# Patient Record
Sex: FEMALE | Race: WHITE | NOT HISPANIC OR LATINO | Employment: OTHER | URBAN - METROPOLITAN AREA
[De-identification: names, ages, dates, MRNs, and addresses within clinical notes are randomized per-mention and may not be internally consistent; named-entity substitution may affect disease eponyms.]

---

## 2017-01-03 ENCOUNTER — ALLSCRIPTS OFFICE VISIT (OUTPATIENT)
Dept: OTHER | Facility: OTHER | Age: 77
End: 2017-01-03

## 2017-01-03 ENCOUNTER — HOSPITAL ENCOUNTER (OUTPATIENT)
Dept: CT IMAGING | Facility: HOSPITAL | Age: 77
Discharge: HOME | End: 2017-01-03
Attending: INTERNAL MEDICINE
Payer: MEDICARE

## 2017-01-03 DIAGNOSIS — C34.91 MALIGNANT NEOPLASM OF UNSPECIFIED PART OF RIGHT BRONCHUS OR LUNG (HCC): ICD-10-CM

## 2017-01-03 PROCEDURE — 74177 CT ABD & PELVIS W/CONTRAST: CPT

## 2017-01-03 PROCEDURE — 71260 CT THORAX DX C+: CPT

## 2017-01-03 RX ADMIN — IOHEXOL 100 ML: 350 INJECTION, SOLUTION INTRAVENOUS at 10:59

## 2017-01-05 ENCOUNTER — ALLSCRIPTS OFFICE VISIT (OUTPATIENT)
Dept: OTHER | Facility: OTHER | Age: 77
End: 2017-01-05

## 2017-01-05 ENCOUNTER — TRANSCRIBE ORDERS (OUTPATIENT)
Dept: ADMINISTRATIVE | Facility: HOSPITAL | Age: 77
End: 2017-01-05

## 2017-01-05 DIAGNOSIS — C34.91 PRIMARY LUNG CANCER WITH METASTASIS FROM LUNG TO OTHER SITE, RIGHT (HCC): Primary | ICD-10-CM

## 2017-01-17 ENCOUNTER — GENERIC CONVERSION - ENCOUNTER (OUTPATIENT)
Dept: OTHER | Facility: OTHER | Age: 77
End: 2017-01-17

## 2017-01-25 ENCOUNTER — GENERIC CONVERSION - ENCOUNTER (OUTPATIENT)
Dept: OTHER | Facility: OTHER | Age: 77
End: 2017-01-25

## 2017-03-02 ENCOUNTER — ALLSCRIPTS OFFICE VISIT (OUTPATIENT)
Dept: OTHER | Facility: OTHER | Age: 77
End: 2017-03-02

## 2017-03-02 DIAGNOSIS — I65.29 OCCLUSION AND STENOSIS OF UNSPECIFIED CAROTID ARTERY: ICD-10-CM

## 2017-03-02 DIAGNOSIS — I73.9 PERIPHERAL VASCULAR DISEASE (HCC): ICD-10-CM

## 2017-03-06 ENCOUNTER — HOSPITAL ENCOUNTER (OUTPATIENT)
Dept: NON INVASIVE DIAGNOSTICS | Facility: HOSPITAL | Age: 77
Discharge: HOME | End: 2017-03-06
Attending: INTERNAL MEDICINE
Payer: MEDICARE

## 2017-03-06 ENCOUNTER — TRANSCRIBE ORDERS (OUTPATIENT)
Dept: ADMINISTRATIVE | Facility: HOSPITAL | Age: 77
End: 2017-03-06

## 2017-03-06 ENCOUNTER — APPOINTMENT (OUTPATIENT)
Dept: LAB | Facility: HOSPITAL | Age: 77
End: 2017-03-06
Attending: INTERNAL MEDICINE
Payer: MEDICARE

## 2017-03-06 DIAGNOSIS — I65.29 OCCLUSION AND STENOSIS OF UNSPECIFIED CAROTID ARTERY: ICD-10-CM

## 2017-03-06 DIAGNOSIS — I73.9 PERIPHERAL VASCULAR DISEASE (HCC): ICD-10-CM

## 2017-03-06 LAB
ALBUMIN SERPL BCP-MCNC: 3.4 G/DL (ref 3.5–5)
ALP SERPL-CCNC: 94 U/L (ref 46–116)
ALT SERPL W P-5'-P-CCNC: 15 U/L (ref 12–78)
AST SERPL W P-5'-P-CCNC: 16 U/L (ref 5–45)
BILIRUB DIRECT SERPL-MCNC: 0.12 MG/DL (ref 0–0.2)
BILIRUB SERPL-MCNC: 0.3 MG/DL (ref 0.2–1)
CHOLEST SERPL-MCNC: 189 MG/DL (ref 50–200)
HDLC SERPL-MCNC: 65 MG/DL (ref 40–60)
LDLC SERPL CALC-MCNC: 95 MG/DL (ref 0–100)
PROT SERPL-MCNC: 7.3 G/DL (ref 6.4–8.2)
TRIGL SERPL-MCNC: 146 MG/DL

## 2017-03-06 PROCEDURE — 36415 COLL VENOUS BLD VENIPUNCTURE: CPT

## 2017-03-06 PROCEDURE — 93306 TTE W/DOPPLER COMPLETE: CPT

## 2017-03-06 PROCEDURE — 80076 HEPATIC FUNCTION PANEL: CPT

## 2017-03-06 PROCEDURE — 80061 LIPID PANEL: CPT

## 2017-04-04 ENCOUNTER — ALLSCRIPTS OFFICE VISIT (OUTPATIENT)
Dept: OTHER | Facility: OTHER | Age: 77
End: 2017-04-04

## 2017-04-17 ENCOUNTER — APPOINTMENT (OUTPATIENT)
Dept: LAB | Facility: HOSPITAL | Age: 77
End: 2017-04-17
Attending: RADIOLOGY
Payer: MEDICARE

## 2017-04-17 ENCOUNTER — HOSPITAL ENCOUNTER (OUTPATIENT)
Dept: CT IMAGING | Facility: HOSPITAL | Age: 77
Discharge: HOME | End: 2017-04-17
Attending: INTERNAL MEDICINE
Payer: MEDICARE

## 2017-04-17 ENCOUNTER — TRANSCRIBE ORDERS (OUTPATIENT)
Dept: ADMINISTRATIVE | Facility: HOSPITAL | Age: 77
End: 2017-04-17

## 2017-04-17 DIAGNOSIS — Z01.818 PREOP TESTING: ICD-10-CM

## 2017-04-17 DIAGNOSIS — C34.91 MALIGNANT NEOPLASM OF UNSPECIFIED PART OF RIGHT BRONCHUS OR LUNG (HCC): ICD-10-CM

## 2017-04-17 DIAGNOSIS — Z01.818 PREOP TESTING: Primary | ICD-10-CM

## 2017-04-17 LAB
CREAT SERPL-MCNC: 0.76 MG/DL (ref 0.6–1.3)
GFR SERPL CREATININE-BSD FRML MDRD: >60 ML/MIN/1.73SQ M

## 2017-04-17 PROCEDURE — 70498 CT ANGIOGRAPHY NECK: CPT

## 2017-04-17 PROCEDURE — 82565 ASSAY OF CREATININE: CPT

## 2017-04-17 PROCEDURE — 36415 COLL VENOUS BLD VENIPUNCTURE: CPT

## 2017-04-17 PROCEDURE — 71260 CT THORAX DX C+: CPT

## 2017-04-17 PROCEDURE — 74177 CT ABD & PELVIS W/CONTRAST: CPT

## 2017-04-17 RX ADMIN — IOHEXOL 100 ML: 350 INJECTION, SOLUTION INTRAVENOUS at 11:03

## 2017-05-01 ENCOUNTER — TRANSCRIBE ORDERS (OUTPATIENT)
Dept: ADMINISTRATIVE | Facility: HOSPITAL | Age: 77
End: 2017-05-01

## 2017-05-01 ENCOUNTER — ALLSCRIPTS OFFICE VISIT (OUTPATIENT)
Dept: OTHER | Facility: OTHER | Age: 77
End: 2017-05-01

## 2017-05-01 DIAGNOSIS — C34.90 PRIMARY LUNG CANCER WITH METASTASIS FROM LUNG TO OTHER SITE, UNSPECIFIED LATERALITY (HCC): Primary | ICD-10-CM

## 2017-05-01 DIAGNOSIS — C34.91 MALIGNANT NEOPLASM OF UNSPECIFIED PART OF RIGHT BRONCHUS OR LUNG (HCC): ICD-10-CM

## 2017-05-04 ENCOUNTER — GENERIC CONVERSION - ENCOUNTER (OUTPATIENT)
Dept: OTHER | Facility: OTHER | Age: 77
End: 2017-05-04

## 2017-05-15 RX ORDER — SODIUM CHLORIDE 9 MG/ML
40 INJECTION, SOLUTION INTRAVENOUS ONCE
Status: DISCONTINUED | OUTPATIENT
Start: 2017-05-16 | End: 2017-05-19 | Stop reason: HOSPADM

## 2017-05-16 ENCOUNTER — HOSPITAL ENCOUNTER (OUTPATIENT)
Dept: INFUSION CENTER | Facility: HOSPITAL | Age: 77
Discharge: HOME | End: 2017-05-16
Payer: MEDICARE

## 2017-05-16 VITALS
OXYGEN SATURATION: 97 % | RESPIRATION RATE: 20 BRPM | SYSTOLIC BLOOD PRESSURE: 147 MMHG | TEMPERATURE: 96.2 F | DIASTOLIC BLOOD PRESSURE: 64 MMHG | HEART RATE: 63 BPM

## 2017-05-16 LAB
ALBUMIN SERPL BCP-MCNC: 3 G/DL (ref 3.5–5)
ALP SERPL-CCNC: 78 U/L (ref 46–116)
ALT SERPL W P-5'-P-CCNC: 13 U/L (ref 12–78)
ANION GAP SERPL CALCULATED.3IONS-SCNC: 4 MMOL/L (ref 4–13)
AST SERPL W P-5'-P-CCNC: 14 U/L (ref 5–45)
BASOPHILS # BLD AUTO: 0.02 THOUSANDS/ΜL (ref 0–0.1)
BASOPHILS NFR BLD AUTO: 0 % (ref 0–1)
BILIRUB SERPL-MCNC: 0.4 MG/DL (ref 0.2–1)
BUN SERPL-MCNC: 14 MG/DL (ref 5–25)
CALCIUM SERPL-MCNC: 8.6 MG/DL (ref 8.3–10.1)
CHLORIDE SERPL-SCNC: 105 MMOL/L (ref 100–108)
CO2 SERPL-SCNC: 34 MMOL/L (ref 21–32)
CREAT SERPL-MCNC: 0.7 MG/DL (ref 0.6–1.3)
EOSINOPHIL # BLD AUTO: 0.11 THOUSAND/ΜL (ref 0–0.61)
EOSINOPHIL NFR BLD AUTO: 2 % (ref 0–6)
ERYTHROCYTE [DISTWIDTH] IN BLOOD BY AUTOMATED COUNT: 12.3 % (ref 11.6–15.1)
GFR SERPL CREATININE-BSD FRML MDRD: >60 ML/MIN/1.73SQ M
GLUCOSE SERPL-MCNC: 161 MG/DL (ref 65–140)
HCT VFR BLD AUTO: 39 % (ref 34.8–46.1)
HGB BLD-MCNC: 12.5 G/DL (ref 11.5–15.4)
LYMPHOCYTES # BLD AUTO: 1.47 THOUSANDS/ΜL (ref 0.6–4.47)
LYMPHOCYTES NFR BLD AUTO: 23 % (ref 14–44)
MCH RBC QN AUTO: 30.9 PG (ref 26.8–34.3)
MCHC RBC AUTO-ENTMCNC: 32.1 G/DL (ref 31.4–37.4)
MCV RBC AUTO: 97 FL (ref 82–98)
MONOCYTES # BLD AUTO: 0.51 THOUSAND/ΜL (ref 0.17–1.22)
MONOCYTES NFR BLD AUTO: 8 % (ref 4–12)
NEUTROPHILS # BLD AUTO: 4.24 THOUSANDS/ΜL (ref 1.85–7.62)
NEUTS SEG NFR BLD AUTO: 67 % (ref 43–75)
PLATELET # BLD AUTO: 128 THOUSANDS/UL (ref 149–390)
PMV BLD AUTO: 10.1 FL (ref 8.9–12.7)
POTASSIUM SERPL-SCNC: 3.5 MMOL/L (ref 3.5–5.3)
PROT SERPL-MCNC: 6.2 G/DL (ref 6.4–8.2)
RBC # BLD AUTO: 4.04 MILLION/UL (ref 3.81–5.12)
SODIUM SERPL-SCNC: 143 MMOL/L (ref 136–145)
TSH SERPL DL<=0.05 MIU/L-ACNC: 2.99 UIU/ML (ref 0.36–3.74)
WBC # BLD AUTO: 6.35 THOUSAND/UL (ref 4.31–10.16)

## 2017-05-16 PROCEDURE — 96413 CHEMO IV INFUSION 1 HR: CPT

## 2017-05-16 PROCEDURE — 84443 ASSAY THYROID STIM HORMONE: CPT | Performed by: INTERNAL MEDICINE

## 2017-05-16 PROCEDURE — 80053 COMPREHEN METABOLIC PANEL: CPT | Performed by: INTERNAL MEDICINE

## 2017-05-16 PROCEDURE — 85025 COMPLETE CBC W/AUTO DIFF WBC: CPT | Performed by: INTERNAL MEDICINE

## 2017-05-16 RX ADMIN — ATEZOLIZUMAB 1200 MG: 1200 INJECTION, SOLUTION INTRAVENOUS at 08:45

## 2017-05-16 RX ADMIN — Medication 300 UNITS: at 10:03

## 2017-05-16 NOTE — PROGRESS NOTES
Per Mabel Grady, Dr Jose Clemente nurse, pt had decided to start Tecentriq now instead of waiting 2 months to restart chemo or an immunotherapy drug per last office visit note

## 2017-05-16 NOTE — PROGRESS NOTES
Pt matthew infusion well  Offers no c/o  Pt used O2 via n/c @ 2L during visit today  Port deaccessed after flushing with Heparin per protocol  Dsd applied  Disch amb to home, steady gait

## 2017-05-16 NOTE — PROGRESS NOTES
Pt arrives on unit for Next Jump  Labs sent as ordered  Pt offers no c/o at this time  Will continue to monitor

## 2017-06-05 ENCOUNTER — ALLSCRIPTS OFFICE VISIT (OUTPATIENT)
Dept: OTHER | Facility: OTHER | Age: 77
End: 2017-06-05

## 2017-06-05 DIAGNOSIS — R53.83 OTHER FATIGUE: ICD-10-CM

## 2017-06-05 DIAGNOSIS — C34.91 MALIGNANT NEOPLASM OF UNSPECIFIED PART OF RIGHT BRONCHUS OR LUNG (HCC): ICD-10-CM

## 2017-06-06 ENCOUNTER — HOSPITAL ENCOUNTER (OUTPATIENT)
Dept: INFUSION CENTER | Facility: HOSPITAL | Age: 77
Discharge: HOME | End: 2017-06-06
Payer: MEDICARE

## 2017-06-06 LAB
ALBUMIN SERPL BCP-MCNC: 3.2 G/DL (ref 3.5–5)
ALP SERPL-CCNC: 84 U/L (ref 46–116)
ALT SERPL W P-5'-P-CCNC: 15 U/L (ref 12–78)
ANION GAP SERPL CALCULATED.3IONS-SCNC: 4 MMOL/L (ref 4–13)
AST SERPL W P-5'-P-CCNC: 13 U/L (ref 5–45)
BASOPHILS # BLD AUTO: 0.02 THOUSANDS/ΜL (ref 0–0.1)
BASOPHILS NFR BLD AUTO: 0 % (ref 0–1)
BILIRUB SERPL-MCNC: 0.4 MG/DL (ref 0.2–1)
BUN SERPL-MCNC: 12 MG/DL (ref 5–25)
CALCIUM SERPL-MCNC: 8.8 MG/DL (ref 8.3–10.1)
CHLORIDE SERPL-SCNC: 102 MMOL/L (ref 100–108)
CO2 SERPL-SCNC: 35 MMOL/L (ref 21–32)
CREAT SERPL-MCNC: 0.66 MG/DL (ref 0.6–1.3)
EOSINOPHIL # BLD AUTO: 0.1 THOUSAND/ΜL (ref 0–0.61)
EOSINOPHIL NFR BLD AUTO: 1 % (ref 0–6)
ERYTHROCYTE [DISTWIDTH] IN BLOOD BY AUTOMATED COUNT: 12.6 % (ref 11.6–15.1)
GFR SERPL CREATININE-BSD FRML MDRD: >60 ML/MIN/1.73SQ M
GLUCOSE SERPL-MCNC: 121 MG/DL (ref 65–140)
HCT VFR BLD AUTO: 39.8 % (ref 34.8–46.1)
HGB BLD-MCNC: 12.7 G/DL (ref 11.5–15.4)
LYMPHOCYTES # BLD AUTO: 2.17 THOUSANDS/ΜL (ref 0.6–4.47)
LYMPHOCYTES NFR BLD AUTO: 31 % (ref 14–44)
MCH RBC QN AUTO: 31.1 PG (ref 26.8–34.3)
MCHC RBC AUTO-ENTMCNC: 31.9 G/DL (ref 31.4–37.4)
MCV RBC AUTO: 98 FL (ref 82–98)
MONOCYTES # BLD AUTO: 0.69 THOUSAND/ΜL (ref 0.17–1.22)
MONOCYTES NFR BLD AUTO: 10 % (ref 4–12)
NEUTROPHILS # BLD AUTO: 4.05 THOUSANDS/ΜL (ref 1.85–7.62)
NEUTS SEG NFR BLD AUTO: 58 % (ref 43–75)
PLATELET # BLD AUTO: 146 THOUSANDS/UL (ref 149–390)
PMV BLD AUTO: 10.2 FL (ref 8.9–12.7)
POTASSIUM SERPL-SCNC: 4.1 MMOL/L (ref 3.5–5.3)
PROT SERPL-MCNC: 6.6 G/DL (ref 6.4–8.2)
RBC # BLD AUTO: 4.08 MILLION/UL (ref 3.81–5.12)
SODIUM SERPL-SCNC: 141 MMOL/L (ref 136–145)
WBC # BLD AUTO: 7.03 THOUSAND/UL (ref 4.31–10.16)

## 2017-06-06 PROCEDURE — 85025 COMPLETE CBC W/AUTO DIFF WBC: CPT | Performed by: INTERNAL MEDICINE

## 2017-06-06 PROCEDURE — 80053 COMPREHEN METABOLIC PANEL: CPT | Performed by: INTERNAL MEDICINE

## 2017-06-06 RX ORDER — SODIUM CHLORIDE 9 MG/ML
40 INJECTION, SOLUTION INTRAVENOUS ONCE
Status: DISCONTINUED | OUTPATIENT
Start: 2017-06-07 | End: 2017-06-10 | Stop reason: HOSPADM

## 2017-06-06 RX ADMIN — Medication 300 UNITS: at 15:30

## 2017-06-07 ENCOUNTER — HOSPITAL ENCOUNTER (OUTPATIENT)
Dept: INFUSION CENTER | Facility: HOSPITAL | Age: 77
Discharge: HOME | End: 2017-06-07
Payer: MEDICARE

## 2017-06-07 VITALS
TEMPERATURE: 96.3 F | RESPIRATION RATE: 18 BRPM | DIASTOLIC BLOOD PRESSURE: 82 MMHG | SYSTOLIC BLOOD PRESSURE: 147 MMHG | WEIGHT: 110.23 LBS | HEIGHT: 60 IN | OXYGEN SATURATION: 96 % | HEART RATE: 78 BPM | BODY MASS INDEX: 21.64 KG/M2

## 2017-06-07 PROCEDURE — 96413 CHEMO IV INFUSION 1 HR: CPT

## 2017-06-07 RX ADMIN — ATEZOLIZUMAB 1200 MG: 1200 INJECTION, SOLUTION INTRAVENOUS at 09:07

## 2017-06-07 RX ADMIN — Medication 300 UNITS: at 09:51

## 2017-06-13 ENCOUNTER — GENERIC CONVERSION - ENCOUNTER (OUTPATIENT)
Dept: OTHER | Facility: OTHER | Age: 77
End: 2017-06-13

## 2017-06-19 DIAGNOSIS — C34.91 MALIGNANT NEOPLASM OF UNSPECIFIED PART OF RIGHT BRONCHUS OR LUNG (HCC): ICD-10-CM

## 2017-06-26 ENCOUNTER — TRANSCRIBE ORDERS (OUTPATIENT)
Dept: ADMINISTRATIVE | Facility: HOSPITAL | Age: 77
End: 2017-06-26

## 2017-06-26 ENCOUNTER — HOSPITAL ENCOUNTER (OUTPATIENT)
Dept: INFUSION CENTER | Facility: HOSPITAL | Age: 77
Discharge: HOME | End: 2017-06-26
Payer: MEDICARE

## 2017-06-26 ENCOUNTER — ALLSCRIPTS OFFICE VISIT (OUTPATIENT)
Dept: OTHER | Facility: OTHER | Age: 77
End: 2017-06-26

## 2017-06-26 DIAGNOSIS — R53.83 OTHER FATIGUE: ICD-10-CM

## 2017-06-26 DIAGNOSIS — R91.1 COIN LESION: Primary | ICD-10-CM

## 2017-06-26 DIAGNOSIS — C34.91 MALIGNANT NEOPLASM OF UNSPECIFIED PART OF RIGHT BRONCHUS OR LUNG (HCC): ICD-10-CM

## 2017-06-26 DIAGNOSIS — R91.1 SOLITARY PULMONARY NODULE: ICD-10-CM

## 2017-06-26 LAB
ALBUMIN SERPL BCP-MCNC: 3.3 G/DL (ref 3.5–5)
ALP SERPL-CCNC: 86 U/L (ref 46–116)
ALT SERPL W P-5'-P-CCNC: 17 U/L (ref 12–78)
ANION GAP SERPL CALCULATED.3IONS-SCNC: 6 MMOL/L (ref 4–13)
AST SERPL W P-5'-P-CCNC: 13 U/L (ref 5–45)
BASOPHILS # BLD AUTO: 0.01 THOUSANDS/ΜL (ref 0–0.1)
BASOPHILS NFR BLD AUTO: 0 % (ref 0–1)
BILIRUB SERPL-MCNC: 0.4 MG/DL (ref 0.2–1)
BUN SERPL-MCNC: 11 MG/DL (ref 5–25)
CALCIUM SERPL-MCNC: 9.1 MG/DL (ref 8.3–10.1)
CHLORIDE SERPL-SCNC: 102 MMOL/L (ref 100–108)
CO2 SERPL-SCNC: 34 MMOL/L (ref 21–32)
CREAT SERPL-MCNC: 0.82 MG/DL (ref 0.6–1.3)
EOSINOPHIL # BLD AUTO: 0.09 THOUSAND/ΜL (ref 0–0.61)
EOSINOPHIL NFR BLD AUTO: 1 % (ref 0–6)
ERYTHROCYTE [DISTWIDTH] IN BLOOD BY AUTOMATED COUNT: 12.5 % (ref 11.6–15.1)
GFR SERPL CREATININE-BSD FRML MDRD: >60 ML/MIN/1.73SQ M
GLUCOSE SERPL-MCNC: 195 MG/DL (ref 65–140)
HCT VFR BLD AUTO: 40.5 % (ref 34.8–46.1)
HGB BLD-MCNC: 12.9 G/DL (ref 11.5–15.4)
LYMPHOCYTES # BLD AUTO: 2.18 THOUSANDS/ΜL (ref 0.6–4.47)
LYMPHOCYTES NFR BLD AUTO: 29 % (ref 14–44)
MCH RBC QN AUTO: 31.5 PG (ref 26.8–34.3)
MCHC RBC AUTO-ENTMCNC: 31.9 G/DL (ref 31.4–37.4)
MCV RBC AUTO: 99 FL (ref 82–98)
MONOCYTES # BLD AUTO: 0.49 THOUSAND/ΜL (ref 0.17–1.22)
MONOCYTES NFR BLD AUTO: 7 % (ref 4–12)
NEUTROPHILS # BLD AUTO: 4.76 THOUSANDS/ΜL (ref 1.85–7.62)
NEUTS SEG NFR BLD AUTO: 63 % (ref 43–75)
PLATELET # BLD AUTO: 153 THOUSANDS/UL (ref 149–390)
PMV BLD AUTO: 9.9 FL (ref 8.9–12.7)
POTASSIUM SERPL-SCNC: 4.2 MMOL/L (ref 3.5–5.3)
PROT SERPL-MCNC: 6.6 G/DL (ref 6.4–8.2)
RBC # BLD AUTO: 4.1 MILLION/UL (ref 3.81–5.12)
SODIUM SERPL-SCNC: 142 MMOL/L (ref 136–145)
TSH SERPL DL<=0.05 MIU/L-ACNC: 3 UIU/ML (ref 0.36–3.74)
WBC # BLD AUTO: 7.53 THOUSAND/UL (ref 4.31–10.16)

## 2017-06-26 PROCEDURE — 80053 COMPREHEN METABOLIC PANEL: CPT | Performed by: INTERNAL MEDICINE

## 2017-06-26 PROCEDURE — 84443 ASSAY THYROID STIM HORMONE: CPT | Performed by: INTERNAL MEDICINE

## 2017-06-26 PROCEDURE — 85025 COMPLETE CBC W/AUTO DIFF WBC: CPT | Performed by: INTERNAL MEDICINE

## 2017-06-26 RX ORDER — SODIUM CHLORIDE 9 MG/ML
40 INJECTION, SOLUTION INTRAVENOUS ONCE
Status: DISCONTINUED | OUTPATIENT
Start: 2017-06-27 | End: 2017-06-30 | Stop reason: HOSPADM

## 2017-06-26 RX ADMIN — Medication 300 UNITS: at 11:51

## 2017-06-26 NOTE — PROGRESS NOTES
Rec'd pt in good spirits, arrived with , using O2 concentrator at 2 liters nc  Port accessed for labs, deaccessed per protocol, sm bandage applied  Pt then d/c'd to home with steady gait

## 2017-06-27 ENCOUNTER — HOSPITAL ENCOUNTER (OUTPATIENT)
Dept: INFUSION CENTER | Facility: HOSPITAL | Age: 77
Discharge: HOME | End: 2017-06-27
Payer: MEDICARE

## 2017-06-27 VITALS
OXYGEN SATURATION: 98 % | DIASTOLIC BLOOD PRESSURE: 65 MMHG | SYSTOLIC BLOOD PRESSURE: 162 MMHG | RESPIRATION RATE: 18 BRPM | HEART RATE: 59 BPM | WEIGHT: 110.23 LBS | TEMPERATURE: 96 F | BODY MASS INDEX: 21.7 KG/M2

## 2017-06-27 PROCEDURE — 96413 CHEMO IV INFUSION 1 HR: CPT

## 2017-06-27 RX ADMIN — ATEZOLIZUMAB 1200 MG: 1200 INJECTION, SOLUTION INTRAVENOUS at 09:06

## 2017-06-27 RX ADMIN — Medication 300 UNITS: at 09:47

## 2017-06-27 NOTE — PROGRESS NOTES
Pt tolerated chemotherapy infusion with no adverse reactions   Pt then d/c'd home ambulatory with steady gait

## 2017-06-27 NOTE — PROGRESS NOTES
Pt arrives on unit for chemotherapy  Labs wnl to proceed with treatment  Pt offers no c/o at this time  Will continue to monitor

## 2017-07-11 ENCOUNTER — HOSPITAL ENCOUNTER (OUTPATIENT)
Dept: CT IMAGING | Facility: HOSPITAL | Age: 77
Discharge: HOME | End: 2017-07-11
Attending: INTERNAL MEDICINE
Payer: MEDICARE

## 2017-07-11 DIAGNOSIS — R91.1 SOLITARY PULMONARY NODULE: ICD-10-CM

## 2017-07-11 PROCEDURE — 71260 CT THORAX DX C+: CPT

## 2017-07-11 PROCEDURE — 74177 CT ABD & PELVIS W/CONTRAST: CPT

## 2017-07-11 RX ADMIN — IOHEXOL 100 ML: 350 INJECTION, SOLUTION INTRAVENOUS at 07:47

## 2017-07-17 ENCOUNTER — ALLSCRIPTS OFFICE VISIT (OUTPATIENT)
Dept: OTHER | Facility: OTHER | Age: 77
End: 2017-07-17

## 2017-07-17 ENCOUNTER — HOSPITAL ENCOUNTER (OUTPATIENT)
Dept: INFUSION CENTER | Facility: HOSPITAL | Age: 77
Discharge: HOME | End: 2017-07-17
Payer: MEDICARE

## 2017-07-17 LAB
ALBUMIN SERPL BCP-MCNC: 3.3 G/DL (ref 3.5–5)
ALP SERPL-CCNC: 77 U/L (ref 46–116)
ALT SERPL W P-5'-P-CCNC: 14 U/L (ref 12–78)
ANION GAP SERPL CALCULATED.3IONS-SCNC: 3 MMOL/L (ref 4–13)
AST SERPL W P-5'-P-CCNC: 14 U/L (ref 5–45)
BASOPHILS # BLD AUTO: 0.02 THOUSANDS/ΜL (ref 0–0.1)
BASOPHILS NFR BLD AUTO: 0 % (ref 0–1)
BILIRUB SERPL-MCNC: 0.5 MG/DL (ref 0.2–1)
BUN SERPL-MCNC: 13 MG/DL (ref 5–25)
CALCIUM SERPL-MCNC: 8.9 MG/DL (ref 8.3–10.1)
CHLORIDE SERPL-SCNC: 103 MMOL/L (ref 100–108)
CO2 SERPL-SCNC: 35 MMOL/L (ref 21–32)
CREAT SERPL-MCNC: 0.78 MG/DL (ref 0.6–1.3)
EOSINOPHIL # BLD AUTO: 0.09 THOUSAND/ΜL (ref 0–0.61)
EOSINOPHIL NFR BLD AUTO: 1 % (ref 0–6)
ERYTHROCYTE [DISTWIDTH] IN BLOOD BY AUTOMATED COUNT: 12.1 % (ref 11.6–15.1)
GFR SERPL CREATININE-BSD FRML MDRD: >60 ML/MIN/1.73SQ M
GLUCOSE SERPL-MCNC: 196 MG/DL (ref 65–140)
HCT VFR BLD AUTO: 41.1 % (ref 34.8–46.1)
HGB BLD-MCNC: 13.3 G/DL (ref 11.5–15.4)
LYMPHOCYTES # BLD AUTO: 2.14 THOUSANDS/ΜL (ref 0.6–4.47)
LYMPHOCYTES NFR BLD AUTO: 32 % (ref 14–44)
MCH RBC QN AUTO: 32 PG (ref 26.8–34.3)
MCHC RBC AUTO-ENTMCNC: 32.4 G/DL (ref 31.4–37.4)
MCV RBC AUTO: 99 FL (ref 82–98)
MONOCYTES # BLD AUTO: 0.4 THOUSAND/ΜL (ref 0.17–1.22)
MONOCYTES NFR BLD AUTO: 6 % (ref 4–12)
NEUTROPHILS # BLD AUTO: 3.98 THOUSANDS/ΜL (ref 1.85–7.62)
NEUTS SEG NFR BLD AUTO: 61 % (ref 43–75)
PLATELET # BLD AUTO: 135 THOUSANDS/UL (ref 149–390)
PMV BLD AUTO: 10 FL (ref 8.9–12.7)
POTASSIUM SERPL-SCNC: 3.9 MMOL/L (ref 3.5–5.3)
PROT SERPL-MCNC: 6.6 G/DL (ref 6.4–8.2)
RBC # BLD AUTO: 4.15 MILLION/UL (ref 3.81–5.12)
SODIUM SERPL-SCNC: 141 MMOL/L (ref 136–145)
TSH SERPL DL<=0.05 MIU/L-ACNC: 4.46 UIU/ML (ref 0.36–3.74)
WBC # BLD AUTO: 6.63 THOUSAND/UL (ref 4.31–10.16)

## 2017-07-17 PROCEDURE — 80053 COMPREHEN METABOLIC PANEL: CPT | Performed by: INTERNAL MEDICINE

## 2017-07-17 PROCEDURE — 85025 COMPLETE CBC W/AUTO DIFF WBC: CPT | Performed by: INTERNAL MEDICINE

## 2017-07-17 PROCEDURE — 84443 ASSAY THYROID STIM HORMONE: CPT | Performed by: INTERNAL MEDICINE

## 2017-07-17 RX ORDER — SODIUM CHLORIDE 9 MG/ML
20 INJECTION, SOLUTION INTRAVENOUS CONTINUOUS
Status: DISPENSED | OUTPATIENT
Start: 2017-07-18 | End: 2017-07-19

## 2017-07-17 RX ADMIN — Medication 300 UNITS: at 14:28

## 2017-07-17 NOTE — PROGRESS NOTES
Patient in infusion center today for central line labs  Pt tolerated port access and deaccess with no adverse reactions  Pt then d/cd home ambulatory with steady gait

## 2017-07-18 ENCOUNTER — HOSPITAL ENCOUNTER (OUTPATIENT)
Dept: INFUSION CENTER | Facility: HOSPITAL | Age: 77
Discharge: HOME | End: 2017-07-18
Payer: MEDICARE

## 2017-07-18 VITALS
BODY MASS INDEX: 21.77 KG/M2 | DIASTOLIC BLOOD PRESSURE: 63 MMHG | TEMPERATURE: 95.9 F | HEART RATE: 57 BPM | OXYGEN SATURATION: 96 % | WEIGHT: 110.89 LBS | SYSTOLIC BLOOD PRESSURE: 158 MMHG | RESPIRATION RATE: 18 BRPM | HEIGHT: 60 IN

## 2017-07-18 PROCEDURE — 96413 CHEMO IV INFUSION 1 HR: CPT

## 2017-07-18 PROCEDURE — C9483 INJECTION, ATEZOLIZUMAB: HCPCS | Performed by: INTERNAL MEDICINE

## 2017-07-18 RX ADMIN — Medication 300 UNITS: at 09:46

## 2017-07-18 RX ADMIN — ATEZOLIZUMAB 1200 MG: 1200 INJECTION, SOLUTION INTRAVENOUS at 09:10

## 2017-07-18 RX ADMIN — SODIUM CHLORIDE 20 ML/HR: 9 INJECTION, SOLUTION INTRAVENOUS at 08:30

## 2017-07-18 NOTE — PROGRESS NOTES
Pt arrives on unit for Tecentriq  Offers no c/o at this time  Notified Hay Wu RN of elevated TSH  As per [employer] no new orders at this time, okay to proceed with treatment  Will continue to monitor

## 2017-07-25 ENCOUNTER — GENERIC CONVERSION - ENCOUNTER (OUTPATIENT)
Dept: OTHER | Facility: OTHER | Age: 77
End: 2017-07-25

## 2017-07-25 LAB
LEFT EYE DIABETIC RETINOPATHY: NORMAL
RIGHT EYE DIABETIC RETINOPATHY: NORMAL

## 2017-08-07 ENCOUNTER — ALLSCRIPTS OFFICE VISIT (OUTPATIENT)
Dept: OTHER | Facility: OTHER | Age: 77
End: 2017-08-07

## 2017-08-07 ENCOUNTER — HOSPITAL ENCOUNTER (OUTPATIENT)
Dept: INFUSION CENTER | Facility: HOSPITAL | Age: 77
Discharge: HOME | End: 2017-08-07
Payer: MEDICARE

## 2017-08-07 VITALS — BODY MASS INDEX: 21.77 KG/M2 | HEIGHT: 60 IN | WEIGHT: 110.89 LBS

## 2017-08-07 DIAGNOSIS — C34.91 MALIGNANT NEOPLASM OF UNSPECIFIED PART OF RIGHT BRONCHUS OR LUNG (HCC): ICD-10-CM

## 2017-08-07 DIAGNOSIS — R53.83 OTHER FATIGUE: ICD-10-CM

## 2017-08-07 DIAGNOSIS — R91.1 SOLITARY PULMONARY NODULE: ICD-10-CM

## 2017-08-07 LAB
ALBUMIN SERPL BCP-MCNC: 3.4 G/DL (ref 3.5–5)
ALP SERPL-CCNC: 84 U/L (ref 46–116)
ALT SERPL W P-5'-P-CCNC: 9 U/L (ref 12–78)
ANION GAP SERPL CALCULATED.3IONS-SCNC: 4 MMOL/L (ref 4–13)
AST SERPL W P-5'-P-CCNC: 14 U/L (ref 5–45)
BASOPHILS # BLD AUTO: 0.02 THOUSANDS/ΜL (ref 0–0.1)
BASOPHILS NFR BLD AUTO: 0 % (ref 0–1)
BILIRUB SERPL-MCNC: 0.55 MG/DL (ref 0.2–1)
BUN SERPL-MCNC: 13 MG/DL (ref 5–25)
CALCIUM SERPL-MCNC: 9.1 MG/DL (ref 8.3–10.1)
CHLORIDE SERPL-SCNC: 102 MMOL/L (ref 100–108)
CO2 SERPL-SCNC: 33 MMOL/L (ref 21–32)
CREAT SERPL-MCNC: 0.62 MG/DL (ref 0.6–1.3)
EOSINOPHIL # BLD AUTO: 0.11 THOUSAND/ΜL (ref 0–0.61)
EOSINOPHIL NFR BLD AUTO: 1 % (ref 0–6)
ERYTHROCYTE [DISTWIDTH] IN BLOOD BY AUTOMATED COUNT: 12 % (ref 11.6–15.1)
GFR SERPL CREATININE-BSD FRML MDRD: 87 ML/MIN/1.73SQ M
GLUCOSE SERPL-MCNC: 93 MG/DL (ref 65–140)
HCT VFR BLD AUTO: 41.9 % (ref 34.8–46.1)
HGB BLD-MCNC: 14.1 G/DL (ref 11.5–15.4)
LYMPHOCYTES # BLD AUTO: 2.09 THOUSANDS/ΜL (ref 0.6–4.47)
LYMPHOCYTES NFR BLD AUTO: 25 % (ref 14–44)
MCH RBC QN AUTO: 32.2 PG (ref 26.8–34.3)
MCHC RBC AUTO-ENTMCNC: 33.7 G/DL (ref 31.4–37.4)
MCV RBC AUTO: 96 FL (ref 82–98)
MONOCYTES # BLD AUTO: 0.59 THOUSAND/ΜL (ref 0.17–1.22)
MONOCYTES NFR BLD AUTO: 7 % (ref 4–12)
NEUTROPHILS # BLD AUTO: 5.49 THOUSANDS/ΜL (ref 1.85–7.62)
NEUTS SEG NFR BLD AUTO: 67 % (ref 43–75)
NRBC BLD AUTO-RTO: 0 /100 WBCS
PLATELET # BLD AUTO: 145 THOUSANDS/UL (ref 149–390)
PMV BLD AUTO: 10.1 FL (ref 8.9–12.7)
POTASSIUM SERPL-SCNC: 4.1 MMOL/L (ref 3.5–5.3)
PROT SERPL-MCNC: 6.9 G/DL (ref 6.4–8.2)
RBC # BLD AUTO: 4.38 MILLION/UL (ref 3.81–5.12)
SODIUM SERPL-SCNC: 139 MMOL/L (ref 136–145)
WBC # BLD AUTO: 8.32 THOUSAND/UL (ref 4.31–10.16)

## 2017-08-07 PROCEDURE — 85025 COMPLETE CBC W/AUTO DIFF WBC: CPT | Performed by: INTERNAL MEDICINE

## 2017-08-07 PROCEDURE — 80053 COMPREHEN METABOLIC PANEL: CPT | Performed by: INTERNAL MEDICINE

## 2017-08-07 RX ORDER — SODIUM CHLORIDE 9 MG/ML
20 INJECTION, SOLUTION INTRAVENOUS CONTINUOUS
Status: DISPENSED | OUTPATIENT
Start: 2017-08-08 | End: 2017-08-09

## 2017-08-07 RX ADMIN — Medication 300 UNITS: at 10:37

## 2017-08-08 ENCOUNTER — HOSPITAL ENCOUNTER (OUTPATIENT)
Dept: INFUSION CENTER | Facility: HOSPITAL | Age: 77
Discharge: HOME | End: 2017-08-08
Payer: MEDICARE

## 2017-08-08 VITALS
TEMPERATURE: 96.5 F | OXYGEN SATURATION: 96 % | RESPIRATION RATE: 20 BRPM | WEIGHT: 110.89 LBS | DIASTOLIC BLOOD PRESSURE: 67 MMHG | BODY MASS INDEX: 21.77 KG/M2 | SYSTOLIC BLOOD PRESSURE: 142 MMHG | HEIGHT: 60 IN | HEART RATE: 65 BPM

## 2017-08-08 PROCEDURE — C9483 INJECTION, ATEZOLIZUMAB: HCPCS | Performed by: INTERNAL MEDICINE

## 2017-08-08 PROCEDURE — 96413 CHEMO IV INFUSION 1 HR: CPT

## 2017-08-08 RX ADMIN — Medication 300 UNITS: at 09:30

## 2017-08-08 RX ADMIN — ATEZOLIZUMAB 1200 MG: 1200 INJECTION, SOLUTION INTRAVENOUS at 08:53

## 2017-08-08 NOTE — PROGRESS NOTES
Pt arrives on unit for Tecentriq  Offers no c/o at this time  Labs wnl to proceed with treatment  Will continue to monitor

## 2017-08-28 ENCOUNTER — HOSPITAL ENCOUNTER (OUTPATIENT)
Dept: INFUSION CENTER | Facility: HOSPITAL | Age: 77
Discharge: HOME | End: 2017-08-28
Payer: MEDICARE

## 2017-08-28 ENCOUNTER — TRANSCRIBE ORDERS (OUTPATIENT)
Dept: ADMINISTRATIVE | Facility: HOSPITAL | Age: 77
End: 2017-08-28

## 2017-08-28 ENCOUNTER — ALLSCRIPTS OFFICE VISIT (OUTPATIENT)
Dept: OTHER | Facility: OTHER | Age: 77
End: 2017-08-28

## 2017-08-28 DIAGNOSIS — C34.10 SUPERIOR PULMONARY SULCUS SYNDROME, UNSPECIFIED LATERALITY (HCC): Primary | ICD-10-CM

## 2017-08-28 LAB
ALBUMIN SERPL BCP-MCNC: 3.4 G/DL (ref 3.5–5)
ALP SERPL-CCNC: 79 U/L (ref 46–116)
ALT SERPL W P-5'-P-CCNC: 14 U/L (ref 12–78)
ANION GAP SERPL CALCULATED.3IONS-SCNC: 4 MMOL/L (ref 4–13)
AST SERPL W P-5'-P-CCNC: 15 U/L (ref 5–45)
BASOPHILS # BLD AUTO: 0.01 THOUSANDS/ΜL (ref 0–0.1)
BASOPHILS NFR BLD AUTO: 0 % (ref 0–1)
BILIRUB SERPL-MCNC: 0.4 MG/DL (ref 0.2–1)
BUN SERPL-MCNC: 12 MG/DL (ref 5–25)
CALCIUM SERPL-MCNC: 9 MG/DL (ref 8.3–10.1)
CHLORIDE SERPL-SCNC: 103 MMOL/L (ref 100–108)
CO2 SERPL-SCNC: 36 MMOL/L (ref 21–32)
CREAT SERPL-MCNC: 0.69 MG/DL (ref 0.6–1.3)
EOSINOPHIL # BLD AUTO: 0.1 THOUSAND/ΜL (ref 0–0.61)
EOSINOPHIL NFR BLD AUTO: 1 % (ref 0–6)
ERYTHROCYTE [DISTWIDTH] IN BLOOD BY AUTOMATED COUNT: 11.9 % (ref 11.6–15.1)
GFR SERPL CREATININE-BSD FRML MDRD: 84 ML/MIN/1.73SQ M
GLUCOSE P FAST SERPL-MCNC: 126 MG/DL (ref 65–99)
GLUCOSE SERPL-MCNC: 126 MG/DL (ref 65–140)
HCT VFR BLD AUTO: 42.6 % (ref 34.8–46.1)
HGB BLD-MCNC: 13.7 G/DL (ref 11.5–15.4)
LYMPHOCYTES # BLD AUTO: 1.93 THOUSANDS/ΜL (ref 0.6–4.47)
LYMPHOCYTES NFR BLD AUTO: 28 % (ref 14–44)
MCH RBC QN AUTO: 31.4 PG (ref 26.8–34.3)
MCHC RBC AUTO-ENTMCNC: 32.2 G/DL (ref 31.4–37.4)
MCV RBC AUTO: 98 FL (ref 82–98)
MONOCYTES # BLD AUTO: 0.59 THOUSAND/ΜL (ref 0.17–1.22)
MONOCYTES NFR BLD AUTO: 9 % (ref 4–12)
NEUTROPHILS # BLD AUTO: 4.28 THOUSANDS/ΜL (ref 1.85–7.62)
NEUTS SEG NFR BLD AUTO: 62 % (ref 43–75)
PLATELET # BLD AUTO: 139 THOUSANDS/UL (ref 149–390)
PMV BLD AUTO: 10.7 FL (ref 8.9–12.7)
POTASSIUM SERPL-SCNC: 3.9 MMOL/L (ref 3.5–5.3)
PROT SERPL-MCNC: 6.6 G/DL (ref 6.4–8.2)
RBC # BLD AUTO: 4.36 MILLION/UL (ref 3.81–5.12)
SODIUM SERPL-SCNC: 143 MMOL/L (ref 136–145)
T4 FREE SERPL-MCNC: 1.69 NG/DL (ref 0.76–1.46)
TSH SERPL DL<=0.05 MIU/L-ACNC: 5.15 UIU/ML (ref 0.36–3.74)
WBC # BLD AUTO: 6.91 THOUSAND/UL (ref 4.31–10.16)

## 2017-08-28 PROCEDURE — 85025 COMPLETE CBC W/AUTO DIFF WBC: CPT | Performed by: INTERNAL MEDICINE

## 2017-08-28 PROCEDURE — 84439 ASSAY OF FREE THYROXINE: CPT | Performed by: INTERNAL MEDICINE

## 2017-08-28 PROCEDURE — 80053 COMPREHEN METABOLIC PANEL: CPT | Performed by: INTERNAL MEDICINE

## 2017-08-28 PROCEDURE — 84443 ASSAY THYROID STIM HORMONE: CPT | Performed by: INTERNAL MEDICINE

## 2017-08-28 RX ORDER — SODIUM CHLORIDE 9 MG/ML
20 INJECTION, SOLUTION INTRAVENOUS CONTINUOUS
Status: DISCONTINUED | OUTPATIENT
Start: 2017-08-29 | End: 2017-09-01 | Stop reason: HOSPADM

## 2017-08-28 RX ADMIN — Medication 300 UNITS: at 08:25

## 2017-08-28 NOTE — PROGRESS NOTES
Blood drawn for labs from patients port  Port flushed with heparin  Pt discharged from unit ambulatory and with a steady gait

## 2017-08-29 ENCOUNTER — HOSPITAL ENCOUNTER (OUTPATIENT)
Dept: INFUSION CENTER | Facility: HOSPITAL | Age: 77
Discharge: HOME | End: 2017-08-29
Payer: MEDICARE

## 2017-08-29 VITALS
TEMPERATURE: 96 F | OXYGEN SATURATION: 98 % | DIASTOLIC BLOOD PRESSURE: 55 MMHG | BODY MASS INDEX: 22.03 KG/M2 | HEART RATE: 65 BPM | RESPIRATION RATE: 18 BRPM | SYSTOLIC BLOOD PRESSURE: 142 MMHG | WEIGHT: 112.21 LBS | HEIGHT: 60 IN

## 2017-08-29 PROCEDURE — 96413 CHEMO IV INFUSION 1 HR: CPT

## 2017-08-29 PROCEDURE — C9483 INJECTION, ATEZOLIZUMAB: HCPCS | Performed by: INTERNAL MEDICINE

## 2017-08-29 RX ADMIN — Medication 300 UNITS: at 09:26

## 2017-08-29 RX ADMIN — SODIUM CHLORIDE 20 ML/HR: 9 INJECTION, SOLUTION INTRAVENOUS at 08:05

## 2017-08-29 RX ADMIN — ATEZOLIZUMAB 1200 MG: 1200 INJECTION, SOLUTION INTRAVENOUS at 08:47

## 2017-09-18 ENCOUNTER — HOSPITAL ENCOUNTER (OUTPATIENT)
Dept: INFUSION CENTER | Facility: HOSPITAL | Age: 77
Discharge: HOME | End: 2017-09-18
Payer: MEDICARE

## 2017-09-18 DIAGNOSIS — R91.1 SOLITARY PULMONARY NODULE: ICD-10-CM

## 2017-09-18 DIAGNOSIS — R53.83 OTHER FATIGUE: ICD-10-CM

## 2017-09-18 DIAGNOSIS — C34.91 MALIGNANT NEOPLASM OF UNSPECIFIED PART OF RIGHT BRONCHUS OR LUNG (HCC): ICD-10-CM

## 2017-09-18 DIAGNOSIS — C34.10 MALIGNANT NEOPLASM OF UPPER LOBE, UNSPECIFIED BRONCHUS OR LUNG (HCC): ICD-10-CM

## 2017-09-18 LAB
ALBUMIN SERPL BCP-MCNC: 3.3 G/DL (ref 3.5–5)
ALP SERPL-CCNC: 78 U/L (ref 46–116)
ALT SERPL W P-5'-P-CCNC: 14 U/L (ref 12–78)
ANION GAP SERPL CALCULATED.3IONS-SCNC: 5 MMOL/L (ref 4–13)
AST SERPL W P-5'-P-CCNC: 14 U/L (ref 5–45)
BASOPHILS # BLD AUTO: 0.01 THOUSANDS/ΜL (ref 0–0.1)
BASOPHILS NFR BLD AUTO: 0 % (ref 0–1)
BILIRUB SERPL-MCNC: 0.5 MG/DL (ref 0.2–1)
BUN SERPL-MCNC: 14 MG/DL (ref 5–25)
CALCIUM SERPL-MCNC: 8.8 MG/DL (ref 8.3–10.1)
CHLORIDE SERPL-SCNC: 104 MMOL/L (ref 100–108)
CO2 SERPL-SCNC: 35 MMOL/L (ref 21–32)
CREAT SERPL-MCNC: 0.7 MG/DL (ref 0.6–1.3)
EOSINOPHIL # BLD AUTO: 0.11 THOUSAND/ΜL (ref 0–0.61)
EOSINOPHIL NFR BLD AUTO: 2 % (ref 0–6)
ERYTHROCYTE [DISTWIDTH] IN BLOOD BY AUTOMATED COUNT: 11.9 % (ref 11.6–15.1)
GFR SERPL CREATININE-BSD FRML MDRD: 83 ML/MIN/1.73SQ M
GLUCOSE SERPL-MCNC: 129 MG/DL (ref 65–140)
HCT VFR BLD AUTO: 42.6 % (ref 34.8–46.1)
HGB BLD-MCNC: 13.8 G/DL (ref 11.5–15.4)
LYMPHOCYTES # BLD AUTO: 1.82 THOUSANDS/ΜL (ref 0.6–4.47)
LYMPHOCYTES NFR BLD AUTO: 28 % (ref 14–44)
MCH RBC QN AUTO: 31.7 PG (ref 26.8–34.3)
MCHC RBC AUTO-ENTMCNC: 32.4 G/DL (ref 31.4–37.4)
MCV RBC AUTO: 98 FL (ref 82–98)
MONOCYTES # BLD AUTO: 0.56 THOUSAND/ΜL (ref 0.17–1.22)
MONOCYTES NFR BLD AUTO: 9 % (ref 4–12)
NEUTROPHILS # BLD AUTO: 4.02 THOUSANDS/ΜL (ref 1.85–7.62)
NEUTS SEG NFR BLD AUTO: 61 % (ref 43–75)
PLATELET # BLD AUTO: 133 THOUSANDS/UL (ref 149–390)
PMV BLD AUTO: 9.9 FL (ref 8.9–12.7)
POTASSIUM SERPL-SCNC: 4 MMOL/L (ref 3.5–5.3)
PROT SERPL-MCNC: 6.6 G/DL (ref 6.4–8.2)
RBC # BLD AUTO: 4.35 MILLION/UL (ref 3.81–5.12)
SODIUM SERPL-SCNC: 144 MMOL/L (ref 136–145)
WBC # BLD AUTO: 6.52 THOUSAND/UL (ref 4.31–10.16)

## 2017-09-18 PROCEDURE — 85025 COMPLETE CBC W/AUTO DIFF WBC: CPT | Performed by: INTERNAL MEDICINE

## 2017-09-18 PROCEDURE — 80053 COMPREHEN METABOLIC PANEL: CPT | Performed by: INTERNAL MEDICINE

## 2017-09-18 RX ORDER — SODIUM CHLORIDE 9 MG/ML
20 INJECTION, SOLUTION INTRAVENOUS CONTINUOUS
Status: DISPENSED | OUTPATIENT
Start: 2017-09-19 | End: 2017-09-20

## 2017-09-18 RX ADMIN — Medication 300 UNITS: at 08:23

## 2017-09-19 ENCOUNTER — HOSPITAL ENCOUNTER (OUTPATIENT)
Dept: INFUSION CENTER | Facility: HOSPITAL | Age: 77
Discharge: HOME | End: 2017-09-19
Payer: MEDICARE

## 2017-09-19 VITALS
HEART RATE: 64 BPM | DIASTOLIC BLOOD PRESSURE: 58 MMHG | SYSTOLIC BLOOD PRESSURE: 142 MMHG | OXYGEN SATURATION: 100 % | RESPIRATION RATE: 20 BRPM

## 2017-09-19 PROCEDURE — C9483 INJECTION, ATEZOLIZUMAB: HCPCS | Performed by: INTERNAL MEDICINE

## 2017-09-19 PROCEDURE — 96413 CHEMO IV INFUSION 1 HR: CPT

## 2017-09-19 RX ADMIN — ATEZOLIZUMAB 1200 MG: 1200 INJECTION, SOLUTION INTRAVENOUS at 08:39

## 2017-09-19 RX ADMIN — Medication 300 UNITS: at 09:26

## 2017-09-19 RX ADMIN — SODIUM CHLORIDE 20 ML/HR: 9 INJECTION, SOLUTION INTRAVENOUS at 08:41

## 2017-09-19 NOTE — PLAN OF CARE
Problem: Potential for Falls  Goal: Patient will remain free of falls  INTERVENTIONS:  - Assess patient frequently for physical needs  -  Identify cognitive and physical deficits and behaviors that affect risk of falls    -  Baker fall precautions as indicated by assessment   - Educate patient/family on patient safety including physical limitations  - Instruct patient to call for assistance with activity based on assessment  - Modify environment to reduce risk of injury  - Consider OT/PT consult to assist with strengthening/mobility   Outcome: Progressing

## 2017-09-19 NOTE — PROGRESS NOTES
Rec'd pt for Infusion  VSS, no c/o pain/SOB  Port accessed per protocol  Tecentriq now infusing  Will cont to monitor  Carrie Lawson

## 2017-09-19 NOTE — PROGRESS NOTES
Tecentriq infused w/o adverse reaction, no complaints offered  Port deaccessed per protocol  Pt then d/c'd to home with steady gait on 2 l nasal canula O2 on portable concentrator

## 2017-09-21 ENCOUNTER — ALLSCRIPTS OFFICE VISIT (OUTPATIENT)
Dept: OTHER | Facility: OTHER | Age: 77
End: 2017-09-21

## 2017-09-27 ENCOUNTER — ALLSCRIPTS OFFICE VISIT (OUTPATIENT)
Dept: OTHER | Facility: OTHER | Age: 77
End: 2017-09-27

## 2017-10-02 ENCOUNTER — HOSPITAL ENCOUNTER (OUTPATIENT)
Dept: CT IMAGING | Facility: HOSPITAL | Age: 77
Discharge: HOME | End: 2017-10-02
Attending: INTERNAL MEDICINE
Payer: MEDICARE

## 2017-10-02 DIAGNOSIS — C34.10 SUPERIOR PULMONARY SULCUS SYNDROME, UNSPECIFIED LATERALITY (HCC): ICD-10-CM

## 2017-10-02 PROCEDURE — 74177 CT ABD & PELVIS W/CONTRAST: CPT

## 2017-10-02 PROCEDURE — 71260 CT THORAX DX C+: CPT

## 2017-10-02 RX ADMIN — IOHEXOL 100 ML: 350 INJECTION, SOLUTION INTRAVENOUS at 08:22

## 2017-10-05 ENCOUNTER — GENERIC CONVERSION - ENCOUNTER (OUTPATIENT)
Dept: OTHER | Facility: OTHER | Age: 77
End: 2017-10-05

## 2017-10-06 RX ORDER — SODIUM CHLORIDE 9 MG/ML
40 INJECTION, SOLUTION INTRAVENOUS ONCE
Status: COMPLETED | OUTPATIENT
Start: 2017-10-10 | End: 2017-10-10

## 2017-10-09 ENCOUNTER — ALLSCRIPTS OFFICE VISIT (OUTPATIENT)
Dept: OTHER | Facility: OTHER | Age: 77
End: 2017-10-09

## 2017-10-09 ENCOUNTER — HOSPITAL ENCOUNTER (OUTPATIENT)
Dept: INFUSION CENTER | Facility: HOSPITAL | Age: 77
Discharge: HOME | End: 2017-10-09
Payer: MEDICARE

## 2017-10-09 LAB
ALBUMIN SERPL BCP-MCNC: 3.2 G/DL (ref 3.5–5)
ALP SERPL-CCNC: 80 U/L (ref 46–116)
ALT SERPL W P-5'-P-CCNC: 19 U/L (ref 12–78)
ANION GAP SERPL CALCULATED.3IONS-SCNC: 3 MMOL/L (ref 4–13)
AST SERPL W P-5'-P-CCNC: 14 U/L (ref 5–45)
BASOPHILS # BLD AUTO: 0.02 THOUSANDS/ΜL (ref 0–0.1)
BASOPHILS NFR BLD AUTO: 0 % (ref 0–1)
BILIRUB SERPL-MCNC: 0.4 MG/DL (ref 0.2–1)
BUN SERPL-MCNC: 13 MG/DL (ref 5–25)
CALCIUM SERPL-MCNC: 8.8 MG/DL (ref 8.3–10.1)
CHLORIDE SERPL-SCNC: 102 MMOL/L (ref 100–108)
CO2 SERPL-SCNC: 35 MMOL/L (ref 21–32)
CREAT SERPL-MCNC: 0.69 MG/DL (ref 0.6–1.3)
EOSINOPHIL # BLD AUTO: 0.12 THOUSAND/ΜL (ref 0–0.61)
EOSINOPHIL NFR BLD AUTO: 2 % (ref 0–6)
ERYTHROCYTE [DISTWIDTH] IN BLOOD BY AUTOMATED COUNT: 12.1 % (ref 11.6–15.1)
GFR SERPL CREATININE-BSD FRML MDRD: 84 ML/MIN/1.73SQ M
GLUCOSE SERPL-MCNC: 156 MG/DL (ref 65–140)
HCT VFR BLD AUTO: 40.1 % (ref 34.8–46.1)
HGB BLD-MCNC: 13.6 G/DL (ref 11.5–15.4)
LYMPHOCYTES # BLD AUTO: 1.92 THOUSANDS/ΜL (ref 0.6–4.47)
LYMPHOCYTES NFR BLD AUTO: 32 % (ref 14–44)
MCH RBC QN AUTO: 32.9 PG (ref 26.8–34.3)
MCHC RBC AUTO-ENTMCNC: 33.9 G/DL (ref 31.4–37.4)
MCV RBC AUTO: 97 FL (ref 82–98)
MONOCYTES # BLD AUTO: 0.53 THOUSAND/ΜL (ref 0.17–1.22)
MONOCYTES NFR BLD AUTO: 9 % (ref 4–12)
NEUTROPHILS # BLD AUTO: 3.43 THOUSANDS/ΜL (ref 1.85–7.62)
NEUTS SEG NFR BLD AUTO: 57 % (ref 43–75)
PLATELET # BLD AUTO: 141 THOUSANDS/UL (ref 149–390)
PMV BLD AUTO: 10.5 FL (ref 8.9–12.7)
POTASSIUM SERPL-SCNC: 3.6 MMOL/L (ref 3.5–5.3)
PROT SERPL-MCNC: 6.5 G/DL (ref 6.4–8.2)
RBC # BLD AUTO: 4.14 MILLION/UL (ref 3.81–5.12)
SODIUM SERPL-SCNC: 140 MMOL/L (ref 136–145)
WBC # BLD AUTO: 6.02 THOUSAND/UL (ref 4.31–10.16)

## 2017-10-09 PROCEDURE — 80053 COMPREHEN METABOLIC PANEL: CPT | Performed by: INTERNAL MEDICINE

## 2017-10-09 PROCEDURE — 85025 COMPLETE CBC W/AUTO DIFF WBC: CPT | Performed by: INTERNAL MEDICINE

## 2017-10-09 RX ADMIN — Medication 300 UNITS: at 08:15

## 2017-10-09 NOTE — PROGRESS NOTES
Pt here for port labs; port accessed; labs obtained; port flushed per protocol; deaccessed; pt d/c'd home ambulatory with steady gait

## 2017-10-10 ENCOUNTER — HOSPITAL ENCOUNTER (OUTPATIENT)
Dept: INFUSION CENTER | Facility: HOSPITAL | Age: 77
Discharge: HOME | End: 2017-10-10
Payer: MEDICARE

## 2017-10-10 VITALS
HEIGHT: 60 IN | RESPIRATION RATE: 18 BRPM | BODY MASS INDEX: 21.73 KG/M2 | HEART RATE: 63 BPM | TEMPERATURE: 97 F | WEIGHT: 110.67 LBS | OXYGEN SATURATION: 99 %

## 2017-10-10 PROCEDURE — C9483 INJECTION, ATEZOLIZUMAB: HCPCS | Performed by: INTERNAL MEDICINE

## 2017-10-10 PROCEDURE — 96413 CHEMO IV INFUSION 1 HR: CPT

## 2017-10-10 RX ADMIN — ATEZOLIZUMAB 1200 MG: 1200 INJECTION, SOLUTION INTRAVENOUS at 08:51

## 2017-10-10 RX ADMIN — SODIUM CHLORIDE 40 ML/HR: 9 INJECTION, SOLUTION INTRAVENOUS at 08:36

## 2017-10-10 RX ADMIN — Medication 300 UNITS: at 09:32

## 2017-10-10 NOTE — PROGRESS NOTES
Pt arrived amb with spouse for for chemo  Wearing O2 at 2L via n/c  Connected to oxygen concentrator here  Made comfortable  Denies symptoms of infection, denies fever  Port accessed LCW, NSS to mileso, Tecentriq infused  Pt matthew well

## 2017-10-10 NOTE — PROGRESS NOTES
NSS flush done  Port deaccessed after flushing with Heparin per protocol  Dsd applied  Disch amb to home with spouse, steady gait  Instructions reviewed

## 2017-10-10 NOTE — PROGRESS NOTES
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  is currently on immunotherapy  She is tolerating this without difficulty  CT scan shows essentially stable findings compared to the prior study of 3 months earlier  There is a slight increase in the size of her left adrenal metastases, however  reviewed that this is probably not clinically relevant and that continued treatment would be advocated  could include a change to a different chemotherapy with the likelihood of response in the 20-30% range and higher potential for toxicity, or consideration for repeat biopsy to evaluate for any molecular markers that may have a vault  Sometimes this can translate into a treatment protocol or research protocol options  patient and her  voiced understanding above discussion wish to continue her current therapy for the moment  I certainly a m  in agreement with that plan  Counseling Documentation With Imm: The patient, patient's family was counseled regarding diagnostic results,-instructions for management,-patient and family education,-impressions,-risks and benefits of treatment options  total time of encounter was 40 minutes  Chief Complaint  Chief Complaint Free Text Note Form: Follow-up regarding lung cancer  History of Present Illness  HPI: June 2016 patient was found to have a thyroid mass on physical examination   Ultrasound showed bilateral thyroid nodules  In July 2016 she underwent CAT scan of the neck for evaluation of the carotid arteries  Incidentally noted, infiltrating mass in the right upper lobe extending to the hilum was noted  4, 2016PET/CT showed a right upper lobe mass measuring 4 8 cm, SUV 21 5  This extends to the right hilum  Right paratracheal and subcarinal nodes measure up to 12 mm  The left adrenal showed some hypermetabolism with SUV of 3 7, without discrete mass  Uptake in the right parotid gland is noted with SUV of 22 3 and a soft tissue nodule, measuring 11 mm endoscopy and bronchoscopy showed 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  Lymph node biopsies did not show malignancy  and radiation therapy were not felt to be applicable  Due to background pulmonary dysfunction as well as the potential for left adrenal metastatic disease  Chemotherapy was chosen as primary therapy  Alternatively  6, 2016started Abraxane 80 mg/m² day 1, 8, 15, carbo AUC5, day 1 only, every 21 days  2017progressive disease noted  Tecentriq 1200 mg IV every 3 weeks initiated  Current Therapy: May 2017progressive disease noted  Tecentriq 1200 mg IV every 3 weeks initiated  Interval History: Psoriasis stable CW pre treatment  topicals with good control  Review of Systems  Complete-Female:   Constitutional: No fever, no chills, feels well, no tiredness, no recent weight gain or weight loss-and-no recent weight loss  Eyes: No complaints of eye pain, no red eyes, no eyesight problems, no discharge, no dry eyes, no itching of eyes  ENT: no complaints of earache, no loss of hearing, no nose bleeds, no nasal discharge, no sore throat, no hoarseness  Cardiovascular: No complaints of slow heart rate, no fast heart rate, no chest pain, no palpitations, no leg claudication, no lower extremity edema  Respiratory: shortness of breath,-shortness of breath during exertion-and-Less SOB, able to do more     Gastrointestinal: No complaints of abdominal pain, no constipation, no nausea or vomiting, no diarrhea, no bloody stools  Genitourinary: No complaints of dysuria, no incontinence, no pelvic pain, no dysmenorrhea, no vaginal discharge or bleeding  Musculoskeletal: No complaints of arthralgias, no myalgias, no joint swelling or stiffness, no limb pain or swelling  The patient presents with complaints of a rash (psoriasis  )  Neurological: No complaints of headache, no confusion, no convulsions, no numbness, no dizziness or fainting, no tingling, no limb weakness, no difficulty walking  Psychiatric: Not suicidal, no sleep disturbance, no anxiety or depression, no change in personality, no emotional problems  Endocrine: No complaints of proptosis, no hot flashes, no muscle weakness, no deepening of the voice, no feelings of weakness  Hematologic/Lymphatic: No complaints of swollen glands, no swollen glands in the neck, does not bleed easily, does not bruise easily  Active Problems  1  Abnormal CT of the chest (793 2) (R93 8)   2  Acute bronchitis (466 0) (J20 9)   3  Asymptomatic carotid artery stenosis (433 10) (I65 29)   4  Benign essential hypertension (401 1) (I10)   5  Bursitis, ischial (726 5) (M70 70)   6  Cardiomyopathy (425 4) (I42 9)   7  Cataract of right eye (366 9) (H26 9)   8  Chemotherapy-induced nausea (787 02,E933 1) (R11 0,T45  1X5A)   9  Chronic hypoxemic respiratory failure (518 83,799 02) (J96 11)   10  Chronic obstructive pulmonary disease (496) (J44 9)   11  Congestive heart failure (428 0) (I50 9)   12  Depression (311) (F32 9)   13  DMII (diabetes mellitus, type 2) (250 00) (E11 9)   14  Fracture of multiple pubic rami (808 2) (S32 599A)   15  Fracture of pubic ramus (808 2) (S32 599A)   16  Hyperlipidemia (272 4) (E78 5)   17  Hypertension (401 9) (I10)   18  Lung cancer, upper lobe (162 3) (C34 10)   19  Metastasis to adrenal gland (198 7) (C79 70)   20   Multiple thyroid nodules (241 1) (E04 2) 21  Nodule of left lobe of thyroid gland (241 0) (E04 1)   22  Orthopedic aftercare (V54 9) (Z47 89)   23  Osteoporosis (733 00) (M81 0)   24  Other chronic pain (338 29) (G89 29)   25  Parotid adenoma (210 2) (D11 0)   26  Pelvic pain (R10 2)   27  Pelvic pain in female (625 9) (R10 2)   28  Primary localized osteoarthrosis of the hip, unspecified laterality (715 15) (M16 10)   29  Psoriasis (696 1) (L40 9)   30  PVD (peripheral vascular disease) (443 9) (I73 9)   31  Restless legs syndrome (333 94) (G25 81)   32  Sciatica (724 3) (M54 30)   33  Screening for genitourinary condition (V81 6) (Z13 89)   34  Screening for neurological condition (V80 09) (Z13 89)   35  Screening for osteoporosis (V82 81) (Z13 820)   36  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)   37  Strain of right hip adductor muscle (843 8) (S76 011A)   38  Trochanteric bursitis, unspecified laterality (726 5) (M70 60)   39  Type 2 diabetes mellitus (250 00) (E11 9)    Past Medical History  1  History of acute bronchitis (V12 69) (Z87 09)    Surgical History  1  History of Bypass Graft Using Vein: Aortic-Bifemoral   2  History of  Section   3  History of Cholecystectomy   4  History of Hysterectomy   5  History of Tonsillectomy    Family History  Mother    1  Family history of thyroid disease (V18 19) (Z83 49)  Sister    2  Family history of malignant neoplasm (V16 9) (Z80 9)    Social History   · Denied: Alcohol Use (History)   · Caffeine Use   · Current every day smoker (305 1) (F17 200)   · Denied: Drug use (305 90) (F19 90)    Current Meds   1  Advair Diskus 250-50 MCG/DOSE Inhalation Aerosol Powder Breath Activated; USE 1   INHALATION TWICE A DAY RINSE MOUTH AFTER USE; Therapy: 77HVN7794 to (Evaluate:11Rgq0440)  Requested for: 12Uou6613; Last   Rx:62Ofx9585 Ordered   2  Albuterol Sulfate (2 5 MG/3ML) 0 083% Inhalation Nebulization Solution; inhale contents   of 1 vial in nebulizer four times a day if needed;    Therapy: 21OIU9382 to (Evaluate:18Jun2018)  Requested for: 71HFU6215; Last   Rx:21Sep2017 Ordered   3  Azithromycin 250 MG Oral Tablet; Take 1 tab every Monday Wednesday and Friday    Requested for: 53Khl5580; Last Rx:21Sep2017 Ordered   4  Fluocinonide 0 05 % External Solution; APPLY SPARINGLY TO SCALP TWICE DAILY; Therapy: 41Cdq3436 to (Evaluate:01Nov2017)  Requested for: 02Oct2017; Last   Rx:02Oct2017 Ordered   5  FreeStyle Lite Test In [employer]; Test 3 times daily; Therapy: 53SIS0639 to (Evaluate:01Jan2018)  Requested for: 65XLG9060; Last   Rx:06Jan2017 Ordered   6  Lyrica 100 MG Oral Capsule; TAKE 1 CAPSULE AT BEDTIME; Therapy: (Recorded:70Mja4704) to Recorded   7  Metoprolol Tartrate 100 MG Oral Tablet; take one tablet daily  Requested for: 16LRW6687;   Last Rx:78Rkt4027 Ordered   8  Oxycodone-Acetaminophen 5-325 MG Oral Tablet; 1 q4h prn; Therapy: 58DJF6048 to [phone])  Requested for: 52KPT5481; Last   Rx:73Yep4088 Ordered   9  Simvastatin 80 MG Oral Tablet; Take 1 tablet daily; Therapy: 06NFS2974 to (Evaluate:67Ckb1293)  Requested for: 75DGJ0370; Last   Rx:51Cfg8658 Ordered   10  Spiriva Respimat 2 5 MCG/ACT Inhalation Aerosol Solution; INHALE 2 PUFFS ONCE    DAILY; Therapy: 27BFX9243 to (Evaluate:16Sep2018)  Requested for: 41Vag6577; Last    Rx:62Tvn9557 Ordered    Allergies  1  Penicillins    Vitals  Vital Signs    Recorded: 25JCG3310 02:50PM   Temperature 97 6 F   Heart Rate 72   Respiration 18   Systolic 137   Diastolic 64   Height 5 ft 1 in   Weight 110 lb 6 oz   BMI Calculated 20 86   BSA Calculated 1 47   O2 Saturation 93   Pain Scale 0     Physical Exam    Constitutional   General appearance: No acute distress, well appearing and well nourished  Eyes   Conjunctiva and lids: No swelling, erythema or discharge  Ears, Nose, Mouth, and Throat   External inspection of ears and nose: Normal     Oropharynx: Normal with no erythema, edema, exudate or lesions      Pulmonary Auscultation of lungs: Abnormal   Auscultation of the lungs revealed decreased breath sounds diffusely  Cardiovascular   Auscultation of heart: Normal rate and rhythm, normal S1 and S2, without murmurs  Examination of extremities for edema and/or varicosities: Normal     Abdomen   Abdomen: Non-tender, no masses  Liver and spleen: No hepatomegaly or splenomegaly  Lymphatic   Palpation of lymph nodes in neck: No lymphadenopathy  Musculoskeletal   Gait and station: Normal     Skin   Skin and subcutaneous tissue: Normal without rashes or lesions  Neurologic   Cranial nerves: Cranial nerves 2-12 intact  Psychiatric   Orientation to person, place, and time: Normal          Health Management  Chronic obstructive pulmonary disease   Complete PFT; every 1 year; Next Due: 34KRR0585; Overdue    Future Appointments    Date/Time Provider Specialty Site   10/19/2017 09:00 AM SANTIAGO Salazar  [facility] [address] [place] [address]     Signatures   Electronically signed by : SANTIAGO Bettencourt  Oct  9 2017  3:25PM EST                       (Author)

## 2017-10-26 RX ORDER — SODIUM CHLORIDE 9 MG/ML
20 INJECTION, SOLUTION INTRAVENOUS CONTINUOUS
Status: DISPENSED | OUTPATIENT
Start: 2017-10-31 | End: 2017-11-01

## 2017-10-26 NOTE — PROGRESS NOTES
Assessment  1  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)   2  Lung cancer, upper lobe (162 3) (C34 10)   3  Metastasis to adrenal gland (198 7) (C79 70)    Plan  Squamous cell carcinoma of right lung    · Follow-up visit in 3 weeks Evaluation and Treatment  Follow-up  Status: Hold For -  Scheduling  Requested for: [id number]   Ordered; For: Squamous cell carcinoma of right lung; Ordered By: Homer Camarena Performed:  Due: [id number]    Discussion/Summary  Discussion Summary:   In summary, this is a 74-year-old female with history of squamous cell carcinoma of the lung, stage IV, as outlined  is currently on Tecentriq every 3 weeks  She will have repeat labs today  Pending they are within range, she will proceed with cycle #5 of Tecentriq  She tolerates this well psoriasis is not worse since starting immunotherapy  She uses OTC cream intermittently with benefit  scan on 7/11/17 showed mild increase in the size of her primary tumor mass from previous 2 1 cm to her current 2 4 cm  Additionally, slight progression of adrenal nodularity is noted consistent with minimally progressive disease  A right upper lobe 5 mm nodule is new compared to the prior exam  Due to this showing slow progression, current therapy continued  imaging to be arranged at her next follow-up in 3 weeks  will be seeing her PCP for check up in a few weeks as well  Counseling Documentation With Imm: The patient, patient's family was counseled regarding diagnostic results,-- instructions for management,-- patient and family education,-- impressions  total time of encounter was 30 minutes-- and-- 20 minutes was spent counseling  Goals and Barriers: The patient has the current Goals: Prolongation of survival  The patent has the current Barriers: None  Patient's Capacity to Self-Care: Patient is able to Self-Care     Medication SE Review and Pt Understands Tx: Possible side effects of new medications were reviewed with the patient/guardian today  The treatment plan was reviewed with the patient/guardian  The patient/guardian understands and agrees with the treatment plan   Self Referrals:   Self Referrals: No      History of Present Illness  HPI: June 2016? patient was found to have a thyroid mass on physical examination  Ultrasound showed bilateral thyroid nodules  In July 2016 she underwent CAT scan of the neck for evaluation of the carotid arteries  Incidentally noted, infiltrating mass in the right upper lobe extending to the hilum was noted  4, 2016? PET/CT showed a right upper lobe mass measuring 4 8 cm, SUV 21 5  This extends to the right hilum  Right paratracheal and subcarinal nodes measure up to 12 mm  The left adrenal showed some hypermetabolism with SUV of 3 7, without discrete mass  Uptake in the right parotid gland is noted with SUV of 22 3 and a soft tissue nodule, measuring 11 mm endoscopy and bronchoscopy showed 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  Lymph node biopsies did not show malignancy  and radiation therapy were not felt to be applicable  Due to background pulmonary dysfunction as well as the potential for left adrenal metastatic disease  Chemotherapy was chosen as primary therapy  Alternatively  6, 2016?started Abraxane 80 mg/m? day 1, 8, 15, carbo AUC?5, day 1 only, every 21 days  2017? progressive disease noted  Tecentriq 1200 mg IV every 3 weeks initiated  Current Therapy: May 2017? progressive disease noted  Tecentriq 1200 mg IV every 3 weeks initiated  Interval History: Psoriasis stable  continues to using topicals with good control      Review of Systems  Complete-Female:   Constitutional: No fever, no chills, feels well, no tiredness, no recent weight gain or weight loss  Eyes: No complaints of eye pain, no red eyes, no eyesight problems, no discharge, no dry eyes, no itching of eyes     ENT: no complaints of earache, no loss of hearing, no nose bleeds, no nasal discharge, no sore throat, no hoarseness  Cardiovascular: No complaints of slow heart rate, no fast heart rate, no chest pain, no palpitations, no leg claudication, no lower extremity edema  Respiratory: wheezing,-- shortness of breath during exertion-- and-- continuous O2, 2 L, but-- no cough  Gastrointestinal: No complaints of abdominal pain, no constipation, no nausea or vomiting, no diarrhea, no bloody stools  Genitourinary: No complaints of dysuria, no incontinence, no pelvic pain, no dysmenorrhea, no vaginal discharge or bleeding  Musculoskeletal: No complaints of arthralgias, no myalgias, no joint swelling or stiffness, no limb pain or swelling  Integumentary: psoriasis  Neurological: No complaints of headache, no confusion, no convulsions, no numbness, no dizziness or fainting, no tingling, no limb weakness, no difficulty walking  Psychiatric: no anxiety-- and-- no depression  Endocrine: no muscle weakness  no feelings of weakness   Hematologic/Lymphatic: No complaints of swollen glands, no swollen glands in the neck, does not bleed easily, does not bruise easily  ROS Reviewed:   ROS reviewed  Active Problems  1  Abnormal CT of the chest (793 2) (R93 8)   2  Acute bronchitis (466 0) (J20 9)   3  Asymptomatic carotid artery stenosis (433 10) (I65 29)   4  Benign essential hypertension (401 1) (I10)   5  Bursitis, ischial (726 5) (M70 70)   6  Cardiomyopathy (425 4) (I42 9)   7  Cataract of right eye (366 9) (H26 9)   8  Chemotherapy-induced nausea (787 02,E933 1) (R11 0,T45  1X5A)   9  Chronic obstructive pulmonary disease (496) (J44 9)   10  Congestive heart failure (428 0) (I50 9)   11  COPD with acute exacerbation (491 21) (J44 1)   12  Depression (311) (F32 9)   13  DMII (diabetes mellitus, type 2) (250 00) (E11 9)   14  Dyspnea (786 09) (R06 00)   15  Fatigue (780 79) (R53 83)   16  Fracture of multiple pubic rami (808 2) (S32 599A)   17  Fracture of pubic ramus (808 2) (S32 599A)   18   Hyperlipidemia (272 4) (E78 5)   19  Hypertension (401 9) (I10)   20  Lung cancer, upper lobe (162 3) (C34 10)   21  Metastasis to adrenal gland (198 7) (C79 70)   22  Multiple thyroid nodules (241 1) (E04 2)   23  Need for immunization against influenza (V04 81) (Z23)   24  Nodule of left lobe of thyroid gland (241 0) (E04 1)   25  Orthopedic aftercare (V54 9) (Z47 89)   26  Osteoporosis (733 00) (M81 0)   27  Other chronic pain (338 29) (G89 29)   28  Parotid adenoma (210 2) (D11 0)   29  Pelvic pain (R10 2)   30  Pelvic pain in female (625 9) (R10 2)   31  Pre-operative cardiovascular examination (V72 81) (Z01 810)   32  Primary localized osteoarthrosis of the hip, unspecified laterality (715 15) (M16 10)   33  Psoriasis (696 1) (L40 9)   34  Pulmonary mass (786 6) (R91 8)   35  Pulmonary nodule (793 11) (R91 1)   36  PVD (peripheral vascular disease) (443 9) (I73 9)   37  Restless legs syndrome (333 94) (G25 81)   38  Sciatica (724 3) (M54 30)   39  Screening for genitourinary condition (V81 6) (Z13 89)   40  Screening for neurological condition (V80 09) (Z13 89)   41  Screening for osteoporosis (V82 81) (Z13 820)   42  Shortness of breath (786 05) (R06 02)   43  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)   44  Strain of right hip adductor muscle (843 8) (S76 011A)   45  Trochanteric bursitis, unspecified laterality (726 5) (M70 60)   46  Type 2 diabetes mellitus (250 00) (E11 9)   47  Wheezing (786 07) (R06 2)    Past Medical History  1  History of acute bronchitis (V12 69) (Z87 09)    Surgical History  1  History of Bypass Graft Using Vein: Aortic-Bifemoral   2  History of  Section   3  History of Cholecystectomy   4  History of Hysterectomy   5  History of Tonsillectomy  Surgical History Reviewed: The surgical history was reviewed and updated today  Family History  Mother    1  Family history of thyroid disease (V18 19) (Z83 49)  Sister    2   Family history of malignant neoplasm (V16 9) (Z80 9)  Family History Reviewed: The family history was reviewed and updated today  Social History   · Denied: Alcohol Use (History)   · Caffeine Use   · Current every day smoker (305 1) (F17 200)   · Denied: Drug use (305 90) (F19 90)  Social History Reviewed: The social history was reviewed and updated today  The social history was reviewed and is unchanged  Current Meds   1  Advair Diskus 250-50 MCG/DOSE Inhalation Aerosol Powder Breath Activated; USE 1   INHALATION TWICE A DAY RINSE MOUTH AFTER USE; Therapy: 38IQO9178 to (Evaluate:16Sep2016)  Requested for: 12HXM6391; Last   Rx:56Ujk7298 Ordered   2  Albuterol Sulfate (2 5 MG/3ML) 0 083% Inhalation Nebulization Solution; inhale contents   of 1 vial in nebulizer four times a day if needed; Therapy: 56SRP3232 to (Germán Rodriguez)  Requested for: 36DDU3582; Last   Rx:18Sux4981 Ordered   3  FreeStyle Lite Test In [employer]; Test 3 times daily; Therapy: 67LIG1472 to (Evaluate:01Jan2018)  Requested for: 46OJB6015; Last   Rx:06Jan2017 Ordered   4  Ibandronate Sodium 150 MG Oral Tablet; TAKE ONE TABLET ONCE MONTHLY; Therapy: 69XDB3465 to (Last Rx:29Wgd8734)  Requested for: 50Lwr6793 Ordered   5  Lyrica 100 MG Oral Capsule; TAKE 1 CAPSULE AT BEDTIME; Therapy: (Recorded:13Dss2503) to Recorded   6  Lyrica 100 MG Oral Capsule; TAKE 1 CAPSULE AT BEDTIME; Therapy: (Recorded:38Eze8359) to Recorded   7  Metoprolol Tartrate 100 MG Oral Tablet; take one tablet daily  Requested for: 15INP5957;   Last Rx:27Cqt1959 Ordered   8  Oxycodone-Acetaminophen 5-325 MG Oral Tablet; 1 q4h prn; Therapy: 45CXU3876 to (Evaluate:79Lxr4973)  Requested for: 73IRE4387; Last   Rx:04Liy1197 Ordered   9  Simvastatin 80 MG Oral Tablet; Take 1 tablet daily; Therapy: 73YYE3956 to (Evaluate:90Wnn4585)  Requested for: 29PAK6246; Last   QW:71TNS1024 Ordered   10  Spiriva Respimat 2 5 MCG/ACT Inhalation Aerosol Solution; INHALE 2 PUFFS ONCE    DAILY;     Therapy: 27QVA5725 to (Oleta Angel)  Requested for: 93YWH8389; Last    Rx:09Iyv7872 Ordered   11  TRENtal 400 MG TBCR; TAKE 1 TABLET 3 TIMES DAILY WITH FOOD; Therapy: (Selina Contreras) to Recorded  Medication List Reviewed: The medication list was reviewed and updated today  Allergies  1  Penicillins    Vitals  Vital Signs    Recorded: 07Aug2017 08:54AM Recorded: 07Aug2017 08:40AM   Temperature  97 F   Heart Rate 61 38   Respiration  18   Systolic 843 040   Diastolic 80 78   Height  5 ft 0 5 in   Weight  111 lb    BMI Calculated  21 32   BSA Calculated  1 46   O2 Saturation  97     Physical Exam    Constitutional   General appearance: No acute distress, well appearing and well nourished  Eyes   Conjunctiva and lids: No swelling, erythema or discharge  Pupils and irises: Equal, round and reactive to light  Ears, Nose, Mouth, and Throat   External inspection of ears and nose: Normal     Oropharynx: Normal with no erythema, edema, exudate or lesions  Pulmonary   Respiratory effort: No increased work of breathing or signs of respiratory distress  -- on continuous nasal cannula O2, 2L  Auscultation of lungs: Abnormal   Auscultation of the lungs revealed diffuse wheezing  Cardiovascular   Auscultation of heart: Normal rate and rhythm, normal S1 and S2, without murmurs  Examination of extremities for edema and/or varicosities: Normal     Abdomen   Abdomen: Non-tender, no masses  Liver and spleen: No hepatomegaly or splenomegaly  Lymphatic   Palpation of lymph nodes in neck: No lymphadenopathy  Musculoskeletal   Gait and station: Normal     Skin   Skin and subcutaneous tissue: Abnormal  -- mild psorisasis of elbows and posterior neck  Neurologic   Cranial nerves: Cranial nerves 2-12 intact      Psychiatric   Orientation to person, place, and time: Normal     Mood and affect: Normal         ECOG 2       Results/Data  (1) CBC/PLT/DIFF 20NGE0999 02:29PM Elizabeth Cehn     Test Name Result Flag Reference   WBC COUNT 6 63 Thousand/uL  4 31-10 16   RBC COUNT 4 15 Million/uL  3 81-5 12   HEMOGLOBIN 13 3 g/dL  11 5-15 4   HEMATOCRIT 41 1 %  34 8-46  1   MCV 99 fL H 82-98   MCH 32 0 pg  26 8-34 3   MCHC 32 4 g/dL  31 4-37 4   RDW 12 1 %  11 6-15 1   MPV 10 0 fL  8 9-12 7   PLATELET COUNT 946 Thousands/uL L 149-390   NEUTROPHILS RELATIVE PERCENT 61 %  43-75   LYMPHOCYTES RELATIVE PERCENT 32 %  14-44   MONOCYTES RELATIVE PERCENT 6 %  4-12   EOSINOPHILS RELATIVE PERCENT 1 %  0-6   BASOPHILS RELATIVE PERCENT 0 %  0-1   NEUTROPHILS ABSOLUTE COUNT 3 98 Thousands/? ??L  1 85-7 62   LYMPHOCYTES ABSOLUTE COUNT 2 14 Thousands/? ??L  0 60-4 47   MONOCYTES ABSOLUTE COUNT 0 40 Thousand/? ??L  0 17-1 22   EOSINOPHILS ABSOLUTE COUNT 0 09 Thousand/? ??L  0 00-0 61   BASOPHILS ABSOLUTE COUNT 0 02 Thousands/? ??L  0 00-0 10     (1) COMPREHENSIVE METABOLIC PANEL 33FUQ0540 45:25PL Baltafrance Binaks     Test Name Result Flag Reference   GLUCOSE,RANDM 196 mg/dL H    If the patient is fasting, the ADA then defines impaired fasting glucose as > 100 mg/dL and diabetes as > or equal to 123 mg/dL  SODIUM 141 mmol/L  136-145   POTASSIUM 3 9 mmol/L  3 5-5 3   CHLORIDE 103 mmol/L  100-108   CARBON DIOXIDE 35 mmol/L H 21-32   ANION GAP (CALC) 3 mmol/L L 4-13   BLOOD UREA NITROGEN 13 mg/dL  5-25   CREATININE 0 78 mg/dL  0 60-1 30   Standardized to IDMS reference method   CALCIUM 8 9 mg/dL  8 3-10 1   BILI, TOTAL 0 50 mg/dL  0 20-1 00   ALK PHOSPHATAS 77 U/L     ALT (SGPT) 14 U/L  12-78   AST(SGOT) 14 U/L  5-45   ALBUMIN 3 3 g/dL L 3 5-5 0   TOTAL PROTEIN 6 6 g/dL  6 4-8 2   eGFR Non-African American      >60 0 ml/min/1 73sq m   [facility] Disease Education Program recommendations are as follows:  GFR calculation is accurate only with a steady state creatinine  Chronic Kidney disease less than 60 ml/min/1 73 sq  meters  Kidney failure less than 15 ml/min/1 73 sq  meters       (1) TSH 61XQW6338 02:29PM Keri Samaniego     Test Name Result Flag Reference   TSH 4 465 uIU/mL H 0 358-3 740   Patients undergoing fluorescein dye angiography may retain small amounts of fluorescein in the body for 48-72 hours post procedure  Samples containing fluorescein can produce falsely depressed TSH values  If the patient had this procedure,a specimen should be resubmitted post fluorescein clearance  The recommended reference ranges for TSH during pregnancy are as follows:  First trimester 0 1 to 2 5 uIU/mL  Second trimester  0 2 to 3 0 uIU/mL  Third trimester 0 3 to 3 0 uIU/m     Health Management  Chronic obstructive pulmonary disease   Complete PFT; every 1 year; Next Due: 75BIU3224; Overdue    Future Appointments    Date/Time Provider Specialty Site   09/21/2017 10:00 AM SANTIAGO Rg  Pulmonary Medicine [facility] Alan Contiwster   08/28/2017 01:30 PM SANTIAGO Alves , [facility] Hematology Oncology [address] [place] [address] ONCOLOGY   08/21/2017 09:50 AM SANTIAGO Han  Family Medicine [facility]     Signatures   Electronically signed by : VIRAL Pierson; Aug  7 2017  4:42PM EST                       (Author)    Electronically signed by : SANTIAGO Apodaca  Sep 13 2017 10:12AM EST

## 2017-10-27 NOTE — PROGRESS NOTES
Assessment  1  Chronic obstructive pulmonary disease (496) (J44 9)   2  Chronic hypoxemic respiratory failure (518 83,799 02) (J96 11)   3  Lung cancer, upper lobe (162 3) (C34 10)   4  Metastasis to adrenal gland (198 7) (C79 70)    Plan  Chronic obstructive pulmonary disease    · Advair Diskus 250-50 MCG/DOSE Inhalation Aerosol Powder Breath Activated;  USE 1 INHALATION TWICE A DAY RINSE MOUTH AFTER USE   Rx By: Linda Huber; Dispense: 90 Days ; #:3 Aerosol Powder Breath Activated; Refill: 3;For: Chronic obstructive pulmonary disease; ALLIE = N; Verified Transmission to [facility]/PHARMACY# [id number]; Last Updated By: System, Sure[employer]; 9/21/2017 10:37:22 AM   · Albuterol Sulfate (2 5 MG/3ML) 0 083% Inhalation Nebulization Solution; inhale  contents of 1 vial in nebulizer four times a day if needed   Rx By: Linda Huber; Dispense: 90 Days ; #:360 ML; Refill: 2;For: Chronic obstructive pulmonary disease; ALLIE = N; Verified Transmission to [facility]/PHARMACY# [id number]; Msg to Pharmacy: J44 9; Last Updated By: System, SureScripts; 9/21/2017 10:37:22 AM   · Azithromycin 250 MG Oral Tablet; Take 1 tab every Monday Wednesday and  Friday   Rx By: Linda Huber; Dispense: 90 Days ; #:36 Tablet; Refill: 3;For: Chronic obstructive pulmonary disease; ALLIE = N; Verified Transmission to [facility]/PHARMACY# [id number]; Last Updated By: System, SureScripts; 9/21/2017 10:37:22 AM   · Spiriva Respimat 2 5 MCG/ACT Inhalation Aerosol Solution; INHALE 2 PUFFS  ONCE DAILY   Rx By: Linda Huber; Dispense: 90 Days ; #:3 X 4 GM Inhaler; Refill: 3;For: Chronic obstructive pulmonary disease; ALLIE = N; Verified Transmission to "[facility]/PHARMACY# [id number]; Last Updated By: System, SureScripts; 9/21/2017 10:37:23 AM    Results/Data  Results Free Text Form [facility]:   Results   Other CBC shows normal white blood cell count and hemoglobin and slight reduction in platelet count   Comprehensive metabolic profile shows slightly elevated carbon dioxide level which may reflect some chronic hypercapnic respiratory failure  CT Scan Chest abdomen pelvis CT from July was viewed on the [facility] system  There is still a suprahilar mass which is spiculated and has enlarged very minimally from the prior  Adrenal abnormality also persists  Discussion/Summary  Discussion Summary:   Colleen Linares has similar respiratory symptoms  She is on maximal pulmonary medication  I have spoke with her about participating in pulmonary rehabilitation and given her some information to take home to consider it  I do think she would benefit from participating and would likely participate at the [facility]  continues to smoke  We discussed tobacco cessation  Her  inquired about potential assistive medications  We discussed these in detail but ultimately it comes down to Colleen Linares being ready and willing to attempt a complete cessation  lung cancer is being followed by Dr Iqra Cui and she is undergoing chemotherapy for adrenal metastasis  She has not responded to this point to therapy although has not had significant progression either  will see her back again and six months  If she has new or worsening symptoms, I would be happy to see her sooner  Medications were refilled as appropriate  Thank you for asking me to participate in her care    Goals and Barriers: The patient has the current Goals: Improved breathing  The patent has the current Barriers: Lung cancer, continued tobacco use  Patient's Capacity to Self-Care: Patient is able to Self-Care  Patient Education: Educational resources provided: Pulmonary rehabilitation pamphlet  Medication SE Review and Pt Understands Tx: The treatment plan was reviewed with the patient/guardian  The patient/guardian understands and agrees with the treatment plan   Self Referrals:   Self Referrals: No      Active Problems  1  Abnormal CT of the chest (793 2) (R93 8)   2  Acute bronchitis (466 0) (J20 9)   3  Asymptomatic carotid artery stenosis (433 10) (I65 29)   4  Benign essential hypertension (401 1) (I10)   5  Bursitis, ischial (726 5) (M70 70)   6  Cardiomyopathy (425 4) (I42 9)   7  Cataract of right eye (366 9) (H26 9)   8  Chemotherapy-induced nausea (787 02,E933 1) (R11 0,T45  1X5A)   9  Chronic obstructive pulmonary disease (496) (J44 9)   10  Congestive heart failure (428 0) (I50 9)   11  Depression (311) (F32 9)   12  DMII (diabetes mellitus, type 2) (250 00) (E11 9)   13  Fracture of multiple pubic rami (808 2) (S32 599A)   14  Fracture of pubic ramus (808 2) (S32 599A)   15  Hyperlipidemia (272 4) (E78 5)   16  Hypertension (401 9) (I10)   17  Lung cancer, upper lobe (162 3) (C34 10)   18  Metastasis to adrenal gland (198 7) (C79 70)   19  Multiple thyroid nodules (241 1) (E04 2)   20  Nodule of left lobe of thyroid gland (241 0) (E04 1)   21  Orthopedic aftercare (V54 9) (Z47 89)   22  Osteoporosis (733 00) (M81 0)   23  Other chronic pain (338 29) (G89 29)   24  Parotid adenoma (210 2) (D11 0)   25  Pelvic pain (R10 2)   26  Pelvic pain in female (625 9) (R10 2)   27  Primary localized osteoarthrosis of the hip, unspecified laterality (715 15) (M16 10)   28  Psoriasis (696 1) (L40 9)   29  PVD (peripheral vascular disease) (443 9) (I73 9)   30  Restless legs syndrome (333 94) (G25 81)   31  Sciatica (724 3) (M54 30)   32  Screening for genitourinary condition (V81 6) (Z13 89)   33  Screening for neurological condition (V80 09) (Z13 89)   34  Screening for osteoporosis (V82 81) (Z13 820)   35  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)   36  Strain of right hip adductor muscle (843 8) (S76 011A)   37  Trochanteric bursitis, unspecified laterality (726 5) (M70 60)   38  Type 2 diabetes mellitus (250 00) (E11 9)    Chief Complaint  Chief Complaint Chronic Condition [facility]: Patient is here today for follow up of chronic conditions described in HPI  History of Present Illness  HPI: Colleen Linares is here for follow-up of her COPD and lung cancer   She has chronic hypoxemic respiratory failure and continues to smoke close to half a pack of cigarettes daily  She was previously followed by Dr Wade Mend  She is currently following with Dr Sofia Benoit for squamous cell carcinoma of the right lung for which she is undergoing chemotherapy  This is felt to be metastatic to her adrenal gland and she has not been particularly chemotherapy responsive  She is currently on Advair 250/51 puff twice daily, Spiriva once a day, prn albuterol nebulizers and a prn Ventolin inhaler  She does have oxygen at home that she uses at night and p r n  during the day  denies cough or sputum production  She has no wheezing  Her shortness of breath is unchanged in nature  Her oxygen demands have not changed since last visit  She does report feeling tired but no recent weight changes  Review of Systems  Complete-Female - Pulm:   Constitutional: No fever, no chills, feels well, no tiredness, no recent weight gain or weight loss  Eyes: no complaints of vision problems  ENT: no rhinitis, no PND, no epistaxis  Cardiovascular: no palpitations, no chest pain  Respiratory: as noted in HPI  Gastrointestinal: no complaints of esophageal reflux, no abdominal pain  Genitourinary: no dysuria  Musculoskeletal: no arthralgias, no joint swelling, no myalgias  Integumentary: no rash, no lesions  Neurological: no headache, no fainting, no weakness  Psychiatric: no anxiety, no depression  Hematologic/Lymphatic: ? no complaints of swollen glands  Past Medical History  1  History of acute bronchitis (V12 69) (Z87 09)    Surgical History  1  History of Bypass Graft Using Vein: Aortic-Bifemoral   2  History of  Section   3  History of Cholecystectomy   4  History of Hysterectomy   5  History of Tonsillectomy  Surgical History Reviewed: The surgical history was reviewed and updated today  Family History  Mother    1   Family history of thyroid disease (V18 19) (Z80 46)  Sister 2  Family history of malignant neoplasm (V16 9) (Z80 9)  Family History Reviewed: The family history was reviewed and updated today  Social History   · Denied: Alcohol Use (History)   · Caffeine Use   · Coffee once in a great while   and everyday tea drinker   · Current every day smoker (305 1) (F17 200)   · Smoke for less than 1/2 pa k per day for 50 yrs   · Denied: Drug use (305 90) (F19 90)  Social History Reviewed: The social history was reviewed and updated today  Current Meds   1  Advair Diskus 250-50 MCG/DOSE Inhalation Aerosol Powder Breath Activated; USE 1   INHALATION TWICE A DAY RINSE MOUTH AFTER USE; Therapy: 29LTQ1251 to (Evaluate:16Sep2016)  Requested for: 14BSF7014; Last   Rx:44Qie5983 Ordered   2  Albuterol Sulfate (2 5 MG/3ML) 0 083% Inhalation Nebulization Solution; inhale contents   of 1 vial in nebulizer four times a day if needed; Therapy: 44WPI1860 to (Darren Diehl)  Requested for: 59NOP7282; Last   Rx:58Zvq4179 Ordered   3  Azithromycin 250 MG Oral Tablet; 1 tab three times weekly; Therapy: (Krishna Roberts) to Recorded   4  FreeStyle Lite Test In [employer]; Test 3 times daily; Therapy: 03RFM5192 to (Evaluate:01Jan2018)  Requested for: 99GRU7636; Last   Rx:06Jan2017 Ordered   5  Lyrica 100 MG Oral Capsule; TAKE 1 CAPSULE AT BEDTIME; Therapy: (Recorded:72Vis7372) to Recorded   6  Metoprolol Tartrate 100 MG Oral Tablet; take one tablet daily  Requested for: 17HSJ9290;   Last Rx:53Gah1522 Ordered   7  Oxycodone-Acetaminophen 5-325 MG Oral Tablet; 1 q4h prn; Therapy: 23MYQ4396 to (Evaluate:76Pgb4605)  Requested for: 31Lbv4929; Last   Rx:59Drd9442 Ordered   8  Simvastatin 80 MG Oral Tablet; Take 1 tablet daily; Therapy: 26VYY2901 to (Evaluate:02Pae1356)  Requested for: 15YSW6892; Last   Rx:22Uuk5649 Ordered   9  Spiriva Respimat 2 5 MCG/ACT Inhalation Aerosol Solution; INHALE 2 PUFFS ONCE   DAILY;    Therapy: 39YOY1866 to (Melissa Walsh)  Requested for: 70UKS6819; Last   Rx:11Nxz9348 Ordered   10  TRENtal 400 MG TBCR; TAKE 1 TABLET 3 TIMES DAILY WITH FOOD; Therapy: (Tamra Kimball) to Recorded  Medication List Reviewed: The medication list was reviewed and updated today  Allergies  1  Penicillins    Vitals  Vital Signs    Recorded: 06LMO3089 10:11AM   Temperature 96 9 F   Heart Rate 80   Respiration 18   Systolic 032   Diastolic 60   Height 5 ft 1 in   Weight 111 lb    BMI Calculated 20 97   BSA Calculated 1 47   O2 Saturation 95, Nasal Cannula   FiO2 2L/min, Nasal Cannula     Physical Exam    Constitutional   General appearance: No acute distress, well appearing and well nourished  Ears, Nose, Mouth, and Throat   Nasal mucosa, septum, and turbinates: Normal without edema or erythema  Lips, teeth, and gums: Normal, good dentition  Oropharynx: Normal with no erythema, edema, exudate or lesions  Neck   Neck: Supple, symmetric, trachea midline, no masses  Jugular veins: Normal     Pulmonary   Chest: Normal     Percussion of chest: Normal     Auscultation of lungs: Clear to auscultation, no rales, no crackles, no wheezing  Cardiovascular   Auscultation of heart: Normal rate and rhythm, normal S1 and S2, no murmurs  Examination of extremities for edema and/or varicosities: Normal     Abdomen   Abdomen: Soft, non-tender  Lymphatic   Palpation of lymph nodes in neck: No lymphadenopathy  Musculoskeletal   Gait and station: Normal     Digits and nails: Normal without clubbing or cyanosis  Neurologic   Mental Status: Normal  Not confused, no evidence of dementia, good comprehension, good concentration  Skin   Skin and subcutaneous tissue: Limited exam shows no rash  Psychiatric   Orientation to person, place and time: Normal     Mood and affect: Normal        Health Management  Chronic obstructive pulmonary disease   Complete PFT; every 1 year; Next Due: 09NHG2123;  Overdue    Future Appointments    Date/Time Provider Specialty Site   10/09/2017 02:15 PM SANTIAGO Dinero , , [facility] Hematology Oncology [facility] ONCOLOGY   09/28/2017 09:30 AM SANTIAGO Salazar   Family Medicine [address] [place] [address]     Signatures   Electronically signed by : SANTIAGO Francisco ; Sep 21 2017 12:54PM EST                       (Author)

## 2017-10-27 NOTE — PROGRESS NOTES
Assessment  1  Chronic hypoxemic respiratory failure (518 83,799 02) (J96 11)   2  Chronic obstructive pulmonary disease (496) (J44 9)   3  Lung cancer, upper lobe (162 3) (C34 10)   4  Metastasis to adrenal gland (198 7) (C79 70)    Plan  Abnormal CT of the chest, Chronic hypoxemic respiratory failure, Chronic obstructive  pulmonary disease, Lung cancer, upper lobe, Metastasis to adrenal gland    · Home Oxygen with Portability; Status:Complete;   Done: 11XMK5910   Perform:Not Applicable; [id number]; Last Updated By:Aspen Garcia; 2017 9:18:55 AM;Ordered; For:Abnormal CT of the chest, Chronic hypoxemic respiratory failure, Chronic obstructive pulmonary disease, Lung cancer, upper lobe, Metastasis to adrenal gland; Ordered By:Caro Ha;  : Good  of Need (# of months)(99=Lifetime): : 99  Assesment? : Yes  of Delivery? Specify Mask Type : Nasal Canula  is the patient's Weight? : 111 lbs  is the patient's Height? : 5 ft 1 inch  1-5L O2: : To include pulse oximetry at rest and with activities of daily living  Maintain oxygen saturation levels at or above 90% while on an oxygen conserving setting      range of 1-5   for Oxygen Conserving Device? : Yes  : 2  : Continuous  Saturation Results Ambulating with O2 : 93  Saturation Results Ambulating Room Air : 87  Saturation Results-Room Air : 91  Chronic hypoxemic respiratory failure    · Six Minute Walk Test - POC; Status:Complete;   Done: 00NOX3455   Perform: In Office; ; Last Updated By:Anum Tatum; 2017 4:02:33 PM;Ordered; For:Chronic hypoxemic respiratory failure; Ordered By:Caro Ha; Results/Data  Results Free Text Form [facility]:   Results   Other 6 Minute Walk Test: was started on RA She was 91% with HR of 72 BPM at rest  She started walking and after 3 Minutes her SPO2 fell to 87% with a HR of 84 BPM  She was placed on 2 LPM NC   Her SPo2 Improved to 96% with HR of 74 BPM  She walked another 5 minutes and SPO2 was maintained < 88% on 2 LPM with HR of 95 BPM  She ambulated a total of 195 Meters  Discussion/Summary  Discussion Summary:   Patient presents for Oxygen assessment  She performed 6 Minute walk test  She required NC at 2 LPM NC to maintain her SPo2 > 88%  I have written her a prescription  I have made no other changes  She will follow up with Dr Josey Solis as previously scheduled  Counseling Documentation With Imm: The patient, patient's family was counseled regarding diagnostic results,-- instructions for management  Patient's Capacity to Self-Care: Patient is able to Self-Care  Active Problems  1  Abnormal CT of the chest (793 2) (R93 8)   2  Acute bronchitis (466 0) (J20 9)   3  Asymptomatic carotid artery stenosis (433 10) (I65 29)   4  Benign essential hypertension (401 1) (I10)   5  Bursitis, ischial (726 5) (M70 70)   6  Cardiomyopathy (425 4) (I42 9)   7  Cataract of right eye (366 9) (H26 9)   8  Chemotherapy-induced nausea (787 02,E933 1) (R11 0,T45  1X5A)   9  Chronic hypoxemic respiratory failure (518 83,799 02) (J96 11)   10  Chronic obstructive pulmonary disease (496) (J44 9)   11  Congestive heart failure (428 0) (I50 9)   12  Depression (311) (F32 9)   13  DMII (diabetes mellitus, type 2) (250 00) (E11 9)   14  Fracture of multiple pubic rami (808 2) (S32 599A)   15  Fracture of pubic ramus (808 2) (S32 599A)   16  Hyperlipidemia (272 4) (E78 5)   17  Hypertension (401 9) (I10)   18  Lung cancer, upper lobe (162 3) (C34 10)   19  Metastasis to adrenal gland (198 7) (C79 70)   20  Multiple thyroid nodules (241 1) (E04 2)   21  Nodule of left lobe of thyroid gland (241 0) (E04 1)   22  Orthopedic aftercare (V54 9) (Z47 89)   23  Osteoporosis (733 00) (M81 0)   24  Other chronic pain (338 29) (G89 29)   25  Parotid adenoma (210 2) (D11 0)   26  Pelvic pain (R10 2)   27  Pelvic pain in female (625 9) (R10 2)   28  Primary localized osteoarthrosis of the hip, unspecified laterality (949 15) (M16 10)   29  Psoriasis (696 1) (L40 9)   30  PVD (peripheral vascular disease) (443 9) (I73 9)   31  Restless legs syndrome (333 94) (G25 81)   32  Sciatica (724 3) (M54 30)   33  Screening for genitourinary condition (V81 6) (Z13 89)   34  Screening for neurological condition (V80 09) (Z13 89)   35  Screening for osteoporosis (V82 81) (Z13 820)   36  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)   37  Strain of right hip adductor muscle (843 8) (S76 011A)   38  Trochanteric bursitis, unspecified laterality (726 5) (M70 60)   39  Type 2 diabetes mellitus (250 00) (E11 9)    Chief Complaint  Chief Complaint Free Text Note Form: Oxygen, COPD, 6 Min Walk Face to Face      History of Present Illness  HPI: Ms Lalita Reese presents to the office today for evaluation for Oxygen  She was recently in the office to follow up with Dr Navya Gutiérrez is regards to her COPD and Chronic Hypoxic Respiratory Failure  She offers no complaints and feels at baseline  Review of Systems  Complete-Female - Pulm:   Constitutional: No fever, no chills, feels well, no tiredness, no recent weight gain or weight loss  Eyes: no complaints of vision problems  ENT: no rhinitis, no PND, no epistaxis  Cardiovascular: no palpitations, no chest pain  Respiratory: as noted in HPI  Gastrointestinal: no complaints of esophageal reflux, no abdominal pain  Musculoskeletal: no arthralgias, no joint swelling, no myalgias  Integumentary: no rash, no lesions  Past Medical History  1  History of acute bronchitis (V12 69) (Z87 09)    Surgical History  1  History of Bypass Graft Using Vein: Aortic-Bifemoral   2  History of  Section   3  History of Cholecystectomy   4  History of Hysterectomy   5  History of Tonsillectomy    Family History  Mother    1  Family history of thyroid disease (V18 19) (Z83 49)  Sister    2   Family history of malignant neoplasm (V16 9) (Z80 9)    Social History   · Denied: Alcohol Use (History)   · Caffeine Use   · Coffee once in a great while   and everyday tea drinker   · Current every day smoker (305 1) (F17 200)   · Smoke for less than 1/2 pa k per day for 50 yrs   · Denied: Drug use (305 90) (F19 90)    Current Meds   1  Advair Diskus 250-50 MCG/DOSE Inhalation Aerosol Powder Breath Activated; USE 1   INHALATION TWICE A DAY RINSE MOUTH AFTER USE; Therapy: 96MCA5762 to (Evaluate:16Sep2018)  Requested for: 03Xei8937; Last   Rx:21Sep2017 Ordered   2  Albuterol Sulfate (2 5 MG/3ML) 0 083% Inhalation Nebulization Solution; inhale contents   of 1 vial in nebulizer four times a day if needed; Therapy: 93VNB7551 to (Evaluate:18Jun2018)  Requested for: 93HXK9994; Last   Rx:21Sep2017 Ordered   3  Azithromycin 250 MG Oral Tablet; Take 1 tab every Monday Wednesday and Friday    Requested for: 60Dpj6868; Last Rx:21Sep2017 Ordered   4  FreeStyle Lite Test In [employer]; Test 3 times daily; Therapy: 58CQA2935 to (Evaluate:01Jan2018)  Requested for: 18SFE8219; Last   Rx:06Jan2017 Ordered   5  Lyrica 100 MG Oral Capsule; TAKE 1 CAPSULE AT BEDTIME; Therapy: (Recorded:18Ndq0950) to Recorded   6  Metoprolol Tartrate 100 MG Oral Tablet; TAKE 1 TABLET DAILY morning; Therapy: 92QPX0209 to Recorded   7  Metoprolol Tartrate 100 MG Oral Tablet; take one tablet daily  Requested for: 84DIM1889;   Last Rx:37Oqd6990 Ordered   8  Oxycodone-Acetaminophen 5-325 MG Oral Tablet; 1 q4h prn; Therapy: 07FAD1969 to (Evaluate:89Mvk0061)  Requested for: 15Lbl8918; Last   Rx:20Mxd0668 Ordered   9  Simvastatin 80 MG Oral Tablet; Take 1 tablet daily; Therapy: 50AUX8236 to (Evaluate:76Jsl8480)  Requested for: 89WQP8787; Last   Rx:10Oer7336 Ordered   10  Spiriva Respimat 2 5 MCG/ACT Inhalation Aerosol Solution; INHALE 2 PUFFS ONCE    DAILY; Therapy: 09FFL5039 to (Evaluate:16Sep2018)  Requested for: 63Fai8420; Last    Rx:61Yka8828 Ordered    Allergies  1   Penicillins    Vitals  Vital Signs    Recorded: 17VAF3763 03:19PM   Temperature 97 1 F   Heart Rate 76   Respiration 20   Systolic 527   Diastolic 60   Height 5 ft 1 in   Weight 111 lb    BMI Calculated 20 97   BSA Calculated 1 47   O2 Saturation 91, RA     Physical Exam    Constitutional   General appearance: No acute distress, well appearing and well nourished  Eyes   Examination of pupil and irises: Anicteric, pupils reactive  Ears, Nose, Mouth, and Throat   External inspection of ears and nose: Normal     Nasal mucosa, septum, and turbinates: Normal without edema or erythema  Oropharynx: Normal with no erythema, edema, exudate or lesions  Neck   Jugular veins: Normal     Pulmonary   Chest: Normal     Auscultation of lungs: Clear to auscultation, no rales, no crackles, no wheezing  Cardiovascular   Auscultation of heart: Normal rate and rhythm, normal S1 and S2, no murmurs  Examination of extremities for edema and/or varicosities: Normal     Abdomen   Abdomen: Soft, non-tender  Lymphatic   Palpation of lymph nodes in neck: No lymphadenopathy  Musculoskeletal   Gait and station: Normal     Neurologic   Mental Status: Normal  Not confused, no evidence of dementia, good comprehension, good concentration  Skin   Skin and subcutaneous tissue: Limited exam shows no rash  Psychiatric   Orientation to person, place and time: Normal        Health Management  Chronic obstructive pulmonary disease   Complete PFT; every 1 year; Next Due: 15IUU1587; Overdue    Future Appointments    Date/Time Provider Specialty Site   10/09/2017 02:15 PM SANTIAGO Johns , [facility] Hematology Oncology [address] [place] [address] MEDICAL ONCOLOGY   10/19/2017 09:00 AM SANTIAGO Le   Family Medicine [facility]     Signatures   Electronically signed by : Jerel Lawson, [facility]; Sep 27 2017  5:46PM EST                       (Author)    Electronically signed by : SANTIAGO Campos ; Oct  2 2017  4:48PM EST                       (Author)

## 2017-10-30 ENCOUNTER — HOSPITAL ENCOUNTER (OUTPATIENT)
Dept: INFUSION CENTER | Facility: HOSPITAL | Age: 77
Discharge: HOME | End: 2017-10-30
Payer: MEDICARE

## 2017-10-30 DIAGNOSIS — R91.1 SOLITARY PULMONARY NODULE: ICD-10-CM

## 2017-10-30 LAB
ALBUMIN SERPL BCP-MCNC: 3.1 G/DL (ref 3.5–5)
ALP SERPL-CCNC: 76 U/L (ref 46–116)
ALT SERPL W P-5'-P-CCNC: 15 U/L (ref 12–78)
ANION GAP SERPL CALCULATED.3IONS-SCNC: 4 MMOL/L (ref 4–13)
AST SERPL W P-5'-P-CCNC: 14 U/L (ref 5–45)
BASOPHILS # BLD AUTO: 0.01 THOUSANDS/ΜL (ref 0–0.1)
BASOPHILS NFR BLD AUTO: 0 % (ref 0–1)
BILIRUB SERPL-MCNC: 0.5 MG/DL (ref 0.2–1)
BUN SERPL-MCNC: 10 MG/DL (ref 5–25)
CALCIUM SERPL-MCNC: 8.4 MG/DL (ref 8.3–10.1)
CHLORIDE SERPL-SCNC: 102 MMOL/L (ref 100–108)
CO2 SERPL-SCNC: 36 MMOL/L (ref 21–32)
CREAT SERPL-MCNC: 0.68 MG/DL (ref 0.6–1.3)
EOSINOPHIL # BLD AUTO: 0.1 THOUSAND/ΜL (ref 0–0.61)
EOSINOPHIL NFR BLD AUTO: 1 % (ref 0–6)
ERYTHROCYTE [DISTWIDTH] IN BLOOD BY AUTOMATED COUNT: 12.1 % (ref 11.6–15.1)
GFR SERPL CREATININE-BSD FRML MDRD: 84 ML/MIN/1.73SQ M
GLUCOSE SERPL-MCNC: 161 MG/DL (ref 65–140)
HCT VFR BLD AUTO: 41 % (ref 34.8–46.1)
HGB BLD-MCNC: 12.9 G/DL (ref 11.5–15.4)
LYMPHOCYTES # BLD AUTO: 1.52 THOUSANDS/ΜL (ref 0.6–4.47)
LYMPHOCYTES NFR BLD AUTO: 20 % (ref 14–44)
MCH RBC QN AUTO: 31.2 PG (ref 26.8–34.3)
MCHC RBC AUTO-ENTMCNC: 31.5 G/DL (ref 31.4–37.4)
MCV RBC AUTO: 99 FL (ref 82–98)
MONOCYTES # BLD AUTO: 0.63 THOUSAND/ΜL (ref 0.17–1.22)
MONOCYTES NFR BLD AUTO: 8 % (ref 4–12)
NEUTROPHILS # BLD AUTO: 5.35 THOUSANDS/ΜL (ref 1.85–7.62)
NEUTS SEG NFR BLD AUTO: 71 % (ref 43–75)
PLATELET # BLD AUTO: 142 THOUSANDS/UL (ref 149–390)
PMV BLD AUTO: 10.3 FL (ref 8.9–12.7)
POTASSIUM SERPL-SCNC: 3.9 MMOL/L (ref 3.5–5.3)
PROT SERPL-MCNC: 6.4 G/DL (ref 6.4–8.2)
RBC # BLD AUTO: 4.14 MILLION/UL (ref 3.81–5.12)
SODIUM SERPL-SCNC: 142 MMOL/L (ref 136–145)
WBC # BLD AUTO: 7.61 THOUSAND/UL (ref 4.31–10.16)

## 2017-10-30 PROCEDURE — 85025 COMPLETE CBC W/AUTO DIFF WBC: CPT | Performed by: INTERNAL MEDICINE

## 2017-10-30 PROCEDURE — 36593 DECLOT VASCULAR DEVICE: CPT

## 2017-10-30 PROCEDURE — 80053 COMPREHEN METABOLIC PANEL: CPT | Performed by: INTERNAL MEDICINE

## 2017-10-30 RX ADMIN — ALTEPLASE 2 MG: 2.2 INJECTION, POWDER, LYOPHILIZED, FOR SOLUTION INTRAVENOUS at 08:30

## 2017-10-30 RX ADMIN — Medication 300 UNITS: at 09:33

## 2017-10-30 NOTE — PROGRESS NOTES
Brisk blood return noted from left CW port after 60 minute dwell time on cath flow  Labs drawn and patient then d/c'd home ambulatory with steady gait

## 2017-10-30 NOTE — PROGRESS NOTES
Pt in infusion center today for central line labs  Unable to obtain blood return  Cath flow instilling at this time

## 2017-10-31 ENCOUNTER — HOSPITAL ENCOUNTER (OUTPATIENT)
Dept: INFUSION CENTER | Facility: HOSPITAL | Age: 77
Discharge: HOME | End: 2017-10-31
Payer: MEDICARE

## 2017-10-31 VITALS
HEART RATE: 62 BPM | TEMPERATURE: 96.2 F | OXYGEN SATURATION: 99 % | DIASTOLIC BLOOD PRESSURE: 70 MMHG | SYSTOLIC BLOOD PRESSURE: 144 MMHG | RESPIRATION RATE: 18 BRPM

## 2017-10-31 PROCEDURE — C9483 INJECTION, ATEZOLIZUMAB: HCPCS | Performed by: INTERNAL MEDICINE

## 2017-10-31 PROCEDURE — 96413 CHEMO IV INFUSION 1 HR: CPT

## 2017-10-31 RX ADMIN — SODIUM CHLORIDE 20 ML/HR: 9 INJECTION, SOLUTION INTRAVENOUS at 08:53

## 2017-10-31 RX ADMIN — ATEZOLIZUMAB 1200 MG: 1200 INJECTION, SOLUTION INTRAVENOUS at 08:53

## 2017-10-31 RX ADMIN — Medication 300 UNITS: at 09:31

## 2017-10-31 NOTE — PROGRESS NOTES
Pt arrived for infusion, VSS, offers no complaints  Port accessed without difficulty, pharmacy notified  WCTM

## 2017-11-20 ENCOUNTER — HOSPITAL ENCOUNTER (OUTPATIENT)
Dept: INFUSION CENTER | Facility: HOSPITAL | Age: 77
Discharge: HOME | End: 2017-11-20
Payer: MEDICARE

## 2017-11-20 LAB
ALBUMIN SERPL BCP-MCNC: 3.3 G/DL (ref 3.5–5)
ALP SERPL-CCNC: 83 U/L (ref 46–116)
ALT SERPL W P-5'-P-CCNC: 15 U/L (ref 12–78)
ANION GAP SERPL CALCULATED.3IONS-SCNC: 4 MMOL/L (ref 4–13)
AST SERPL W P-5'-P-CCNC: 12 U/L (ref 5–45)
BASOPHILS # BLD AUTO: 0.01 THOUSANDS/ΜL (ref 0–0.1)
BASOPHILS NFR BLD AUTO: 0 % (ref 0–1)
BILIRUB SERPL-MCNC: 0.5 MG/DL (ref 0.2–1)
BUN SERPL-MCNC: 11 MG/DL (ref 5–25)
CALCIUM SERPL-MCNC: 8.8 MG/DL (ref 8.3–10.1)
CHLORIDE SERPL-SCNC: 103 MMOL/L (ref 100–108)
CO2 SERPL-SCNC: 36 MMOL/L (ref 21–32)
CREAT SERPL-MCNC: 0.73 MG/DL (ref 0.6–1.3)
EOSINOPHIL # BLD AUTO: 0.1 THOUSAND/ΜL (ref 0–0.61)
EOSINOPHIL NFR BLD AUTO: 2 % (ref 0–6)
ERYTHROCYTE [DISTWIDTH] IN BLOOD BY AUTOMATED COUNT: 11.8 % (ref 11.6–15.1)
GFR SERPL CREATININE-BSD FRML MDRD: 79 ML/MIN/1.73SQ M
GLUCOSE SERPL-MCNC: 164 MG/DL (ref 65–140)
HCT VFR BLD AUTO: 42.8 % (ref 34.8–46.1)
HGB BLD-MCNC: 13.4 G/DL (ref 11.5–15.4)
LYMPHOCYTES # BLD AUTO: 1.81 THOUSANDS/ΜL (ref 0.6–4.47)
LYMPHOCYTES NFR BLD AUTO: 28 % (ref 14–44)
MCH RBC QN AUTO: 30.9 PG (ref 26.8–34.3)
MCHC RBC AUTO-ENTMCNC: 31.3 G/DL (ref 31.4–37.4)
MCV RBC AUTO: 99 FL (ref 82–98)
MONOCYTES # BLD AUTO: 0.46 THOUSAND/ΜL (ref 0.17–1.22)
MONOCYTES NFR BLD AUTO: 7 % (ref 4–12)
NEUTROPHILS # BLD AUTO: 4.16 THOUSANDS/ΜL (ref 1.85–7.62)
NEUTS SEG NFR BLD AUTO: 63 % (ref 43–75)
PLATELET # BLD AUTO: 146 THOUSANDS/UL (ref 149–390)
PMV BLD AUTO: 10.3 FL (ref 8.9–12.7)
POTASSIUM SERPL-SCNC: 4 MMOL/L (ref 3.5–5.3)
PROT SERPL-MCNC: 6.7 G/DL (ref 6.4–8.2)
RBC # BLD AUTO: 4.33 MILLION/UL (ref 3.81–5.12)
SODIUM SERPL-SCNC: 143 MMOL/L (ref 136–145)
WBC # BLD AUTO: 6.54 THOUSAND/UL (ref 4.31–10.16)

## 2017-11-20 PROCEDURE — 80053 COMPREHEN METABOLIC PANEL: CPT | Performed by: INTERNAL MEDICINE

## 2017-11-20 PROCEDURE — 85025 COMPLETE CBC W/AUTO DIFF WBC: CPT | Performed by: INTERNAL MEDICINE

## 2017-11-20 RX ORDER — SODIUM CHLORIDE 9 MG/ML
40 INJECTION, SOLUTION INTRAVENOUS ONCE
Status: COMPLETED | OUTPATIENT
Start: 2017-11-21 | End: 2017-11-21

## 2017-11-20 RX ADMIN — Medication 300 UNITS: at 08:15

## 2017-11-21 ENCOUNTER — HOSPITAL ENCOUNTER (OUTPATIENT)
Dept: INFUSION CENTER | Facility: HOSPITAL | Age: 77
Discharge: HOME | End: 2017-11-21
Payer: MEDICARE

## 2017-11-21 VITALS
TEMPERATURE: 96.1 F | RESPIRATION RATE: 18 BRPM | SYSTOLIC BLOOD PRESSURE: 149 MMHG | OXYGEN SATURATION: 98 % | HEART RATE: 75 BPM | DIASTOLIC BLOOD PRESSURE: 67 MMHG

## 2017-11-21 PROCEDURE — C9483 INJECTION, ATEZOLIZUMAB: HCPCS | Performed by: INTERNAL MEDICINE

## 2017-11-21 PROCEDURE — 96413 CHEMO IV INFUSION 1 HR: CPT

## 2017-11-21 RX ADMIN — SODIUM CHLORIDE 40 ML/HR: 9 INJECTION, SOLUTION INTRAVENOUS at 08:24

## 2017-11-21 RX ADMIN — ATEZOLIZUMAB 1200 MG: 1200 INJECTION, SOLUTION INTRAVENOUS at 08:55

## 2017-11-21 RX ADMIN — Medication 300 UNITS: at 09:35

## 2017-11-21 NOTE — PROGRESS NOTES
Pt arrived on unit for chemotherapy  Offers no c/o  VSS and labs wnl  Pt tolerated treatment today with no adverse reactions  Left unit ambulatory with a steady gait

## 2017-11-30 ENCOUNTER — TRANSCRIBE ORDERS (OUTPATIENT)
Dept: ADMINISTRATIVE | Facility: HOSPITAL | Age: 77
End: 2017-11-30

## 2017-11-30 ENCOUNTER — ALLSCRIPTS OFFICE VISIT (OUTPATIENT)
Dept: OTHER | Facility: OTHER | Age: 77
End: 2017-11-30

## 2017-11-30 DIAGNOSIS — C34.91 PRIMARY LUNG CANCER WITH METASTASIS FROM LUNG TO OTHER SITE, RIGHT (HCC): Primary | ICD-10-CM

## 2017-12-01 ENCOUNTER — ALLSCRIPTS OFFICE VISIT (OUTPATIENT)
Dept: OTHER | Facility: OTHER | Age: 77
End: 2017-12-01

## 2017-12-04 ENCOUNTER — GENERIC CONVERSION - ENCOUNTER (OUTPATIENT)
Dept: OTHER | Facility: OTHER | Age: 77
End: 2017-12-04

## 2017-12-04 ENCOUNTER — LAB CONVERSION - ENCOUNTER (OUTPATIENT)
Dept: OTHER | Facility: OTHER | Age: 77
End: 2017-12-04

## 2017-12-04 LAB
HBA1C MFR BLD HPLC: 5.8 % OF TOTAL HGB
TSH SERPL DL<=0.05 MIU/L-ACNC: 3.16 MIU/L (ref 0.4–4.5)

## 2017-12-05 NOTE — PROGRESS NOTES
Assessment    1  DMII (diabetes mellitus, type 2) (250 00) (E11 9)   2  Hyperlipidemia (272 4) (E78 5)   3  Hypertension (401 9) (I10)    Plan  DMII (diabetes mellitus, type 2)    · (1) HEMOGLOBIN A1C; Status:Active; Requested for:15Svk3077;    · (1) TSH WITH FT4 REFLEX; Status:Active; Requested for:52Rkf3224;     Discussion/Summary    In summary then this is a 65-year-old female with COPD, lung carcinoma with metastasis being treated by Oncology and Pulmonary who presents today for follow-up of chronic pain related to peripheral vascular disease with failed revascularization in distant past, type 2 diabetes managed with diet, essential hypertension, hyperlipidemia as well as subclinical hypothyroidism in addition to multiple other conditions  We are going to get hemoglobin A1c as well as a TSH today  She is given an influenza vaccine today  She declines a Pneumovax today  She will continue her follow-up with her specialty care  Will see her back in 4 months or sooner as needed  Follow up when results are available  She has been agree  Chief Complaint  My back bothers me for 4-5 days  Seems to worsen through the day  Takes Percocet chronically without change  Seems fine overnight  I sit around all day cause my  does it all  No change in chronic LE pain  BP seemed to begin while making the bed  No incontinence or LE weakness  Heat seems to help  Patient is here today for follow up of chronic conditions described in HPI        History of Present Illness  The patient is a 65-year-old female with metastatic lung carcinoma currently being treated by Hematology Oncology as well as Pulmonary who presents today for routine follow-up of multiple medical conditions including chronic pain managed with narcotics related to peripheral vascular disease with failed revascularization in the distant past, essential hypertension, COPD, history of CHF, depression, type 2 diabetes as well as hyperlipidemia in addition to multiple other problems  She is accompanied by her  today  She states that her back has been bothering her and indicates the lumbosacral region for the past 4-5 days   relates that he believes it began when they were trying to make the bed and she began complaining about it at that time  She states that she sleeps fine at night and seems improved in the morning but as she sits around during the day it seems to worsen  She has no change in her chronic lower extremity pain  She has no complaint of urinary incontinence or fecal incontinence  No increased lower extremity weakness  She states that heat seems to help it  The patient states her hyperlipidemia has been stable since the last visit  Comorbid Illnesses: diabetes mellitus,-- peripheral vascular disease,-- tobacco use-- and-- hypertension  Symptoms: denies chest pain,-- stable intermittent leg claudication-- and-- stable muscle pain  Medications: Medication(s): a statin  The patient presents for follow-up of essential hypertension  The patient states she has been stable with her blood pressure control since the last visit  Comorbid Illnesses: cardiac failure-- and-- left ventricular hypertrophy  Interval Events: Wearing chronic O2 for her COPD and history of CHF  Symptoms: stable dyspnea,-- denies chest pain-- and-- denies lower extremity edema  Associated symptoms include She is presently on no medication she is overdue for hemoglobin A1c  Medications: The patient is not currently on any medications for her hypertension--lifestyle modification only  Review of Systems   Constitutional: no fever,-- no recent weight gain,-- not feeling tired-- and-- no recent weight loss  Cardiovascular: no chest pain-- and-- no lower extremity edema  Respiratory: shortness of breath-- and-- cough, but-- no orthopnea-- and-- no PND  Gastrointestinal: no constipation-- and-- no diarrhea  Genitourinary: no incontinence    Musculoskeletal: limb pain, but-- as noted in HPI  Neurological: no headache-- and-- no dizziness  Active Problems  1  Abnormal CT of the chest (793 2) (R93 8)   2  Asymptomatic carotid artery stenosis (433 10) (I65 29)   3  Benign essential hypertension (401 1) (I10)   4  Bursitis, ischial (726 5) (M70 70)   5  Cardiomyopathy (425 4) (I42 9)   6  Cataract of right eye (366 9) (H26 9)   7  Chemotherapy-induced nausea (787 02,E933 1) (R11 0,T45  1X5A)   8  Chronic hypoxemic respiratory failure (518 83,799 02) (J96 11)   9  Chronic obstructive pulmonary disease (496) (J44 9)   10  Congestive heart failure (428 0) (I50 9)   11  Depression (311) (F32 9)   12  DMII (diabetes mellitus, type 2) (250 00) (E11 9)   13  Fracture of multiple pubic rami (808 2) (S32 599A)   14  Fracture of pubic ramus (808 2) (S32 599A)   15  Hyperlipidemia (272 4) (E78 5)   16  Hypertension (401 9) (I10)   17  Lung cancer, upper lobe (162 3) (C34 10)   18  Metastasis to adrenal gland (198 7) (C79 70)   19  Multiple thyroid nodules (241 1) (E04 2)   20  Nodule of left lobe of thyroid gland (241 0) (E04 1)   21  Orthopedic aftercare (V54 9) (Z47 89)   22  Osteoporosis (733 00) (M81 0)   23  Other chronic pain (338 29) (G89 29)   24  Parotid adenoma (210 2) (D11 0)   25  Pelvic pain (R10 2)   26  Pelvic pain in female (625 9) (R10 2)   27  Primary localized osteoarthrosis of the hip, unspecified laterality (715 15) (M16 10)   28  Psoriasis (696 1) (L40 9)   29  PVD (peripheral vascular disease) (443 9) (I73 9)   30  Restless legs syndrome (333 94) (G25 81)   31  Sciatica (724 3) (M54 30)   32  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)   33  Trochanteric bursitis, unspecified laterality (726 5) (M70 60)    Past Medical History  1  History of acute bronchitis (V12 69) (Z87 09)    Surgical History  1  History of Bypass Graft Using Vein: Aortic-Bifemoral   2  History of  Section   3  History of Cholecystectomy   4  History of Hysterectomy   5   History of Tonsillectomy    Family History  Mother    1  Family history of thyroid disease (V18 19) (Z83 49)  Sister    2  Family history of malignant neoplasm (V16 9) (Z80 9)    Social History     · Denied: Alcohol Use (History)   · Caffeine Use   · Current every day smoker (305 1) (F17 200)   · Denied: Drug use (305 90) (F19 90)    Current Meds   1  Advair Diskus 250-50 MCG/DOSE Inhalation Aerosol Powder Breath Activated; USE 1 INHALATION TWICE A DAY RINSE MOUTH AFTER USE; Therapy: 45VTP8154 to (Evaluate:88Ltt8703)  Requested for: 55Xoy0314; Last Rx:11Mhk3109 Ordered   2  Albuterol Sulfate (2 5 MG/3ML) 0 083% Inhalation Nebulization Solution; inhale contents of 1 vial in nebulizer four times a day if needed; Therapy: 54SDK2140 to (Evaluate:18Jun2018)  Requested for: 66PXA2345; Last Rx:81Juk0127 Ordered   3  Fluocinonide 0 05 % External Solution; APPLY SPARINGLY TO SCALP TWICE DAILY; Therapy: 41Jnl3863 to (Evaluate:03Cav4090)  Requested for: 95NAC5318; Last Rx:16Oct2017 Ordered   4  FreeStyle Lite Test In [employer]; Test 3 times daily; Therapy: 37PEB5680 to (Evaluate:01Jan2018)  Requested for: 60ZGT9629; Last Rx:06Jan2017 Ordered   5  Lyrica 100 MG Oral Capsule; TAKE 1 CAPSULE AT BEDTIME; Therapy: (Recorded:30Oct2017) to Recorded   6  Metoprolol Tartrate 100 MG Oral Tablet; take one tablet daily  Requested for: 00GYZ1361; Last Rx:16Oct2017 Ordered   7  Oxycodone-Acetaminophen 5-325 MG Oral Tablet; 1 q4h prn; Therapy: 36TBN0448 to (Evaluate:90Eqc2387)  Requested for: 27PTL4402; Last Rx:28Nov2017 Ordered   8  Simvastatin 80 MG Oral Tablet; Take 1 tablet daily; Therapy: 41VOM7543 to (Evaluate:16Fnp0542)  Requested for: 80HOS8163; Last Rx:29Dqc8432 Ordered   9  Spiriva Respimat 2 5 MCG/ACT Inhalation Aerosol Solution; INHALE 2 PUFFS ONCE DAILY; Therapy: 20DCN7107 to (Evaluate:15Qkj7776)  Requested for: 43Ynt2418; Last Rx:71Fzd6391 Ordered    Allergies  1   Penicillins    Vitals  Vital Signs    Recorded: 50SLA7569 09:36AM Systolic 772   Diastolic 60   Height 5 ft 1 in   Weight 110 lb    BMI Calculated 20 78   BSA Calculated 1 47       Physical Exam   Constitutional  General appearance: Abnormal   well developed,-- chronically ill,-- within normal limits of ideal weight-- and-- appearance reflects stated age  -- Wearing nasal oxygen  Eyes  Conjunctiva and lids: No swelling, erythema or discharge  Pulmonary  Respiratory effort: No increased work of breathing or signs of respiratory distress  Auscultation of lungs: Abnormal  -- Moderately distant breath sounds with scattered rhonchi and occasional crackle  Cardiovascular  Auscultation of heart: Normal rate and rhythm, normal S1 and S2, without murmurs  -- Distant heart sounds with regular rhythm with a rate of approximately 80  Examination of extremities for edema and/or varicosities: Normal  -- No edema  Lymphatic  Palpation of lymph nodes in neck: No lymphadenopathy  -- No JVD  Musculoskeletal  Digits and nails: Normal without clubbing or cyanosis  -- No cyanosis noted  Psychiatric  Orientation to person, place, and time: Normal    Mood and affect: Abnormal  -- Dysphoric appearance  Health Management  Chronic obstructive pulmonary disease   Complete PFT; every 1 year; Next Due: 07ZJO4633;  Overdue    Future Appointments    Date/Time Provider Specialty Site   01/02/2018 10:00 AM SANTIAGO Lemus , DO [facility]   02/06/2018 08:40 AM SANTIAGO Lemus , DO [facility] [address] [place] [address]       Signatures   Electronically signed by : SANTIAGO Singh ; Dec  1 2017  6:40PM EST                       (Author)

## 2017-12-05 NOTE — PROGRESS NOTES
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)   2  Metastasis to adrenal gland (198 7) (C79 70)    Plan  Squamous cell carcinoma of right lung    · Drink plenty of fluids ; Status:Complete;   Done: 51AUW7130   Ordered; For:Squamous cell carcinoma of right lung; Ordered By:Yonathan Langford;   · Follow-up visit in 6 weeks Evaluation and Treatment  Follow-up  Status: Hold For -  Scheduling  Requested for: 91XFH8146   Ordered; For: Squamous cell carcinoma of right lung; Ordered By: Isidro Hanson Performed:  Due: 39GMF0476   · * CT CHEST ABDOMEN PELVIS W CONTRAST; Status:Hold For - Scheduling; Requested  GOL:85TOD6261; Perform:[facility]; WVM:85GKQ1513;LTWAEZF; For:Squamous cell carcinoma of right lung; Ordered By:Yonathan Langford;    Discussion/Summary  Discussion Summary:   In summary, this is a 44-year-old female history of squamous cell carcinoma of the lung with metastatic disease, stage IV  She is currently on Tecentriq  She is tolerating this fairly well  CBC and chemistry are normal  TSH to be Re checked  Performance status remains good, ECOG -1  Follow-up in 6 weeks is requested with repeat imaging just prior to that visit  I reviewed the above with the patient and her   They voiced understanding and agreement  Counseling Documentation With Imm: The patient was counseled regarding diagnostic results, instructions for management, patient and family education, impressions  total time of encounter was 25 minutes  Chief Complaint  Chief Complaint Free Text Note Form: Follow-up regarding lung cancer  History of Present Illness  HPI: June 2016- patient was found to have a thyroid mass on physical examination  Ultrasound showed bilateral thyroid nodules  In July 2016 she underwent CAT scan of the neck for evaluation of the carotid arteries  Incidentally noted, infiltrating mass in the right upper lobe extending to the hilum was noted    August 4, 2016-PET/CT showed a right upper lobe mass measuring 4 8 cm, SUV 21 5  This extends to the right hilum  Right paratracheal and subcarinal nodes measure up to 12 mm  The left adrenal showed some hypermetabolism with SUV of 3 7, without discrete mass  Uptake in the right parotid gland is noted with SUV of 22 3 and a soft tissue nodule, measuring 11 mm  Mediastinal endoscopy and bronchoscopy showed 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  Lymph node biopsies did not show malignancy  Surgery and radiation therapy were not felt to be applicable  Due to background pulmonary dysfunction as well as the potential for left adrenal metastatic disease  Chemotherapy was chosen as primary therapy  Alternatively  September 6, 2016-started Abraxane 80 mg/m^2 day 1, 8, 15, carbo AUC-5, day 1 only, every 21 days  May 2017-progressive disease noted  Tecentriq 1200 mg IV every 3 weeks initiated  Current Therapy: May 2017-progressive disease noted  Tecentriq 1200 mg IV every 3 weeks initiated  Interval History: Has had back pain started about 4 days ago  Started after some housework, has been getting better with heating pad  Review of Systems  Complete-Female:   Constitutional: No fever, no chills, feels well, no tiredness, no recent weight gain or weight loss and no recent weight loss  Eyes: No complaints of eye pain, no red eyes, no eyesight problems, no discharge, no dry eyes, no itching of eyes  ENT: no complaints of earache, no loss of hearing, no nose bleeds, no nasal discharge, no sore throat, no hoarseness  Cardiovascular: No complaints of slow heart rate, no fast heart rate, no chest pain, no palpitations, no leg claudication, no lower extremity edema  Respiratory: shortness of breath, shortness of breath during exertion and Less SOB, able to do more  Gastrointestinal: No complaints of abdominal pain, no constipation, no nausea or vomiting, no diarrhea, no bloody stools     Genitourinary: No complaints of dysuria, no incontinence, no pelvic pain, no dysmenorrhea, no vaginal discharge or bleeding  Musculoskeletal: No complaints of arthralgias, no myalgias, no joint swelling or stiffness, no limb pain or swelling  The patient presents with complaints of a rash (psoriasis  )  Neurological: No complaints of headache, no confusion, no convulsions, no numbness, no dizziness or fainting, no tingling, no limb weakness, no difficulty walking  Psychiatric: Not suicidal, no sleep disturbance, no anxiety or depression, no change in personality, no emotional problems  Endocrine: No complaints of proptosis, no hot flashes, no muscle weakness, no deepening of the voice, no feelings of weakness  Hematologic/Lymphatic: No complaints of swollen glands, no swollen glands in the neck, does not bleed easily, does not bruise easily  Active Problems    1  Abnormal CT of the chest (793 2) (R93 8)   2  Acute bronchitis (466 0) (J20 9)   3  Asymptomatic carotid artery stenosis (433 10) (I65 29)   4  Benign essential hypertension (401 1) (I10)   5  Bursitis, ischial (726 5) (M70 70)   6  Cardiomyopathy (425 4) (I42 9)   7  Cataract of right eye (366 9) (H26 9)   8  Chemotherapy-induced nausea (787 02,E933 1) (R11 0,T45  1X5A)   9  Chronic hypoxemic respiratory failure (518 83,799 02) (J96 11)   10  Chronic obstructive pulmonary disease (496) (J44 9)   11  Congestive heart failure (428 0) (I50 9)   12  Depression (311) (F32 9)   13  DMII (diabetes mellitus, type 2) (250 00) (E11 9)   14  Fracture of multiple pubic rami (808 2) (S32 599A)   15  Fracture of pubic ramus (808 2) (S32 599A)   16  Hyperlipidemia (272 4) (E78 5)   17  Hypertension (401 9) (I10)   18  Lung cancer, upper lobe (162 3) (C34 10)   19  Metastasis to adrenal gland (198 7) (C79 70)   20  Multiple thyroid nodules (241 1) (E04 2)   21  Nodule of left lobe of thyroid gland (241 0) (E04 1)   22  Orthopedic aftercare (V54 9) (Z47 89)   23   Osteoporosis (733 00) (M81 0) 24  Other chronic pain (338 29) (G89 29)   25  Parotid adenoma (210 2) (D11 0)   26  Pelvic pain (R10 2)   27  Pelvic pain in female (625 9) (R10 2)   28  Primary localized osteoarthrosis of the hip, unspecified laterality (715 15) (M16 10)   29  Psoriasis (696 1) (L40 9)   30  PVD (peripheral vascular disease) (443 9) (I73 9)   31  Restless legs syndrome (333 94) (G25 81)   32  Sciatica (724 3) (M54 30)   33  Screening for genitourinary condition (V81 6) (Z13 89)   34  Screening for neurological condition (V80 09) (Z13 89)   35  Screening for osteoporosis (V82 81) (Z13 820)   36  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)   37  Strain of right hip adductor muscle (843 8) (S76 011A)   38  Trochanteric bursitis, unspecified laterality (726 5) (M70 60)   39  Type 2 diabetes mellitus (250 00) (E11 9)    Past Medical History    1  History of acute bronchitis (V12 69) (Z87 09)    Surgical History    1  History of Bypass Graft Using Vein: Aortic-Bifemoral   2  History of  Section   3  History of Cholecystectomy   4  History of Hysterectomy   5  History of Tonsillectomy    Family History  Mother    1  Family history of thyroid disease (V18 19) (Z83 49)  Sister    2  Family history of malignant neoplasm (V16 9) (Z80 9)    Social History    · Denied: Alcohol Use (History)   · Caffeine Use   · Current every day smoker (305 1) (F17 200)   · Denied: Drug use (305 90) (F19 90)    Current Meds   1  Advair Diskus 250-50 MCG/DOSE Inhalation Aerosol Powder Breath Activated; USE 1   INHALATION TWICE A DAY RINSE MOUTH AFTER USE; Therapy: 13NJZ2608 to (Evaluate:42Jfr0215)  Requested for: 60Zgq5444; Last   Rx:84Doq3981 Ordered   2  Albuterol Sulfate (2 5 MG/3ML) 0 083% Inhalation Nebulization Solution; inhale contents   of 1 vial in nebulizer four times a day if needed; Therapy: 44DJF8509 to (Evaluate:07Acj8321)  Requested for: 58TNX2035; Last   Rx:41Aqj5753 Ordered   3  Azithromycin 250 MG Oral Tablet;  Take 1 tab every Monday Wednesday and Friday    Requested for: 47Zhd7474; Last Rx:21Sep2017 Ordered   4  Fluocinonide 0 05 % External Solution; APPLY SPARINGLY TO SCALP TWICE DAILY; Therapy: 49Ggb7440 to (Evaluate:15Qho4741)  Requested for: 67NVS0389; Last   Rx:16Oct2017 Ordered   5  FreeStyle Lite Test In [employer]; Test 3 times daily; Therapy: 64NOG3250 to (Evaluate:01Jan2018)  Requested for: 78MCK1670; Last   Rx:06Jan2017 Ordered   6  Lyrica 100 MG Oral Capsule; TAKE 1 CAPSULE AT BEDTIME; Therapy: (Recorded:30Oct2017) to Recorded   7  Metoprolol Tartrate 100 MG Oral Tablet; take one tablet daily  Requested for: 61CXL5720;   Last Rx:16Oct2017 Ordered   8  Oxycodone-Acetaminophen 5-325 MG Oral Tablet; 1 q4h prn; Therapy: 87OHM9716 to (Evaluate:37Siz5789)  Requested for: 92XCC0484; Last   Rx:28Nov2017 Ordered   9  Simvastatin 80 MG Oral Tablet; Take 1 tablet daily; Therapy: 34ISY1171 to (Evaluate:36Clo8362)  Requested for: 87SZZ0311; Last   Rx:17Ebo7904 Ordered   10  Spiriva Respimat 2 5 MCG/ACT Inhalation Aerosol Solution; INHALE 2 PUFFS ONCE    DAILY; Therapy: 74YIE5311 to (Evaluate:11Dhi1365)  Requested for: 90Gxj5650; Last    Rx:21Sep2017 Ordered    Allergies    1  Penicillins    Vitals  Vital Signs    Recorded: 07TUJ5982 08:25AM   Temperature 97 4 F   Heart Rate 73   Respiration 17   Systolic 305   Diastolic 68   Height 5 ft 1 in   Weight 110 lb 4 oz   BMI Calculated 20 83   BSA Calculated 1 47   O2 Saturation 97   Pain Scale 3     Physical Exam    Constitutional   General appearance: No acute distress, well appearing and well nourished  Eyes   Conjunctiva and lids: No swelling, erythema or discharge  Ears, Nose, Mouth, and Throat   External inspection of ears and nose: Normal     Oropharynx: Normal with no erythema, edema, exudate or lesions  Pulmonary   Auscultation of lungs: Abnormal   Auscultation of the lungs revealed decreased breath sounds diffusely     Cardiovascular   Auscultation of heart: Normal rate and rhythm, normal S1 and S2, without murmurs  Examination of extremities for edema and/or varicosities: Normal     Abdomen   Abdomen: Non-tender, no masses  Liver and spleen: No hepatomegaly or splenomegaly  Lymphatic   Palpation of lymph nodes in neck: No lymphadenopathy  Musculoskeletal   Gait and station: Normal     Skin   Skin and subcutaneous tissue: Normal without rashes or lesions  Neurologic   Cranial nerves: Cranial nerves 2-12 intact  Psychiatric   Orientation to person, place, and time: Normal          Health Management  Chronic obstructive pulmonary disease   Complete PFT; every 1 year; Next Due: 74LEQ5942; Overdue    Future Appointments    Date/Time Provider Specialty Site   01/02/2018 10:00 AM SANTIAGO Monet , DO, [facility]   12/01/2017 09:30 AM SANTIAGO Clemens   [facility] [place] [address]   Electronically signed by : SANTIAGO Peoples ,DO; Nov 30 2017  8:49AM EST                       (Author)

## 2017-12-06 ENCOUNTER — GENERIC CONVERSION - ENCOUNTER (OUTPATIENT)
Dept: FAMILY MEDICINE CLINIC | Facility: CLINIC | Age: 77
End: 2017-12-06

## 2017-12-06 ENCOUNTER — HOSPITAL ENCOUNTER (OUTPATIENT)
Dept: RADIOLOGY | Facility: HOSPITAL | Age: 77
Discharge: HOME | End: 2017-12-06
Attending: FAMILY MEDICINE
Payer: MEDICARE

## 2017-12-06 ENCOUNTER — TRANSCRIBE ORDERS (OUTPATIENT)
Dept: ADMINISTRATIVE | Facility: HOSPITAL | Age: 77
End: 2017-12-06

## 2017-12-06 DIAGNOSIS — M54.50 LOW BACK PAIN: ICD-10-CM

## 2017-12-06 PROCEDURE — 72110 X-RAY EXAM L-2 SPINE 4/>VWS: CPT

## 2017-12-06 RX ORDER — SODIUM CHLORIDE 9 MG/ML
20 INJECTION, SOLUTION INTRAVENOUS CONTINUOUS
Status: DISCONTINUED | OUTPATIENT
Start: 2017-12-12 | End: 2017-12-15 | Stop reason: HOSPADM

## 2017-12-08 ENCOUNTER — GENERIC CONVERSION - ENCOUNTER (OUTPATIENT)
Dept: OTHER | Facility: OTHER | Age: 77
End: 2017-12-08

## 2017-12-11 DIAGNOSIS — M54.50 LOW BACK PAIN: ICD-10-CM

## 2017-12-11 DIAGNOSIS — R91.1 SOLITARY PULMONARY NODULE: ICD-10-CM

## 2017-12-12 ENCOUNTER — HOSPITAL ENCOUNTER (OUTPATIENT)
Dept: INFUSION CENTER | Facility: HOSPITAL | Age: 77
Discharge: HOME | End: 2017-12-12
Payer: MEDICARE

## 2017-12-12 VITALS
HEART RATE: 71 BPM | OXYGEN SATURATION: 99 % | DIASTOLIC BLOOD PRESSURE: 58 MMHG | TEMPERATURE: 96 F | RESPIRATION RATE: 18 BRPM | SYSTOLIC BLOOD PRESSURE: 142 MMHG

## 2017-12-12 LAB
ALBUMIN SERPL BCP-MCNC: 3.2 G/DL (ref 3.5–5)
ALP SERPL-CCNC: 114 U/L (ref 46–116)
ALT SERPL W P-5'-P-CCNC: 13 U/L (ref 12–78)
ANION GAP SERPL CALCULATED.3IONS-SCNC: 3 MMOL/L (ref 4–13)
AST SERPL W P-5'-P-CCNC: 14 U/L (ref 5–45)
BASOPHILS # BLD AUTO: 0.01 THOUSANDS/ΜL (ref 0–0.1)
BASOPHILS NFR BLD AUTO: 0 % (ref 0–1)
BILIRUB SERPL-MCNC: 0.5 MG/DL (ref 0.2–1)
BUN SERPL-MCNC: 13 MG/DL (ref 5–25)
CALCIUM SERPL-MCNC: 8.8 MG/DL (ref 8.3–10.1)
CHLORIDE SERPL-SCNC: 103 MMOL/L (ref 100–108)
CO2 SERPL-SCNC: 38 MMOL/L (ref 21–32)
CREAT SERPL-MCNC: 0.75 MG/DL (ref 0.6–1.3)
EOSINOPHIL # BLD AUTO: 0.09 THOUSAND/ΜL (ref 0–0.61)
EOSINOPHIL NFR BLD AUTO: 1 % (ref 0–6)
ERYTHROCYTE [DISTWIDTH] IN BLOOD BY AUTOMATED COUNT: 11.7 % (ref 11.6–15.1)
GFR SERPL CREATININE-BSD FRML MDRD: 77 ML/MIN/1.73SQ M
GLUCOSE SERPL-MCNC: 159 MG/DL (ref 65–140)
HCT VFR BLD AUTO: 41 % (ref 34.8–46.1)
HGB BLD-MCNC: 13.2 G/DL (ref 11.5–15.4)
LYMPHOCYTES # BLD AUTO: 1.56 THOUSANDS/ΜL (ref 0.6–4.47)
LYMPHOCYTES NFR BLD AUTO: 23 % (ref 14–44)
MCH RBC QN AUTO: 32 PG (ref 26.8–34.3)
MCHC RBC AUTO-ENTMCNC: 32.2 G/DL (ref 31.4–37.4)
MCV RBC AUTO: 100 FL (ref 82–98)
MONOCYTES # BLD AUTO: 0.56 THOUSAND/ΜL (ref 0.17–1.22)
MONOCYTES NFR BLD AUTO: 8 % (ref 4–12)
NEUTROPHILS # BLD AUTO: 4.63 THOUSANDS/ΜL (ref 1.85–7.62)
NEUTS SEG NFR BLD AUTO: 68 % (ref 43–75)
PLATELET # BLD AUTO: 165 THOUSANDS/UL (ref 149–390)
PMV BLD AUTO: 10 FL (ref 8.9–12.7)
POTASSIUM SERPL-SCNC: 4 MMOL/L (ref 3.5–5.3)
PROT SERPL-MCNC: 6.6 G/DL (ref 6.4–8.2)
RBC # BLD AUTO: 4.12 MILLION/UL (ref 3.81–5.12)
SODIUM SERPL-SCNC: 144 MMOL/L (ref 136–145)
T4 FREE SERPL-MCNC: 1.64 NG/DL (ref 0.76–1.46)
TSH SERPL DL<=0.05 MIU/L-ACNC: 6.8 UIU/ML (ref 0.36–3.74)
WBC # BLD AUTO: 6.85 THOUSAND/UL (ref 4.31–10.16)

## 2017-12-12 PROCEDURE — C9483 INJECTION, ATEZOLIZUMAB: HCPCS | Performed by: INTERNAL MEDICINE

## 2017-12-12 PROCEDURE — 85025 COMPLETE CBC W/AUTO DIFF WBC: CPT | Performed by: INTERNAL MEDICINE

## 2017-12-12 PROCEDURE — 84443 ASSAY THYROID STIM HORMONE: CPT | Performed by: INTERNAL MEDICINE

## 2017-12-12 PROCEDURE — 80053 COMPREHEN METABOLIC PANEL: CPT | Performed by: INTERNAL MEDICINE

## 2017-12-12 PROCEDURE — 84439 ASSAY OF FREE THYROXINE: CPT | Performed by: INTERNAL MEDICINE

## 2017-12-12 PROCEDURE — 96413 CHEMO IV INFUSION 1 HR: CPT

## 2017-12-12 RX ADMIN — SODIUM CHLORIDE 20 ML/HR: 0.9 INJECTION, SOLUTION INTRAVENOUS at 08:54

## 2017-12-12 RX ADMIN — Medication 300 UNITS: at 10:07

## 2017-12-12 RX ADMIN — ATEZOLIZUMAB 1200 MG: 1200 INJECTION, SOLUTION INTRAVENOUS at 09:25

## 2017-12-12 NOTE — PROGRESS NOTES
Pt matthew Tecentriq infusion without c/o  NSS flush done  Port deaccessed after flushing with Heparin, dsd applied  Disch amb to home with spouse, steady gait

## 2017-12-12 NOTE — PROGRESS NOTES
Pt here for labs and chemo  VSS  Labs drawn, pharmacy notified  [facility]

## 2017-12-13 ENCOUNTER — ALLSCRIPTS OFFICE VISIT (OUTPATIENT)
Dept: OTHER | Facility: OTHER | Age: 77
End: 2017-12-13

## 2017-12-15 NOTE — CONSULTS
Assessment  Assessed    1  PVD (peripheral vascular disease) (443 9) (I73 9)   2  Lower back pain (724 2) (M54 5)    Plan  Back pain, lumbosacral    · PredniSONE 10 MG Oral Tablet; Take as directed according to info sheet given atoffice   Rx By: [address] [facility]; Dispense: 7 Days ; #:21 Tablet; Refill: 0;For: Back pain, lumbosacral; ALLIE = N; Verified Transmission to [facility]/PHARMACY# [id number]; Last Updated By: System, [employer]; 12/13/2017 1:21:46 PM   · *1 - [facility]  *  Status: Active  Requested for: 85BXD9229   Ordered; For: Back pain, lumbosacral; Ordered By: [address] Performed:  Due: 98WDH4115  Care Summary provided  : Yes   · Follow-up visit in 1 week Evaluation and Treatment  Follow-up  Status: Hold For -Scheduling  Requested for: 28LCX2165   Ordered; For: Back pain, lumbosacral; Ordered By: [address] Performed:  Due: 95DQT5439    Discussion/Summary    At this point the patient's pain persists despite time, relative rest, activity modification, and nonsteroidal anti-inflammatories  Her pain is significantly interfering with her daily living activities  At this point, I believe is reasonable to have the patient undergo a course of physical therapy  I will start her on a titrating dose of oral prednisone to address any inflammatory component of the patient's pain  She understands she should not take nonsteroidal anti-inflammatories until she is finished with this steroid  If she has any problems or questions she will give us a call  Will follow-up phone call in 1 weekThis document utilizing voice recognition software and errors may have occurred  The patient has the current Goals: Decreased pain increased function  The patent has the current Barriers: Chronic respiratory issues, chronic pain syndrome  Patient is able to Self-Care  Possible side effects of new medications were reviewed with the patient/guardian today  The treatment plan was reviewed with the patient/guardian   The patient/guardian understands and agrees with the treatment plan   The patient and patient's family was counseled regarding diagnostic results,-- instructions for management,-- risk factor reductions,-- prognosis,-- patient and family education,-- risks and benefits of treatment options-- and-- importance of compliance with treatment  Self Referrals: Yes Physical therapy      Chief Complaint  Chief Complaints    1  Pain    History of Present Illness  Extremely pleasant 27-year-old female with longstanding history of lower extremity pain secondary to venous insufficiency and COPD and lung cancer  She has a 4 week history of pain on the right side of her low back without radiation  It possibly secondary to moving a mattress it has been improving since it started  She is on chronic Percocet and Lyrica for her previously mentioned lower extremity pain  I personally reviewed the patient's past medical history, surgical history, allergies, current medications social history, family history and review of systems  Full intake form reviewed with the patient  Referring physician is  Dr Parrish Montelongo  Primary Care physician is  Dr Angeline Pedro presents with complaints of constant episodes of moderate right lower back pain, described as dull and aching  On a scale of 1 to 10, the patient rates the pain as 5  Episodes started about 1 month ago  Her symptoms are caused by no known event  Symptoms are improved by rest  Symptoms are made worse by walking  Symptoms are improving  Review of Systems   Constitutional: recent weight loss, but-- no fever-- and-- no recent weight gain  Eyes: no double vision-- and-- no blurry vision  Cardiovascular: no chest pain,-- no palpitations-- and-- no lower extremity edema  Respiratory: shortness of breath-- and-- wheezing    Musculoskeletal: difficulty walking, but-- no muscle weakness,-- no joint stiffness,-- no joint swelling,-- no limb swelling,-- no pain in extremity-- and-- no decreased range of motion  Neurological: memory loss, but-- no dizziness,-- no difficulty swallowing,-- no loss of consciousness-- and-- no seizures  Gastrointestinal: no nausea,-- no vomiting,-- no constipation-- and-- no diarrhea  Genitourinary: no difficulty initiating urine stream,-- no genital pain-- and-- no frequent urination  Integumentary: no complaints of skin rash  Psychiatric: no depression  Endocrine: no excessive thirst,-- no adrenal disease,-- no hypothyroidism-- and-- no hyperthyroidism  Hematologic/Lymphatic: no tendency for easy bruising-- and-- no tendency for easy bleeding  ROS reviewed  Active Problems  Problems    1  Back pain, lumbosacral (724 2,724 6) (M54 5)   2  Bursitis, ischial (726 5) (M70 70)   3  Cardiomyopathy (425 4) (I42 9)   4  Hyperlipidemia (272 4) (E78 5)   5  Hypertension (401 9) (I10)   6  Lung cancer, upper lobe (162 3) (C34 10)   7  Metastasis to adrenal gland (198 7) (C79 70)   8  Multiple thyroid nodules (241 1) (E04 2)   9  Nodule of left lobe of thyroid gland (241 0) (E04 1)   10  Orthopedic aftercare (V54 9) (Z47 89)   11  Osteoporosis (733 00) (M81 0)   12  Other chronic pain (338 29) (G89 29)   13  Parotid adenoma (210 2) (D11 0)   14  Primary localized osteoarthrosis of the hip, unspecified laterality (715 15) (M16 10)   15  Psoriasis (696 1) (L40 9)   16  PVD (peripheral vascular disease) (443 9) (I73 9)   17  Restless legs syndrome (333 94) (G25 81)   18  Sciatica (724 3) (M54 30)   19  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)    Past Medical History  Problems    1  History of Abnormal CT of the chest (793 2) (R93 8)   2  History of Asymptomatic carotid artery stenosis (433 10) (I65 29)   3  History of Benign essential hypertension (401 1) (I10)   4  History of Cataract of right eye (366 9) (H26 9)   5  History of acute bronchitis (V12 69) (Z87 09)   6  History of asthma (V12 69) (Z42 09)   7   History of diabetes mellitus (V12 29) (Z86 39)   8  History of hypertension (V12 59) (Z86 79)   9  History of malignant neoplasm of lung (V10 11) (Z85 118)   10  History of myocardial infarction (412) (I25 2)   11  History of pulmonary emphysema (V12 69) (Z87 09)   12  History of Pelvic pain (R10 2)   13  History of Pelvic pain in female (625 9) (R10 2)   14  History of Trochanteric bursitis, unspecified laterality (726 5) (M70 60)    The active problems and past medical history were reviewed and updated today  Surgical History  Problems    1  History of Bypass Graft Using Vein: Aortic-Bifemoral   2  History of  Section   3  History of Cholecystectomy   4  History of Hysterectomy   5  History of Tonsillectomy    The surgical history was reviewed and updated today  Family History  Mother    1  Family history of thyroid disease (V18 19) (Z83 49)  Sister    2  Family history of malignant neoplasm (V16 9) (Z80 9)    The family history was reviewed and updated today  Social History  Problems    · Denied: Alcohol Use (History)   · Caffeine Use   · Current every day smoker (305 1) (F17 200)   · Denied: Drug use (305 90) (F19 90)   ·   The social history was reviewed and updated today  Current Meds   1  Advair Diskus 250-50 MCG/DOSE Inhalation Aerosol Powder Breath Activated; USE 1 INHALATION TWICE A DAY RINSE MOUTH AFTER USE; Therapy: 42GTO6607 to (Evaluate:23Ota4053)  Requested for: 72Kzh2303; Last Rx:71Zmu4488 Ordered   2  Albuterol Sulfate (2 5 MG/3ML) 0 083% Inhalation Nebulization Solution; inhale contents of 1 vial in nebulizer four times a day if needed; Therapy: 34WAY1211 to (Evaluate:50Vku7318)  Requested for: 67XUV5344; Last Rx:29Fia0378 Ordered   3  Fluocinonide 0 05 % External Solution; APPLY SPARINGLY TO SCALP TWICE DAILY; Therapy: 87Fkq2514 to (Evaluate:62Fyp8436)  Requested for: 79YLP5891; Last Rx:62Cig5412 Ordered   4  FreeStyle Lite Test In [employer]; Test 3 times daily;  Therapy: 76Tns7032 to (Evaluate:01Jan2018)  Requested for: 36HPH0758; Last QS:78DXX0459 Ordered   5  Lyrica 100 MG Oral Capsule; TAKE 1 CAPSULE AT BEDTIME; Therapy: (Recorded:30Oct2017) to Recorded   6  Metoprolol Tartrate 100 MG Oral Tablet; take one tablet daily  Requested for: 38ULD7700; Last Rx:75Ctc6662 Ordered   7  Oxycodone-Acetaminophen 5-325 MG Oral Tablet; 1 q4h prn; Therapy: 71YVV8980 to (Evaluate:27Yzr7169)  Requested for: 43HRD0444; Last Rx:28Nov2017 Ordered   8  Simvastatin 80 MG Oral Tablet; Take 1 tablet daily; Therapy: 01KPZ2974 to (Evaluate:26Mra5395)  Requested for: 56TXL4868; Last Rx:86Mwg1382 Ordered   9  Spiriva Respimat 2 5 MCG/ACT Inhalation Aerosol Solution; INHALE 2 PUFFS ONCE DAILY; Therapy: 65CEW5374 to (Evaluate:16Qyy1141)  Requested for: 03FGF4558; Last Rx:91Qor1244 Ordered   10  TRENtal 400 MG TBCR; Therapy: (Recorded:87Xrj6159) to Recorded    The medication list was reviewed and updated today  Allergies  Medication    1  Penicillins    Physical Exam   Constitutional  General appearance: Abnormal   chronically ill  Eyes  Sclera: anicteric  HEENT  Hearing grossly intact  Neck  Neck: Supple, symmetric, trachea midline, no masses  Pulmonary  Respiratory effort: Abnormal  -- On oxygen nasal cannula  Cardiovascular  Examination of extremities: No edema or pitting edema present  Pedal pulses: Abnormal      Skin  Skin and subcutaneous tissue: Normal without rashes or lesions, well hydrated  Psychiatric  Mood and affect: Mood and affect appropriate  Neurologic  Cranial nerves: Cranial nerves II-XII grossly intact  Musculoskeletal  Gait and station: Abnormal   Gait evaluation demonstrated slow, deliberate sit to stand transfer  Lumbar/Sacral Spine examination demonstrates  Inspection: Normal station and gait  Normal lumbar lordotic curve with no significant scoliosis or spinal step-off  Skin intact without erythema  No gross mass or muscle atrophy  Palpation: There is no tenderness to palpation overlying the sacroiliac joint as well as the ischial bursa bilateral  No significant tenderness over the greater trochanteric bursa bilaterally  Motor/Strength: 5/5 strength in the bilateral lower limbs  The patient is able to heel and/toe walk  Tandem gait is intact  Reflexes: Deep tendon reflex are 2+ and symmetrical bilateral patella and AchillesSensation: Sensation intact to light touch and pinprick in the bilateral lower limbs  Proprioception is intact at bilateral hallux  Maneuvers: Negative bilateral straight leg raising  Negative Raphael's maneuver  Results/Data  Procedure Flowsheet 89CZC1601 01:13PM [address] [facility]     Test Name Result Flag Reference   Oswestry Score 44         Results    I personally reviewed the films/images in the office today  Other  Oxycodone- this am 12/13/2017  * XR SPINE LUMBAR MINIMUM 4 VIEWS NON INJURY [id number] 10:46AM Richardary Raid Order Number: [id number]     Test Name Result Flag Reference   XR SPINE LUMBAR MINIMUM 4 VIEWS (Report)     LUMBAR SPINE   INDICATION: Lower back pain  COMPARISON: CT abdomen chest, abdomen and pelvis 10/2/2017   VIEWS: AP, lateral, bilateral oblique and coned down projections   IMAGES: 5   FINDINGS:   Mild levoscoliosis  No spondylolisthesis  There is no radiographic evidence of acute fracture or destructive osseous lesion  Multilevel degenerative disc disease throughout the lumbar spine with small marginal endplate osteophytes at several levels  The pedicles appear intact  No pars defects  Atherosclerotic vascular calcifications are noted  Surgical clips in the retroperitoneum  IMPRESSION:   No acute findings  Mild levoscoliosis with multilevel degenerative disease      Workstation performed: [id number]   Signed by:  Lyn Ventura DO  12/7/17     Future Appointments    Date/Time Provider Specialty Site   01/02/2018 10:00 AM SANTIAGO Munoz , , [facility] [address] [place] [address] 02/06/2018 08:40 AM SANTIAGO Arriaga , DO, [facility] On[facility]     Signatures   Electronically signed by : Karen Franco DO; Dec 13 2017  1:27PM EST                       (Author)

## 2017-12-18 ENCOUNTER — APPOINTMENT (OUTPATIENT)
Dept: PHYSICAL THERAPY | Facility: CLINIC | Age: 77
End: 2017-12-18
Payer: MEDICARE

## 2017-12-18 DIAGNOSIS — M54.50 LOW BACK PAIN: ICD-10-CM

## 2017-12-18 PROCEDURE — G8981 BODY POS CURRENT STATUS: HCPCS

## 2017-12-18 PROCEDURE — 97162 PT EVAL MOD COMPLEX 30 MIN: CPT

## 2017-12-18 PROCEDURE — 97140 MANUAL THERAPY 1/> REGIONS: CPT

## 2017-12-18 PROCEDURE — G8982 BODY POS GOAL STATUS: HCPCS

## 2017-12-19 ENCOUNTER — GENERIC CONVERSION - ENCOUNTER (OUTPATIENT)
Dept: PAIN MEDICINE | Facility: CLINIC | Age: 77
End: 2017-12-19

## 2017-12-20 ENCOUNTER — APPOINTMENT (OUTPATIENT)
Dept: PHYSICAL THERAPY | Facility: CLINIC | Age: 77
End: 2017-12-20
Payer: MEDICARE

## 2017-12-20 ENCOUNTER — GENERIC CONVERSION - ENCOUNTER (OUTPATIENT)
Dept: OTHER | Facility: OTHER | Age: 77
End: 2017-12-20

## 2017-12-20 PROCEDURE — 97110 THERAPEUTIC EXERCISES: CPT

## 2017-12-20 PROCEDURE — 97140 MANUAL THERAPY 1/> REGIONS: CPT

## 2017-12-27 ENCOUNTER — HOSPITAL ENCOUNTER (OUTPATIENT)
Dept: CT IMAGING | Facility: HOSPITAL | Age: 77
Discharge: HOME | End: 2017-12-27
Attending: INTERNAL MEDICINE
Payer: MEDICARE

## 2017-12-27 DIAGNOSIS — C34.91 MALIGNANT NEOPLASM OF UNSPECIFIED PART OF RIGHT BRONCHUS OR LUNG (HCC): ICD-10-CM

## 2017-12-27 PROCEDURE — 74177 CT ABD & PELVIS W/CONTRAST: CPT

## 2017-12-27 PROCEDURE — 71260 CT THORAX DX C+: CPT

## 2017-12-27 RX ADMIN — IOHEXOL 100 ML: 350 INJECTION, SOLUTION INTRAVENOUS at 07:44

## 2017-12-28 ENCOUNTER — APPOINTMENT (OUTPATIENT)
Dept: PHYSICAL THERAPY | Facility: CLINIC | Age: 77
End: 2017-12-28
Payer: MEDICARE

## 2017-12-28 PROCEDURE — 97110 THERAPEUTIC EXERCISES: CPT

## 2017-12-28 PROCEDURE — 97140 MANUAL THERAPY 1/> REGIONS: CPT

## 2018-01-02 ENCOUNTER — ALLSCRIPTS OFFICE VISIT (OUTPATIENT)
Dept: OTHER | Facility: OTHER | Age: 78
End: 2018-01-02

## 2018-01-02 RX ORDER — SODIUM CHLORIDE 9 MG/ML
20 INJECTION, SOLUTION INTRAVENOUS CONTINUOUS
Status: DISPENSED | OUTPATIENT
Start: 2018-01-03 | End: 2018-01-04

## 2018-01-03 ENCOUNTER — HOSPITAL ENCOUNTER (OUTPATIENT)
Dept: INFUSION CENTER | Facility: HOSPITAL | Age: 78
Discharge: HOME | End: 2018-01-05
Payer: MEDICARE

## 2018-01-03 VITALS
TEMPERATURE: 96.5 F | RESPIRATION RATE: 18 BRPM | SYSTOLIC BLOOD PRESSURE: 151 MMHG | HEART RATE: 63 BPM | DIASTOLIC BLOOD PRESSURE: 53 MMHG | OXYGEN SATURATION: 100 %

## 2018-01-03 LAB
ALBUMIN SERPL BCP-MCNC: 3.2 G/DL (ref 3.5–5)
ALP SERPL-CCNC: 118 U/L (ref 46–116)
ALT SERPL W P-5'-P-CCNC: 14 U/L (ref 12–78)
ANION GAP SERPL CALCULATED.3IONS-SCNC: 5 MMOL/L (ref 4–13)
AST SERPL W P-5'-P-CCNC: 13 U/L (ref 5–45)
BASOPHILS # BLD AUTO: 0.01 THOUSANDS/ΜL (ref 0–0.1)
BASOPHILS NFR BLD AUTO: 0 % (ref 0–1)
BILIRUB SERPL-MCNC: 0.5 MG/DL (ref 0.2–1)
BUN SERPL-MCNC: 11 MG/DL (ref 5–25)
CALCIUM SERPL-MCNC: 8.5 MG/DL (ref 8.3–10.1)
CHLORIDE SERPL-SCNC: 103 MMOL/L (ref 100–108)
CO2 SERPL-SCNC: 35 MMOL/L (ref 21–32)
CREAT SERPL-MCNC: 0.63 MG/DL (ref 0.6–1.3)
EOSINOPHIL # BLD AUTO: 0.1 THOUSAND/ΜL (ref 0–0.61)
EOSINOPHIL NFR BLD AUTO: 2 % (ref 0–6)
ERYTHROCYTE [DISTWIDTH] IN BLOOD BY AUTOMATED COUNT: 11.8 % (ref 11.6–15.1)
GFR SERPL CREATININE-BSD FRML MDRD: 86 ML/MIN/1.73SQ M
GLUCOSE P FAST SERPL-MCNC: 134 MG/DL (ref 65–99)
GLUCOSE SERPL-MCNC: 134 MG/DL (ref 65–140)
HCT VFR BLD AUTO: 41.3 % (ref 34.8–46.1)
HGB BLD-MCNC: 13.1 G/DL (ref 11.5–15.4)
LYMPHOCYTES # BLD AUTO: 1.5 THOUSANDS/ΜL (ref 0.6–4.47)
LYMPHOCYTES NFR BLD AUTO: 24 % (ref 14–44)
MCH RBC QN AUTO: 32.1 PG (ref 26.8–34.3)
MCHC RBC AUTO-ENTMCNC: 31.7 G/DL (ref 31.4–37.4)
MCV RBC AUTO: 101 FL (ref 82–98)
MONOCYTES # BLD AUTO: 0.53 THOUSAND/ΜL (ref 0.17–1.22)
MONOCYTES NFR BLD AUTO: 9 % (ref 4–12)
NEUTROPHILS # BLD AUTO: 4.09 THOUSANDS/ΜL (ref 1.85–7.62)
NEUTS SEG NFR BLD AUTO: 65 % (ref 43–75)
PLATELET # BLD AUTO: 127 THOUSANDS/UL (ref 149–390)
PMV BLD AUTO: 11.2 FL (ref 8.9–12.7)
POTASSIUM SERPL-SCNC: 3.7 MMOL/L (ref 3.5–5.3)
PROT SERPL-MCNC: 6.4 G/DL (ref 6.4–8.2)
RBC # BLD AUTO: 4.08 MILLION/UL (ref 3.81–5.12)
SODIUM SERPL-SCNC: 143 MMOL/L (ref 136–145)
TSH SERPL DL<=0.05 MIU/L-ACNC: 4.93 UIU/ML (ref 0.36–3.74)
WBC # BLD AUTO: 6.23 THOUSAND/UL (ref 4.31–10.16)

## 2018-01-03 PROCEDURE — 84443 ASSAY THYROID STIM HORMONE: CPT | Performed by: INTERNAL MEDICINE

## 2018-01-03 PROCEDURE — 96413 CHEMO IV INFUSION 1 HR: CPT

## 2018-01-03 PROCEDURE — 85025 COMPLETE CBC W/AUTO DIFF WBC: CPT | Performed by: INTERNAL MEDICINE

## 2018-01-03 PROCEDURE — 80053 COMPREHEN METABOLIC PANEL: CPT | Performed by: INTERNAL MEDICINE

## 2018-01-03 RX ADMIN — Medication 300 UNITS: at 09:46

## 2018-01-03 RX ADMIN — SODIUM CHLORIDE 20 ML/HR: 9 INJECTION, SOLUTION INTRAVENOUS at 08:39

## 2018-01-03 RX ADMIN — ATEZOLIZUMAB 1200 MG: 1200 INJECTION, SOLUTION INTRAVENOUS at 09:03

## 2018-01-03 NOTE — PROGRESS NOTES
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)    Plan   Squamous cell carcinoma of right lung    · Drink plenty of fluids ; Status:Complete;   Done: 58GQV9706   Ordered; For:Squamous cell carcinoma of right lung; Ordered By:Belman, Truett Burkitt;   · Follow-up visit in 1 month Evaluation and Treatment  Follow-up  Status: Hold For -    Scheduling  Requested for: 14VKQ6075   Ordered; For: Squamous cell carcinoma of right lung; Ordered By: Rupinder Nugent Performed:  Due: 76ZYG6543   · 2 - Beverley Bear MD, Farooq  (Radiation Oncology) Co-Management  *  Status: Active     Requested for: 85RRP9820   Ordered; For: Squamous cell carcinoma of right lung; Ordered By: Rupinder Nugent Performed:  Due: 36FBD2375  Care Summary provided  : Yes    Discussion/Summary   Discussion Summary:    In summary, this is a 58-year-old female history of 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  she is doing fairly well  About a month ago she had some pain in the right hip  This resolved with physical therapy  She has no residual pain in that area, or elsewhere  generally is able to function without restriction  She does have some dyspnea on exertion which is stable  She uses nasal oxygen 24/7   and chemistry are normal  TSH is slightly increased over the past few months  T4 is near normal, however  Observation is recommended  This is likely a side effect of her immunotherapy  CT scan shows stable disease in the right hilum and satellite nodule  The left adrenal has increased by some 20% compared to her most recent imaging when it had increased slightly compared to the imaging prior  this progressive increase in the size of her left adrenal a change in approach is warranted    reviewed that 1 could change her systemic therapy program entirely or, alternatively, consider radiating the progressive adrenal lesion, and continuing on immunotherapy for the disease control it provides elsewhere  theoretically, 1 could consider that radiating the renegade adrenal lesion might provide immunologic priming which could improve efficacy of immunotherapy  Again, this is a theoretical consideration without any data, at the moment  reviewed the above considerations the patient and her   They were agreeable to radiation consultation in this regard  will see her back in 1 month for review  Counseling Documentation With Imm: The patient, patient's family was counseled regarding diagnostic results,-- instructions for management,-- patient and family education,-- impressions  total time of encounter was 40 minutes  Chief Complaint   Chief Complaint Free Text Note Form: Follow-up regarding lung cancer  History of Present Illness   HPI: June 2016â patient was found to have a thyroid mass on physical examination  Ultrasound showed bilateral thyroid nodules  In July 2016 she underwent CAT scan of the neck for evaluation of the carotid arteries  Incidentally noted, infiltrating mass in the right upper lobe extending to the hilum was noted  4, 2016âPET/CT showed a right upper lobe mass measuring 4 8 cm, SUV 21 5  This extends to the right hilum  Right paratracheal and subcarinal nodes measure up to 12 mm  The left adrenal showed some hypermetabolism with SUV of 3 7, without discrete mass  Uptake in the right parotid gland is noted with SUV of 22 3 and a soft tissue nodule, measuring 11 mm  endoscopy and bronchoscopy showed 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  Lymph node biopsies did not show malignancy  and radiation therapy were not felt to be applicable  Due to background pulmonary dysfunction as well as the potential for left adrenal metastatic disease  Chemotherapy was chosen as primary therapy  Alternatively  6, 2016âstarted Abraxane 80 mg/mÂ² day 1, 8, 15, carbo AUCâ5, day 1 only, every 21 days  2017âprogressive disease noted   Tecentriq 1200 mg IV every 3 weeks initiated  Current Therapy: May 2017âprogressive disease noted  Tecentriq 1200 mg IV every 3 weeks initiated  Interval History: Has had back pain started about 4 days ago  Started after some housework, has been getting better with heating pad  Review of Systems   Complete-Female:      Constitutional: No fever, no chills, feels well, no tiredness, no recent weight gain or weight loss-- and-- no recent weight loss  Eyes: No complaints of eye pain, no red eyes, no eyesight problems, no discharge, no dry eyes, no itching of eyes  ENT: no complaints of earache, no loss of hearing, no nose bleeds, no nasal discharge, no sore throat, no hoarseness  Cardiovascular: No complaints of slow heart rate, no fast heart rate, no chest pain, no palpitations, no leg claudication, no lower extremity edema  Respiratory: shortness of breath,-- shortness of breath during exertion-- and-- Less SOB, able to do more  Gastrointestinal: No complaints of abdominal pain, no constipation, no nausea or vomiting, no diarrhea, no bloody stools  Genitourinary: No complaints of dysuria, no incontinence, no pelvic pain, no dysmenorrhea, no vaginal discharge or bleeding  Musculoskeletal: No complaints of arthralgias, no myalgias, no joint swelling or stiffness, no limb pain or swelling  The patient presents with complaints of a rash (psoriasis  )  Neurological: No complaints of headache, no confusion, no convulsions, no numbness, no dizziness or fainting, no tingling, no limb weakness, no difficulty walking  Psychiatric: Not suicidal, no sleep disturbance, no anxiety or depression, no change in personality, no emotional problems  Endocrine: No complaints of proptosis, no hot flashes, no muscle weakness, no deepening of the voice, no feelings of weakness        Hematologic/Lymphatic: No complaints of swollen glands, no swollen glands in the neck, does not bleed easily, does not bruise easily  Active Problems   1  Back pain, lumbosacral (724 2,724 6) (M54 5)   2  Bursitis, ischial (726 5) (M70 70)   3  Cardiomyopathy (425 4) (I42 9)   4  Hyperlipidemia (272 4) (E78 5)   5  Hypertension (401 9) (I10)   6  Lower back pain (724 2) (M54 5)   7  Lung cancer, upper lobe (162 3) (C34 10)   8  Metastasis to adrenal gland (198 7) (C79 70)   9  Multiple thyroid nodules (241 1) (E04 2)   10  Nodule of left lobe of thyroid gland (241 0) (E04 1)   11  Orthopedic aftercare (V54 9) (Z47 89)   12  Osteoporosis (733 00) (M81 0)   13  Other chronic pain (338 29) (G89 29)   14  Parotid adenoma (210 2) (D11 0)   15  Primary localized osteoarthrosis of the hip, unspecified laterality (715 15) (M16 10)   16  Psoriasis (696 1) (L40 9)   17  PVD (peripheral vascular disease) (443 9) (I73 9)   18  Restless legs syndrome (333 94) (G25 81)   19  Sciatica (724 3) (M54 30)   20  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)    Past Medical History   1  History of Abnormal CT of the chest (793 2) (R93 8)   2  History of Asymptomatic carotid artery stenosis (433 10) (I65 29)   3  History of Benign essential hypertension (401 1) (I10)   4  History of Cataract of right eye (366 9) (H26 9)   5  History of acute bronchitis (V12 69) (Z87 09)   6  History of asthma (V12 69) (Z87 09)   7  History of diabetes mellitus (V12 29) (Z86 39)   8  History of hypertension (V12 59) (Z86 79)   9  History of malignant neoplasm of lung (V10 11) (Z85 118)   10  History of myocardial infarction (412) (I25 2)   11  History of pulmonary emphysema (V12 69) (Z87 09)   12  History of Pelvic pain (R10 2)   13  History of Pelvic pain in female (625 9) (R10 2)   14  History of Trochanteric bursitis, unspecified laterality (726 5) (M70 60)    Surgical History   1  History of Bypass Graft Using Vein: Aortic-Bifemoral   2  History of  Section   3  History of Cholecystectomy   4  History of Hysterectomy   5   History of Tonsillectomy    Family History   Mother    1  Family history of thyroid disease (V18 19) (Z83 49)  Sister    2  Family history of malignant neoplasm (V16 9) (Z80 9)    Social History    · Denied: Alcohol Use (History)   · Caffeine Use   · Current every day smoker (305 1) (F17 200)   · Denied: Drug use (305 90) (F19 90)   ·     Current Meds    1  Advair Diskus 250-50 MCG/DOSE Inhalation Aerosol Powder Breath Activated; USE 1     INHALATION TWICE A DAY RINSE MOUTH AFTER USE; Therapy: 10JVD9531 to (Evaluate:16Sep2018)  Requested for: 77Xpm2054; Last     Rx:62Ipv2276 Ordered   2  Albuterol Sulfate (2 5 MG/3ML) 0 083% Inhalation Nebulization Solution; inhale contents     of 1 vial in nebulizer four times a day if needed; Therapy: 67KQY8789 to (Evaluate:18Jun2018)  Requested for: 96XKQ0823; Last     Rx:67Bym5862 Ordered   3  Azithromycin 250 MG Oral Tablet; 1 tablet 3 times weekly; Therapy: (50-92-49-29) to Recorded   4  Fluocinonide 0 05 % External Solution; APPLY SPARINGLY TO SCALP TWICE DAILY; Therapy: 39Uhg7421 to (Evaluate:55Xom2495)  Requested for: 97SMO0586; Last     Rx:16Oct2017 Ordered   5  FreeStyle Lite Test In [employer]; Test 3 times daily; Therapy: 71FCW3625 to (Evaluate:01Jan2018)  Requested for: 99FTP5433; Last     Rx:06Jan2017 Ordered   6  Lyrica 100 MG Oral Capsule; TAKE 1 CAPSULE AT BEDTIME; Therapy: (Recorded:30Oct2017) to Recorded   7  Metoprolol Tartrate 100 MG Oral Tablet; take one tablet daily  Requested for: 11ROA5411;     Last Rx:16Oct2017 Ordered   8  Oxycodone-Acetaminophen 5-325 MG Oral Tablet; 1 q4h prn; Therapy: 27IRG3629 to (Evaluate:27Jan2018)  Requested for: 49Ojg1781; Last     Rx:96Jfh7358 Ordered   9  Simvastatin 80 MG Oral Tablet; Take 1 tablet daily; Therapy: 29BYE8643 to (Evaluate:19Mar2018)  Requested for: 13HII9401; Last     Rx:41Qpo4197 Ordered   10   Spiriva Respimat 2 5 MCG/ACT Inhalation Aerosol Solution; INHALE 2 PUFFS ONCE DAILY; Therapy: 64PGZ8546 to (Evaluate:34Vvv5864)  Requested for: 27BNI3305; Last      Rx:80Nen5182 Ordered   11  TRENtal 400 MG TBCR; Therapy: (Recorded:05Hut2863) to Recorded    Allergies   1  Penicillins    Vitals   Vital Signs    Recorded: 94PQK4890 10:23AM   Temperature 96 6 F   Heart Rate 77   Respiration 18   Systolic 544   Diastolic 72   Height 5 ft 1 in   Weight 108 lb 3 oz   BMI Calculated 20 44   BSA Calculated 1 45   O2 Saturation 95   Pain Scale 0     Physical Exam        Constitutional      General appearance: No acute distress, well appearing and well nourished  Eyes      Conjunctiva and lids: No swelling, erythema or discharge  Ears, Nose, Mouth, and Throat      External inspection of ears and nose: Normal        Oropharynx: Normal with no erythema, edema, exudate or lesions  Pulmonary      Auscultation of lungs: Abnormal   Auscultation of the lungs revealed decreased breath sounds diffusely  Cardiovascular      Auscultation of heart: Normal rate and rhythm, normal S1 and S2, without murmurs  Examination of extremities for edema and/or varicosities: Normal        Abdomen      Abdomen: Non-tender, no masses  Liver and spleen: No hepatomegaly or splenomegaly  Lymphatic      Palpation of lymph nodes in neck: No lymphadenopathy  Musculoskeletal      Gait and station: Normal        Skin      Skin and subcutaneous tissue: Normal without rashes or lesions  Neurologic      Cranial nerves: Cranial nerves 2-12 intact         Psychiatric      Orientation to person, place, and time: Normal           Results/Data   * CT CHEST ABDOMEN PELVIS W CONTRAST 23Qzg8888 07:16AM Pati Krystin Order Number: [id number]       - Patient Instructions: [facility]      ** [address] [place] [address]       ** [place] [address]      ** ON 12/26/2017 START FOLLOW INSTRUCTIONS ON BARIUM BOTTLE       ** WEDNESDAY 12/27/2017 @@ 7:30AM       To schedule this appointment, please contact Central Scheduling at [phone]  Test Name Result Flag Reference   CT CHEST ABDOMEN PELVIS W CONTRAST (Report)     CT CHEST, ABDOMEN AND PELVIS WITH IV CONTRAST           INDICATION: C34 91: Malignant neoplasm of unspecified part of right bronchus or lung (this clinical history is taken directly from the electronic ordering system)  Lung cancer  COMPARISON: Multiple prior examinations, most recently July 11, 2017  TECHNIQUE: CT examination of the chest, abdomen and pelvis was performed  Reformatted images were created in axial, sagittal, and coronal planes  Radiation dose length product (DLP) for this visit: 352 mGy-cm   This examination, like all CT scans performed in the [facility], was performed utilizing techniques to minimize radiation dose exposure, including the use of iterative       reconstruction and automated exposure control  IV Contrast: 100 mL of iohexol (OMNIPAQUE)         Enteric Contrast: Enteric contrast was administered  FINDINGS:            CHEST           LUNGS: Stable right suprahilar mass measures 2 5 x 1 0 cm  Decreased conspicuity of satellite nodule in the periphery of the lateral right upper lobe measuring 6 mm on image 3/21  Stable minimal postobstructive atelectasis in the right upper lobe  Stable left lower lobe linear scarring  No other new pulmonary nodule or mass  PLEURA: Unremarkable  HEART/GREAT VESSELS: Unremarkable for patient's age  MEDIASTINUM AND CARLO: Prominent right hilar node unchanged  No other adenopathy  CHEST WALL AND LOWER NECK: Heterogeneous thyroid with stable bilateral thyroid nodules  Lleft chest wall port again identified  ABDOMEN           LIVER/BILIARY TREE: Unremarkable  GALLBLADDER: Gallbladder is surgically absent  SPLEEN: Unremarkable  PANCREAS: Unremarkable  ADRENAL GLANDS: Enlarging left adrenal gland nodule measuring 2 8 x 2 4 cm (previous 2 3 x 2 1 cm)  Stable thickening of the right adrenal gland  KIDNEYS/URETERS: Unchanged bilateral renal cysts  Bilateral renal scarring again noted  STOMACH AND BOWEL: Sigmoid diverticulosis without acute diverticulitis  Stomach and bowel appear otherwise unremarkable  APPENDIX: No findings to suggest appendicitis  ABDOMINOPELVIC CAVITY: Surgical clips throughout the retroperitoneum in keeping with a history of prior retroperitoneal surgery  No lymphadenopathy  No ascites  No free intracranial air  VESSELS: Extensive atherosclerotic change  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  Stable chronically occluded aortobifemoral grafts  PELVIS           REPRODUCTIVE ORGANS: Unremarkable for patient's age  URINARY BLADDER: Unremarkable  ABDOMINAL WALL/INGUINAL REGIONS: Unremarkable  OSSEOUS STRUCTURES: No acute fracture or destructive osseous lesion  Chronic healed fractures of right pubic symphysis and ischium noted  IMPRESSION:           Stable right suprahilar mass  Small satellite nodule suspicious for local right upper lobe metastatic disease measuring 6 mm slightly less conspicuous (less solid-appearing)  Interval increase in the size of metastatic lesion in the anterior limb of the left adrenal gland  No other metastatic lesions are identified  Workstation performed: [id number]           Signed by: Mary Black MD      12/29/17        Health Management   History of chronic obstructive lung disease   Complete PFT; every 1 year; Next Due: 99SGQ9670;  Overdue    Future Appointments      Date/Time Provider Specialty Site   02/06/2018 08:40 AM SANTIAGO Melton , DO, [facility] [address] [place] [address]     Signatures    Electronically signed by : Vee Salvador SANTIAGO Da Silva  Jan 2 2018 11:08AM EST                       (Author)

## 2018-01-09 ENCOUNTER — GENERIC CONVERSION - ENCOUNTER (OUTPATIENT)
Dept: OTHER | Facility: OTHER | Age: 78
End: 2018-01-09

## 2018-01-09 NOTE — RESULT NOTES
Verified Results  * XR CHEST PA & LATERAL [id number] 07:38AM Missy Camp Order Number: [id number]     Test Name Result Flag Reference   XR CHEST PA & LATERAL (Report)     CHEST      INDICATION: Chronic cough     COMPARISON: 06/19/2013     VIEWS: Frontal and lateral projections; 2 images     FINDINGS:        Cardiomediastinal silhouette appears unremarkable  The lungs are hyperinflated  There are no infiltrates  There is no evidence of heart failure  Visualized osseous structures appear within normal limits for the patient's age  IMPRESSION:     No active pulmonary disease  Hyperinflation  No change         Workstation performed: [id number]     Signed by:   Juli Syed MD   3/17/16

## 2018-01-10 NOTE — CONSULTS
Chief Complaint  Evaluation regarding lung cancer  History of Present Illness  June 2016- patient was found to have a thyroid mass on physical examination  Ultrasound showed bilateral thyroid nodules  In July 2016 she underwent CAT scan of the neck for evaluation of the carotid arteries  Incidentally noted, infiltrating mass in the right upper lobe extending to the hilum was noted  August 4, 2016-PET/CT showed a right upper lobe mass measuring 4 8 cm, SUV 21 5  This extends to the right hilum  Right paratracheal and subcarinal nodes measure up to 12 mm  The left adrenal showed some hypermetabolism with SUV of 3 7, without discrete mass  Uptake in the right parotid gland is noted with SUV of 22 3 and a soft tissue nodule, measuring 11 mm  Mediastinal endoscopy and bronchoscopy showed 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  Lymph node biopsies did not show malignancy  Review of Systems    Constitutional: recent 20 lbs over past 6-12 months  lb weight loss, but No fever, no chills, feels well, no tiredness, no recent weight gain or weight loss  Eyes: No complaints of eye pain, no red eyes, no eyesight problems, no discharge, no dry eyes, no itching of eyes  ENT: no complaints of earache, no loss of hearing, no nose bleeds, no nasal discharge, no sore throat, no hoarseness  Cardiovascular: No complaints of slow heart rate, no fast heart rate, no chest pain, no palpitations, no leg claudication, no lower extremity edema  Respiratory: shortness of breath and shortness of breath during exertion  Gastrointestinal: No complaints of abdominal pain, no constipation, no nausea or vomiting, no diarrhea, no bloody stools  Genitourinary: No complaints of dysuria, no incontinence, no pelvic pain, no dysmenorrhea, no vaginal discharge or bleeding  Musculoskeletal: No complaints of arthralgias, no myalgias, no joint swelling or stiffness, no limb pain or swelling     The patient presents with complaints of a rash (psoriasis  )  Neurological: No complaints of headache, no confusion, no convulsions, no numbness, no dizziness or fainting, no tingling, no limb weakness, no difficulty walking  Psychiatric: Not suicidal, no sleep disturbance, no anxiety or depression, no change in personality, no emotional problems  Endocrine: No complaints of proptosis, no hot flashes, no muscle weakness, no deepening of the voice, no feelings of weakness  Hematologic/Lymphatic: No complaints of swollen glands, no swollen glands in the neck, does not bleed easily, does not bruise easily  Active Problems    1  Abnormal CT of the chest (793 2) (R93 8)   2  Acute bronchitis (466 0) (J20 9)   3  Asymptomatic carotid artery stenosis (433 10) (I65 29)   4  Benign essential hypertension (401 1) (I10)   5  Bursitis, ischial (726 5) (M70 70)   6  Cardiomyopathy (425 4) (I42 9)   7  Cataract of right eye (366 9) (H26 9)   8  Chronic obstructive pulmonary disease (496) (J44 9)   9  Congestive heart failure (428 0) (I50 9)   10  COPD with acute exacerbation (491 21) (J44 1)   11  Depression (311) (F32 9)   12  DMII (diabetes mellitus, type 2) (250 00) (E11 9)   13  Dyspnea (786 09) (R06 00)   14  Fracture of multiple pubic rami (808 2) (S32 599A)   15  Fracture of pubic ramus (808 2) (S32 599A)   16  Hyperlipidemia (272 4) (E78 5)   17  Hypertension (401 9) (I10)   18  Left thyroid nodule (241 0) (E04 1)   19  Lung cancer, upper lobe (162 3) (C34 10)   20  Multiple thyroid nodules (241 1) (E04 2)   21  Need for immunization against influenza (V04 81) (Z23)   22  Orthopedic aftercare (V54 9) (Z47 89)   23  Osteoporosis (733 00) (M81 0)   24  Other chronic pain (338 29) (G89 29)   25  Pelvic pain (R10 2)   26  Pelvic pain in female (625 9) (R10 2)   27  Pre-operative cardiovascular examination (V72 81) (Z01 810)   28  Primary localized osteoarthrosis of the hip, unspecified laterality (715 15) (M16 10)   29   Psoriasis (696 1) (L40 9)   30  Pulmonary mass (786 6) (R91 8)   31  Pulmonary nodule (793 11) (R91 1)   32  PVD (peripheral vascular disease) (443 9) (I73 9)   33  Restless legs syndrome (333 94) (G25 81)   34  Sciatica (724 3) (M54 30)   35  Screening for genitourinary condition (V81 6) (Z13 89)   36  Screening for neurological condition (V80 09) (Z13 89)   37  Screening for osteoporosis (V82 81) (Z13 820)   38  Shortness of breath (786 05) (R06 02)   39  Strain of right hip adductor muscle (843 8) (S76 011A)   40  Trochanteric bursitis, unspecified laterality (726 5) (M70 60)   41  Type 2 diabetes mellitus (250 00) (E11 9)    Past Medical History    · History of acute bronchitis (V12 69) (Z87 09)    Surgical History    · History of Bypass Graft Using Vein: Aortic-Bifemoral   · History of  Section   · History of Cholecystectomy   · History of Hysterectomy   · History of Tonsillectomy    The surgical history was reviewed and updated today  Family History    · Family history of malignant neoplasm (V16 9) (Z80 9)    The family history was reviewed and updated today  Social History    · Denied: Alcohol Use (History)   · Caffeine Use   · Coffee once in a great while   and everyday tea drinker   · Current every day smoker (305 1) (F17 200)   · Smoke for less than 1/2 pa k per day for 50 yrs   · Denied: Drug use (305 90) (F19 90)  The social history was reviewed and updated today  Current Meds   1  Advair Diskus 250-50 MCG/DOSE Inhalation Aerosol Powder Breath Activated; USE 1   INHALATION TWICE A DAY RINSE MOUTH AFTER USE; Therapy: 17WEV6810 to (Evaluate:86Qwb0526)  Requested for: 62BVM1349; Last   Rx:85Krj7991 Ordered   2  Albuterol Sulfate (2 5 MG/3ML) 0 083% Inhalation Nebulization Solution; inhale contents   of 1 vial in nebulizer four times a day if needed    Requested for: 30KIS3559; Last Rx:2015 Ordered   3  ALPRAZolam 0 25 MG Oral Tablet; TAKE 1 TABLET TWICE DAILY AS NEEDED;    Therapy: (Ela Mcginnis) to Recorded   4  Aspirin 81 MG Oral Tablet Delayed Release; TAKE 1 TABLET DAILY AS DIRECTED; Therapy: 36SXN9120 to (Evaluate:84Qag1808)  Requested for: 44Lns5253; Last   Rx:33Jrx9917 Ordered   5  Azithromycin 250 MG Oral Tablet; One tab 3 times weekly; Therapy: (Ileana Boland) to Recorded   6  Diflorasone Diacetate 0 05 % External Ointment; APPLY TO AFFECTED AREA TWICE A   DAY; Therapy: 12BQO8309 to (Evaluate:13Vxt3533)  Requested for: 25MEW6870; Last   Rx:14Jan2016 Ordered   7  Fluocinonide 0 05 % External Solution; APPLY SPARINGLY TO AFFECTED AREA(S)   TWICE DAILY; Last Rx:53Xdh3227 Ordered   8  FreeStyle Lite Test In [employer]; Test 3 times daily; Therapy: 51Odu8351 to (Evaluate:69Zzd2982)  Requested for: 63Ngh8473; Last   Rx:44Etx1353 Ordered   9  GlipiZIDE ER 2 5 MG Oral Tablet Extended Release 24 Hour; TAKE 1 TABLET DAILY    Requested for: 92MKL4187; Last Rx:23Mar2016 Ordered   10  Ibandronate Sodium 150 MG Oral Tablet; TAKE ONE TABLET ONCE MONTHLY; Therapy: 00WUS8336 to (Last Rx:73Dyj3324)  Requested for: 99Cji1784 Ordered   11  Lyrica 100 MG Oral Capsule; TAKE 1 CAPSULE AT BEDTIME; Last Rx:29Vbr4433    Ordered   12  Metoprolol Tartrate 100 MG Oral Tablet; take one tablet daily  Requested for: 21Mar2016;    Last Rx:21Mar2016 Ordered   13  Oxycodone-Acetaminophen 5-325 MG Oral Tablet; 1 q4h prn; Therapy: 28HYQ3425 to (Evaluate:46Ojy0752)  Requested for: 07Sbv5287; Last    Rx:09Qph7394 Ordered   14  Pazeo 0 7 % Ophthalmic Solution; Therapy: 68XPH8408 to Recorded   15  PredniSONE 20 MG Oral Tablet; Take one tablet twice a day for 4 days, then one tablet a    day for 4 days, then 1/2 tablet a day for 4 days, then stop; Therapy: 53VYP9754 to (Evaluate:29Jun2016)  Requested for: 62NOA7616; Last    Rx:69Rvc7837 Ordered   16  Simvastatin 80 MG Oral Tablet; Take 1 tablet daily;     Therapy: 69DPH8760 to (Evaluate:35Vrc4290)  Requested for: 21Mar2016; Last    Rx:21Mar2016 Ordered   17  Simvastatin 80 MG Oral Tablet; Therapy: (Arcenio Dee) to Recorded   18  Spiriva Respimat 2 5 MCG/ACT Inhalation Aerosol Solution; INHALE 2 PUFFS ONCE    DAILY; Therapy: 46BNM5516 to (Evaluate:67Szw9752)  Requested for: 74HIQ4990; Last    Rx:80Qyv1201 Ordered   19  TRENtal 400 MG TBCR; TAKE 1 TABLET 3 TIMES DAILY WITH FOOD; Therapy: (Shabbir Lopez) to Recorded    The medication list was reviewed and updated today  Allergies    1  Penicillins    Vitals   Recorded: 48KGD9080 65:57HI   Systolic 520   Diastolic 68   Heart Rate 47   Respiration 14   Temperature 97 3 F   Pain Scale 0   O2 Saturation 90   Height 5 ft 1 in   Weight 110 lb 2 08 oz   BMI Calculated 20 81   BSA Calculated 1 46     Physical Exam    Constitutional   General appearance: No acute distress, well appearing and well nourished  Eyes   Conjunctiva and lids: No swelling, erythema or discharge  Ears, Nose, Mouth, and Throat   External inspection of ears and nose: Normal     Oropharynx: Normal with no erythema, edema, exudate or lesions  Pulmonary   Auscultation of lungs: Abnormal   Auscultation of the lungs revealed decreased breath sounds diffusely  Cardiovascular   Auscultation of heart: Normal rate and rhythm, normal S1 and S2, without murmurs  Examination of extremities for edema and/or varicosities: Normal     Abdomen   Abdomen: Non-tender, no masses  Liver and spleen: No hepatomegaly or splenomegaly  Lymphatic   Palpation of lymph nodes in neck: No lymphadenopathy  Musculoskeletal   Gait and station: Normal     Skin   Skin and subcutaneous tissue: Normal without rashes or lesions  Neurologic   Cranial nerves: Cranial nerves 2-12 intact      Psychiatric   Orientation to person, place, and time: Normal          Results/Data  (1) TISSUE EXAM [id number] 12:56PM [place]     Test Name Result Flag Reference   LAB AP CASE REPORT (Report)     Surgical Pathology Report             Case: [id number]                   Authorizing Provider: Karin Junior MD      Collected:      08/10/2016 1256        Ordering Location:   [facility]   Received:      08/12/2016 Franky Mistry [address] Operating Room                            Pathologist:      Sheila Crowley MD                              Specimen:  Bronchus, Endobronchial biopsy left superior segment   LAB AP FINAL DIAGNOSIS      A  Bronchus, left superior segment, edobronchial biopsy   - High grade squamous dysplasia/squamous cell carcinoma in situ    - No definitive invasion identified  Electronically signed by Sheila Crowley MD on 8/17/2016 at 10:42 AM   LAB AP NOTE      The atypical cells are positive for p40 and CK5/6 but negative forf TTF-1   and Napsin-A on immunostaining helping support the above diagnosis  Appropriate positive controls were reviewed  Intradepartmental   consultation is in agreement  LAB AP SURGICAL ADDITIONAL INFORMATION (Report)     These tests were developed and their performance characteristics   determined by [facility] or [facility]  They may not be cleared or approved by the [place]  Food and   Drug Administration  The FDA has determined that such clearance or   approval is not necessary  These tests are used for clinical purposes  They should not be regarded as investigational or for research  This   laboratory has been approved by CLIA 88, designated as a high-complexity   laboratory and is qualified to perform these tests  - Interpretation performed at [facility], [facility]   LAB AP GROSS DESCRIPTION (Report)     A  The specimen is received in formalin, labeled with the patient's name   and hospital number, and is designated endobronchial biopsy left superior   segment  The specimen consists of 3 tan to pink soft tissue fragments   ranging in greatest dimension from less than 0 1 to 0 5 centimeters     Entirely submitted  One cassette  Note: The estimated total formalin fixation time based upon information   provided by the submitting clinician and the standard processing schedule   is 49 0 hours  BMV     (1) FINE NEEDLE ASPIRATION 33Lpe2231 12:06PM Nando Lundberg     Test Name Result Flag Reference   LAB AP CASE REPORT (Report)     Non-gynecologic Cytology             Case: [id number]                  Authorizing Provider: China Martino MD      Collected:      08/10/2016 1206        Ordering Location:   [address] [place] [address]   Received:      08/10/2016 [address] Operating Room                            Pathologist:      Lawrence Chowdhury MD                              Specimens:  A) - Mass, Right hilar                                         B) - Mass, Right hilar                                         C) - Mass, Right hilar                                         D) - Lymph Node, LEVEL 7                                        E) - Lymph Node, LEVEL 7                                        F) - Lymph Node, 4L                                          G) - Lymph Node, 4L                                          H) - Lymph Node, 4R                                          I) - Lymph Node, 4R   LAB AP CYTO FINAL DIAGNOSIS (Report)     A, B, C  Mass, Right hilar: (Thin prep, smear and cell block preparations)  Conclusive evidence of Malignancy  Non-small cell carcinoma consistent with squamous cell carcinoma  Satisfactory for evaluation  D-E  Lymph Node, LEVEL 7: (Thin prep and cell block preparations)  No malignant cells identified  Mixed lymphocytes and neutrophils  Rare bronchial cells  Satisfactory for evaluation  F - G  Lymph Node, 4L: (Thin prep and cell block preparations)  Atypical cellular changes seen  Rare groups of atypical epithelioid cells  Unremarkable bronchial cells  Mixed lymphocytes and neutrophils  Satisfactory for evaluation  H - I   Lymph Node, 4R: (Thin prep and cell block preparations)  No malignant cells identified  Mixed lymphocytes and neutrophils  Unremarkable bronchial cells  Electronically signed by Marge Means MD on 8/18/2016 at 10:05 AM   LAB AP NONGYN NOTE      On immunostaining, with appropriate positive controls, the tumor cells are   positive for p40 and CK 5/6  They are negative for TTF-1  These results   help support the above classification of this tumor  LAB AP INTRAOPERATIVE CONSULTATION      Mass, Right hilar - FNAB on-site evaluation: Positive for malignancy,   favor non-small cell carcinoma  Dr [place]  [place]   LAB AP GROSS DESCRIPTION (Report)     A  Mass, Right hilar: 35ml, red, received in CytoLyt*    B  Mass, Right hilar: 6 slides received: 3 Diff-quik and 3 alcohol fixed    C  Mass, Right hilar, cell block:    D  Lymph Node, LEVEL 7: 25ml, red, received in CytoLyt*    E  Lymph Node, LEVEL 7, cell block:     F  Lymph Node, 4L: 23ml, red, received in CytoLyt*    G  Lymph Node, 4L, cell block:    H  Lymph Node, 4R: 25ml, light pink, received in CytoLyt*    I  Lymph Node, 4R, cell block:   LAB AP NONGYN ADDITIONAL INFORMATION (Report)     [email or web address]'s FDA approved ,  and ThinPrep Imaging System are   utilized with strict adherence to the 's instruction manual to   prepare gynecologic and non-gynecologic cytology specimens for the   production of ThinPrep slides as well as for gynecologic ThinPrep imaging  These processes have been validated by our laboratory and/or by the     These tests were developed and their performance characteristics   determined by [facility] [address] [place] [address] or [facility]  They may not be cleared or approved by the [place]  Food and   Drug Administration  The FDA has determined that such clearance or   approval is not necessary  These tests are used for clinical purposes  They should not be regarded as investigational or for research   This laboratory has been approved by [facility], designated as a high-complexity   laboratory and is qualified to perform these tests  - Interpretation performed at [facility], [address] [address] [address] 12:28PM Kaity aDy Order Number: [id number]    - Patient Instructions: To schedule this appointment, please contact Central Scheduling at [phone]   Order Number: [id number]    - Patient Instructions: [address] 7/30/16 1:00PM   NOTHING TO EAT 3 HOURS PRIOR TO TEST, OK TO DRINK CLEAR LIQUIDS     Test Name Result Flag Reference   CT CHEST W CONTRAST (Report)     CT CHEST WITH IV CONTRAST     INDICATION: Right hilar mass identified on recent neck CT angiogram  Carotid stenosis  COMPARISON: CT angiogram performed July 21, 2016  TECHNIQUE: CT examination of the chest was performed  85 mL of Omnipaque 350 was injected intravenously  Axial, sagittal and coronal reformatted images were submitted for interpretation  Coronal thick section MIP (maximal intensity projection) images    were also created  This examination, like all CT scans performed in the [facility], was performed utilizing techniques to minimize radiation dose exposure, including the use of iterative reconstruction and automated exposure control  FINDINGS:     LUNGS: There is a spiculated right hilar and suprahilar mass measuring 4 2 x 2 8 cm in transverse dimensions  This mass encases and slightly narrows right upper lobe bronchus as demonstrated on images 601/75 and 603/57  Groundglass parenchymal density   in the right upper lobe peripheral to this mass system with obstructive atelectasis or developing postobstructive right upper lobe pneumonia, and findings are unchanged from July 21, 2016 examination  Lungs are otherwise clear  PLEURA: Unremarkable  HEART/GREAT VESSELS: Heart is unremarkable   There is marked narrowing of posterior right upper lobe pulmonary vein related to right hilar/suprahilar tumor  MEDIASTINUM AND CARLO: Unremarkable  CHEST WALL AND LOWER NECK: Bilateral thyroid nodules are noted, largest in the posterior mid to lower pole the left thyroid lobe measuring 2 2 x 1 2 cm  VISUALIZED STRUCTURES IN THE UPPER ABDOMEN: Extensive atherosclerotic changes associated with arterial vasculature in the upper abdomen  There is atherosclerotic occlusion of the aorta below the level of the renal arteries  OSSEOUS STRUCTURES: No acute fracture  No destructive osseous lesion  Intramuscular lipoma in the left latissimus dorsi musculature measures approximately 9 cm in greatest dimension  IMPRESSION:     Spiculated left hilar and suprahilar mass consistent with bronchogenic malignancy  Encasement and slight narrowing of right upper lobe bronchus resulting in postobstructive atelectasis and, in the appropriate clinical setting, possibly postobstructive    pneumonia  Extensive atherosclerosis and apparent occlusion of infrarenal abdominal aorta  Recently discovered thyroid nodules, largest measuring up to 2 2 cm  According to guidelines published in the February 2015 white paper on this topic in the Journal of the [employer] of Radiology Geryl Saver), further evaluation with ultrasound may be    considered  However, as the majority of incidental thyroid nodules are benign and because small incidental thyroid malignancies typically demonstrate indolent behavior, consideration of the patient's life expectancy and possible comorbidities should be    taken into account prior to a decision to pursue further imaging  ##sigslh##sigslh       Workstation performed: [id number]     Signed by:   Bobbi Baker MD   8/1/16     Assessment    1  History of Bypass Graft Using Vein: Aortic-Bifemoral   2   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)    Plan  Chemotherapy-induced nausea    · Ondansetron HCl - 8 MG Oral Tablet; 1 TAB PO Q 8 HR PRN N/V   Rx By: Marlene Falk; Dispense: 0 Days ; #:30 Tablet; Refill: 2; For: Chemotherapy-induced nausea; ALLIE = N; Verified Transmission to [facility]/PHARMACY# [id number]; Last Updated By: System, [employer]; 8/22/2016 4:56:41 PM  Squamous cell carcinoma of right lung    · Drink plenty of fluids ; Status:Complete;   Done: 40XKG3002   Ordered; For:Squamous cell carcinoma of right lung; Ordered By:Dawson Langford;   · Follow-up visit in 3 weeks Evaluation and Treatment  Follow-up  Status: Hold For -  Scheduling  Requested for: 21Bax2626   Ordered; For: Squamous cell carcinoma of right lung; Ordered By: Marlene Falk Performed:  Due: 54MAD6351   · (1) CBC/PLT/DIFF; Status:Active; Requested SGL:34PYO5135;    Perform:[facility]; HQF:73DCM9126; Last Updated By:Iggy Wisdom; 8/22/2016 4:26:24 PM;Ordered; For:Squamous cell carcinoma of right lung; Ordered By:Dawson Langford;   · (1) CBC/PLT/DIFF; Status:Active; Requested IEE:86FRE4495;    Perform:[facility]; FEB:01XSA7501; Last Updated By:Iggy Wisdom; 8/22/2016 4:25:16 PM;Ordered; For:Squamous cell carcinoma of right lung; Ordered By:Dawson Langford;   · (1) CBC/PLT/DIFF; Status:Active; Requested SNM:92MJB0353;    Perform:[facility]; AGY:52MMZ3019; Last Updated By:Iggy Wisdom; 8/22/2016 4:27:13 PM;Ordered; For:Squamous cell carcinoma of right lung; Ordered By:Dawson Langford;   · (1) CBC/PLT/DIFF; Status:Active; Requested for:10Oct2016;    Perform:[facility]; Due:10Oct2017; Last Updated By:Iggy Wisdom; 8/22/2016 4:26:33 PM;Ordered; For:Squamous cell carcinoma of right lung; Ordered By:Dawson Langford;   · (1) CBC/PLT/DIFF; Status:Active; Requested for:12Ksa0360;    Perform:[facility]; Due:49Gmz1248; Last Updated By:Iggy Wisdom; 8/22/2016 4:25:46 PM;Ordered; For:Squamous cell carcinoma of right lung; Ordered By:Dawson Langford;   · (1) CBC/PLT/DIFF; Status:Active;  Requested VAY:07BIV7479;    Perform:[facility]; EYE:94OBT6543; Last Updated By:Chiquita Wisdom; 2016 4:26:40 PM;Ordered; For:Squamous cell carcinoma of right lung; Ordered By:Kia Langford;   · (1) CBC/PLT/DIFF; Status:Active; Requested TZR:54BGO5439;    Perform:[facility]; Due:92Rec1971; Last Updated By:Chiquita Wisdom; 2016 4:26:05 PM;Ordered; For:Squamous cell carcinoma of right lung; Ordered By:Kia Langford;   · (1) CBC/PLT/DIFF; Status:Active; Requested for:03Dqw1926;    Perform:[facility]; PZU:63GBL0044;FRQXJFH; For:Squamous cell carcinoma of right lung; Ordered By:Kia Langford;   · (1) CBC/PLT/DIFF; Status:Active; Requested NFZ:13BDB2703;    Perform:[facility]; GZP:04SNB4100; Last Updated By:Chiquita Wisdom; 2016 4:26:49 PM;Ordered; For:Squamous cell carcinoma of right lung; Ordered By:Kia Langford;   · (1) CBC/PLT/DIFF; Status:Active; Requested ZVK:36NXL3659;    Perform:[facility]; Due:17Lyz4684; Last Updated By:Chiquita Wisdom; 2016 4:26:13 PM;Ordered; For:Squamous cell carcinoma of right lung; Ordered By:Kia Langford;   · (1) CBC/PLT/DIFF; Status:Active; Requested for:62Bos1232;    Perform:[facility]; Due:81Ohe4493; Last Updated By:Chiquita Wisdom; 2016 4:24:17 PM;Ordered; For:Squamous cell carcinoma of right lung; Ordered By:Kia Langford;   · (1) CBC/PLT/DIFF; Status:Active; Requested for:62Wzu1333;    Perform:[facility]; SZ93BSZ4833; Last Updated By:Chiquita Wisdom; 2016 4:27:05 PM;Ordered; For:Squamous cell carcinoma of right lung; Ordered By:Kia Langford;   · (1) CBC/PLT/DIFF; Status:Complete; Requested for:Recurring Schedule: 2016;  2016; 2016; 2016; 2016; 2016; 10/3/2016; 10/1   ;    Perform:[facility]; QWH:61SSK9726;VKTDWTQ; For:Squamous cell carcinoma of right lung; Ordered By:Kia Langford;   · (1) COMPREHENSIVE METABOLIC PANEL; Status:Active;  Requested EC44GYT4780;    Perform:[facility]; ZMF:77MVP2081; Last Updated By:Davy Wisdom; 8/22/2016 4:26:24 PM;Ordered; For:Squamous cell carcinoma of right lung; Ordered By:Reed Langford;   · (1) COMPREHENSIVE METABOLIC PANEL; Status:Active; Requested HHT:54YMN9416;    Perform:[facility]; HID:50QNW2067; Last Updated By:Davy Wisdom; 8/22/2016 4:25:16 PM;Ordered; For:Squamous cell carcinoma of right lung; Ordered By:Reed Langford;   · (1) COMPREHENSIVE METABOLIC PANEL; Status:Active; Requested BRIAN:83JCN4888;    Perform:[facility]; IRX:14PQL2586; Last Updated By:Davy Wisdom; 8/22/2016 4:27:13 PM;Ordered; For:Squamous cell carcinoma of right lung; Ordered By:Reed Langford;   · (1) COMPREHENSIVE METABOLIC PANEL; Status:Active; Requested for:10Oct2016;    Perform:[facility]; Due:10Oct2017; Last Updated By:Davy Wisdom; 8/22/2016 4:26:33 PM;Ordered; For:Squamous cell carcinoma of right lung; Ordered By:Reed Langford;   · (1) COMPREHENSIVE METABOLIC PANEL; Status:Active; Requested for:75Jux1220;    Perform:[facility]; Due:84Lcb2604; Last Updated By:aDvy Wisdom; 8/22/2016 4:25:46 PM;Ordered; For:Squamous cell carcinoma of right lung; Ordered By:Reed Langford;   · (1) COMPREHENSIVE METABOLIC PANEL; Status:Active; Requested FELIX:39FRL7377;    Perform:[facility]; Due:52Cqm6409; Last Updated By:Davy Wisdom; 8/22/2016 4:26:40 PM;Ordered; For:Squamous cell carcinoma of right lung; Ordered By:Reed Langford;   · (1) COMPREHENSIVE METABOLIC PANEL; Status:Active; Requested CYQ:53KBX1063;    Perform:[facility]; Due:69Pdy2191; Last Updated By:Davy Wisdom; 8/22/2016 4:26:05 PM;Ordered; For:Squamous cell carcinoma of right lung; Ordered By:Reed Langford;   · (1) COMPREHENSIVE METABOLIC PANEL; Status:Active; Requested for:88Tav1861;    Perform:[facility]; PIC:90IYH4177;VMYUKLP; For:Squamous cell carcinoma of right lung; Ordered By:Reed Langford;   · (1) COMPREHENSIVE METABOLIC PANEL; Status:Active;  Requested WPB:96KUY7138; Perform:[facility]; EVU:97TBQ7937; Last Updated By:Adonis Wisdom; 8/22/2016 4:26:49 PM;Ordered; For:Squamous cell carcinoma of right lung; Ordered By:Sally Langford;   · (1) COMPREHENSIVE METABOLIC PANEL; Status:Active; Requested XPB:59NFG8749;    Perform:[facility]; Due:88Zan4201; Last Updated By:Adonis Wisdom; 8/22/2016 4:26:14 PM;Ordered; For:Squamous cell carcinoma of right lung; Ordered By:Sally Langford;   · (1) COMPREHENSIVE METABOLIC PANEL; Status:Active; Requested for:04Ovw6852;    Perform:[facility]; Due:17Egg8080; Last Updated By:Adonis Wisdom; 8/22/2016 4:24:17 PM;Ordered; For:Squamous cell carcinoma of right lung; Ordered By:Sally Langford;   · (1) COMPREHENSIVE METABOLIC PANEL; Status:Active; Requested for:13Qdj7416;    Perform:[facility]; VZF:57DCX8781; Last Updated By:Adonis Wisdom; 8/22/2016 4:27:05 PM;Ordered; For:Squamous cell carcinoma of right lung; Ordered By:Sally Langford;   · (1) COMPREHENSIVE METABOLIC PANEL; Status:Complete; Requested for:Recurring  Schedule: 8/22/2016; 8/29/2016; 9/5/2016; 9/12/2016; 9/19/2016; 9/26/2016; 10/3/2016;  10/1   ;    Perform:[facility]; NRI:84LBG5207;HNMEIUH; For:Squamous cell carcinoma of right lung; Ordered By:Adam Langford;    Discussion/Summary    In summary, this is a 79-year-old female history of 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 as outlined  We reviewed her PET scan results  She clearly has disease in the right upper lung extending to the mediastinum  Mediastinal lymph nodes have not been demonstrated malignant, however  Question of metastatic disease in the left adrenal gland is raised  The lesion in the right parotid most likely reflects a separate primary in that location  Her case was reviewed at lung tumor working group  Radiation therapy was felt to be unfavorable due to background pulmonary dysfunction and increased risk for further pulmonary restrictions    Chemotherapy is considered as an alternative  We reviewed that Abraxane and carboplatin could be offered to stabilize or improve her disease status  If this occurred survival benefit would be associated  We reviewed potential toxicities including but not limited to, nausea, vomiting, cytopenias, fatigue, allergic reaction, peripheral neuropathy, alopecia  We reviewed prophylactic measures taken routinely as well as monitoring to allow for early intervention is appropriate  Our chemotherapy nurse review the above information as well as giving the patient and her   Information in this regard  The patient and her  voiced understanding and agreement and wished to proceed as outlined  We made arrangements accordingly  She will need a Port-A-Cath, as her venous access is quite poor  The treatment plan was reviewed with the patient/guardian  The patient/guardian understands and agrees with the treatment plan   The patient was counseled regarding diagnostic results, instructions for management, patient and family education, impressions  total time of encounter was 40 minutes        Signatures   Electronically signed by : SANTIAGO Russell D ,DO; Aug 22 2016  5:14PM EST                       (Author)

## 2018-01-10 NOTE — MISCELLANEOUS
Message   Recorded as Task   Date: 05/03/2017 02:46 PM, Created By: Shabbir Hogan   Task Name: Care Coordination   Assigned To: Meri Nugent   Regarding Patient: Carolann Hammans, Status: Active   Comment:    Kelly Arora - 03 May 2017 2:46 PM     TASK CREATED  Caller: DARIO, Spouse; Care Coordination; [phone] (Mobile Phone)  PT'S , [address] NOW---NOT WAIT 2 MONTHS AND REPEAT CT SCAN  PLEASE CONTACT THEM ON CELL TO DISCUSS/SET UP   Left msg for patients  - Per Dr June Hartley ok for patient to start on Tecentriq  Will make arrangements for patient to come in for teaching and consent      Active Problems    1  Abnormal CT of the chest (793 2) (R93 8)   2  Acute bronchitis (466 0) (J20 9)   3  Asymptomatic carotid artery stenosis (433 10) (I65 29)   4  Benign essential hypertension (401 1) (I10)   5  Bursitis, ischial (726 5) (M70 70)   6  Cardiomyopathy (425 4) (I42 9)   7  Cataract of right eye (366 9) (H26 9)   8  Chemotherapy-induced nausea (787 02,E933 1) (R11 0,T45  1X5A)   9  Chronic obstructive pulmonary disease (496) (J44 9)   10  Congestive heart failure (428 0) (I50 9)   11  COPD with acute exacerbation (491 21) (J44 1)   12  Depression (311) (F32 9)   13  DMII (diabetes mellitus, type 2) (250 00) (E11 9)   14  Dyspnea (786 09) (R06 00)   15  Fatigue (780 79) (R53 83)   16  Fracture of multiple pubic rami (808 2) (S32 599A)   17  Fracture of pubic ramus (808 2) (S32 599A)   18  Hyperlipidemia (272 4) (E78 5)   19  Hypertension (401 9) (I10)   20  Lung cancer, upper lobe (162 3) (C34 10)   21  Multiple thyroid nodules (241 1) (E04 2)   22  Need for immunization against influenza (V04 81) (Z23)   23  Nodule of left lobe of thyroid gland (241 0) (E04 1)   24  Orthopedic aftercare (V54 9) (Z47 89)   25  Osteoporosis (733 00) (M81 0)   26  Other chronic pain (338 29) (G89 29)   27  Parotid adenoma (210 2) (D11 0)   28  Pelvic pain (R10 2)   29   Pelvic pain in female (625 9) (R10 2)   30  Pre-operative cardiovascular examination (V72 81) (Z01 810)   31  Primary localized osteoarthrosis of the hip, unspecified laterality (715 15) (M16 10)   32  Psoriasis (696 1) (L40 9)   33  Pulmonary mass (786 6) (R91 8)   34  Pulmonary nodule (793 11) (R91 1)   35  PVD (peripheral vascular disease) (443 9) (I73 9)   36  Restless legs syndrome (333 94) (G25 81)   37  Sciatica (724 3) (M54 30)   38  Screening for genitourinary condition (V81 6) (Z13 89)   39  Screening for neurological condition (V80 09) (Z13 89)   40  Screening for osteoporosis (V82 81) (Z13 820)   41  Shortness of breath (786 05) (R06 02)   42  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)   43  Strain of right hip adductor muscle (843 8) (S76 011A)   44  Trochanteric bursitis, unspecified laterality (726 5) (M70 60)   45  Type 2 diabetes mellitus (250 00) (E11 9)   46  Wheezing (786 07) (R06 2)    Current Meds   1  Advair Diskus 250-50 MCG/DOSE Inhalation Aerosol Powder Breath Activated; USE 1   INHALATION TWICE A DAY RINSE MOUTH AFTER USE; Therapy: 97VCQ2286 to (Evaluate:16Sep2016)  Requested for: 11ATJ6291; Last   Rx:93Lel8327 Ordered   2  Albuterol Sulfate (2 5 MG/3ML) 0 083% Inhalation Nebulization Solution; inhale contents   of 1 vial in nebulizer four times a day if needed; Therapy: 69WWA8060 to (Caden Cornea)  Requested for: 58UGY4766; Last   Rx:67Cxc2053 Ordered   3  FreeStyle Lite Test In [employer]; Test 3 times daily; Therapy: 65FEJ9495 to (Evaluate:01Jan2018)  Requested for: 71NKQ9586; Last   Rx:06Jan2017 Ordered   4  Ibandronate Sodium 150 MG Oral Tablet; TAKE ONE TABLET ONCE MONTHLY; Therapy: 60VPF1083 to (Last Rx:40Caz0881)  Requested for: 52Emd8602 Ordered   5  Lyrica 100 MG Oral Capsule; TAKE 1 CAPSULE AT BEDTIME; Therapy: (Recorded:65Cel2899) to Recorded   6   Metoprolol Tartrate 100 MG Oral Tablet; take one tablet daily  Requested for: 19Apr2017;   Last Rx:19Apr2017 Ordered   7  Oxycodone-Acetaminophen 5-325 MG Oral Tablet; 1 q4h prn; Therapy: 97XRQ9661 to (Evaluate:94Hsc4251)  Requested for: 46UOP6070; Last   XH:31IKW2679 Ordered   8  Simvastatin 80 MG Oral Tablet; Take 1 tablet daily; Therapy: 42JMJ2419 to (Evaluate:25Jun2017)  Requested for: 65ULD2225; Last   Rx:27Mar2017 Ordered   9  Spiriva Respimat 2 5 MCG/ACT Inhalation Aerosol Solution; INHALE 2 PUFFS ONCE   DAILY; Therapy: 34JTM1945 to (Jerry Ohs)  Requested for: 63DKN2215; Last   Rx:15Qjz1605 Ordered   10  TRENtal 400 MG TBCR (Pentoxifylline ER); TAKE 1 TABLET 3 TIMES DAILY WITH    FOOD; Therapy: (Recorded:21Nov2012) to Recorded    Allergies    1   Penicillins    Signatures   Electronically signed by : Netta Beckett, ; May  4 2017 10:58AM EST                       (Author)

## 2018-01-10 NOTE — RESULT NOTES
Verified Results  * DXA BONE DENSITY SPINE HIP AND PELVIS 14Hkb1885 07:02AM Madeline Alpers Order Number: [id number]     Test Name Result Flag Reference   DXA BONE DENSITY SPINE HIP AND PELVIS (Report)     CENTRAL DXA SCAN     CLINICAL HISTORY:  67year old post-menopausal  female who does not exercise and does not take calcium or vitamin D supplements  TECHNIQUE: Bone densitometry was performed using a [employer] C bone densitometer  Regions of interest appear properly placed  The BMD of L1 and L2 is elevated, compared to the BMD of L3 and L4, most likely secondary to degenerative change,    although this should be correlated with the patient's clinical history  L1 and L2 were excluded from calculation of lumbar spine BMD         COMPARISON: None  RESULTS:    LUMBAR SPINE: L3-L4:   BMD 0 867 gm/cm2   T-score -2 1   Z-score 0 5     LEFT TOTAL HIP:   BMD 0 572 gm/cm2   T-score -3 0   Z-score -1 1     LEFT FEMORAL NECK:   BMD 0 422 gm/cm2   T-score -3 8   Z-score -1 7           ASSESSMENT:   1  Based on the WHO classification, the T-scores of -3 8 and -3 0 in the left femoral neck and total left hip are consistent with osteoporosis  2  According to the [address] [place] [address], prescription therapy is recommended with a T-score of -2 5 or less in the spine or hip after appropriate evaluation to exclude secondary causes  3  A daily intake of at least 1200 mg Calcium and 800 to 1000 IU of Vitamin D, as well as weight bearing and muscle strengthening exercise, fall prevention and avoidance of tobacco and excessive alcohol intake as basic preventive measures are    suggested  4  Repeat DXA scan in 18-24 months as clinically indicated         WHO CLASSIFICATION:   Normal (a T-score of -1 0 or higher)   Low bone mineral density (a T-score of less than -1 0 but higher than -2 5)   Osteoporosis (a T-score of -2 5 or less)   Severe osteoporosis (a T-score of -2 5 or less with a fragility fracture)             Workstation performed: [id number]     VAS CAROTID COMPLETE STUDY 37FLS9783 10:33AM Kelly Peacock Order Number: [id number]     Test Name Result Flag Reference   VAS CAROTID COMPLETE STUDY (Report)     [facility] REPORT   CLINICAL:   Indications: Carotid disease w/o CVA [I65 29]  Pt  states she is here because   her doctor told her to have this test done, but she does not know why  She has   no symptoms  Risk Factors   The patient has history of Diabetes (NIDDM (oral meds))  Clinical   Right Pressure: 121/52 mm Hg     Left Pressure: 115/56 mm Hg  FINDINGS:      Right    Impression PSV EDV (cm/s) Ratio    Dist  ICA         92     18  1 60    Mid  ICA         106     23  1 84    Prox  ICA  1 - 49%   102     18  1 77    Dist CCA         105     19        Mid CCA          57     16  0 70    Prox CCA         82     11        Ext Carotid        82      0  1 42    Prox Vert         63     15        Subclavian        125      0           Left     Impression PSV EDV (cm/s) Ratio    Dist  ICA         53      7  0 65    Mid  ICA         220     38  2 65    Prox  ICA  70%+    281     63  3 39    Dist CCA         72     12        Mid CCA          83      9  1 13    Prox CCA         73     11        Ext Carotid       120     16  1 45    Prox Vert         76     25        Subclavian        89      0                 CONCLUSION:   Impression   RIGHT:   There is <50% stenosis noted in the internal carotid artery  Plaque is   heterogenous and irregular  Plaque is seen within the common carotid artery  Vertebral artery flow is antegrade  There is no significant subclavian artery   disease  LEFT:   There is >70% stenosis noted in the internal carotid artery  Plaque is   heterogenous and irregular  Plaque is seen within the common carotid artery  Vertebral artery flow is antegrade  There is no significant subclavian artery   disease        Recommend repeat testing in 6month as per protocol unless otherwise indicated  Internal carotid artery stenosis determination by consensus criteria from:   Luis Campos et al  Carotid Artery Stenosis: Gray-Scale and Doppler US Diagnosis   - Society of Radiologists in [address] [place] [address], Radiology 2003;   [phone]  SIGNATURE:   Electronically Signed by: Neil Denney MD on 2016-07-06 05:14:52 PM     US THYROID [id number] 10:05AM Kelly Peacock Order Number: [id number]     Test Name Result Flag Reference   US THYROID (Report)     THYROID ULTRASOUND     INDICATION: Evaluate for thyroid nodules     COMPARISON: None  TECHNIQUE:  Ultrasound of the thyroid was performed with a high frequency linear transducer in transverse and sagittal planes including volumetric imaging sweeps as well as traditional still imaging technique  FINDINGS:   Normal homogeneous smooth echotexture  Right gland: 1 3 x 4 7 x 2 1 cm  There is a 1 8 x 1 4 x 1 3 cm hypoechoic nodule at the midpole right lobe of the thyroid  This has smooth margins with a few foci of increased echogenicity internally  This meets criteria for ultrasound-guided fine-needle aspiration  Left gland: 1 6 x 4 1 x 2 2 cm  There is a 2 x 2 2 x 1 6 cm slightly heterogeneous oval nodule at the mid to inferior pole left lobe of the thyroid  This meets criteria for ultrasound-guided fine-needle aspiration  There is a 1 4 x 0 8 x 0 7 cm iso to slightly hyperechoic nodule at the mid to upper pole left lobe of the thyroid  This does not meet criteria for fine needle aspiration  Isthmus: 0 4 cm in AP dimension  No dominant nodules  IMPRESSION:      Bilateral thyroid nodules  The dominant nodule at the midpole right lobe of the thyroid as well as the dominant nodule at the mid to lower pole left lobe of the thyroid criteria for fine needle aspiration         Workstation performed: [id number]     Signed by:   Jeanine Tobin MD   7/7/16       Plan  Multiple thyroid nodules    · US GUIDED THYROID BIOPSY; Status:Hold For - Scheduling; Requested  for:70Bbs8591;   Osteoporosis    · Ibandronate Sodium 150 MG Oral Tablet; TAKE ONE TABLET ONCE MONTHLY

## 2018-01-11 ENCOUNTER — APPOINTMENT (OUTPATIENT)
Dept: RADIATION ONCOLOGY | Facility: HOSPITAL | Age: 78
End: 2018-01-11
Payer: MEDICARE

## 2018-01-11 PROCEDURE — 99215 OFFICE O/P EST HI 40 MIN: CPT | Performed by: RADIOLOGY

## 2018-01-12 ENCOUNTER — APPOINTMENT (OUTPATIENT)
Dept: PHYSICAL THERAPY | Facility: CLINIC | Age: 78
End: 2018-01-12
Payer: MEDICARE

## 2018-01-12 VITALS
DIASTOLIC BLOOD PRESSURE: 64 MMHG | SYSTOLIC BLOOD PRESSURE: 108 MMHG | HEIGHT: 61 IN | WEIGHT: 106.5 LBS | BODY MASS INDEX: 20.11 KG/M2 | HEART RATE: 78 BPM

## 2018-01-12 VITALS
OXYGEN SATURATION: 95 % | HEIGHT: 61 IN | HEART RATE: 80 BPM | DIASTOLIC BLOOD PRESSURE: 60 MMHG | SYSTOLIC BLOOD PRESSURE: 132 MMHG | WEIGHT: 111 LBS | BODY MASS INDEX: 20.96 KG/M2 | RESPIRATION RATE: 18 BRPM | TEMPERATURE: 96.9 F

## 2018-01-12 PROCEDURE — 97140 MANUAL THERAPY 1/> REGIONS: CPT

## 2018-01-12 PROCEDURE — G8983 BODY POS D/C STATUS: HCPCS

## 2018-01-12 PROCEDURE — 97110 THERAPEUTIC EXERCISES: CPT

## 2018-01-12 PROCEDURE — G8982 BODY POS GOAL STATUS: HCPCS

## 2018-01-12 NOTE — MISCELLANEOUS
Message   Recorded as Task   Date: 06/13/2017 10:34 AM, Created By: Sofía Jang   Task Name: Call Back   Assigned To: Meri Nugent   Regarding Patient: Naif Erickson, Status: Active   Comment:    Sofía Jang - 13 Jun 2017 10:34 AM     TASK CREATED  Arturo, pt's  called () asking about the ct on the 19th and if this is supposed to happen still because of the change in treatment  He also has questions about his upcoming follow up appointment for his wife (pt)  Meri Nugent - 13 Jun 2017 11:10 AM     TASK REASSIGNED: Previously Assigned To Meri Nugent  When should her next CT scan be? Ingris Garcia - 13 Jun 2017 11:57 AM     TASK REPLIED TO: Previously Assigned To Luna Dueñas  she has completed 2 cycles of tecentriq  scan after 3  scan can be scheduled and discussed at next office visit with Dr Zulma Bacon  That was an old scan scheduled before she decided to do treatment  Returned call to patients  - explained that patient does not need CT scan on 6/19 - next scan will be arranged at her next office visit with Dr Zulma Bacon  Active Problems    1  Abnormal CT of the chest (793 2) (R93 8)   2  Acute bronchitis (466 0) (J20 9)   3  Asymptomatic carotid artery stenosis (433 10) (I65 29)   4  Benign essential hypertension (401 1) (I10)   5  Bursitis, ischial (726 5) (M70 70)   6  Cardiomyopathy (425 4) (I42 9)   7  Cataract of right eye (366 9) (H26 9)   8  Chemotherapy-induced nausea (787 02,E933 1) (R11 0,T45  1X5A)   9  Chronic obstructive pulmonary disease (496) (J44 9)   10  Congestive heart failure (428 0) (I50 9)   11  COPD with acute exacerbation (491 21) (J44 1)   12  Depression (311) (F32 9)   13  DMII (diabetes mellitus, type 2) (250 00) (E11 9)   14  Dyspnea (786 09) (R06 00)   15  Fatigue (780 79) (R53 83)   16  Fracture of multiple pubic rami (808 2) (S32 599A)   17  Fracture of pubic ramus (808 2) (S32 599A)   18  Hyperlipidemia (272 4) (E78 5)   19  Hypertension (401 9) (I10)   20  Lung cancer, upper lobe (162 3) (C34 10)   21  Multiple thyroid nodules (241 1) (E04 2)   22  Need for immunization against influenza (V04 81) (Z23)   23  Nodule of left lobe of thyroid gland (241 0) (E04 1)   24  Orthopedic aftercare (V54 9) (Z47 89)   25  Osteoporosis (733 00) (M81 0)   26  Other chronic pain (338 29) (G89 29)   27  Parotid adenoma (210 2) (D11 0)   28  Pelvic pain (R10 2)   29  Pelvic pain in female (625 9) (R10 2)   30  Pre-operative cardiovascular examination (V72 81) (Z01 810)   31  Primary localized osteoarthrosis of the hip, unspecified laterality (715 15) (M16 10)   32  Psoriasis (696 1) (L40 9)   33  Pulmonary mass (786 6) (R91 8)   34  Pulmonary nodule (793 11) (R91 1)   35  PVD (peripheral vascular disease) (443 9) (I73 9)   36  Restless legs syndrome (333 94) (G25 81)   37  Sciatica (724 3) (M54 30)   38  Screening for genitourinary condition (V81 6) (Z13 89)   39  Screening for neurological condition (V80 09) (Z13 89)   40  Screening for osteoporosis (V82 81) (Z13 820)   41  Shortness of breath (786 05) (R06 02)   42  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)   43  Strain of right hip adductor muscle (843 8) (S76 011A)   44  Trochanteric bursitis, unspecified laterality (726 5) (M70 60)   45  Type 2 diabetes mellitus (250 00) (E11 9)   46  Wheezing (786 07) (R06 2)    Current Meds   1  Advair Diskus 250-50 MCG/DOSE Inhalation Aerosol Powder Breath Activated; USE 1   INHALATION TWICE A DAY RINSE MOUTH AFTER USE; Therapy: 76BRH6178 to (Evaluate:20Zhb1713)  Requested for: 74MMQ3443; Last   Rx:56Ijm1570 Ordered   2  Albuterol Sulfate (2 5 MG/3ML) 0 083% Inhalation Nebulization Solution; inhale contents   of 1 vial in nebulizer four times a day if needed; Therapy: 18IJE2146 to (Isabel Ramirez)  Requested for: 50ZLI5873; Last   Rx:58Onz2614 Ordered   3  FreeStyle Lite Test In [employer]; Test 3 times daily;    Therapy: 86Jib5304 to (Evaluate:01Jan2018)  Requested for: 19QAJ9215; Last   TR:30DGJ9057 Ordered   4  Ibandronate Sodium 150 MG Oral Tablet; TAKE ONE TABLET ONCE MONTHLY; Therapy: 67UML1690 to (Last Rx:43Smp4816)  Requested for: 65Tae8131 Ordered   5  Lyrica 100 MG Oral Capsule; TAKE 1 CAPSULE AT BEDTIME; Therapy: (Recorded:30Ibr5729) to Recorded   6  Lyrica 100 MG Oral Capsule; TAKE 1 CAPSULE AT BEDTIME; Therapy: (Recorded:05May2017) to Recorded   7  Metoprolol Tartrate 100 MG Oral Tablet; take one tablet daily  Requested for: 19Apr2017;   Last Rx:19Apr2017 Ordered   8  Oxycodone-Acetaminophen 5-325 MG Oral Tablet; 1 q4h prn; Therapy: 24JPQ9315 to (Evaluate:30Jun2017)  Requested for: 47XAD5830; Last   Rx:16Mgy3820 Ordered   9  Simvastatin 80 MG Oral Tablet; Take 1 tablet daily; Therapy: 09UZU2153 to (Evaluate:25Jun2017)  Requested for: 62KOB7167; Last   Rx:27Mar2017 Ordered   10  Spiriva Respimat 2 5 MCG/ACT Inhalation Aerosol Solution; INHALE 2 PUFFS ONCE    DAILY; Therapy: 51GXW8809 to (Alondra Waters)  Requested for: 73HVQ9736; Last    Rx:79Twb7651 Ordered   11  TRENtal 400 MG TBCR (Pentoxifylline ER); TAKE 1 TABLET 3 TIMES DAILY WITH    FOOD; Therapy: (Recorded:21Nov2012) to Recorded    Allergies    1   Penicillins    Signatures   Electronically signed by : Swetha Aranda, ; Jun 13 2017  1:41PM EST                       (Author)

## 2018-01-12 NOTE — PROGRESS NOTES
Discussion/Summary  Social Work-Discussion Summary [facility]: Patient is being seen for a distress screenning assessment  LSW reviewed pt's distress thermometer completed by pt on 8/22/2016 in med onc  Pt rated their distress as a 5/10 and named worry as an emotional problem  LSW introduced her role as a cancer care counselor and cancer support services offered to pt and her  in person  LSW assessed pt's worry  Pt denied that worrying is a problem for her and discussed how she is able to cope with her cancer diagnosis with the support of her   Pt reports she is feeling "good " Pt and her  denied requiring social work assistance at this time  LSW provided pt emotional support during this assessment  LSW provided pt with contact information for cancer care counselors  LSW is available if pt or her family desire cancer care counseling        Signatures   Electronically signed by : GURJIT Dalton; Aug 23 2016  9:45AM EST                       (Author)

## 2018-01-12 NOTE — CONSULTS
Assessment    1  History of Bypass Graft Using Vein: Aortic-Bifemoral   2  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)    Plan  Chemotherapy-induced nausea    · Ondansetron HCl - 8 MG Oral Tablet; 1 TAB PO Q 8 HR PRN N/V   Rx By: Gavino Mckeon; Dispense: 0 Days ; #:30 Tablet; Refill: 2; For: Chemotherapy-induced nausea; ALLIE = N; Verified Transmission to [facility]/PHARMACY# 1960; Last Updated By: System, [employer]; 2016 4:56:41 PM  Squamous cell carcinoma of right lung    · Drink plenty of fluids ; Status:Complete;   Done: 23GKF5612   Ordered; For:Squamous cell carcinoma of right lung; Ordered By:Sia Langford;   · Follow-up visit in 3 weeks Evaluation and Treatment  Follow-up  Status: Hold For -  Scheduling  Requested for: 53Pcg0479   Ordered; For: Squamous cell carcinoma of right lung; Ordered By: Gavino Mckeon Performed:  Due: 64HOB1259   · (1) CBC/PLT/DIFF; Status:Active; Requested XHD:00VMX5352;    Perform:[facility]; GNI:89QLL9403; Last Updated By:Krystian Wisdom; 2016 4:26:24 PM;Ordered; For:Squamous cell carcinoma of right lung; Ordered By:Sia Langford;   · (1) CBC/PLT/DIFF; Status:Active; Requested PLY:29JNX7078;    Perform:[facility]; KORY:26RDQ8193; Last Updated By:Krystian Wisdom; 2016 4:25:16 PM;Ordered; For:Squamous cell carcinoma of right lung; Ordered By:Sia Langford;   · (1) CBC/PLT/DIFF; Status:Active; Requested YF81TQQ2258;    Perform:[facility]; FDW:49GPN5836; Last Updated By:Krystian Wisdom; 2016 4:27:13 PM;Ordered; For:Squamous cell carcinoma of right lung; Ordered By:Sia Langford;   · (1) CBC/PLT/DIFF; Status:Active; Requested for:2016;    Perform:[facility]; Due:2017; Last Updated By:Krystian Wisdom; 2016 4:26:33 PM;Ordered; For:Squamous cell carcinoma of right lung; Ordered By:Sia Langford;   · (1) CBC/PLT/DIFF; Status:Active;  Requested for:2016;    Perform:[facility]; Due:2017; Last Updated By:Divine Wisdom; 8/22/2016 4:25:46 PM;Ordered; For:Squamous cell carcinoma of right lung; Ordered By:Jesse Langford;   · (1) CBC/PLT/DIFF; Status:Active; Requested OSI:59WDX6083;    Perform:[facility]; Due:50Kqy9884; Last Updated By:Divine Wisdom; 8/22/2016 4:26:40 PM;Ordered; For:Squamous cell carcinoma of right lung; Ordered By:Jesse Langford;   · (1) CBC/PLT/DIFF; Status:Active; Requested WJX:54BGD4381;    Perform:[facility]; Due:81Cmo6040; Last Updated By:Divine Wisdom; 8/22/2016 4:26:05 PM;Ordered; For:Squamous cell carcinoma of right lung; Ordered By:Jesse Langford;   · (1) CBC/PLT/DIFF; Status:Active; Requested for:87Kzn3348;    Perform:[facility]; OSV:32BKT4958;TTQJIDE; For:Squamous cell carcinoma of right lung; Ordered By:Jesse Langford;   · (1) CBC/PLT/DIFF; Status:Active; Requested DXE:40UVW2121;    Perform:[facility]; EPB:78ZCW8517; Last Updated By:Divine Wisdom; 8/22/2016 4:26:49 PM;Ordered; For:Squamous cell carcinoma of right lung; Ordered By:Jesse Langford;   · (1) CBC/PLT/DIFF; Status:Active; Requested DZU:62SAT0995;    Perform:[facility]; Due:83Ooe8068; Last Updated By:Divine Wisdom; 8/22/2016 4:26:13 PM;Ordered; For:Squamous cell carcinoma of right lung; Ordered By:Jesse Langford;   · (1) CBC/PLT/DIFF; Status:Active; Requested for:82Vcp2741;    Perform:[facility]; Due:08Zyy4133; Last Updated By:Divine Wisdom; 8/22/2016 4:24:17 PM;Ordered; For:Squamous cell carcinoma of right lung; Ordered By:Jesse Langford;   · (1) CBC/PLT/DIFF; Status:Active; Requested for:31Oct2016;    Perform:[facility]; UEF:00UAS1563; Last Updated By:Divine Wisdom; 8/22/2016 4:27:05 PM;Ordered; For:Squamous cell carcinoma of right lung; Ordered By:Jesse Langford;   · (1) CBC/PLT/DIFF; Status:Complete; Requested for:Recurring Schedule: 8/22/2016;  8/29/2016; 9/5/2016; 9/12/2016; 9/19/2016; 9/26/2016; 10/3/2016; 10/1   ;    Perform:[facility]; A:82AMS3227;STOZWDN; For:Squamous cell carcinoma of right lung; Ordered By:Jane Langford;   · (1) COMPREHENSIVE METABOLIC PANEL; Status:Active; Requested GIR:53KEC2799;    Perform:[facility]; DYT:18VTZ7787; Last Updated By:Julia Wisdom; 8/22/2016 4:26:24 PM;Ordered; For:Squamous cell carcinoma of right lung; Ordered By:Jane Langford;   · (1) COMPREHENSIVE METABOLIC PANEL; Status:Active; Requested BBO:05TBX2955;    Perform:[facility]; BPI:35TLK1215; Last Updated By:Julia Wisdom; 8/22/2016 4:25:16 PM;Ordered; For:Squamous cell carcinoma of right lung; Ordered By:Jane Langford;   · (1) COMPREHENSIVE METABOLIC PANEL; Status:Active; Requested VBB:14GKX5619;    Perform:[facility]; ORM:12HZK2174; Last Updated By:Julia Wisdom; 8/22/2016 4:27:13 PM;Ordered; For:Squamous cell carcinoma of right lung; Ordered By:Jane Langford;   · (1) COMPREHENSIVE METABOLIC PANEL; Status:Active; Requested for:10Oct2016;    Perform:[facility]; Due:10Oct2017; Last Updated By:Julia Wisdom; 8/22/2016 4:26:33 PM;Ordered; For:Squamous cell carcinoma of right lung; Ordered By:Jane Langford;   · (1) COMPREHENSIVE METABOLIC PANEL; Status:Active; Requested for:99Nsk7266;    Perform:[facility]; Due:45Pvv8038; Last Updated By:Julia Wisdom; 8/22/2016 4:25:46 PM;Ordered; For:Squamous cell carcinoma of right lung; Ordered By:Jane Langford;   · (1) COMPREHENSIVE METABOLIC PANEL; Status:Active; Requested TRN:91OMT1128;    Perform:[facility]; Due:17Oct2017; Last Updated By:Julia Wisdom; 8/22/2016 4:26:40 PM;Ordered; For:Squamous cell carcinoma of right lung; Ordered By:Jane Langford;   · (1) COMPREHENSIVE METABOLIC PANEL; Status:Active; Requested DOC:20NVG9102;    Perform:[facility]; Due:60Wdk1557; Last Updated By:Julia Wisdom; 8/22/2016 4:26:05 PM;Ordered; For:Squamous cell carcinoma of right lung; Ordered By:Jane Langford;   · (1) COMPREHENSIVE METABOLIC PANEL; Status:Active;  Requested for:94Dfr5309;    Perform:[facility]; EXX:54MYX4788;KGCMRLD; For:Squamous cell carcinoma of right lung; Ordered By:Sally Langford;   · (1) COMPREHENSIVE METABOLIC PANEL; Status:Active; Requested CZK:48XVZ3183;    Perform:[facility]; WAK:07YOH6893; Last Updated By:Adonis Wisdom; 8/22/2016 4:26:49 PM;Ordered; For:Squamous cell carcinoma of right lung; Ordered By:Sally Langford;   · (1) COMPREHENSIVE METABOLIC PANEL; Status:Active; Requested TMK:18ZIP4330;    Perform:[facility]; Due:72Byk3954; Last Updated By:Adonis Wisdom; 8/22/2016 4:26:14 PM;Ordered; For:Squamous cell carcinoma of right lung; Ordered By:Sally Langford;   · (1) COMPREHENSIVE METABOLIC PANEL; Status:Active; Requested for:29Viy2022;    Perform:[facility]; Due:07Ica5046; Last Updated By:Adonis Wisdom; 8/22/2016 4:24:17 PM;Ordered; For:Squamous cell carcinoma of right lung; Ordered By:Sally Langford;   · (1) COMPREHENSIVE METABOLIC PANEL; Status:Active; Requested for:31Oct2016;    Perform:[facility]; DPH:34CSG0699; Last Updated By:Adonis Wisdom; 8/22/2016 4:27:05 PM;Ordered; For:Squamous cell carcinoma of right lung; Ordered By:Sally Langford;   · (1) COMPREHENSIVE METABOLIC PANEL; Status:Complete; Requested for:Recurring  Schedule: 8/22/2016; 8/29/2016; 9/5/2016; 9/12/2016; 9/19/2016; 9/26/2016; 10/3/2016;  10/1   ;    Perform:[facility]; JXQ:93KYS0825;SPKKXHN; For:Squamous cell carcinoma of right lung; Ordered By:Anastasiya, Adam;    Discussion/Summary    In summary, this is a 54-year-old female history of 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 as outlined  We reviewed her PET scan results  She clearly has disease in the right upper lung extending to the mediastinum  Mediastinal lymph nodes have not been demonstrated malignant, however  Question of metastatic disease in the left adrenal gland is raised   The lesion in the right parotid most likely reflects a separate primary in that location  Her case was reviewed at lung tumor working group  Radiation therapy was felt to be unfavorable due to background pulmonary dysfunction and increased risk for further pulmonary restrictions  Chemotherapy is considered as an alternative  We reviewed that Abraxane and carboplatin could be offered to stabilize or improve her disease status  If this occurred survival benefit would be associated  We reviewed potential toxicities including but not limited to, nausea, vomiting, cytopenias, fatigue, allergic reaction, peripheral neuropathy, alopecia  We reviewed prophylactic measures taken routinely as well as monitoring to allow for early intervention is appropriate  Our chemotherapy nurse review the above information as well as giving the patient and her   Information in this regard  The patient and her  voiced understanding and agreement and wished to proceed as outlined  We made arrangements accordingly  She will need a Port-A-Cath, as her venous access is quite poor  The treatment plan was reviewed with the patient/guardian  The patient/guardian understands and agrees with the treatment plan   The patient was counseled regarding diagnostic results, instructions for management, patient and family education, impressions  total time of encounter was 40 minutes  Chief Complaint  Evaluation regarding lung cancer  History of Present Illness  June 2016- patient was found to have a thyroid mass on physical examination  Ultrasound showed bilateral thyroid nodules  In July 2016 she underwent CAT scan of the neck for evaluation of the carotid arteries  Incidentally noted, infiltrating mass in the right upper lobe extending to the hilum was noted  August 4, 2016-PET/CT showed a right upper lobe mass measuring 4 8 cm, SUV 21 5  This extends to the right hilum  Right paratracheal and subcarinal nodes measure up to 12 mm   The left adrenal showed some hypermetabolism with SUV of 3 7, without discrete mass  Uptake in the right parotid gland is noted with SUV of 22 3 and a soft tissue nodule, measuring 11 mm  Mediastinal endoscopy and bronchoscopy showed 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  Lymph node biopsies did not show malignancy  Review of Systems    Constitutional: recent 20 lbs over past 6-12 months  lb weight loss, but No fever, no chills, feels well, no tiredness, no recent weight gain or weight loss  Eyes: No complaints of eye pain, no red eyes, no eyesight problems, no discharge, no dry eyes, no itching of eyes  ENT: no complaints of earache, no loss of hearing, no nose bleeds, no nasal discharge, no sore throat, no hoarseness  Cardiovascular: No complaints of slow heart rate, no fast heart rate, no chest pain, no palpitations, no leg claudication, no lower extremity edema  Respiratory: shortness of breath and shortness of breath during exertion  Gastrointestinal: No complaints of abdominal pain, no constipation, no nausea or vomiting, no diarrhea, no bloody stools  Genitourinary: No complaints of dysuria, no incontinence, no pelvic pain, no dysmenorrhea, no vaginal discharge or bleeding  Musculoskeletal: No complaints of arthralgias, no myalgias, no joint swelling or stiffness, no limb pain or swelling  The patient presents with complaints of a rash (psoriasis  )  Neurological: No complaints of headache, no confusion, no convulsions, no numbness, no dizziness or fainting, no tingling, no limb weakness, no difficulty walking  Psychiatric: Not suicidal, no sleep disturbance, no anxiety or depression, no change in personality, no emotional problems  Endocrine: No complaints of proptosis, no hot flashes, no muscle weakness, no deepening of the voice, no feelings of weakness  Hematologic/Lymphatic: No complaints of swollen glands, no swollen glands in the neck, does not bleed easily, does not bruise easily  Active Problems    1   Abnormal CT of the chest (793 2) (R93 8)   2  Acute bronchitis (466 0) (J20 9)   3  Asymptomatic carotid artery stenosis (433 10) (I65 29)   4  Benign essential hypertension (401 1) (I10)   5  Bursitis, ischial (726 5) (M70 70)   6  Cardiomyopathy (425 4) (I42 9)   7  Cataract of right eye (366 9) (H26 9)   8  Chronic obstructive pulmonary disease (496) (J44 9)   9  Congestive heart failure (428 0) (I50 9)   10  COPD with acute exacerbation (491 21) (J44 1)   11  Depression (311) (F32 9)   12  DMII (diabetes mellitus, type 2) (250 00) (E11 9)   13  Dyspnea (786 09) (R06 00)   14  Fracture of multiple pubic rami (808 2) (S32 599A)   15  Fracture of pubic ramus (808 2) (S32 599A)   16  Hyperlipidemia (272 4) (E78 5)   17  Hypertension (401 9) (I10)   18  Left thyroid nodule (241 0) (E04 1)   19  Lung cancer, upper lobe (162 3) (C34 10)   20  Multiple thyroid nodules (241 1) (E04 2)   21  Need for immunization against influenza (V04 81) (Z23)   22  Orthopedic aftercare (V54 9) (Z47 89)   23  Osteoporosis (733 00) (M81 0)   24  Other chronic pain (338 29) (G89 29)   25  Pelvic pain (R10 2)   26  Pelvic pain in female (625 9) (R10 2)   27  Pre-operative cardiovascular examination (V72 81) (Z01 810)   28  Primary localized osteoarthrosis of the hip, unspecified laterality (715 15) (M16 10)   29  Psoriasis (696 1) (L40 9)   30  Pulmonary mass (786 6) (R91 8)   31  Pulmonary nodule (793 11) (R91 1)   32  PVD (peripheral vascular disease) (443 9) (I73 9)   33  Restless legs syndrome (333 94) (G25 81)   34  Sciatica (724 3) (M54 30)   35  Screening for genitourinary condition (V81 6) (Z13 89)   36  Screening for neurological condition (V80 09) (Z13 89)   37  Screening for osteoporosis (V82 81) (Z13 820)   38  Shortness of breath (786 05) (R06 02)   39  Strain of right hip adductor muscle (843 8) (S76 011A)   40  Trochanteric bursitis, unspecified laterality (726 5) (M70 60)   41   Type 2 diabetes mellitus (250 00) (E11 9)    Past Medical History    1  History of acute bronchitis (V12 69) (Z87 09)    Surgical History    1  History of Bypass Graft Using Vein: Aortic-Bifemoral   2  History of  Section   3  History of Cholecystectomy   4  History of Hysterectomy   5  History of Tonsillectomy    The surgical history was reviewed and updated today  Family History  Sister    1  Family history of malignant neoplasm (V16 9) (Z80 9)    The family history was reviewed and updated today  Social History    · Denied: Alcohol Use (History)   · Caffeine Use   · Current every day smoker (305 1) (F17 200)   · Denied: Drug use (305 90) (F19 90)  The social history was reviewed and updated today  Current Meds   1  Advair Diskus 250-50 MCG/DOSE Inhalation Aerosol Powder Breath Activated; USE 1   INHALATION TWICE A DAY RINSE MOUTH AFTER USE; Therapy: 75OFX4782 to (Evaluate:71Fuy9642)  Requested for: 69OPT4156; Last   Rx:61Wmg4480 Ordered   2  Albuterol Sulfate (2 5 MG/3ML) 0 083% Inhalation Nebulization Solution; inhale contents   of 1 vial in nebulizer four times a day if needed    Requested for: 25MDT8969; Last Rx:07Llc1915 Ordered   3  ALPRAZolam 0 25 MG Oral Tablet; TAKE 1 TABLET TWICE DAILY AS NEEDED; Therapy: (Gloria Yousif) to Recorded   4  Aspirin 81 MG Oral Tablet Delayed Release; TAKE 1 TABLET DAILY AS DIRECTED; Therapy: 86TQR6080 to (Evaluate:95Zuu7173)  Requested for: 59Ecg5502; Last   Rx:91Jql6819 Ordered   5  Azithromycin 250 MG Oral Tablet; One tab 3 times weekly; Therapy: (Layla West) to Recorded   6  Diflorasone Diacetate 0 05 % External Ointment; APPLY TO AFFECTED AREA TWICE A   DAY; Therapy: 99CTX6161 to (Evaluate:61Mwm5099)  Requested for: 68CPY4549; Last   Rx:2016 Ordered   7  Fluocinonide 0 05 % External Solution; APPLY SPARINGLY TO AFFECTED AREA(S)   TWICE DAILY; Last Rx:91Btj2231 Ordered   8  FreeStyle Lite Test In [employer]; Test 3 times daily;    Therapy: 05Kwi9750 to (Evaluate:66Rep0445) Requested for: 16Uyc8874; Last   Rx:71Aan3279 Ordered   9  GlipiZIDE ER 2 5 MG Oral Tablet Extended Release 24 Hour; TAKE 1 TABLET DAILY    Requested for: 58FGY6739; Last Rx:23Mar2016 Ordered   10  Ibandronate Sodium 150 MG Oral Tablet; TAKE ONE TABLET ONCE MONTHLY; Therapy: 03ODB8844 to (Last Rx:93Uzl2449)  Requested for: 23Riz6476 Ordered   11  Lyrica 100 MG Oral Capsule; TAKE 1 CAPSULE AT BEDTIME; Last Rx:49Bor3626    Ordered   12  Metoprolol Tartrate 100 MG Oral Tablet; take one tablet daily  Requested for: 21Mar2016;    Last Rx:21Mar2016 Ordered   13  Oxycodone-Acetaminophen 5-325 MG Oral Tablet; 1 q4h prn; Therapy: 26DCG7333 to (Evaluate:23Vvr6741)  Requested for: 63Xwy9273; Last    Rx:24Ysm1590 Ordered   14  Pazeo 0 7 % Ophthalmic Solution; Therapy: 46HBL6036 to Recorded   15  PredniSONE 20 MG Oral Tablet; Take one tablet twice a day for 4 days, then one tablet a    day for 4 days, then 1/2 tablet a day for 4 days, then stop; Therapy: 32QQI7661 to (Evaluate:29Dck8887)  Requested for: 22SQJ8569; Last    Rx:76Taj8002 Ordered   16  Simvastatin 80 MG Oral Tablet; Take 1 tablet daily; Therapy: 18YQR2236 to (Evaluate:49Rym9653)  Requested for: 21Mar2016; Last    Rx:21Mar2016 Ordered   17  Simvastatin 80 MG Oral Tablet; Therapy: (Mp Zazuetaet) to Recorded   18  Spiriva Respimat 2 5 MCG/ACT Inhalation Aerosol Solution; INHALE 2 PUFFS ONCE    DAILY; Therapy: 09UPB6336 to (Evaluate:02Eke5009)  Requested for: 64DSM6878; Last    Rx:21Hah0026 Ordered   19  TRENtal 400 MG TBCR; TAKE 1 TABLET 3 TIMES DAILY WITH FOOD; Therapy: (Joan Nguyen) to Recorded    The medication list was reviewed and updated today  Allergies    1   Penicillins    Vitals  Vital Signs    Recorded: 37IAP5355 12:49IH   Systolic 786   Diastolic 68   Heart Rate 47   Respiration 14   Temperature 97 3 F   Pain Scale 0   O2 Saturation 90   Height 5 ft 1 in   Weight 110 lb 2 08 oz   BMI Calculated 20 81   BSA Calculated 1 46     Physical Exam    Constitutional   General appearance: No acute distress, well appearing and well nourished  Eyes   Conjunctiva and lids: No swelling, erythema or discharge  Ears, Nose, Mouth, and Throat   External inspection of ears and nose: Normal     Oropharynx: Normal with no erythema, edema, exudate or lesions  Pulmonary   Auscultation of lungs: Abnormal   Auscultation of the lungs revealed decreased breath sounds diffusely  Cardiovascular   Auscultation of heart: Normal rate and rhythm, normal S1 and S2, without murmurs  Examination of extremities for edema and/or varicosities: Normal     Abdomen   Abdomen: Non-tender, no masses  Liver and spleen: No hepatomegaly or splenomegaly  Lymphatic   Palpation of lymph nodes in neck: No lymphadenopathy  Musculoskeletal   Gait and station: Normal     Skin   Skin and subcutaneous tissue: Normal without rashes or lesions  Neurologic   Cranial nerves: Cranial nerves 2-12 intact  Psychiatric   Orientation to person, place, and time: Normal          Results/Data  (1) TISSUE EXAM 10Aug2016 12:56PM Roselind Nip     Test Name Result Flag Reference   LAB AP CASE REPORT (Report)     Surgical Pathology Report             Case: [id number]                   Authorizing Provider: Puneet Mcleod MD      Collected:      08/10/2016 1256        Ordering Location:   [address] [place] [address]   Received:      08/12/2016 [address] [place] [address] Operating Room                            Pathologist:      Mireya Weber MD                              Specimen:  Bronchus, Endobronchial biopsy left superior segment   LAB AP FINAL DIAGNOSIS      A  Bronchus, left superior segment, edobronchial biopsy   - High grade squamous dysplasia/squamous cell carcinoma in situ    - No definitive invasion identified          Electronically signed by Mireya Weber MD on 8/17/2016 at 10:42 AM   LAB AP NOTE      The atypical cells are positive for p40 and CK5/6 but negative forf TTF-1   and Napsin-A on immunostaining helping support the above diagnosis  Appropriate positive controls were reviewed  Intradepartmental   consultation is in agreement  LAB AP SURGICAL ADDITIONAL INFORMATION (Report)     These tests were developed and their performance characteristics   determined by [facility] or [address] [place] [address]  They may not be cleared or approved by the [place]  Food and   Drug Administration  The FDA has determined that such clearance or   approval is not necessary  These tests are used for clinical purposes  They should not be regarded as investigational or for research  This   laboratory has been approved by [place] [address], designated as a high-complexity   laboratory and is qualified to perform these tests  - Interpretation performed at [facility] [place] [facility]   LAB AP GROSS DESCRIPTION (Report)     A  The specimen is received in formalin, labeled with the patient's name   and hospital number, and is designated endobronchial biopsy left superior   segment  The specimen consists of 3 tan to pink soft tissue fragments   ranging in greatest dimension from less than 0 1 to 0 5 centimeters  Entirely submitted  One cassette  Note: The estimated total formalin fixation time based upon information   provided by the submitting clinician and the standard processing schedule   is 49 0 hours      BMV     (1) FINE NEEDLE ASPIRATION 06Viy4252 12:06PM Kristel Mendoza     Test Name Result Flag Reference   LAB AP CASE REPORT (Report)     Non-gynecologic Cytology             Case: [id number]                  Authorizing Provider: Gabriela Marin MD      Collected:      08/10/2016 1206        Ordering Location:   [address] [facility]   Received:      08/10/2016 [address] Room                            Pathologist:      Misty Ingram MD                              Specimens:  A) - Mass, Right hilar B) - Mass, Right hilar                                         C) - Mass, Right hilar                                         D) - Lymph Node, LEVEL 7                                        E) - Lymph Node, LEVEL 7                                        F) - Lymph Node, 4L                                          G) - Lymph Node, 4L                                          H) - Lymph Node, 4R                                          I) - Lymph Node, 4R   LAB AP CYTO FINAL DIAGNOSIS (Report)     A, B, C  Mass, Right hilar: (Thin prep, smear and cell block preparations)  Conclusive evidence of Malignancy  Non-small cell carcinoma consistent with squamous cell carcinoma  Satisfactory for evaluation  D-E  Lymph Node, LEVEL 7: (Thin prep and cell block preparations)  No malignant cells identified  Mixed lymphocytes and neutrophils  Rare bronchial cells  Satisfactory for evaluation  F - G  Lymph Node, 4L: (Thin prep and cell block preparations)  Atypical cellular changes seen  Rare groups of atypical epithelioid cells  Unremarkable bronchial cells  Mixed lymphocytes and neutrophils  Satisfactory for evaluation  H - I  Lymph Node, 4R: (Thin prep and cell block preparations)  No malignant cells identified  Mixed lymphocytes and neutrophils  Unremarkable bronchial cells  Electronically signed by Jaden Black MD on 8/18/2016 at 10:05 AM   LAB AP NONGYN NOTE      On immunostaining, with appropriate positive controls, the tumor cells are   positive for p40 and CK 5/6  They are negative for TTF-1  These results   help support the above classification of this tumor  LAB AP INTRAOPERATIVE CONSULTATION      Mass, Right hilar - FNAB on-site evaluation: Positive for malignancy,   favor non-small cell carcinoma  Dr Stan Nielsen   LAB AP GROSS DESCRIPTION (Report)     A  Mass, Right hilar: 35ml, red, received in CytoLyt*    B   Mass, Right hilar: 6 slides received: 3 Diff-quik and 3 alcohol fixed    C  Mass, Right hilar, cell block:    D  Lymph Node, LEVEL 7: 25ml, red, received in CytoLyt*    E  Lymph Node, LEVEL 7, cell block:     F  Lymph Node, 4L: 23ml, red, received in CytoLyt*    G  Lymph Node, 4L, cell block:    H  Lymph Node, 4R: 25ml, light pink, received in CytoLyt*    I  Lymph Node, 4R, cell block:   LAB AP NONGYN ADDITIONAL INFORMATION (Report)     "[employer]'s FDA approved ,  and ThinPrep Imaging System are   utilized with strict adherence to the 's instruction manual to   prepare gynecologic and non-gynecologic cytology specimens for the   production of ThinPrep slides as well as for gynecologic ThinPrep imaging  These processes have been validated by our laboratory and/or by the     These tests were developed and their performance characteristics   determined by [facility] or [address] [place] [address]  They may not be cleared or approved by the [place]  Food and   Drug Administration  The FDA has determined that such clearance or   approval is not necessary  These tests are used for clinical purposes  They should not be regarded as investigational or for research  This   laboratory has been approved by [place] [address], designated as a high-complexity   laboratory and is qualified to perform these tests  - Interpretation performed at [address] 12:28PM Thana Duverney Order Number: [id number]    - Patient Instructions: To schedule this appointment, please contact Central Scheduling at [phone]      Order Number: [id number]    - Patient Instructions: 1826 [facility] 7/30/16 1:00PM   NOTHING TO EAT 3 HOURS PRIOR TO TEST, OK TO DRINK CLEAR LIQUIDS     Test Name Result Flag Reference   CT CHEST W CONTRAST (Report)     CT CHEST WITH IV CONTRAST     INDICATION: Right hilar mass identified on recent neck CT angiogram  Carotid stenosis  COMPARISON: CT angiogram performed July 21, 2016  TECHNIQUE: CT examination of the chest was performed  85 mL of Omnipaque 350 was injected intravenously  Axial, sagittal and coronal reformatted images were submitted for interpretation  Coronal thick section MIP (maximal intensity projection) images    were also created  This examination, like all CT scans performed in the [facility], was performed utilizing techniques to minimize radiation dose exposure, including the use of iterative reconstruction and automated exposure control  FINDINGS:     LUNGS: There is a spiculated right hilar and suprahilar mass measuring 4 2 x 2 8 cm in transverse dimensions  This mass encases and slightly narrows right upper lobe bronchus as demonstrated on images 601/75 and 603/57  Groundglass parenchymal density   in the right upper lobe peripheral to this mass system with obstructive atelectasis or developing postobstructive right upper lobe pneumonia, and findings are unchanged from July 21, 2016 examination  Lungs are otherwise clear  PLEURA: Unremarkable  HEART/GREAT VESSELS: Heart is unremarkable  There is marked narrowing of posterior right upper lobe pulmonary vein related to right hilar/suprahilar tumor  MEDIASTINUM AND CARLO: Unremarkable  CHEST WALL AND LOWER NECK: Bilateral thyroid nodules are noted, largest in the posterior mid to lower pole the left thyroid lobe measuring 2 2 x 1 2 cm  VISUALIZED STRUCTURES IN THE UPPER ABDOMEN: Extensive atherosclerotic changes associated with arterial vasculature in the upper abdomen  There is atherosclerotic occlusion of the aorta below the level of the renal arteries  OSSEOUS STRUCTURES: No acute fracture  No destructive osseous lesion  Intramuscular lipoma in the left latissimus dorsi musculature measures approximately 9 cm in greatest dimension         IMPRESSION:     Spiculated left hilar and suprahilar mass consistent with bronchogenic malignancy  Encasement and slight narrowing of right upper lobe bronchus resulting in postobstructive atelectasis and, in the appropriate clinical setting, possibly postobstructive    pneumonia  Extensive atherosclerosis and apparent occlusion of infrarenal abdominal aorta  Recently discovered thyroid nodules, largest measuring up to 2 2 cm  According to guidelines published in the February 2015 white paper on this topic in the Journal of the [employer] of Radiology Daniel Shah), further evaluation with ultrasound may be    considered  However, as the majority of incidental thyroid nodules are benign and because small incidental thyroid malignancies typically demonstrate indolent behavior, consideration of the patient's life expectancy and possible comorbidities should be    taken into account prior to a decision to pursue further imaging  ##sigslh##sigslh       Workstation performed: [id number]     Signed by:   Gay Denver, MD   8/1/16     Future Appointments    Date/Time Provider Specialty Site   01/05/2017 08:30 AM SANTIAGO Mccarthy   Family Medicine [place] [address]   Electronically signed by : SANTIAGO Cuevas DOM D ,DO; Aug 22 2016  5:14PM EST                       (Author)

## 2018-01-13 VITALS
SYSTOLIC BLOOD PRESSURE: 128 MMHG | HEART RATE: 73 BPM | OXYGEN SATURATION: 97 % | WEIGHT: 110.25 LBS | TEMPERATURE: 97.4 F | HEIGHT: 61 IN | RESPIRATION RATE: 17 BRPM | BODY MASS INDEX: 20.82 KG/M2 | DIASTOLIC BLOOD PRESSURE: 68 MMHG

## 2018-01-13 VITALS
SYSTOLIC BLOOD PRESSURE: 110 MMHG | BODY MASS INDEX: 20.39 KG/M2 | DIASTOLIC BLOOD PRESSURE: 50 MMHG | TEMPERATURE: 97.7 F | WEIGHT: 108 LBS | HEIGHT: 61 IN

## 2018-01-13 VITALS
DIASTOLIC BLOOD PRESSURE: 60 MMHG | BODY MASS INDEX: 20.01 KG/M2 | TEMPERATURE: 96.9 F | HEIGHT: 61 IN | OXYGEN SATURATION: 96 % | HEART RATE: 65 BPM | WEIGHT: 106 LBS | RESPIRATION RATE: 16 BRPM | SYSTOLIC BLOOD PRESSURE: 130 MMHG

## 2018-01-14 VITALS
RESPIRATION RATE: 16 BRPM | BODY MASS INDEX: 20.39 KG/M2 | OXYGEN SATURATION: 98 % | DIASTOLIC BLOOD PRESSURE: 70 MMHG | WEIGHT: 108 LBS | SYSTOLIC BLOOD PRESSURE: 120 MMHG | TEMPERATURE: 96 F | HEART RATE: 70 BPM | HEIGHT: 61 IN

## 2018-01-14 VITALS
RESPIRATION RATE: 18 BRPM | TEMPERATURE: 97.4 F | OXYGEN SATURATION: 97 % | WEIGHT: 111 LBS | DIASTOLIC BLOOD PRESSURE: 58 MMHG | HEIGHT: 61 IN | SYSTOLIC BLOOD PRESSURE: 118 MMHG | HEART RATE: 62 BPM | BODY MASS INDEX: 20.96 KG/M2

## 2018-01-14 VITALS
BODY MASS INDEX: 20.62 KG/M2 | TEMPERATURE: 97.5 F | RESPIRATION RATE: 16 BRPM | OXYGEN SATURATION: 93 % | WEIGHT: 109.25 LBS | DIASTOLIC BLOOD PRESSURE: 74 MMHG | HEART RATE: 68 BPM | SYSTOLIC BLOOD PRESSURE: 120 MMHG | HEIGHT: 61 IN

## 2018-01-14 VITALS
WEIGHT: 111 LBS | HEART RATE: 76 BPM | BODY MASS INDEX: 20.96 KG/M2 | HEIGHT: 61 IN | DIASTOLIC BLOOD PRESSURE: 60 MMHG | RESPIRATION RATE: 20 BRPM | OXYGEN SATURATION: 91 % | SYSTOLIC BLOOD PRESSURE: 142 MMHG | TEMPERATURE: 97.1 F

## 2018-01-14 VITALS
TEMPERATURE: 96.7 F | HEIGHT: 61 IN | BODY MASS INDEX: 20.08 KG/M2 | WEIGHT: 106.38 LBS | DIASTOLIC BLOOD PRESSURE: 66 MMHG | OXYGEN SATURATION: 97 % | RESPIRATION RATE: 18 BRPM | HEART RATE: 59 BPM | SYSTOLIC BLOOD PRESSURE: 130 MMHG

## 2018-01-14 VITALS
TEMPERATURE: 97.6 F | BODY MASS INDEX: 20.84 KG/M2 | WEIGHT: 110.38 LBS | HEART RATE: 72 BPM | OXYGEN SATURATION: 93 % | SYSTOLIC BLOOD PRESSURE: 142 MMHG | RESPIRATION RATE: 18 BRPM | HEIGHT: 61 IN | DIASTOLIC BLOOD PRESSURE: 64 MMHG

## 2018-01-14 VITALS
HEART RATE: 61 BPM | WEIGHT: 111 LBS | DIASTOLIC BLOOD PRESSURE: 80 MMHG | RESPIRATION RATE: 18 BRPM | HEIGHT: 61 IN | OXYGEN SATURATION: 97 % | TEMPERATURE: 97 F | BODY MASS INDEX: 20.96 KG/M2 | SYSTOLIC BLOOD PRESSURE: 130 MMHG

## 2018-01-14 VITALS
BODY MASS INDEX: 21.17 KG/M2 | HEART RATE: 74 BPM | HEIGHT: 61 IN | RESPIRATION RATE: 18 BRPM | DIASTOLIC BLOOD PRESSURE: 74 MMHG | WEIGHT: 112.13 LBS | TEMPERATURE: 97.9 F | SYSTOLIC BLOOD PRESSURE: 132 MMHG | OXYGEN SATURATION: 97 %

## 2018-01-14 VITALS
OXYGEN SATURATION: 93 % | HEART RATE: 63 BPM | WEIGHT: 111.38 LBS | TEMPERATURE: 97.2 F | DIASTOLIC BLOOD PRESSURE: 78 MMHG | BODY MASS INDEX: 21.87 KG/M2 | HEIGHT: 60 IN | RESPIRATION RATE: 16 BRPM | SYSTOLIC BLOOD PRESSURE: 118 MMHG

## 2018-01-14 NOTE — PROGRESS NOTES
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)    Plan  Squamous cell carcinoma of right lung    · Drink plenty of fluids ; Status:Complete;   Done: 22WYN3482   Ordered; For:Squamous cell carcinoma of right lung; Ordered By:Dawson Langford;   · (1) CBC/PLT/DIFF; Status:Active; Requested for:01May2017;    Perform:[facility]; HYB:[id number]; Ordered; For:Squamous cell carcinoma of right lung; Ordered By:Dawson Langford;   · (1) COMPREHENSIVE METABOLIC PANEL; Status:Active; Requested for:01May2017;    Perform:[facility]; USW:27NMQ7262; Ordered; For:Squamous cell carcinoma of right lung; Ordered By:Dawson Langford;   · * CT CHEST ABDOMEN PELVIS W CONTRAST; Status:Hold For - Scheduling; Requested  for:01May2017;    Perform:[facility]; VSC:45GHJ2967; Ordered; For:Squamous cell carcinoma of right lung; Ordered By:Dawson Langford;   · CTA NECK W WO CONTRAST; Status:Hold For - Scheduling; Requested for:01May2017;    Perform:[facility]; Order Comments:right parotid ; IDH:[id number]; Ordered; For:Squamous cell carcinoma of right lung; Ordered By:Dawson Langford;   · Follow-up visit in 4 Months Evaluation and Treatment  Follow-up  Status: Complete   Done: 06RDT0223 08:45AM   Ordered; For: Squamous cell carcinoma of right lung; Ordered By: Marlene Falk Performed:  Due: 29ICE1162; Last Updated By: Dustin Thompson; 1/5/2017 10:28:43 AM    Discussion/Summary  Discussion Summary:   In summary, this is a 65-year-old female history of squamous cell carcinoma of the lung as outlined  Recent CAT scan shows essential resolution of her previous right-sided hilar mass as well as mediastinal adenopathy  No new disease is noted elsewhere  Previously noted thyroid nodules are stable  There is some groundglass opacity in the peripheral lungs bilaterally  This is more likely inflammatory or infectious than malignant    I reviewed the above findings and their significance with the patient and her   We had previously considered her for research trial investigating the potential role of immunotherapy in the maintenance setting  She is ineligible for the trial, predominantly due to staging reasons  The trial aims for patients with stage IIIB/for disease  I believe she has a stage II/IIIa situation  Additionally, we reviewed findings on her previous PET scan including thyroid nodules, which have remained stable  I would say these are likely benign and observation remains appropriate  Additionally, she had a 1 5 cm nodule in the right parotid with hypermetabolism  This could represent a benign process or possibly parotid adenocarcinoma  The patient and her  prefer observation  I think that is reasonable  We extended her CAT scan to cover this area as well  I'll see her back in 4 months for review  The patient and her  voiced understanding and agreement  Counseling Documentation With Imm: The patient was counseled regarding diagnostic results, instructions for management, patient and family education, impressions  total time of encounter was 40 minutes  Chief Complaint  Chief Complaint Free Text Note Form: Follow-up regarding lung cancer  History of Present Illness  HPI: June 2016- patient was found to have a thyroid mass on physical examination  Ultrasound showed bilateral thyroid nodules  In July 2016 she underwent CAT scan of the neck for evaluation of the carotid arteries  Incidentally noted, infiltrating mass in the right upper lobe extending to the hilum was noted  August 4, 2016-PET/CT showed a right upper lobe mass measuring 4 8 cm, SUV 21 5  This extends to the right hilum  Right paratracheal and subcarinal nodes measure up to 12 mm  The left adrenal showed some hypermetabolism with SUV of 3 7, without discrete mass  Uptake in the right parotid gland is noted with SUV of 22 3 and a soft tissue nodule, measuring 11 mm    Mediastinal endoscopy and bronchoscopy showed 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  Lymph node biopsies did not show malignancy  Surgery and radiation therapy were not felt to be applicable  Due to background pulmonary dysfunction as well as the potential for left adrenal metastatic disease  Chemotherapy was chosen as primary therapy  Alternatively  September 6, 2016-started Abraxane 80 mg/m^2 day 1, 8, 15, carbo AUC-5, day 1 only, every 21 days  Current Therapy: September 6, 2016-started Abraxane 80 mg/m^2 day 1, 8, 15, carbo AUC-5, day 1 only, every 21 days  Interval History: "super tired "      Review of Systems  Complete-Female:   Constitutional: No fever, no chills, feels well, no tiredness, no recent weight gain or weight loss and no recent weight loss  Eyes: No complaints of eye pain, no red eyes, no eyesight problems, no discharge, no dry eyes, no itching of eyes  ENT: no complaints of earache, no loss of hearing, no nose bleeds, no nasal discharge, no sore throat, no hoarseness  Cardiovascular: No complaints of slow heart rate, no fast heart rate, no chest pain, no palpitations, no leg claudication, no lower extremity edema  Respiratory: shortness of breath, shortness of breath during exertion and Less SOB, able to do more  Gastrointestinal: No complaints of abdominal pain, no constipation, no nausea or vomiting, no diarrhea, no bloody stools  Genitourinary: No complaints of dysuria, no incontinence, no pelvic pain, no dysmenorrhea, no vaginal discharge or bleeding  Musculoskeletal: No complaints of arthralgias, no myalgias, no joint swelling or stiffness, no limb pain or swelling  The patient presents with complaints of a rash (psoriasis  )  Neurological: No complaints of headache, no confusion, no convulsions, no numbness, no dizziness or fainting, no tingling, no limb weakness, no difficulty walking     Psychiatric: Not suicidal, no sleep disturbance, no anxiety or depression, no change in personality, no emotional problems  Endocrine: No complaints of proptosis, no hot flashes, no muscle weakness, no deepening of the voice, no feelings of weakness  Hematologic/Lymphatic: No complaints of swollen glands, no swollen glands in the neck, does not bleed easily, does not bruise easily  Active Problems    1  Abnormal CT of the chest (793 2) (R93 8)   2  Acute bronchitis (466 0) (J20 9)   3  Asymptomatic carotid artery stenosis (433 10) (I65 29)   4  Benign essential hypertension (401 1) (I10)   5  Bursitis, ischial (726 5) (M70 70)   6  Cardiomyopathy (425 4) (I42 9)   7  Cataract of right eye (366 9) (H26 9)   8  Chemotherapy-induced nausea (787 02,E933 1) (R11 0,T45  1X5A)   9  Chronic obstructive pulmonary disease (496) (J44 9)   10  Congestive heart failure (428 0) (I50 9)   11  COPD with acute exacerbation (491 21) (J44 1)   12  Depression (311) (F32 9)   13  DMII (diabetes mellitus, type 2) (250 00) (E11 9)   14  Dyspnea (786 09) (R06 00)   15  Fatigue (780 79) (R53 83)   16  Fracture of multiple pubic rami (808 2) (S32 599A)   17  Fracture of pubic ramus (808 2) (S32 599A)   18  Hyperlipidemia (272 4) (E78 5)   19  Hypertension (401 9) (I10)   20  Lung cancer, upper lobe (162 3) (C34 10)   21  Multiple thyroid nodules (241 1) (E04 2)   22  Need for immunization against influenza (V04 81) (Z23)   23  Nodule of left lobe of thyroid gland (241 0) (E04 1)   24  Orthopedic aftercare (V54 9) (Z47 89)   25  Osteoporosis (733 00) (M81 0)   26  Other chronic pain (338 29) (G89 29)   27  Parotid adenoma (210 2) (D11 0)   28  Pelvic pain (R10 2)   29  Pelvic pain in female (625 9) (R10 2)   30  Pre-operative cardiovascular examination (V72 81) (Z01 810)   31  Primary localized osteoarthrosis of the hip, unspecified laterality (715 15) (M16 10)   32  Psoriasis (696 1) (L40 9)   33  Pulmonary mass (786 6) (R91 8)   34  Pulmonary nodule (793 11) (R91 1)   35  PVD (peripheral vascular disease) (443 9) (I73 9)   36  Restless legs syndrome (333 94) (G25 81)   37  Sciatica (724 3) (M54 30)   38  Screening for genitourinary condition (V81 6) (Z13 89)   39  Screening for neurological condition (V80 09) (Z13 89)   40  Screening for osteoporosis (V82 81) (Z13 820)   41  Shortness of breath (786 05) (R06 02)   42  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)   43  Strain of right hip adductor muscle (843 8) (S76 011A)   44  Trochanteric bursitis, unspecified laterality (726 5) (M70 60)   45  Type 2 diabetes mellitus (250 00) (E11 9)   46  Wheezing (786 07) (R06 2)    Past Medical History    1  History of acute bronchitis (V12 69) (Z87 09)    Surgical History    1  History of Bypass Graft Using Vein: Aortic-Bifemoral   2  History of  Section   3  History of Cholecystectomy   4  History of Hysterectomy   5  History of Tonsillectomy    Family History  Mother    1  Family history of thyroid disease (V18 19) (Z83 49)  Sister    2  Family history of malignant neoplasm (V16 9) (Z80 9)    Social History    · Denied: Alcohol Use (History)   · Caffeine Use   · Current every day smoker (305 1) (F17 200)   · Denied: Drug use (305 90) (F19 90)    Current Meds   1  Advair Diskus 250-50 MCG/DOSE Inhalation Aerosol Powder Breath Activated; USE 1   INHALATION TWICE A DAY RINSE MOUTH AFTER USE; Therapy: 40OSX3113 to (Evaluate:67Ahx5376)  Requested for: 00PGG4380; Last   Rx:34Wmm5923 Ordered   2  Albuterol Sulfate (2 5 MG/3ML) 0 083% Inhalation Nebulization Solution; inhale contents   of 1 vial in nebulizer four times a day if needed; Therapy: 37NVL8982 to (Lorenso Canavan)  Requested for: 69WXB4162; Last   Rx:00Yhh6487 Ordered   3  FreeStyle Lite Test In [employer]; Test 3 times daily; Therapy: 52Vzj7238 to (Evaluate:01Hgy9851)  Requested for: 87Lrn6569; Last   Rx:01Jrv9657 Ordered   4  Ibandronate Sodium 150 MG Oral Tablet; TAKE ONE TABLET ONCE MONTHLY;    Therapy: 95QJG7044 to (Last Rx:91Dsm1488)  Requested for: 75CRN4703 Ordered   5  Lyrica 100 MG Oral Capsule; TAKE 1 CAPSULE AT BEDTIME; Last Rx:74Kdl6875   Ordered   6  Metoprolol Tartrate 100 MG Oral Tablet; take one tablet daily  Requested for: 66LIR4610;   Last Rx:91Vzk7729 Ordered   7  Oxycodone-Acetaminophen 5-325 MG Oral Tablet; 1 q4h prn; Therapy: 59LSP6387 to (Rosana Miller)  Requested for: 52AMD6625; Last   VE:65IBD7629 Ordered   8  Simvastatin 80 MG Oral Tablet; Take 1 tablet daily; Therapy: 39XNC6232 to (Evaluate:27Mar2017)  Requested for: 85Fwo1278; Last   Rx:18Jvk9482 Ordered   9  Simvastatin 80 MG Oral Tablet; Therapy: (Mike Mccollum) to Recorded   10  Spiriva Respimat 2 5 MCG/ACT Inhalation Aerosol Solution; INHALE 2 PUFFS ONCE    DAILY; Therapy: 69PJP6136 to (Linda Santos)  Requested for: 52JCR6881; Last    Rx:24Zvf7819 Ordered   11  TRENtal 400 MG TBCR; TAKE 1 TABLET 3 TIMES DAILY WITH FOOD; Therapy: (Recorded:53Pvt3348) to Recorded    Allergies    1  Penicillins    Vitals  Vital Signs    Recorded: 96FAM3885 09:46AM   Temperature 96 7 F   Heart Rate 59   Respiration 18   Systolic 955   Diastolic 66   Height 5 ft 1 in   Weight 106 lb 6 oz   BMI Calculated 20 1   BSA Calculated 1 44   O2 Saturation 97     Physical Exam    Constitutional   General appearance: No acute distress, well appearing and well nourished  Eyes   Conjunctiva and lids: No swelling, erythema or discharge  Ears, Nose, Mouth, and Throat   External inspection of ears and nose: Normal     Oropharynx: Normal with no erythema, edema, exudate or lesions  Pulmonary   Auscultation of lungs: Abnormal   Auscultation of the lungs revealed decreased breath sounds diffusely  Cardiovascular   Auscultation of heart: Normal rate and rhythm, normal S1 and S2, without murmurs  Examination of extremities for edema and/or varicosities: Normal     Abdomen   Abdomen: Non-tender, no masses  Liver and spleen: No hepatomegaly or splenomegaly      Lymphatic   Palpation of lymph nodes in neck: No lymphadenopathy  Musculoskeletal   Gait and station: Normal     Skin   Skin and subcutaneous tissue: Normal without rashes or lesions  Neurologic   Cranial nerves: Cranial nerves 2-12 intact  Psychiatric   Orientation to person, place, and time: Normal          Results/Data  * CT CHEST ABDOMEN PELVIS W CONTRAST 09AEX2255 10:01AM Danilo Cooely Order Number: [id number]   Performing Comments: Edy Martinez   - Patient Instructions: To schedule this appointment, please contact Central Scheduling at [phone]  Test Name Result Flag Reference   CT CHEST ABDOMEN PELVIS W CONTRAST (Report)     CT CHEST, ABDOMEN AND PELVIS WITH IV CONTRAST     INDICATION: Lung carcinoma status post chemotherapy     COMPARISON: 7/30/2016     TECHNIQUE: CT examination of the chest, abdomen and pelvis was performed  Axial, sagittal and coronal reformatted projections were created  This examination, like all CT scans performed in the [facility], was performed utilizing    techniques to minimize radiation dose exposure, including the use of iterative reconstruction and automated exposure control  IV Contrast: iohexol (OMNIPAQUE) 350 MG/ML injection (MULTI-DOSE) 100 mL Note: (SINGLE DOSE/MULTI DOSE) information refers to the container from which the contrast was acquired  Contrast was injected one time intravenously without immediate    complication  Enteric Contrast: Enteric contrast was administered  FINDINGS:     CHEST     LUNGS: There are multiple small scattered groundglass opacities which have developed in the lungs since the prior diagnostic CT of 7/30/2016  Since the prior PET scan, interval progression has occurred  Examples include the lateral peripheral right    upper lobe on image 3/20, posterior left upper lobe on image 19, subpleural left lower lobe on image 3/34  Many of the opacities are peripheral and subpleural in nature   Previously seen right hilar/suprahilar mass measuring 4 2 x 2 8 cm size has    demonstrated significant response to therapy  The pulmonary mass is no longer visible  PLEURA: No effusion     HEART/GREAT VESSELS: Coronary artery calcification, no pericardial effusion  MEDIASTINUM AND CARLO: There is a right hilar node on coronal image 76 measuring 1 7 x 0 8 cm  Earlier this measured at least 3 1 x 2 4 cm  An anterior mediastinal node measuring 10 x 5 mm is unchanged from 7/30/2016  Precarinal no short axis diameter   is 8 mm, on prior CT 6 mm  CHEST WALL AND LOWER NECK: Large left lateral chest wall lipoma  Bilateral thyroid nodules appearing similar     ABDOMEN     LIVER/BILIARY TREE: Unremarkable  GALLBLADDER: Gallbladder is surgically absent  SPLEEN: Unremarkable  PANCREAS: Unremarkable  ADRENAL GLANDS: There is bilateral adrenal gland nodular thickening left greater than right  This appears similar to the prior exams     KIDNEYS/URETERS: Multiple bilateral renal cysts  No interval change  STOMACH AND BOWEL: Diverticulosis  No acute finding     APPENDIX: No findings to suggest appendicitis  ABDOMINOPELVIC CAVITY: No ascites or free intraperitoneal air  No lymphadenopathy  VESSELS: Extensive atherosclerotic changes  Potentially occluded infrarenal abdominal aorta as on prior     PELVIS     REPRODUCTIVE ORGANS: Unremarkable for patient's age  URINARY BLADDER: Unremarkable  ABDOMINAL WALL/INGUINAL REGIONS: Unremarkable  OSSEOUS STRUCTURES: No acute fracture or destructive osseous lesion  IMPRESSION:       1  Increased predominantly peripheral small ill-defined groundglass opacities in the lungs, most suggestive of an inflammatory or infectious process  This could be related to hypersensitivity pneumonitis  Reassessment in 3 months time recommended  2  The right perihilar mass is no longer visible  Significant diminished size of previously seen right hilar adenopathy   Precarinal lymph node with otherwise stable mediastinal lymph nodes  Again this can be reassessed on follow-up imaging  3  Stable appearance of bilateral thyroid nodules   4  No findings to suggest metastatic disease in the abdomen or pelvis       Workstation performed: [id number]     Signed by:   Jacquelyn Mendosa MD   1/3/17     Health Management  Chronic obstructive pulmonary disease   Complete PFT; every 1 year; Next Due: 26UQW4525; Overdue    Future Appointments    Date/Time Provider Specialty Site   04/04/2017 09:00 AM SANTIAGO Cowan   [facility]     Signatures   Electronically signed by : SANTIAGO Rivero DOM D ,DO; Jan 5 2017 10:31AM EST                       (Author)

## 2018-01-15 NOTE — RESULT NOTES
Message   Please tell Kailee Pedersens her A1c was 5 8 which is great and her TSH was within range  Follow-up of these routinely  Verified Results  (Q) TSH, 3RD GENERATION 67WEY3020 12:00AM Radha Em     Test Name Result Flag Reference   TSH 3 15 mIU/L  0 40-4 50     (Q) HEMOGLOBIN A1c 27Jun2016 12:00AM Radha Em     Test Name Result Flag Reference   HEMOGLOBIN A1c 5 8 % of total Hgb H <5 7   According to ADA guidelines, hemoglobin A1c <7 0%  represents optimal control in non-pregnant diabetic  patients  Different metrics may apply to specific  patient populations  Standards of Medical Care in    Diabetes Care  2013;36:s11-s66     For the purpose of screening for the presence of  diabetes  <5 7%       Consistent with the absence of diabetes  5 7-6 4%    Consistent with increased risk for diabetes              (prediabetes)  >or=6 5%    Consistent with diabetes     This assay result is consistent with an increased risk  of diabetes  Currently, no consensus exists for use of hemoglobin  A1c for diagnosis of diabetes for children

## 2018-01-16 ENCOUNTER — GENERIC CONVERSION - ENCOUNTER (OUTPATIENT)
Dept: OTHER | Facility: OTHER | Age: 78
End: 2018-01-16

## 2018-01-17 NOTE — MISCELLANEOUS
Message  I spoke to the patient's  on the phone today  The patient had a CTA of her carotid arteries  The study also showed a right perihilar mass  She had a chest x-ray done several months ago that did not show any mass  I have scheduled the patient for a PET scan and I will see her in the office after that  Very suspicious for this being lung cancer        Plan  Abnormal CT of the chest    · * NM PET CT SKULL BASE TO MID THIGH; Status:Hold For - Scheduling; Requested  for:53Hzc3289;     Signatures   Electronically signed by : SANTIAGO Church ; Jul 25 2016 11:45AM EST                       (Author)

## 2018-01-17 NOTE — PROGRESS NOTES
Assessment    1  Lung cancer, upper lobe (162 3) (C34 10)   2  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)    Plan  Squamous cell carcinoma of right lung    · Drink plenty of fluids ; Status:Complete;   Done: 20Oct2016   Ordered; For:Squamous cell carcinoma of right lung; Ordered By:Kat Langford;   · Follow-up visit in 3 weeks Evaluation and Treatment  Follow-up  Status: Hold For -  Scheduling  Requested for: 51TRK2378   Ordered; For: Squamous cell carcinoma of right lung; Ordered By: Teresa Mesa Performed:  Due: 04NDO9432   · * NM PET CT SKULL BASE TO MID THIGH; Status:Hold For - Scheduling; Requested  for:01Nov2016;    Perform:[facility]iology; VZB:83KDP2457;OOATFPX; For:Squamous cell carcinoma of right lung; Ordered By:Kat Langford;    Discussion/Summary  Discussion Summary:   In summary, this is a 65-year-old female history of squamous cell carcinoma  The lung as outlined  She is currently on treatment with Abraxane and carboplatin  She tolerates this fairly well  Her main limitations related to COPD and arthritic problems in the bilateral legs  Appetite is good  Weight is stable  Blood counts show mild pancytopenia, treatable to chemotherapy  This is without clinical impact  We reviewed a plan moving forward including continue chemotherapy with PET scan in the next 1-2 weeks  I'll see her back thereafter to review the results and make further recommendations  She may be eligible for research trial investigating observation versus immunotherapy after chemotherapy, provided that her disease is stable or improving  I reviewed the above with the patient and her   They voiced understanding and agreement  Understands and agrees with treatment plan: The treatment plan was reviewed with the patient/guardian   The patient/guardian understands and agrees with the treatment plan   Counseling Documentation With Imm: The patient, patient's family was counseled regarding diagnostic results, instructions for management, patient and family education, impressions  total time of encounter was 25 minutes  Chief Complaint  Chief Complaint Free Text Note Form: Evaluation regarding lung cancer  History of Present Illness  HPI: June 2016- patient was found to have a thyroid mass on physical examination  Ultrasound showed bilateral thyroid nodules  In July 2016 she underwent CAT scan of the neck for evaluation of the carotid arteries  Incidentally noted, infiltrating mass in the right upper lobe extending to the hilum was noted  August 4, 2016-PET/CT showed a right upper lobe mass measuring 4 8 cm, SUV 21 5  This extends to the right hilum  Right paratracheal and subcarinal nodes measure up to 12 mm  The left adrenal showed some hypermetabolism with SUV of 3 7, without discrete mass  Uptake in the right parotid gland is noted with SUV of 22 3 and a soft tissue nodule, measuring 11 mm  Mediastinal endoscopy and bronchoscopy showed 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  Lymph node biopsies did not show malignancy  Surgery and radiation therapy were not felt to be applicable  Due to background pulmonary dysfunction as well as the potential for left adrenal metastatic disease  Chemotherapy was chosen as primary therapy  Alternatively  September 6, 2016-started Abraxane 80 mg/m^2 day 1, 8, 15, carbo AUC-5, day 1 only, every 21 days  Current Therapy: September 6, 2016-started Abraxane 80 mg/m^2 day 1, 8, 15, carbo AUC-5, day 1 only, every 21 days  Interval History: No pain  No complaints  Review of Systems  Complete-Female:   Constitutional: recent 20 lbs over past 6-12 months  lb weight loss, but No fever, no chills, feels well, no tiredness, no recent weight gain or weight loss  Eyes: No complaints of eye pain, no red eyes, no eyesight problems, no discharge, no dry eyes, no itching of eyes     ENT: no complaints of earache, no loss of hearing, no nose bleeds, no nasal discharge, no sore throat, no hoarseness  Cardiovascular: No complaints of slow heart rate, no fast heart rate, no chest pain, no palpitations, no leg claudication, no lower extremity edema  Respiratory: shortness of breath and shortness of breath during exertion  Gastrointestinal: No complaints of abdominal pain, no constipation, no nausea or vomiting, no diarrhea, no bloody stools  Genitourinary: No complaints of dysuria, no incontinence, no pelvic pain, no dysmenorrhea, no vaginal discharge or bleeding  Musculoskeletal: No complaints of arthralgias, no myalgias, no joint swelling or stiffness, no limb pain or swelling  The patient presents with complaints of a rash (psoriasis  )  Neurological: No complaints of headache, no confusion, no convulsions, no numbness, no dizziness or fainting, no tingling, no limb weakness, no difficulty walking  Psychiatric: Not suicidal, no sleep disturbance, no anxiety or depression, no change in personality, no emotional problems  Endocrine: No complaints of proptosis, no hot flashes, no muscle weakness, no deepening of the voice, no feelings of weakness  Hematologic/Lymphatic: No complaints of swollen glands, no swollen glands in the neck, does not bleed easily, does not bruise easily  Active Problems    1  Abnormal CT of the chest (793 2) (R93 8)   2  Acute bronchitis (466 0) (J20 9)   3  Asymptomatic carotid artery stenosis (433 10) (I65 29)   4  Benign essential hypertension (401 1) (I10)   5  Bursitis, ischial (726 5) (M70 70)   6  Cardiomyopathy (425 4) (I42 9)   7  Cataract of right eye (366 9) (H26 9)   8  Chemotherapy-induced nausea (787 02,E933 1) (R11 0,T45  1X5A)   9  Chronic obstructive pulmonary disease (496) (J44 9)   10  Congestive heart failure (428 0) (I50 9)   11  COPD with acute exacerbation (491 21) (J44 1)   12  Depression (311) (F32 9)   13  DMII (diabetes mellitus, type 2) (250 00) (E11 9)   14  Dyspnea (786 09) (R06 00)   15   Fatigue (780 79) (R53 83)   16  Fracture of multiple pubic rami (808 2) (S32 599A)   17  Fracture of pubic ramus (808 2) (S32 599A)   18  Hyperlipidemia (272 4) (E78 5)   19  Hypertension (401 9) (I10)   20  Left thyroid nodule (241 0) (E04 1)   21  Lung cancer, upper lobe (162 3) (C34 10)   22  Multiple thyroid nodules (241 1) (E04 2)   23  Need for immunization against influenza (V04 81) (Z23)   24  Orthopedic aftercare (V54 9) (Z47 89)   25  Osteoporosis (733 00) (M81 0)   26  Other chronic pain (338 29) (G89 29)   27  Pelvic pain (R10 2)   28  Pelvic pain in female (625 9) (R10 2)   29  Pre-operative cardiovascular examination (V72 81) (Z01 810)   30  Primary localized osteoarthrosis of the hip, unspecified laterality (715 15) (M16 10)   31  Psoriasis (696 1) (L40 9)   32  Pulmonary mass (786 6) (R91 8)   33  Pulmonary nodule (793 11) (R91 1)   34  PVD (peripheral vascular disease) (443 9) (I73 9)   35  Restless legs syndrome (333 94) (G25 81)   36  Sciatica (724 3) (M54 30)   37  Screening for genitourinary condition (V81 6) (Z13 89)   38  Screening for neurological condition (V80 09) (Z13 89)   39  Screening for osteoporosis (V82 81) (Z13 820)   40  Shortness of breath (786 05) (R06 02)   41  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)   42  Strain of right hip adductor muscle (843 8) (S76 011A)   43  Trochanteric bursitis, unspecified laterality (726 5) (M70 60)   44  Type 2 diabetes mellitus (250 00) (E11 9)   45  Wheezing (786 07) (R06 2)    Past Medical History    1  History of acute bronchitis (V12 69) (Z87 09)    Surgical History    1  History of Bypass Graft Using Vein: Aortic-Bifemoral   2  History of  Section   3  History of Cholecystectomy   4  History of Hysterectomy   5  History of Tonsillectomy  Surgical History Reviewed: The surgical history was reviewed and updated today  Family History  Mother    1  Family history of thyroid disease (V18 19) (Z83 49)  Sister    2   Family history of malignant neoplasm (V16 9) (Z80 9)  Family History Reviewed: The family history was reviewed and updated today  Social History    · Denied: Alcohol Use (History)   · Caffeine Use   · Current every day smoker (305 1) (F17 200)   · Denied: Drug use (305 90) (F19 90)  Social History Reviewed: The social history was reviewed and updated today  Current Meds   1  Advair Diskus 250-50 MCG/DOSE Inhalation Aerosol Powder Breath Activated; USE 1   INHALATION TWICE A DAY RINSE MOUTH AFTER USE; Therapy: 23KDE9009 to (Evaluate:72Dwg7767)  Requested for: 33KZU2381; Last   Rx:19Wmn2552 Ordered   2  Albuterol Sulfate (2 5 MG/3ML) 0 083% Inhalation Nebulization Solution; inhale contents   of 1 vial in nebulizer four times a day if needed    Requested for: 93RRT5216; Last Rx:92Xwr5797 Ordered   3  ALPRAZolam 0 25 MG Oral Tablet; TAKE 1 TABLET TWICE DAILY AS NEEDED; Therapy: (Surekha Kern to Recorded   4  Aspirin 81 MG Oral Tablet Delayed Release; TAKE 1 TABLET DAILY AS DIRECTED; Therapy: 46UYW0249 to (Evaluate:80Qlc1501)  Requested for: 48Chg7862; Last   Rx:49Ref3805 Ordered   5  Azithromycin 250 MG Oral Tablet; One tab 3 times weekly; Therapy: (Selina Galvin) to Recorded   6  Diflorasone Diacetate 0 05 % External Ointment; APPLY TO AFFECTED AREA TWICE A   DAY; Therapy: 98TNZ7398 to (Evaluate:76Hiv4894)  Requested for: 44TPT3200; Last   Rx:14Jan2016 Ordered   7  Fluocinonide 0 05 % External Solution; APPLY SPARINGLY TO AFFECTED AREA(S)   TWICE DAILY; Last Rx:55Kvi9176 Ordered   8  FreeStyle Lite Test In [employer]; Test 3 times daily; Therapy: 78Kkv8437 to (Evaluate:77Epr9778)  Requested for: 72Dua6252; Last   Rx:17Lfl2415 Ordered   9  GlipiZIDE ER 2 5 MG Oral Tablet Extended Release 24 Hour; TAKE 1 TABLET DAILY    Requested for: 63XUU0185; Last Rx:08Hqt2630 Ordered   10  Ibandronate Sodium 150 MG Oral Tablet; TAKE ONE TABLET ONCE MONTHLY;     Therapy: 45GOZ5298 to (Last Rx:30Xea5425)  Requested for: 66Oey9529 Ordered   11  Lyrica 100 MG Oral Capsule; TAKE 1 CAPSULE AT BEDTIME; Last Rx:36Llj9092    Ordered   12  Metoprolol Tartrate 100 MG Oral Tablet; take one tablet daily  Requested for: 21Mar2016;    Last Rx:21Mar2016 Ordered   13  Ondansetron HCl - 8 MG Oral Tablet; 1 TAB PO Q 8 HR PRN N/V;    Therapy: 68Eiy5712 to (Last Rx:52Ysq7129)  Requested for: 11Pwu2612 Ordered   14  Oxycodone-Acetaminophen 5-325 MG Oral Tablet; 1 q4h prn; Therapy: 30JGY9894 to (Lex Oropeza)  Requested for: 16PNU7873; Last    Rx:66Rlg3389 Ordered   13  Pazeo 0 7 % Ophthalmic Solution; Therapy: 66YNO2391 to Recorded   16  PredniSONE 20 MG Oral Tablet; Take one tablet twice a day for 4 days, then one tablet a    day for 4 days, then 1/2 tablet a day for 4 days, then stop; Therapy: 06PAU9021 to (Evaluate:29Jun2016)  Requested for: 48LVC4893; Last    Rx:58Xyl5121 Ordered   17  Simvastatin 80 MG Oral Tablet; Take 1 tablet daily; Therapy: 29HQT6717 to (Evaluate:75Zek0944)  Requested for: 38BNR3005; Last    Rx:00Dzh6437 Ordered   18  Simvastatin 80 MG Oral Tablet; Therapy: (Malinda Alvarado) to Recorded   19  Spiriva HandiHaler 18 MCG Inhalation Capsule; inhale contents of 1 capsule daily; Therapy: 95ZMD4732 to (Evaluate:18Mar2017)  Requested for: 94Ziu4722; Last    Rx:53Adh9517; Status: ACTIVE - Transmit to [facility] Verification Ordered   20  Spiriva Respimat 2 5 MCG/ACT Inhalation Aerosol Solution; INHALE 2 PUFFS ONCE    DAILY; Therapy: 29GCY6876 to (Evaluate:18Hni5009)  Requested for: 68WZO3956; Last    Rx:88Utt6928 Ordered   21  TRENtal 400 MG TBCR; TAKE 1 TABLET 3 TIMES DAILY WITH FOOD; Therapy: (Crystal Parra) to Recorded  Medication List Reviewed: The medication list was reviewed and updated today  Medication List Reviewed: The medication list was reviewed and updated today  Allergies    1   Penicillins    Vitals  Vital Signs    Recorded: 11MTD9229 46:35IE   Systolic 995 Diastolic 62   Heart Rate 65   Respiration 14   Temperature 96 9 F   Pain Scale 0   O2 Saturation 98   Height 5 ft 1 in   Weight 109 lb 8 oz   BMI Calculated 20 69   BSA Calculated 1 46     Physical Exam    Constitutional   General appearance: No acute distress, well appearing and well nourished  Eyes   Conjunctiva and lids: No swelling, erythema or discharge  Ears, Nose, Mouth, and Throat   External inspection of ears and nose: Normal     Oropharynx: Normal with no erythema, edema, exudate or lesions  Pulmonary   Auscultation of lungs: Abnormal   Auscultation of the lungs revealed decreased breath sounds diffusely  Cardiovascular   Auscultation of heart: Normal rate and rhythm, normal S1 and S2, without murmurs  Examination of extremities for edema and/or varicosities: Normal     Abdomen   Abdomen: Non-tender, no masses  Liver and spleen: No hepatomegaly or splenomegaly  Lymphatic   Palpation of lymph nodes in neck: No lymphadenopathy  Musculoskeletal   Gait and station: Normal     Skin   Skin and subcutaneous tissue: Normal without rashes or lesions  Neurologic   Cranial nerves: Cranial nerves 2-12 intact  Psychiatric   Orientation to person, place, and time: Normal          Health Management  Chronic obstructive pulmonary disease   Complete PFT; every 1 year; Next Due: 68JBR3142; Overdue    Future Appointments    Date/Time Provider Specialty Site   01/05/2017 08:30 AM SANTIAGO Honeycutt   [facility] [place] [address]   Electronically signed by : SANTIAGO Taveras DOM D ,DO; Oct 20 2016 11:25AM EST                       (Author)

## 2018-01-18 NOTE — PROGRESS NOTES
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)    Plan  Squamous cell carcinoma of right lung    · Drink plenty of fluids ; Status:Complete;   Done: 96VUX3851   Ordered; For:Squamous cell carcinoma of right lung; Ordered By:Luigi Langford;   · Follow-up visit in 3 weeks Evaluation and Treatment  Follow-up  Status: Hold For -  Scheduling  Requested for: 43HND3898   Ordered; For: Squamous cell carcinoma of right lung; Ordered By: Lucy Torres Performed:  Due: 52CQA4110    Discussion/Summary  Discussion Summary:   In summary, this is a 44-year-old female history of squamous cell carcinoma of the lung as outlined  She had a questionable adrenal metastases as well as poor pulmonary function prompting treatment with chemotherapy  That is to say it is possible that she has metastatic disease  We have completed one cycle of Abraxane and carboplatin  She's tolerating this relatively well with the exception of mild thrombocytopenia  Her energy level is fair  Her ambulation is primarily limited by orthopedics/leg issues  Her pulmonary function is limited but unchanged compared to pretreatment  Essentially, she feels at baseline  I reviewed her labs with the patient and her   I also reviewed her current general status  We will proceed with 2 more cycles and then restage by way of PET/CT  Her  had some questions about the potential use of proton therapy as well as immunotherapy  Protons might be applicable   If her disease turns out to be localized (i e  no adrenal involvement)  It may not be covered by insurance, however  Immunotherapy would likely be applicable at some time  This is currently approved for patients with disease in the second line setting after intolerance or progression of chemotherapy  Additionally, we do have a research trial for which she may be eligible for randomizing patients after chemotherapy to observation versus institution of [id number] [place] [address] [facility]    I reviewed the above considerations at length with the patient and her   They voiced understanding and agreement  I'll see him back in 3 weeks for review  Medication SE Review and Pt Understands Tx: Possible side effects of new medications were reviewed with the patient/guardian today  The treatment plan was reviewed with the patient/guardian  The patient/guardian understands and agrees with the treatment plan   Understands and agrees with treatment plan: The treatment plan was reviewed with the patient/guardian  The patient/guardian understands and agrees with the treatment plan   Counseling Documentation With Imm: The patient, patient's family was counseled regarding diagnostic results, instructions for management, prognosis, patient and family education, impressions, risks and benefits of treatment options  total time of encounter was 40 minutes  Chief Complaint  Chief Complaint Free Text Note Form: Evaluation regarding lung cancer  History of Present Illness  HPI: June 2016- patient was found to have a thyroid mass on physical examination  Ultrasound showed bilateral thyroid nodules  In July 2016 she underwent CAT scan of the neck for evaluation of the carotid arteries  Incidentally noted, infiltrating mass in the right upper lobe extending to the hilum was noted  August 4, 2016-PET/CT showed a right upper lobe mass measuring 4 8 cm, SUV 21 5  This extends to the right hilum  Right paratracheal and subcarinal nodes measure up to 12 mm  The left adrenal showed some hypermetabolism with SUV of 3 7, without discrete mass  Uptake in the right parotid gland is noted with SUV of 22 3 and a soft tissue nodule, measuring 11 mm  Mediastinal endoscopy and bronchoscopy showed 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  Lymph node biopsies did not show malignancy  Surgery and radiation therapy were not felt to be applicable   Due to background pulmonary dysfunction as well as the potential for left adrenal metastatic disease  Chemotherapy was chosen as primary therapy  Alternatively  September 6, 2016-started Abraxane 80 mg/m^2 day 1, 8, 15, carbo AUC-5, day 1 only, every 21 days  Current Therapy: September 6, 2016-started Abraxane 80 mg/m^2 day 1, 8, 15, carbo AUC-5, day 1 only, every 21 days  Review of Systems  Complete-Female:   Constitutional: recent 20 lbs over past 6-12 months  lb weight loss, but No fever, no chills, feels well, no tiredness, no recent weight gain or weight loss  Eyes: No complaints of eye pain, no red eyes, no eyesight problems, no discharge, no dry eyes, no itching of eyes  ENT: no complaints of earache, no loss of hearing, no nose bleeds, no nasal discharge, no sore throat, no hoarseness  Cardiovascular: No complaints of slow heart rate, no fast heart rate, no chest pain, no palpitations, no leg claudication, no lower extremity edema  Respiratory: shortness of breath and shortness of breath during exertion  Gastrointestinal: No complaints of abdominal pain, no constipation, no nausea or vomiting, no diarrhea, no bloody stools  Genitourinary: No complaints of dysuria, no incontinence, no pelvic pain, no dysmenorrhea, no vaginal discharge or bleeding  Musculoskeletal: No complaints of arthralgias, no myalgias, no joint swelling or stiffness, no limb pain or swelling  The patient presents with complaints of a rash (psoriasis  )  Neurological: No complaints of headache, no confusion, no convulsions, no numbness, no dizziness or fainting, no tingling, no limb weakness, no difficulty walking  Psychiatric: Not suicidal, no sleep disturbance, no anxiety or depression, no change in personality, no emotional problems  Endocrine: No complaints of proptosis, no hot flashes, no muscle weakness, no deepening of the voice, no feelings of weakness     Hematologic/Lymphatic: No complaints of swollen glands, no swollen glands in the neck, does not bleed easily, does not bruise easily  Active Problems    1  Abnormal CT of the chest (793 2) (R93 8)   2  Acute bronchitis (466 0) (J20 9)   3  Asymptomatic carotid artery stenosis (433 10) (I65 29)   4  Benign essential hypertension (401 1) (I10)   5  Bursitis, ischial (726 5) (M70 70)   6  Cardiomyopathy (425 4) (I42 9)   7  Cataract of right eye (366 9) (H26 9)   8  Chemotherapy-induced nausea (787 02,E933 1) (R11 0,T45  1X5A)   9  Chronic obstructive pulmonary disease (496) (J44 9)   10  Congestive heart failure (428 0) (I50 9)   11  COPD with acute exacerbation (491 21) (J44 1)   12  Depression (311) (F32 9)   13  DMII (diabetes mellitus, type 2) (250 00) (E11 9)   14  Dyspnea (786 09) (R06 00)   15  Fatigue (780 79) (R53 83)   16  Fracture of multiple pubic rami (808 2) (S32 599A)   17  Fracture of pubic ramus (808 2) (S32 599A)   18  Hyperlipidemia (272 4) (E78 5)   19  Hypertension (401 9) (I10)   20  Left thyroid nodule (241 0) (E04 1)   21  Lung cancer, upper lobe (162 3) (C34 10)   22  Multiple thyroid nodules (241 1) (E04 2)   23  Need for immunization against influenza (V04 81) (Z23)   24  Orthopedic aftercare (V54 9) (Z47 89)   25  Osteoporosis (733 00) (M81 0)   26  Other chronic pain (338 29) (G89 29)   27  Pelvic pain (R10 2)   28  Pelvic pain in female (625 9) (R10 2)   29  Pre-operative cardiovascular examination (V72 81) (Z01 810)   30  Primary localized osteoarthrosis of the hip, unspecified laterality (715 15) (M16 10)   31  Psoriasis (696 1) (L40 9)   32  Pulmonary mass (786 6) (R91 8)   33  Pulmonary nodule (793 11) (R91 1)   34  PVD (peripheral vascular disease) (443 9) (I73 9)   35  Restless legs syndrome (333 94) (G25 81)   36  Sciatica (724 3) (M54 30)   37  Screening for genitourinary condition (V81 6) (Z13 89)   38  Screening for neurological condition (V80 09) (Z13 89)   39  Screening for osteoporosis (V82 81) (Z13 820)   40  Shortness of breath (786 05) (R06 02)   41   Squamous cell carcinoma of right lung (162  9) (C34 91)   42  Strain of right hip adductor muscle (843 8) (S76 011A)   43  Trochanteric bursitis, unspecified laterality (726 5) (M70 60)   44  Type 2 diabetes mellitus (250 00) (E11 9)   45  Wheezing (786 07) (R06 2)    Past Medical History    1  History of acute bronchitis (V12 69) (Z87 09)    Surgical History    1  History of Bypass Graft Using Vein: Aortic-Bifemoral   2  History of  Section   3  History of Cholecystectomy   4  History of Hysterectomy   5  History of Tonsillectomy  Surgical History Reviewed: The surgical history was reviewed and updated today  Family History  Mother    1  Family history of thyroid disease (V18 19) (Z83 49)  Sister    2  Family history of malignant neoplasm (V16 9) (Z80 9)  Family History Reviewed: The family history was reviewed and updated today  Social History    · Denied: Alcohol Use (History)   · Caffeine Use   · Current every day smoker (305 1) (F17 200)   · Denied: Drug use (305 90) (F19 90)  Social History Reviewed: The social history was reviewed and updated today  Current Meds   1  Advair Diskus 250-50 MCG/DOSE Inhalation Aerosol Powder Breath Activated; USE 1   INHALATION TWICE A DAY RINSE MOUTH AFTER USE; Therapy: 78URM5426 to (Evaluate:47Zzi0236)  Requested for: 79ERS7128; Last   Rx:13Isc7397 Ordered   2  Albuterol Sulfate (2 5 MG/3ML) 0 083% Inhalation Nebulization Solution; inhale contents   of 1 vial in nebulizer four times a day if needed    Requested for: 89WTW9476; Last Rx:42Juq5265 Ordered   3  ALPRAZolam 0 25 MG Oral Tablet; TAKE 1 TABLET TWICE DAILY AS NEEDED; Therapy: (Ravi Vora) to Recorded   4  Aspirin 81 MG Oral Tablet Delayed Release; TAKE 1 TABLET DAILY AS DIRECTED; Therapy: 58XEA8295 to (Evaluate:31Qle9338)  Requested for: 08Wzj4374; Last   Rx:37Kdd5641 Ordered   5  Azithromycin 250 MG Oral Tablet; One tab 3 times weekly; Therapy: (Pura Gudino) to Recorded   6   Diflorasone Diacetate 0 05 % External Ointment; APPLY TO AFFECTED AREA TWICE A   DAY; Therapy: 95PXX3406 to (Evaluate:50Ovo4836)  Requested for: 38SHC4513; Last   Rx:14Jan2016 Ordered   7  Fluocinonide 0 05 % External Solution; APPLY SPARINGLY TO AFFECTED AREA(S)   TWICE DAILY; Last Rx:08Oct2015 Ordered   8  FreeStyle Lite Test In [employer]; Test 3 times daily; Therapy: 57Zdq6698 to (Evaluate:40Qff4986)  Requested for: 89Xhf4018; Last   Rx:88Emh2087 Ordered   9  GlipiZIDE ER 2 5 MG Oral Tablet Extended Release 24 Hour; TAKE 1 TABLET DAILY    Requested for: 48DKF9236; Last Rx:23Mar2016 Ordered   10  Ibandronate Sodium 150 MG Oral Tablet; TAKE ONE TABLET ONCE MONTHLY; Therapy: 68CDQ5700 to (Last Rx:46Ujw8698)  Requested for: 04Czt1874 Ordered   11  Lyrica 100 MG Oral Capsule; TAKE 1 CAPSULE AT BEDTIME; Last Rx:75Fvq3185    Ordered   12  Metoprolol Tartrate 100 MG Oral Tablet; take one tablet daily  Requested for: 21Mar2016;    Last Rx:21Mar2016 Ordered   13  Ondansetron HCl - 8 MG Oral Tablet; 1 TAB PO Q 8 HR PRN N/V;    Therapy: 51Tmx6351 to (Last Rx:37Ytc2414)  Requested for: 00Jpn6950 Ordered   14  Oxycodone-Acetaminophen 5-325 MG Oral Tablet; 1 q4h prn; Therapy: 08ABX7435 to (Tea Ruiz)  Requested for: 08Sep2016; Last    Rx:08Sep2016 Ordered   15  Pazeo 0 7 % Ophthalmic Solution; Therapy: 65HOB4706 to Recorded   16  PredniSONE 20 MG Oral Tablet; Take one tablet twice a day for 4 days, then one tablet a    day for 4 days, then 1/2 tablet a day for 4 days, then stop; Therapy: 64SLQ7680 to (Evaluate:29Jun2016)  Requested for: 92TGL1327; Last    Rx:89Zgo6993 Ordered   17  Simvastatin 80 MG Oral Tablet; Take 1 tablet daily; Therapy: 76VFL8185 to (Evaluate:43Jwb3474)  Requested for: 21Mar2016; Last    Rx:21Mar2016 Ordered   18  Simvastatin 80 MG Oral Tablet; Therapy: (Minerva Dies) to Recorded   19  Spiriva HandiHaler 18 MCG Inhalation Capsule; inhale contents of 1 capsule daily;     Therapy: 43DYK6737 to (Evaluate:18Mar2017)  Requested for: 35Qtr0776; Last    Rx:40Foh9112; Status: ACTIVE - Transmit to [place] Verification Ordered   20  Spiriva Respimat 2 5 MCG/ACT Inhalation Aerosol Solution; INHALE 2 PUFFS ONCE    DAILY; Therapy: 97WWR7157 to (Evaluate:72Oio6121)  Requested for: 70ZWI5161; Last    Rx:72Wvm7770 Ordered   21  TRENtal 400 MG TBCR; TAKE 1 TABLET 3 TIMES DAILY WITH FOOD; Therapy: (Ailin Galvin) to Recorded  Medication List Reviewed: The medication list was reviewed and updated today  Medication List Reviewed: The medication list was reviewed and updated today  Allergies    1  Penicillins    Vitals  Vital Signs    Recorded: 09WHA6225 97:61LN   Systolic 291   Diastolic 74   Heart Rate 53   Respiration 14   Temperature 97 4 F   Pain Scale 0   O2 Saturation 90   Height 5 ft 1 in   Weight 108 lb 8 oz   BMI Calculated 20 5   BSA Calculated 1 46     Physical Exam    Constitutional   General appearance: No acute distress, well appearing and well nourished  Eyes   Conjunctiva and lids: No swelling, erythema or discharge  Ears, Nose, Mouth, and Throat   External inspection of ears and nose: Normal     Oropharynx: Normal with no erythema, edema, exudate or lesions  Pulmonary   Auscultation of lungs: Abnormal   Auscultation of the lungs revealed decreased breath sounds diffusely  Cardiovascular   Auscultation of heart: Normal rate and rhythm, normal S1 and S2, without murmurs  Examination of extremities for edema and/or varicosities: Normal     Abdomen   Abdomen: Non-tender, no masses  Liver and spleen: No hepatomegaly or splenomegaly  Lymphatic   Palpation of lymph nodes in neck: No lymphadenopathy  Musculoskeletal   Gait and station: Normal     Skin   Skin and subcutaneous tissue: Normal without rashes or lesions  Neurologic   Cranial nerves: Cranial nerves 2-12 intact      Psychiatric   Orientation to person, place, and time: Normal          Health Management  Chronic obstructive pulmonary disease   Complete PFT; every 1 year; Next Due: 22FNL0938; Overdue    Future Appointments    Date/Time Provider Specialty Site   01/05/2017 08:30 AM SANTIAGO Adkins   Family Medicine [facility] 6[address]   Electronically signed by : SANTIAGO Rodarte DOM D ,DO; Sep 26 2016 12:12PM EST                       (Author)

## 2018-01-22 VITALS
SYSTOLIC BLOOD PRESSURE: 112 MMHG | HEIGHT: 61 IN | WEIGHT: 110 LBS | BODY MASS INDEX: 20.77 KG/M2 | DIASTOLIC BLOOD PRESSURE: 60 MMHG

## 2018-01-22 DIAGNOSIS — R91.1 SOLITARY PULMONARY NODULE: ICD-10-CM

## 2018-01-22 RX ORDER — SODIUM CHLORIDE 9 MG/ML
20 INJECTION, SOLUTION INTRAVENOUS CONTINUOUS
Status: DISPENSED | OUTPATIENT
Start: 2018-01-23 | End: 2018-01-24

## 2018-01-23 ENCOUNTER — HOSPITAL ENCOUNTER (OUTPATIENT)
Dept: INFUSION CENTER | Facility: HOSPITAL | Age: 78
Discharge: HOME | End: 2018-01-23
Payer: MEDICARE

## 2018-01-23 VITALS
HEART RATE: 72 BPM | RESPIRATION RATE: 18 BRPM | OXYGEN SATURATION: 99 % | DIASTOLIC BLOOD PRESSURE: 52 MMHG | TEMPERATURE: 96.3 F | SYSTOLIC BLOOD PRESSURE: 136 MMHG

## 2018-01-23 VITALS
BODY MASS INDEX: 20.42 KG/M2 | RESPIRATION RATE: 18 BRPM | DIASTOLIC BLOOD PRESSURE: 72 MMHG | HEART RATE: 77 BPM | SYSTOLIC BLOOD PRESSURE: 118 MMHG | WEIGHT: 108.19 LBS | HEIGHT: 61 IN | OXYGEN SATURATION: 95 % | TEMPERATURE: 96.6 F

## 2018-01-23 VITALS
OXYGEN SATURATION: 95 % | HEART RATE: 70 BPM | SYSTOLIC BLOOD PRESSURE: 150 MMHG | WEIGHT: 106.38 LBS | RESPIRATION RATE: 16 BRPM | TEMPERATURE: 97.6 F | BODY MASS INDEX: 20.08 KG/M2 | HEIGHT: 61 IN | DIASTOLIC BLOOD PRESSURE: 64 MMHG

## 2018-01-23 LAB
ALBUMIN SERPL BCP-MCNC: 3.1 G/DL (ref 3.5–5)
ALP SERPL-CCNC: 83 U/L (ref 46–116)
ALT SERPL W P-5'-P-CCNC: 11 U/L (ref 12–78)
ANION GAP SERPL CALCULATED.3IONS-SCNC: 4 MMOL/L (ref 4–13)
AST SERPL W P-5'-P-CCNC: 13 U/L (ref 5–45)
BASOPHILS # BLD AUTO: 0.02 THOUSANDS/ΜL (ref 0–0.1)
BASOPHILS NFR BLD AUTO: 0 % (ref 0–1)
BILIRUB SERPL-MCNC: 0.4 MG/DL (ref 0.2–1)
BUN SERPL-MCNC: 12 MG/DL (ref 5–25)
CALCIUM SERPL-MCNC: 8.5 MG/DL (ref 8.3–10.1)
CHLORIDE SERPL-SCNC: 103 MMOL/L (ref 100–108)
CO2 SERPL-SCNC: 35 MMOL/L (ref 21–32)
CREAT SERPL-MCNC: 0.64 MG/DL (ref 0.6–1.3)
EOSINOPHIL # BLD AUTO: 0.06 THOUSAND/ΜL (ref 0–0.61)
EOSINOPHIL NFR BLD AUTO: 1 % (ref 0–6)
ERYTHROCYTE [DISTWIDTH] IN BLOOD BY AUTOMATED COUNT: 11.8 % (ref 11.6–15.1)
GFR SERPL CREATININE-BSD FRML MDRD: 86 ML/MIN/1.73SQ M
GLUCOSE SERPL-MCNC: 181 MG/DL (ref 65–140)
HCT VFR BLD AUTO: 41.3 % (ref 34.8–46.1)
HGB BLD-MCNC: 13 G/DL (ref 11.5–15.4)
LYMPHOCYTES # BLD AUTO: 1.33 THOUSANDS/ΜL (ref 0.6–4.47)
LYMPHOCYTES NFR BLD AUTO: 21 % (ref 14–44)
MCH RBC QN AUTO: 31.8 PG (ref 26.8–34.3)
MCHC RBC AUTO-ENTMCNC: 31.5 G/DL (ref 31.4–37.4)
MCV RBC AUTO: 101 FL (ref 82–98)
MONOCYTES # BLD AUTO: 0.57 THOUSAND/ΜL (ref 0.17–1.22)
MONOCYTES NFR BLD AUTO: 9 % (ref 4–12)
NEUTROPHILS # BLD AUTO: 4.26 THOUSANDS/ΜL (ref 1.85–7.62)
NEUTS SEG NFR BLD AUTO: 69 % (ref 43–75)
PLATELET # BLD AUTO: 125 THOUSANDS/UL (ref 149–390)
PMV BLD AUTO: 11.1 FL (ref 8.9–12.7)
POTASSIUM SERPL-SCNC: 3.8 MMOL/L (ref 3.5–5.3)
PROT SERPL-MCNC: 6.5 G/DL (ref 6.4–8.2)
RBC # BLD AUTO: 4.09 MILLION/UL (ref 3.81–5.12)
SODIUM SERPL-SCNC: 142 MMOL/L (ref 136–145)
T4 FREE SERPL-MCNC: 1.66 NG/DL (ref 0.76–1.46)
TSH SERPL DL<=0.05 MIU/L-ACNC: 4.15 UIU/ML (ref 0.36–3.74)
WBC # BLD AUTO: 6.24 THOUSAND/UL (ref 4.31–10.16)

## 2018-01-23 PROCEDURE — 85025 COMPLETE CBC W/AUTO DIFF WBC: CPT | Performed by: INTERNAL MEDICINE

## 2018-01-23 PROCEDURE — 84439 ASSAY OF FREE THYROXINE: CPT | Performed by: INTERNAL MEDICINE

## 2018-01-23 PROCEDURE — 80053 COMPREHEN METABOLIC PANEL: CPT | Performed by: INTERNAL MEDICINE

## 2018-01-23 PROCEDURE — 96413 CHEMO IV INFUSION 1 HR: CPT

## 2018-01-23 PROCEDURE — 84443 ASSAY THYROID STIM HORMONE: CPT | Performed by: INTERNAL MEDICINE

## 2018-01-23 RX ADMIN — Medication 300 UNITS: at 09:31

## 2018-01-23 RX ADMIN — SODIUM CHLORIDE 20 ML/HR: 9 INJECTION, SOLUTION INTRAVENOUS at 08:55

## 2018-01-23 RX ADMIN — ATEZOLIZUMAB 1200 MG: 1200 INJECTION, SOLUTION INTRAVENOUS at 08:55

## 2018-01-23 NOTE — RESULT NOTES
Message   Recorded as Task   Date: 12/13/2017 01:23 PM, Created By: Dariana Sneed   Task Name: Follow Up   Assigned To: [facility] procedure,Team   Regarding Patient: Anusha Mckeon, Status: Active   CommentLeontine Ely - 13 Dec 2017 1:23 PM     TASK CREATED  Please call patient in 1wk f/u to Prednison 10mg and see if patient started PT   Dariana Sneed - 20 Dec 2017 10:03 AM     TASK EDITED  Patient states she is doing well pain level is a 1/10  Patient also started PT  Anuj Cleary - 20 Dec 2017 10:10 AM     TASK REPLIED TO: Previously Assigned To Osorio Turner  does she want to schedule a one month f/u with me?    Dariana Sneed - 20 Dec 2017 10:31 AM     TASK EDITED  Patient is going to call back to schedule a f/u   Anuj Cleary - 20 Dec 2017 11:11 AM     TASK REPLIED TO: Previously Assigned To Anuj Cleary  [place]        Signatures   Electronically signed by : Carla Villalta, ; Dec 20 2017 11:45AM EST                       (Author)

## 2018-01-23 NOTE — RESULT NOTES
Verified Results  * XR SPINE LUMBAR MINIMUM 4 VIEWS NON INJURY 57HBW3203 10:46AM [facility] Order Number: [id number]     Test Name Result Flag Reference   XR SPINE LUMBAR MINIMUM 4 VIEWS (Report)     LUMBAR SPINE     INDICATION: Lower back pain  COMPARISON: CT abdomen chest, abdomen and pelvis 10/2/2017     VIEWS: AP, lateral, bilateral oblique and coned down projections     IMAGES: 5     FINDINGS:     Mild levoscoliosis  No spondylolisthesis  There is no radiographic evidence of acute fracture or destructive osseous lesion  Multilevel degenerative disc disease throughout the lumbar spine with small marginal endplate osteophytes at several levels  The pedicles appear intact  No pars defects  Atherosclerotic vascular calcifications are noted  Surgical clips in the retroperitoneum  IMPRESSION:     No acute findings  Mild levoscoliosis with multilevel degenerative disease         Workstation performed: [id number]     Signed by:   Sheri Olvera DO   12/7/17

## 2018-01-23 NOTE — MISCELLANEOUS
Message   Recorded as Task   Date: 01/15/2018 02:50 PM, Created By: Vivek Alston   Task Name: Call Back   Assigned To: Meri Nugent   Regarding Patient: Justus Laird, Status: Active   Comment:    Cammie Jimenez - 15 [address] 2018 2:50 PM     TASK CREATED  PTS  called and wanted to let us know that thier consult with radiation didnt go well, they said he didnt really expalian anything about what will be going on  He would like to know if we can give him more info about the treatment  I told pt that they should contact radiation but they would like to speak to us    [phone]   Patient wishes to speak with Dr Jerel Genao again at her next appointment on 1/25/18 before going back to radiation therapy  Dr Jerel Genao is aware      Active Problems    1  Back pain, lumbosacral (724 2,724 6) (M54 5)   2  Bursitis, ischial (726 5) (M70 70)   3  Cardiomyopathy (425 4) (I42 9)   4  Hyperlipidemia (272 4) (E78 5)   5  Hypertension (401 9) (I10)   6  Lower back pain (724 2) (M54 5)   7  Lung cancer, upper lobe (162 3) (C34 10)   8  Metastasis to adrenal gland (198 7) (C79 70)   9  Multiple thyroid nodules (241 1) (E04 2)   10  Nodule of left lobe of thyroid gland (241 0) (E04 1)   11  Orthopedic aftercare (V54 9) (Z47 89)   12  Osteoporosis (733 00) (M81 0)   13  Other chronic pain (338 29) (G89 29)   14  Parotid adenoma (210 2) (D11 0)   15  Primary localized osteoarthrosis of the hip, unspecified laterality (715 15) (M16 10)   16  Psoriasis (696 1) (L40 9)   17  PVD (peripheral vascular disease) (443 9) (I73 9)   18  Restless legs syndrome (333 94) (G25 81)   19  Sciatica (724 3) (M54 30)   20  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)    Current Meds   1  Advair Diskus 250-50 MCG/DOSE Inhalation Aerosol Powder Breath Activated; USE 1   INHALATION TWICE A DAY RINSE MOUTH AFTER USE; Therapy: 97JWB1228 to (Evaluate:87Lah2862)  Requested for: 35Glu9984; Last   Rx:22Sgn9481 Ordered   2   Albuterol Sulfate (2 5 MG/3ML) 0 083% Inhalation Nebulization Solution; inhale contents   of 1 vial in nebulizer four times a day if needed; Therapy: 38STG5129 to (Evaluate:18Jun2018)  Requested for: 70KSA3212; Last   Rx:74Sub4714 Ordered   3  Azithromycin 250 MG Oral Tablet; 1 tablet 3 times weekly; Therapy: (50-92-49-29) to Recorded   4  Fluocinonide 0 05 % External Solution; APPLY SPARINGLY TO SCALP TWICE DAILY; Therapy: 87Yup2511 to (Evaluate:04Ogi2428)  Requested for: 40HQH8669; Last   Rx:16Oct2017 Ordered   5  FreeStyle Lite Test In [employer]; Test 3 times daily; Therapy: 88JDP4112 to (Evaluate:01Jan2018)  Requested for: 71QXK8506; Last   Rx:06Jan2017 Ordered   6  Lyrica 100 MG Oral Capsule; TAKE 1 CAPSULE AT BEDTIME; Therapy: (Recorded:30Oct2017) to Recorded   7  Metoprolol Tartrate 100 MG Oral Tablet; take one tablet daily  Requested for: 97KIH7874;   Last Rx:16Oct2017 Ordered   8  Oxycodone-Acetaminophen 5-325 MG Oral Tablet; 1 q4h prn; Therapy: 43HEY9955 to (Evaluate:27Jan2018)  Requested for: 19Wac0256; Last   Rx:51Nyl0623 Ordered   9  Simvastatin 80 MG Oral Tablet; Take 1 tablet daily; Therapy: 76JXD4354 to (Evaluate:19Mar2018)  Requested for: 94CDK5419; Last   Rx:06Twb1490 Ordered   10  Spiriva Respimat 2 5 MCG/ACT Inhalation Aerosol Solution; INHALE 2 PUFFS ONCE    DAILY; Therapy: 39QMQ0218 to (Evaluate:50Opu2546)  Requested for: 86PHG5277; Last    Rx:67Jpg0995 Ordered   11  TRENtal 400 MG TBCR (Pentoxifylline ER); Therapy: (Recorded:39Mjf0404) to Recorded    Allergies    1   Penicillins    Signatures   Electronically signed by : Brigid Murillo, ; Jan 16 2018  8:10AM EST                       (Author)

## 2018-01-23 NOTE — PROGRESS NOTES
Pt arrives on unit for Tecentriq  Offers no c/o at this time  Labs drawn and sent as ordered  Will continue to monitor

## 2018-01-23 NOTE — RESULT NOTES
Verified Results  (Q) TSH, 3RD GENERATION W/REFLEX TO FT4 43DDP6436 12:00AM Matthew Apple     Test Name Result Flag Reference   TSH W/REFLEX TO FT4 3 16 mIU/L  0 40-4 50     (Q) HEMOGLOBIN A1c 45PGT2272 12:00AM Matthew Apple     Test Name Result Flag Reference   HEMOGLOBIN A1c 5 8 % of total Hgb H <5 7   For someone without known diabetes, a hemoglobin   A1c value between 5 7% and 6 4% is consistent with  prediabetes and should be confirmed with a   follow-up test      For someone with known diabetes, a value <7%  indicates that their diabetes is well controlled  A1c  targets should be individualized based on duration of  diabetes, age, comorbid conditions, and other  considerations  This assay result is consistent with an increased risk  of diabetes  Currently, no consensus exists regarding use of  hemoglobin A1c for diagnosis of diabetes for children

## 2018-01-24 DIAGNOSIS — Z79.4 TYPE 2 DIABETES MELLITUS WITHOUT COMPLICATION, WITH LONG-TERM CURRENT USE OF INSULIN (HCC): Primary | ICD-10-CM

## 2018-01-24 DIAGNOSIS — E11.9 TYPE 2 DIABETES MELLITUS WITHOUT COMPLICATION, WITH LONG-TERM CURRENT USE OF INSULIN (HCC): Primary | ICD-10-CM

## 2018-01-24 RX ORDER — BLOOD-GLUCOSE METER
1 KIT MISCELLANEOUS AS NEEDED
Qty: 100 EACH | Refills: 0 | Status: SHIPPED | OUTPATIENT
Start: 2018-01-24 | End: 2018-01-26 | Stop reason: SDUPTHER

## 2018-01-24 RX ORDER — BLOOD-GLUCOSE METER
KIT MISCELLANEOUS
Refills: 3 | COMMUNITY
Start: 2017-10-24 | End: 2018-01-24 | Stop reason: SDUPTHER

## 2018-01-26 DIAGNOSIS — E11.9 TYPE 2 DIABETES MELLITUS WITHOUT COMPLICATION, WITH LONG-TERM CURRENT USE OF INSULIN (HCC): ICD-10-CM

## 2018-01-26 DIAGNOSIS — Z79.4 TYPE 2 DIABETES MELLITUS WITHOUT COMPLICATION, WITH LONG-TERM CURRENT USE OF INSULIN (HCC): ICD-10-CM

## 2018-01-26 RX ORDER — BLOOD-GLUCOSE METER
KIT MISCELLANEOUS
Qty: 300 EACH | Refills: 0 | Status: SHIPPED | OUTPATIENT
Start: 2018-01-26 | End: 2018-05-11 | Stop reason: SDUPTHER

## 2018-01-30 ENCOUNTER — TRANSCRIBE ORDERS (OUTPATIENT)
Dept: HEMATOLOGY ONCOLOGY | Facility: CLINIC | Age: 78
End: 2018-01-30

## 2018-01-30 ENCOUNTER — OFFICE VISIT (OUTPATIENT)
Dept: HEMATOLOGY ONCOLOGY | Facility: CLINIC | Age: 78
End: 2018-01-30
Payer: MEDICARE

## 2018-01-30 VITALS
RESPIRATION RATE: 16 BRPM | HEART RATE: 68 BPM | BODY MASS INDEX: 20.93 KG/M2 | HEIGHT: 60 IN | TEMPERATURE: 97.6 F | WEIGHT: 106.6 LBS | SYSTOLIC BLOOD PRESSURE: 132 MMHG | DIASTOLIC BLOOD PRESSURE: 72 MMHG

## 2018-01-30 DIAGNOSIS — C34.11 MALIGNANT NEOPLASM OF UPPER LOBE OF RIGHT LUNG (HCC): Primary | ICD-10-CM

## 2018-01-30 PROCEDURE — 99215 OFFICE O/P EST HI 40 MIN: CPT | Performed by: INTERNAL MEDICINE

## 2018-01-30 RX ORDER — AZITHROMYCIN 250 MG/1
TABLET, FILM COATED ORAL
Refills: 3 | COMMUNITY
Start: 2018-01-12 | End: 2018-08-30

## 2018-01-30 NOTE — PROGRESS NOTES
Jason Hill  1940  [address] [facility] [address] [address] [phone]    No chief complaint on file  Malignant neoplasm of upper lobe of right lung ([address] )    7/30/2016 Initial Diagnosis     CT of the neck for evaluation of the carotid arteries incidentally showed an infiltrating mass in the right upper lobe extending to the hilum  PET-CT June 2016â patient was found to have a thyroid mass on physical examination  Ultrasound showed bilateral thyroid nodules  In July 2016 she underwent CAT scan of the neck for evaluation of the carotid arteries  Incidentally noted, infiltrating mass in the right upper lobe extending to the hilum was noted  4, 2016âPET/CT showed a right upper lobe mass measuring 4 8 cm, SUV 21 5  This extends to the right hilum  Right paratracheal and subcarinal nodes measure up to 12 mm  The left adrenal showed some hypermetabolism with SUV of 3 7, without discrete mass  Uptake in the right parotid gland is noted with SUV of 22 3 and a soft tissue nodule, measuring 11 mm  endoscopy and bronchoscopy showed 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  Lymph node biopsies did not show malignancy  and radiation therapy were not felt to be applicable  Due to background pulmonary dysfunction as well as the potential for left adrenal metastatic disease  Chemotherapy was chosen as primary therapy  Alternatively  6, 2016 started Abraxane 80 mg/m² day 1, 8, 15, carbo AUC-5, day 1 only, every 21 days  2017progressive disease noted  Tecentriq 1200 mg IV every 3 weeks initiated  Current Therapy: May 2017âprogressive disease noted  Tecentriq 1200 mg IV every 3 weeks initiated  Interval History: Has had back pain started about 4 days ago   Started after some housework, has been getting better with heating pad           9/6/2016 - 11/25/2016 Chemotherapy     Abraxane 80 mg/m2 day 1, 8, 15, carbo AUC-5, day 1 only, Q 21 days  5/3/2017 Progression     Progressive disease  2017 -  Chemotherapy     Tecentriq 1200 mg IV Q 3 weeks  History of Present Illness:  -Huber Gan MD:  -Previous Therapy (if not listed in Oncology History):  -Current Therapy (if not listed in Oncology History):  -Disease Status:  -Interval History:  -Tumor Markers:    Review of Systems:  Review of Systems   Constitutional: Negative for appetite change, diaphoresis, fatigue and fever  HENT: Negative for sinus pain  Eyes: Negative for discharge  Respiratory: Negative for cough and shortness of breath  Cardiovascular: Negative for chest pain  Gastrointestinal: Negative for abdominal pain, constipation and diarrhea  Endocrine: Negative for cold intolerance  Genitourinary: Negative for difficulty urinating and hematuria  Musculoskeletal: Negative for joint swelling  Skin: Negative for rash  Allergic/Immunologic: Negative for environmental allergies  Neurological: Negative for dizziness and headaches  Hematological: Negative for adenopathy  Psychiatric/Behavioral: Negative for agitation         Patient Active Problem List   Diagnosis    Lung mass    Malignant neoplasm of upper lobe of right lung [facility])     Past Medical History:   Diagnosis Date    Arthritis     [facility]     Common cold     Coronary artery disease     Depression     Diabetes mellitus [facility]     Disease of thyroid gland     Hyperlipidemia     Hyperlipidemia     Hypertension     Pulmonary nodule     Shortness of breath      Past Surgical History:   Procedure Laterality Date    BRONCHOSCOPY N/A 8/10/2016    Procedure: BRONCHOSCOPY FLEXIBLE;  Surgeon: Robyn Shaw MD;  [facility];  Service:    [facility]  SECTION      CHOLECYSTECTOMY      EYE SURGERY      HYSTERECTOMY      RI BRONCHOSCOPY NEEDLE BX TRACHEA MAIN STEM&/BRON N/A 8/10/2016    Procedure: ENDOBRONCHIAL ULTRASOUND (FROZEN SECTION) ; Surgeon: Lulu Sánchez MD;  Location: [facility];  Service: Thoracic    IN INSJ TUNNELED CTR VAD W/SUBQ PORT AGE 5 YR/> Left 8/30/2016    Procedure: INSERTION VENOUS PORT (PORT-A-CATH); Surgeon: Lulu Sánchez MD;  Location: [facility];  Service: Thoracic    TONSILLECTOMY       No family history on file  Social History     Social History    Marital status: /Civil Union     Spouse name: N/A    Number of children: N/A    Years of education: N/A     Occupational History    Not on file  Social History Main Topics    Smoking status: Current Some Day Smoker     Packs/day: 0 50    Smokeless tobacco: Not on file    Alcohol use No    Drug use: No    Sexual activity: Not on file     Other Topics Concern    Not on file     Social History Narrative    No narrative on file       Current Outpatient Prescriptions:     albuterol (2 5 mg/3 mL) 0 083 % nebulizer solution, Take 2 5 mg by nebulization 4 (four) times a day , Disp: , Rfl:     azithromycin (ZITHROMAX) 250 mg tablet, TAKE 1 TAB EVERY MONDAY WEDNESDAY AND FRIDAY, Disp: , Rfl: 3    fluticasone-salmeterol (ADVAIR) 250-50 mcg/dose inhaler, Inhale 1 puff every 12 (twelve) hours  , Disp: , Rfl:     FREESTYLE LITE test strip, Test Blood sugar 3 times daily  Diagnosis: Type 2 Diabetes, Disp: 300 each, Rfl: 0    metoprolol tartrate (LOPRESSOR) 100 mg tablet, Take 100 mg by mouth daily  , Disp: , Rfl:     oxyCODONE-acetaminophen (PERCOCET) 5-325 mg per tablet, Take 1 tablet by mouth every 4 (four) hours as needed for moderate pain , Disp: , Rfl:     pentoxifylline (TRENtal) 400 mg ER tablet, Take 400 mg by mouth 3 (three) times a day with meals  , Disp: , Rfl:     pregabalin (LYRICA) 100 mg capsule, Take 100 mg by mouth daily  , Disp: , Rfl:     simvastatin (ZOCOR) 80 mg tablet, Take 80 mg by mouth daily at bedtime  , Disp: , Rfl:     tiotropium (SPIRIVA) 18 mcg inhalation capsule, Place 2 5 mcg into inhaler and inhale daily , Disp: , Rfl:     aspirin 81 MG tablet, Take 81 mg by mouth daily  , Disp: , Rfl:     glipiZIDE (GLUCOTROL XL) 2 5 mg 24 hr tablet, Take 2 5 mg by mouth daily  , Disp: , Rfl:   Allergies   Allergen Reactions    Penicillins Swelling     Legs swell up     Vitals:    01/30/18 0907   BP: 132/72   Pulse: 68   Resp: 16   Temp: 97 6 °F (36 4 °C)         Physical Exam   Constitutional: She is oriented to person, place, and time  She appears well-developed  HENT:   Head: Normocephalic  Eyes: Pupils are equal, round, and reactive to light  Neck: Neck supple  Cardiovascular: Normal rate  No murmur heard  Pulmonary/Chest: No respiratory distress  She has no wheezes  She has no rales  Decreased breath sounds throughout  No dullness to percussion  Abdominal: Soft  She exhibits no distension  There is no tenderness  There is no rebound  Musculoskeletal: She exhibits no edema  Lymphadenopathy:     She has no cervical adenopathy  Neurological: She is alert and oriented to person, place, and time  She displays normal reflexes  Skin: Skin is warm  No rash noted  Psychiatric: She has a normal mood and affect  Thought content normal            Performance Status: ECOG/Zubrod/WHO: 1 - Symptomatic but completely ambulatory    Labs:  CBC, Coags, BMP, Mg, Phos     Imaging  No results found  I reviewed the above laboratory and imaging data  Discussion/Summary:  In summary, this is a 77-year-old female history of squamous cell carcinoma of the lung as outlined  Most recent imaging shows progression in the adrenal but none elsewhere  She had met with a radiation oncologist but communications were poor and she wishes to meet with another doctor to reconsider the matter of radiating the adrenal gland  She has been tolerating Tecentriq fairly well  She does have fatigue the day of administration lasting into the next day but afterwards she returns to baseline    COPD continues to be a stable but bothersome problem  Additionally, we reviewed that removal of the adrenal could achieve the same goal but would be more invasive and carry higher risk  The patient and her  voiced understanding above discussion  We made arrangements for radiation consultation

## 2018-01-31 LAB
LEFT EYE DIABETIC RETINOPATHY: NORMAL
LEFT EYE DIABETIC RETINOPATHY: NORMAL
RIGHT EYE DIABETIC RETINOPATHY: NORMAL
RIGHT EYE DIABETIC RETINOPATHY: NORMAL

## 2018-02-08 ENCOUNTER — RADIATION ONCOLOGY FOLLOW-UP (OUTPATIENT)
Dept: RADIATION ONCOLOGY | Facility: HOSPITAL | Age: 78
End: 2018-02-08

## 2018-02-08 ENCOUNTER — APPOINTMENT (OUTPATIENT)
Dept: RADIATION ONCOLOGY | Facility: HOSPITAL | Age: 78
End: 2018-02-08
Attending: RADIOLOGY
Payer: MEDICARE

## 2018-02-08 VITALS
HEART RATE: 80 BPM | RESPIRATION RATE: 16 BRPM | SYSTOLIC BLOOD PRESSURE: 130 MMHG | HEIGHT: 60 IN | WEIGHT: 106 LBS | TEMPERATURE: 98.2 F | OXYGEN SATURATION: 96 % | DIASTOLIC BLOOD PRESSURE: 50 MMHG | BODY MASS INDEX: 20.81 KG/M2

## 2018-02-08 DIAGNOSIS — C79.72 MALIGNANT NEOPLASM METASTATIC TO LEFT ADRENAL GLAND (HCC): Primary | ICD-10-CM

## 2018-02-08 PROCEDURE — 99214 OFFICE O/P EST MOD 30 MIN: CPT | Performed by: RADIOLOGY

## 2018-02-08 PROCEDURE — 77334 RADIATION TREATMENT AID(S): CPT | Performed by: RADIOLOGY

## 2018-02-08 RX ORDER — SODIUM CHLORIDE 9 MG/ML
40 INJECTION, SOLUTION INTRAVENOUS ONCE
Status: COMPLETED | OUTPATIENT
Start: 2018-02-13 | End: 2018-02-13

## 2018-02-08 NOTE — PROGRESS NOTES
Dickson Austin  1940  65 y o     [id number]    12/27/17  CT chest, abd and pelvis:   IMPRESSION:     Stable right suprahilar mass  Small satellite nodule suspicious for local right upper lobe metastatic disease measuring 6 mm slightly less conspicuous (less solid-appearing)     Interval increase in the size of metastatic lesion in the anterior limb of the left adrenal gland  No other metastatic lesions are identified  1/9/18 Consult with Dr Glory Coats: Palliative radiation therapy to the left adrenal mass  Discussion/Summary    We will use IMRT plan to treat the left adrenal mass to deliver 30 Gy or more in 15 treatments and greatly minimize or avoid dose to the critical structures such as the left kidney, spleen, stomach, liver and spinal cord  We inform her and her  possible side effects of nausea and fatigue  The  prefers CT planning at [facility] where she will be scheduled and start treatment a week after simulation  No diagnosis found  Oncology History    1/9/18 Consult with Dr Glory Coats: Palliative radiation therapy to the left adrenal mass  Discussion/Summary    We will use IMRT plan to treat the left adrenal mass to deliver 30 Gy or more in 15 treatments and greatly minimize or avoid dose to the critical structures such as the left kidney, spleen, stomach, liver and spinal cord  We inform her and her  possible side effects of nausea and fatigue  The  prefers CT planning at [facility] where she will be scheduled and start treatment a week after simulation  Malignant neoplasm of upper lobe of right lung ([address] )    7/30/2016 Initial Diagnosis     CT of the neck for evaluation of the carotid arteries incidentally showed an infiltrating mass in the right upper lobe extending to the hilum  PET-CT June 2016â patient was found to have a thyroid mass on physical examination  Ultrasound showed bilateral thyroid nodules   In July 2016 she underwent CAT scan of the neck for evaluation of the carotid arteries  Incidentally noted, infiltrating mass in the right upper lobe extending to the hilum was noted  4, 2016âPET/CT showed a right upper lobe mass measuring 4 8 cm, SUV 21 5  This extends to the right hilum  Right paratracheal and subcarinal nodes measure up to 12 mm  The left adrenal showed some hypermetabolism with SUV of 3 7, without discrete mass  Uptake in the right parotid gland is noted with SUV of 22 3 and a soft tissue nodule, measuring 11 mm  endoscopy and bronchoscopy showed 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  Lymph node biopsies did not show malignancy  and radiation therapy were not felt to be applicable  Due to background pulmonary dysfunction as well as the potential for left adrenal metastatic disease  Chemotherapy was chosen as primary therapy  Alternatively  6, 2016 started Abraxane 80 mg/m² day 1, 8, 15, carbo AUC-5, day 1 only, every 21 days  2017progressive disease noted  Tecentriq 1200 mg IV every 3 weeks initiated  Current Therapy: May 2017âprogressive disease noted  Tecentriq 1200 mg IV every 3 weeks initiated  Interval History: Has had back pain started about 4 days ago  Started after some housework, has been getting better with heating pad           9/6/2016 - 11/25/2016 Chemotherapy     Abraxane 80 mg/m2 day 1, 8, 15, carbo AUC-5, day 1 only, Q 21 days  5/3/2017 Progression     Progressive disease  5/16/2017 -  Chemotherapy     Tecentriq 1200 mg IV Q 3 weeks              Patient Active Problem List   Diagnosis    Lung mass    Malignant neoplasm of upper lobe of right lung [facility])     Past Medical History:   Diagnosis Date    Arthritis     [facility]     Common cold     Coronary artery disease     Depression     Diabetes mellitus [facility]     Hyperlipidemia     Hypertension     Lung cancer [facility]     Pulmonary nodule     Shortness of breath      Past Surgical History:   Procedure Laterality Date    BRONCHOSCOPY N/A 8/10/2016    Procedure: BRONCHOSCOPY FLEXIBLE;  Surgeon: Yolanda Schmitt MD;  [facility];  Service:    [facility]  SECTION      CHOLECYSTECTOMY      EYE SURGERY      HYSTERECTOMY      [place] BRONCHOSCOPY NEEDLE BX TRACHEA MAIN STEM&/BRON N/A 8/10/2016    Procedure: ENDOBRONCHIAL ULTRASOUND (FROZEN SECTION) ; Surgeon: Yolanda Schmitt MD;  [facility];  Service: Thoracic    NV INSJ TUNNELED CTR VAD W/SUBQ PORT AGE 5 YR/> Left 2016    Procedure: INSERTION VENOUS PORT (PORT-A-CATH); Surgeon: Yolanda Schmitt MD;  [facility];  Service: Thoracic    TONSILLECTOMY       Family History   Problem Relation Age of Onset    Brain cancer Sister      Social History     Social History    Marital status: /Civil Union     Spouse name: N/A    Number of children: N/A    Years of education: N/A     Occupational History    Not on file  Social History Main Topics    Smoking status: Current Some Day Smoker     Packs/day: 0 25    Smokeless tobacco: Never Used    Alcohol use No    Drug use: No    Sexual activity: Not on file     Other Topics Concern    Not on file     Social History Narrative    No narrative on file       Current Outpatient Prescriptions:     albuterol (2 5 mg/3 mL) 0 083 % nebulizer solution, Take 2 5 mg by nebulization 4 (four) times a day , Disp: , Rfl:     azithromycin (ZITHROMAX) 250 mg tablet, TAKE 1 TAB EVERY  AND FRIDAY, Disp: , Rfl: 3    fluticasone-salmeterol (ADVAIR) 250-50 mcg/dose inhaler, Inhale 1 puff every 12 (twelve) hours  , Disp: , Rfl:     FREESTYLE LITE test strip, Test Blood sugar 3 times daily  Diagnosis: Type 2 Diabetes, Disp: 300 each, Rfl: 0    metoprolol tartrate (LOPRESSOR) 100 mg tablet, Take 100 mg by mouth daily  , Disp: , Rfl:     oxyCODONE-acetaminophen (PERCOCET) 5-325 mg per tablet, Take 1 tablet by mouth every 4 (four) hours as needed for moderate pain , Disp: , Rfl:     pentoxifylline (TRENtal) 400 mg ER tablet, Take 400 mg by mouth 3 (three) times a day with meals  , Disp: , Rfl:     pregabalin (LYRICA) 100 mg capsule, Take 100 mg by mouth daily  , Disp: , Rfl:     simvastatin (ZOCOR) 80 mg tablet, Take 80 mg by mouth daily at bedtime  , Disp: , Rfl:     tiotropium (SPIRIVA) 18 mcg inhalation capsule, Place 2 5 mcg into inhaler and inhale daily  , Disp: , Rfl:     aspirin 81 MG tablet, Take 81 mg by mouth daily  , Disp: , Rfl:     glipiZIDE (GLUCOTROL XL) 2 5 mg 24 hr tablet, Take 2 5 mg by mouth daily  , Disp: , Rfl:   No current facility-administered medications for this visit  Facility-Administered Medications Ordered in Other Visits:     [START ON 2/13/2018] alteplase (CATHFLO) injection 2 mg, 2 mg, Intracatheter, PRN, Glenn Delaney DO    [START ON 2/13/2018] Atezolizumab 1,200 mg in sodium chloride 0 9 % 250 mL IVPB, 1,200 mg, Intravenous, Once, Glenn Delaney DO    [START ON 2/13/2018] heparin lock flush 100 units/mL injection 300 Units, 300 Units, Intracatheter, Q1H PRN, Glenn Delaney DO    [START ON 2/13/2018] sodium chloride 0 9 % infusion, 40 mL/hr, Intravenous, Once, Saeidr Rhett, DO  Allergies   Allergen Reactions    Penicillins Swelling     Legs swell up       Review of Systems:  Review of Systems   Constitutional: Positive for unexpected weight change (Weight loss has stabalized  )  HENT: Positive for tinnitus (Right)  Negative for sore throat and trouble swallowing  Eyes: Positive for visual disturbance (Left eye is blurry)  Negative for pain  Respiratory: Positive for cough (Dry hacking ), shortness of breath and wheezing  Cardiovascular: Negative  Gastrointestinal: Negative  Endocrine: Negative  Genitourinary: Negative  Musculoskeletal: Negative  Skin: Positive for rash (Psoriasis in back of head  )  Allergic/Immunologic: Negative  Neurological: Negative  Hematological: Negative  Psychiatric/Behavioral: Negative          Vitals:    02/08/18 1307   BP: 130/50   Pulse: 80   Resp: 16   Temp: 98 2 °F (36 8 °C)   SpO2: 96%

## 2018-02-12 RX ORDER — FLUOCINONIDE TOPICAL SOLUTION USP, 0.05% 0.5 MG/ML
SOLUTION TOPICAL
Qty: 60 ML | Refills: 1 | OUTPATIENT
Start: 2018-02-12

## 2018-02-13 ENCOUNTER — HOSPITAL ENCOUNTER (OUTPATIENT)
Dept: INFUSION CENTER | Facility: HOSPITAL | Age: 78
Discharge: HOME | End: 2018-02-13
Payer: MEDICARE

## 2018-02-13 VITALS
RESPIRATION RATE: 18 BRPM | DIASTOLIC BLOOD PRESSURE: 55 MMHG | TEMPERATURE: 97 F | SYSTOLIC BLOOD PRESSURE: 133 MMHG | HEART RATE: 60 BPM | OXYGEN SATURATION: 100 %

## 2018-02-13 LAB
ALBUMIN SERPL BCP-MCNC: 3 G/DL (ref 3.5–5)
ALP SERPL-CCNC: 79 U/L (ref 46–116)
ALT SERPL W P-5'-P-CCNC: 11 U/L (ref 12–78)
ANION GAP SERPL CALCULATED.3IONS-SCNC: 3 MMOL/L (ref 4–13)
AST SERPL W P-5'-P-CCNC: 12 U/L (ref 5–45)
BASOPHILS # BLD AUTO: 0.02 THOUSANDS/ΜL (ref 0–0.1)
BASOPHILS NFR BLD AUTO: 0 % (ref 0–1)
BILIRUB SERPL-MCNC: 0.4 MG/DL (ref 0.2–1)
BUN SERPL-MCNC: 16 MG/DL (ref 5–25)
CALCIUM SERPL-MCNC: 8.7 MG/DL (ref 8.3–10.1)
CHLORIDE SERPL-SCNC: 102 MMOL/L (ref 100–108)
CO2 SERPL-SCNC: 37 MMOL/L (ref 21–32)
CREAT SERPL-MCNC: 0.62 MG/DL (ref 0.6–1.3)
EOSINOPHIL # BLD AUTO: 0.11 THOUSAND/ΜL (ref 0–0.61)
EOSINOPHIL NFR BLD AUTO: 2 % (ref 0–6)
ERYTHROCYTE [DISTWIDTH] IN BLOOD BY AUTOMATED COUNT: 11.6 % (ref 11.6–15.1)
GFR SERPL CREATININE-BSD FRML MDRD: 87 ML/MIN/1.73SQ M
GLUCOSE SERPL-MCNC: 176 MG/DL (ref 65–140)
HCT VFR BLD AUTO: 41.4 % (ref 34.8–46.1)
HGB BLD-MCNC: 12.9 G/DL (ref 11.5–15.4)
LYMPHOCYTES # BLD AUTO: 1.47 THOUSANDS/ΜL (ref 0.6–4.47)
LYMPHOCYTES NFR BLD AUTO: 22 % (ref 14–44)
MCH RBC QN AUTO: 31.4 PG (ref 26.8–34.3)
MCHC RBC AUTO-ENTMCNC: 31.2 G/DL (ref 31.4–37.4)
MCV RBC AUTO: 101 FL (ref 82–98)
MONOCYTES # BLD AUTO: 0.62 THOUSAND/ΜL (ref 0.17–1.22)
MONOCYTES NFR BLD AUTO: 9 % (ref 4–12)
NEUTROPHILS # BLD AUTO: 4.41 THOUSANDS/ΜL (ref 1.85–7.62)
NEUTS SEG NFR BLD AUTO: 67 % (ref 43–75)
PLATELET # BLD AUTO: 140 THOUSANDS/UL (ref 149–390)
PMV BLD AUTO: 10.8 FL (ref 8.9–12.7)
POTASSIUM SERPL-SCNC: 3.8 MMOL/L (ref 3.5–5.3)
PROT SERPL-MCNC: 6.6 G/DL (ref 6.4–8.2)
RBC # BLD AUTO: 4.11 MILLION/UL (ref 3.81–5.12)
SODIUM SERPL-SCNC: 142 MMOL/L (ref 136–145)
T4 FREE SERPL-MCNC: 1.61 NG/DL (ref 0.76–1.46)
TSH SERPL DL<=0.05 MIU/L-ACNC: 4.67 UIU/ML (ref 0.36–3.74)
WBC # BLD AUTO: 6.63 THOUSAND/UL (ref 4.31–10.16)

## 2018-02-13 PROCEDURE — 84439 ASSAY OF FREE THYROXINE: CPT | Performed by: INTERNAL MEDICINE

## 2018-02-13 PROCEDURE — 80053 COMPREHEN METABOLIC PANEL: CPT | Performed by: INTERNAL MEDICINE

## 2018-02-13 PROCEDURE — 85025 COMPLETE CBC W/AUTO DIFF WBC: CPT | Performed by: INTERNAL MEDICINE

## 2018-02-13 PROCEDURE — 96413 CHEMO IV INFUSION 1 HR: CPT

## 2018-02-13 PROCEDURE — 84443 ASSAY THYROID STIM HORMONE: CPT | Performed by: INTERNAL MEDICINE

## 2018-02-13 RX ADMIN — ATEZOLIZUMAB 1200 MG: 1200 INJECTION, SOLUTION INTRAVENOUS at 09:26

## 2018-02-13 RX ADMIN — Medication 300 UNITS: at 10:04

## 2018-02-13 RX ADMIN — SODIUM CHLORIDE 40 ML/HR: 0.9 INJECTION, SOLUTION INTRAVENOUS at 10:00

## 2018-02-13 NOTE — PROGRESS NOTES
Pt tolerated tecentriq infusion with no adverse reactions  Pt reminded on discharge to follow up with primary care regarding elevated BP    Pt then d/c'd home ambulatory with steady gait

## 2018-02-13 NOTE — PROGRESS NOTES
Lake Mckeon RN with Dr Jassi Black office notified of elevated BP  No new orders at this time    Pt to follow up with PCP who is ordering current BP meds

## 2018-02-15 ENCOUNTER — OFFICE VISIT (OUTPATIENT)
Dept: FAMILY MEDICINE CLINIC | Facility: CLINIC | Age: 78
End: 2018-02-15
Payer: MEDICARE

## 2018-02-15 VITALS
DIASTOLIC BLOOD PRESSURE: 68 MMHG | SYSTOLIC BLOOD PRESSURE: 115 MMHG | WEIGHT: 105 LBS | BODY MASS INDEX: 20.64 KG/M2 | TEMPERATURE: 96.8 F

## 2018-02-15 DIAGNOSIS — L40.9 PSORIASIS: Primary | ICD-10-CM

## 2018-02-15 DIAGNOSIS — I10 BENIGN ESSENTIAL HYPERTENSION: ICD-10-CM

## 2018-02-15 DIAGNOSIS — L21.9 SEBORRHEIC DERMATITIS OF SCALP: ICD-10-CM

## 2018-02-15 PROBLEM — D11.0 PAROTID ADENOMA: Status: ACTIVE | Noted: 2017-01-03

## 2018-02-15 PROBLEM — J96.11 CHRONIC HYPOXEMIC RESPIRATORY FAILURE (HCC): Status: ACTIVE | Noted: 2017-09-21

## 2018-02-15 PROBLEM — C79.70 METASTASIS TO ADRENAL GLAND (HCC): Status: ACTIVE | Noted: 2017-07-17

## 2018-02-15 PROBLEM — M54.50 BACK PAIN, LUMBOSACRAL: Status: ACTIVE | Noted: 2017-12-06

## 2018-02-15 PROCEDURE — 77301 RADIOTHERAPY DOSE PLAN IMRT: CPT | Performed by: RADIOLOGY

## 2018-02-15 PROCEDURE — 77300 RADIATION THERAPY DOSE PLAN: CPT | Performed by: RADIOLOGY

## 2018-02-15 PROCEDURE — 99214 OFFICE O/P EST MOD 30 MIN: CPT | Performed by: FAMILY MEDICINE

## 2018-02-15 PROCEDURE — 77338 DESIGN MLC DEVICE FOR IMRT: CPT | Performed by: RADIOLOGY

## 2018-02-15 RX ORDER — BETAMETHASONE DIPROPIONATE 0.5 MG/G
CREAM TOPICAL 2 TIMES DAILY
Qty: 50 G | Refills: 1 | Status: SHIPPED | OUTPATIENT
Start: 2018-02-15 | End: 2018-06-29 | Stop reason: SDUPTHER

## 2018-02-15 RX ORDER — MOMETASONE FUROATE 1 MG/ML
SOLUTION TOPICAL DAILY
Qty: 60 ML | Refills: 3 | Status: SHIPPED | OUTPATIENT
Start: 2018-02-15 | End: 2018-09-11 | Stop reason: ALTCHOICE

## 2018-02-15 NOTE — ASSESSMENT & PLAN NOTE
The patient has psoriasis  She has an active flare in her super gluteal cleft as well as olecranon regions since bilaterally  Psorcon cream is not been overly effective for her  We are going to have her try Diprolene cream and she/  her asked to call to report of its effectiveness in 7-10 days  They agree

## 2018-02-15 NOTE — ASSESSMENT & PLAN NOTE
Patient was noted to have significantly elevated blood pressure in the infusion center   earlier in the week  Initial blood pressure today was well controlled I recheck did during her visit and was also noted to be well controlled  It may have been related to the automated cuff in the infusion center, it may have been related to anxiety over her treatments X cetera  She has no cardiovascular pulmonary complaint no headache diplopia or focal neurologic symptom  I recommended observation   has been checking her blood pressure with a home cuff and for the most part it has been well controlled

## 2018-02-15 NOTE — PROGRESS NOTES
Assessment/Plan:  Seborrheic dermatitis of scalp   Patient has seborrheic dermatitis of her scalp  We are going to have her try mometasone solution  We discussed application with patient and   They will call with report of its effectiveness  They agree  Psoriasis    The patient has psoriasis  She has an active flare in her super gluteal cleft as well as olecranon regions since bilaterally  Psorcon cream is not been overly effective for her  We are going to have her try Diprolene cream and she/  her asked to call to report of its effectiveness in 7-10 days  They agree  Benign essential hypertension    Patient was noted to have significantly elevated blood pressure in the infusion center   earlier in the week  Initial blood pressure today was well controlled I recheck did during her visit and was also noted to be well controlled  It may have been related to the automated cuff in the infusion center, it may have been related to anxiety over her treatments X cetera  She has no cardiovascular pulmonary complaint no headache diplopia or focal neurologic symptom  I recommended observation   has been checking her blood pressure with a home cuff and for the most part it has been well controlled  Diagnoses and all orders for this visit:    Psoriasis  -     betamethasone, augmented, (DIPROLENE-AF) 0 05 % cream; Apply topically 2 (two) times a day    Seborrheic dermatitis of scalp  -     mometasone (ELOCON) 0 1 % lotion; Apply topically daily    Benign essential hypertension          Subjective:   Chief Complaint   Patient presents with    Rash     head, arms, buttox    Hypertension     pt was at infusion center and had high bp        Patient ID: Sarah Olmedo is a 65 y o  female      HPI    The patient is a 69-year-old female with multiple medical problems including lung carcinoma, COPD, hypertension in addition to multiple other issues who presents today with her  stating that she has a rash on her scalp for which she has been using Lidex solution over time with some affect but recently is not been completely controlled  She also has a rash in her super gluteal cleft for which she has been using Psorcon cream but again this has not completely alleviated this rash  They also note that she was in the infusion Center recently for treatment for her lung carcinoma and her blood pressure was noted to be uncontrolled in the 170s over 100 range  She has had no headache chest pain shortness of breath or focal neurologic symptoms  She has had no fevers or chills  She has a scaly rash on her scalp with white dandruff  The rash in her supra gluteal cleft is red and scaly at times  She does have history of psoriasis and she also has red scaly rash around her elbows  Additionally her  states her cough has been increased recently  The patient states that she does not have significant sputum she has had no increased respiratory distress and no chest pain  She is followed by Pulmonary for COPD and she has been compliant with her Spiriva as well as Advair  She is also on azithromycin every other day which she has also been compliant with  The following portions of the patient's history were reviewed and updated as appropriate: allergies, current medications, past family history, past medical history, past social history, past surgical history and problem list     Review of Systems   Constitution: Negative for decreased appetite, fever, malaise/fatigue and night sweats  Cardiovascular: Negative for chest pain, dyspnea on exertion and leg swelling  Respiratory: Positive for cough and wheezing  Negative for shortness of breath and sputum production  Endocrine: Negative for cold intolerance  Skin: Positive for dry skin, itching and rash  Negative for nail changes  Objective:    Physical Exam   Constitutional: She appears well-developed and well-nourished  Somewhat overweight, wearing nasal oxygen and in no apparent distress  HENT:   Head: Normocephalic and atraumatic  Mouth/Throat: Oropharynx is clear and moist  No oropharyngeal exudate  Eyes: Conjunctivae are normal  No scleral icterus  Neck: Neck supple  No JVD present  No thyromegaly present  Cardiovascular: Normal rate and regular rhythm  Exam reveals no gallop  No murmur heard  Pulmonary/Chest: Effort normal  No respiratory distress  She has wheezes  She has no rales  Examination of her lungs reveals scattered rhonchi and expiratory wheezing  I hear no focal crackles  Respiratory rate is presently normal   She is wearing nasal oxygen  She is in no respiratory distress presently  Musculoskeletal: She exhibits no edema  Lymphadenopathy:     She has no cervical adenopathy  Skin: Rash noted  There is erythema  Examination the suprapubic gluteal cleft reveals erythema and scale  There is no breakdown presently  I see no vesiculation no Bulla or other primary lesion  Examination of the scalp reveals it to be dry and scaly with a white crust   There is minimal erythema presently  There is some involvement of the eyebrows as well

## 2018-02-15 NOTE — ASSESSMENT & PLAN NOTE
Patient has seborrheic dermatitis of her scalp  We are going to have her try mometasone solution  We discussed application with patient and   They will call with report of its effectiveness  They agree

## 2018-02-21 PROCEDURE — 77386 HB NTSTY MODUL RAD TX DLVR CPLX: CPT | Performed by: RADIOLOGY

## 2018-02-22 PROCEDURE — 77386 HB NTSTY MODUL RAD TX DLVR CPLX: CPT | Performed by: RADIOLOGY

## 2018-02-22 NOTE — PROGRESS NOTES
Follow-Up - Radiation Oncology   Tunde Paris 1940 65 y o  female [id number]      History of Present Illness       HPI: Tunde Paris presents today in follow-up to proceed with CT simulation for palliative treatment to the progressive left adrenal metastasis  She remains asymptomatic and overall feels well  She is without new complaints since her consultation 3 weeks prior  Historical Information   Previous Oncology History:      Malignant neoplasm of upper lobe of right lung ([address] )    7/30/2016 Initial Diagnosis     CT of the neck for evaluation of the carotid arteries incidentally showed an infiltrating mass in the right upper lobe extending to the hilum  PET-CT June 2016â patient was found to have a thyroid mass on physical examination  Ultrasound showed bilateral thyroid nodules  In July 2016 she underwent CAT scan of the neck for evaluation of the carotid arteries  Incidentally noted, infiltrating mass in the right upper lobe extending to the hilum was noted  4, 2016âPET/CT showed a right upper lobe mass measuring 4 8 cm, SUV 21 5  This extends to the right hilum  Right paratracheal and subcarinal nodes measure up to 12 mm  The left adrenal showed some hypermetabolism with SUV of 3 7, without discrete mass  Uptake in the right parotid gland is noted with SUV of 22 3 and a soft tissue nodule, measuring 11 mm  endoscopy and bronchoscopy showed 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  Lymph node biopsies did not show malignancy  and radiation therapy were not felt to be applicable  Due to background pulmonary dysfunction as well as the potential for left adrenal metastatic disease  Chemotherapy was chosen as primary therapy  Alternatively  6, 2016 started Abraxane 80 mg/m² day 1, 8, 15, carbo AUC-5, day 1 only, every 21 days  2017progressive disease noted  Tecentriq 1200 mg IV every 3 weeks initiated  Current Therapy: May 2017âprogressive disease noted   Tecentriq 1200 mg IV every 3 weeks initiated  Interval History: Has had back pain started about 4 days ago  Started after some housework, has been getting better with heating pad           2016 - 2016 Chemotherapy     Abraxane 80 mg/m2 day 1, 8, 15, carbo AUC-5, day 1 only, Q 21 days  5/3/2017 Progression     Progressive disease  2017 -  Chemotherapy     Tecentriq 1200 mg IV Q 3 weeks  Past Medical History:   Diagnosis Date    Arthritis     Cataract of right eye 3/19/2015    CHF (congestive heart failure) (HCC)     Common cold     Coronary artery disease     Depression     [facility]     Fracture of multiple pubic rami ([facility] ) 2015    Hyperlipidemia     Hypertension     Lung cancer [facility]     Multiple thyroid nodules 2016    Pulmonary nodule     Shortness of breath      Past Surgical History:   Procedure Laterality Date    BRONCHOSCOPY N/A 8/10/2016    Procedure: BRONCHOSCOPY FLEXIBLE;  Surgeon: Bronson Sanchez MD;  [facility];  Service:    [facility]  SECTION      CHOLECYSTECTOMY      EYE SURGERY      HYSTERECTOMY      DE BRONCHOSCOPY NEEDLE BX TRACHEA MAIN STEM&/BRON N/A 8/10/2016    Procedure: ENDOBRONCHIAL ULTRASOUND (FROZEN SECTION) ; Surgeon: Bronson Sanchez MD;  Location[facility];  Service: Thoracic    DE INSJ TUNNELED CTR VAD W/SUBQ PORT AGE 5 YR/> Left 2016    Procedure: INSERTION VENOUS PORT (PORT-A-CATH);   Surgeon: Bronson Sanchez MD;  [facility];  Service: Thoracic    TONSILLECTOMY         Social History   History   Alcohol Use No     History   Drug Use No     History   Smoking Status    Current Some Day Smoker    Packs/day: 0 25   Smokeless Tobacco    Never Used         Meds/Allergies     Current Outpatient Prescriptions:     albuterol (2 5 mg/3 mL) 0 083 % nebulizer solution, Take 2 5 mg by nebulization 4 (four) times a day , Disp: , Rfl:     azithromycin (ZITHROMAX) 250 mg tablet, TAKE 1 TAB EVERY MONDAY WEDNESDAY AND FRIDAY, Disp: , Rfl: 3    fluticasone-salmeterol (ADVAIR) 250-50 mcg/dose inhaler, Inhale 1 puff every 12 (twelve) hours  , Disp: , Rfl:     FREESTYLE LITE test strip, Test Blood sugar 3 times daily  Diagnosis: Type 2 Diabetes, Disp: 300 each, Rfl: 0    metoprolol tartrate (LOPRESSOR) 100 mg tablet, Take 100 mg by mouth daily  , Disp: , Rfl:     oxyCODONE-acetaminophen (PERCOCET) 5-325 mg per tablet, Take 1 tablet by mouth every 4 (four) hours as needed for moderate pain , Disp: , Rfl:     pentoxifylline (TRENtal) 400 mg ER tablet, Take 400 mg by mouth 3 (three) times a day with meals  , Disp: , Rfl:     pregabalin (LYRICA) 100 mg capsule, Take 100 mg by mouth daily  , Disp: , Rfl:     simvastatin (ZOCOR) 80 mg tablet, Take 80 mg by mouth daily at bedtime  , Disp: , Rfl:     tiotropium (SPIRIVA) 18 mcg inhalation capsule, Place 2 5 mcg into inhaler and inhale daily  , Disp: , Rfl:     betamethasone, augmented, (DIPROLENE-AF) 0 05 % cream, Apply topically 2 (two) times a day, Disp: 50 g, Rfl: 1    mometasone (ELOCON) 0 1 % lotion, Apply topically daily, Disp: 60 mL, Rfl: 3  Allergies   Allergen Reactions    Penicillins Swelling     Legs swell up         Review of Systems  Review of Systems   Constitutional: Positive for unexpected weight change (Weight loss has stabalized  )  HENT: Positive for tinnitus (Right)  Negative for sore throat and trouble swallowing  Eyes: Positive for visual disturbance (Left eye is blurry)  Negative for pain  Respiratory: Positive for cough (Dry hacking ), shortness of breath and wheezing  Cardiovascular: Negative  Gastrointestinal: Negative  Endocrine: Negative  Genitourinary: Negative  Musculoskeletal: Negative  Skin: Positive for rash (Psoriasis in back of head  )  Allergic/Immunologic: Negative  Neurological: Negative  Hematological: Negative  Psychiatric/Behavioral: Negative          Objective   /50 (BP Location: Left arm, Patient Position: Sitting, Cuff Size: Standard)   Pulse 80   Temp 98 2 °F (36 8 °C) (Oral)   Resp 16   Ht 4' 11 8" (1 519 m)   Wt 48 1 kg (106 lb)   SpO2 96%   BMI 20 84 kg/m²   Karnofsky: 90 - Able to carry on normal activity; minor signs or symptoms of disease   Physical Exam  The patient presents today no apparent distress  Sclera anicteric  Normal respiratory effort    Recent Results (from the past 672 hour(s))   CBC and differential    Collection Time: 02/13/18  9:01 AM   Result Value Ref Range    WBC 6 63 4 31 - 10 16 Thousand/uL    RBC 4 11 3 81 - 5 12 Million/uL    Hemoglobin 12 9 11 5 - 15 4 g/dL    Hematocrit 41 4 34 8 - 46 1 %     (H) 82 - 98 fL    MCH 31 4 26 8 - 34 3 pg    MCHC 31 2 (L) 31 4 - 37 4 g/dL    RDW 11 6 11 6 - 15 1 %    MPV 10 8 8 9 - 12 7 fL    Platelets 328 (L) 812 - 390 Thousands/uL    Neutrophils Relative 67 43 - 75 %    Lymphocytes Relative 22 14 - 44 %    Monocytes Relative 9 4 - 12 %    Eosinophils Relative 2 0 - 6 %    Basophils Relative 0 0 - 1 %    Neutrophils Absolute 4 41 1 85 - 7 62 Thousands/µL    Lymphocytes Absolute 1 47 0 60 - 4 47 Thousands/µL    Monocytes Absolute 0 62 0 17 - 1 22 Thousand/µL    Eosinophils Absolute 0 11 0 00 - 0 61 Thousand/µL    Basophils Absolute 0 02 0 00 - 0 10 Thousands/µL   Comprehensive metabolic panel    Collection Time: 02/13/18  9:01 AM   Result Value Ref Range    Sodium 142 136 - 145 mmol/L    Potassium 3 8 3 5 - 5 3 mmol/L    Chloride 102 100 - 108 mmol/L    CO2 37 (H) 21 - 32 mmol/L    Anion Gap 3 (L) 4 - 13 mmol/L    BUN 16 5 - 25 mg/dL    Creatinine 0 62 0 60 - 1 30 mg/dL    Glucose 176 (H) 65 - 140 mg/dL    Calcium 8 7 8 3 - 10 1 mg/dL    AST 12 5 - 45 U/L    ALT 11 (L) 12 - 78 U/L    Alkaline Phosphatase 79 46 - 116 U/L    Total Protein 6 6 6 4 - 8 2 g/dL    Albumin 3 0 (L) 3 5 - 5 0 g/dL    Total Bilirubin 0 40 0 20 - 1 00 mg/dL    eGFR 87 ml/min/1 73sq m   TSH, 3rd generation with T4 reflex    Collection Time: 02/13/18 9:01 AM   Result Value Ref Range    TSH 3RD GENERATON 4 665 (H) 0 358 - 3 740 uIU/mL   T4, free    Collection Time: 02/13/18  9:01 AM   Result Value Ref Range    Free T4 1 61 (H) 0 76 - 1 46 ng/dL         No results found  Assessment/Plan      Edwige Mckenna will now proceed with palliative radiation therapy to the left adrenal gland, which is her only site of progressive metastatic disease  We anticipate delivering a dose of 3750 cGy in 15 fractions  The associated risks and toxicities of left adrenal radiation were discussed with the patient in detail, including, but not limited to, fatigue, nausea, vomiting, diarrhea, renal injury, bowel injury, and gastric ulceration/fistula

## 2018-02-23 DIAGNOSIS — C79.72 SECONDARY MALIGNANT NEOPLASM OF LEFT ADRENAL GLAND (HCC): Primary | ICD-10-CM

## 2018-02-23 PROCEDURE — 77386 HB NTSTY MODUL RAD TX DLVR CPLX: CPT | Performed by: RADIOLOGY

## 2018-02-26 PROCEDURE — 77386 HB NTSTY MODUL RAD TX DLVR CPLX: CPT | Performed by: RADIOLOGY

## 2018-02-27 PROCEDURE — 77336 RADIATION PHYSICS CONSULT: CPT | Performed by: RADIOLOGY

## 2018-02-27 PROCEDURE — 77386 HB NTSTY MODUL RAD TX DLVR CPLX: CPT | Performed by: RADIOLOGY

## 2018-02-28 DIAGNOSIS — G89.29 OTHER CHRONIC PAIN: Primary | ICD-10-CM

## 2018-02-28 PROCEDURE — 77386 HB NTSTY MODUL RAD TX DLVR CPLX: CPT | Performed by: RADIOLOGY

## 2018-02-28 RX ORDER — OXYCODONE HYDROCHLORIDE AND ACETAMINOPHEN 5; 325 MG/1; MG/1
1 TABLET ORAL EVERY 4 HOURS PRN
Qty: 180 TABLET | Refills: 0 | Status: SHIPPED | OUTPATIENT
Start: 2018-02-28 | End: 2018-03-30 | Stop reason: SDUPTHER

## 2018-03-01 ENCOUNTER — APPOINTMENT (OUTPATIENT)
Dept: LAB | Facility: CLINIC | Age: 78
End: 2018-03-01
Payer: MEDICARE

## 2018-03-01 ENCOUNTER — TRANSCRIBE ORDERS (OUTPATIENT)
Dept: LAB | Facility: CLINIC | Age: 78
End: 2018-03-01

## 2018-03-01 ENCOUNTER — APPOINTMENT (OUTPATIENT)
Dept: RADIATION ONCOLOGY | Facility: HOSPITAL | Age: 78
End: 2018-03-01
Attending: RADIOLOGY
Payer: MEDICARE

## 2018-03-01 DIAGNOSIS — C79.72 SECONDARY MALIGNANT NEOPLASM OF LEFT ADRENAL GLAND (HCC): ICD-10-CM

## 2018-03-01 LAB
BASOPHILS # BLD AUTO: 0.01 THOUSANDS/ΜL (ref 0–0.1)
BASOPHILS NFR BLD AUTO: 0 % (ref 0–1)
EOSINOPHIL # BLD AUTO: 0.08 THOUSAND/ΜL (ref 0–0.61)
EOSINOPHIL NFR BLD AUTO: 1 % (ref 0–6)
ERYTHROCYTE [DISTWIDTH] IN BLOOD BY AUTOMATED COUNT: 11.4 % (ref 11.6–15.1)
HCT VFR BLD AUTO: 43.1 % (ref 34.8–46.1)
HGB BLD-MCNC: 13.5 G/DL (ref 11.5–15.4)
LYMPHOCYTES # BLD AUTO: 1.17 THOUSANDS/ΜL (ref 0.6–4.47)
LYMPHOCYTES NFR BLD AUTO: 17 % (ref 14–44)
MCH RBC QN AUTO: 30.8 PG (ref 26.8–34.3)
MCHC RBC AUTO-ENTMCNC: 31.3 G/DL (ref 31.4–37.4)
MCV RBC AUTO: 98 FL (ref 82–98)
MONOCYTES # BLD AUTO: 0.48 THOUSAND/ΜL (ref 0.17–1.22)
MONOCYTES NFR BLD AUTO: 7 % (ref 4–12)
NEUTROPHILS # BLD AUTO: 5.28 THOUSANDS/ΜL (ref 1.85–7.62)
NEUTS SEG NFR BLD AUTO: 75 % (ref 43–75)
PLATELET # BLD AUTO: 145 THOUSANDS/UL (ref 149–390)
PMV BLD AUTO: 10.5 FL (ref 8.9–12.7)
RBC # BLD AUTO: 4.38 MILLION/UL (ref 3.81–5.12)
WBC # BLD AUTO: 7.02 THOUSAND/UL (ref 4.31–10.16)

## 2018-03-01 PROCEDURE — 85025 COMPLETE CBC W/AUTO DIFF WBC: CPT

## 2018-03-01 PROCEDURE — 36415 COLL VENOUS BLD VENIPUNCTURE: CPT

## 2018-03-01 PROCEDURE — 77386 HB NTSTY MODUL RAD TX DLVR CPLX: CPT | Performed by: RADIOLOGY

## 2018-03-02 PROCEDURE — 77386 HB NTSTY MODUL RAD TX DLVR CPLX: CPT | Performed by: RADIOLOGY

## 2018-03-05 PROCEDURE — 77386 HB NTSTY MODUL RAD TX DLVR CPLX: CPT | Performed by: RADIOLOGY

## 2018-03-05 RX ORDER — SODIUM CHLORIDE 9 MG/ML
40 INJECTION, SOLUTION INTRAVENOUS ONCE
Status: COMPLETED | OUTPATIENT
Start: 2018-03-06 | End: 2018-03-06

## 2018-03-06 ENCOUNTER — OFFICE VISIT (OUTPATIENT)
Dept: PULMONOLOGY | Facility: CLINIC | Age: 78
End: 2018-03-06
Payer: MEDICARE

## 2018-03-06 ENCOUNTER — HOSPITAL ENCOUNTER (OUTPATIENT)
Dept: INFUSION CENTER | Facility: HOSPITAL | Age: 78
Discharge: HOME | End: 2018-03-06
Payer: MEDICARE

## 2018-03-06 VITALS
HEIGHT: 60 IN | WEIGHT: 105.5 LBS | TEMPERATURE: 97.9 F | DIASTOLIC BLOOD PRESSURE: 62 MMHG | SYSTOLIC BLOOD PRESSURE: 142 MMHG | HEART RATE: 66 BPM | OXYGEN SATURATION: 98 % | BODY MASS INDEX: 20.71 KG/M2

## 2018-03-06 VITALS
RESPIRATION RATE: 18 BRPM | TEMPERATURE: 96 F | OXYGEN SATURATION: 98 % | HEART RATE: 73 BPM | DIASTOLIC BLOOD PRESSURE: 60 MMHG | SYSTOLIC BLOOD PRESSURE: 150 MMHG

## 2018-03-06 DIAGNOSIS — J43.2 CENTRILOBULAR EMPHYSEMA (HCC): Primary | ICD-10-CM

## 2018-03-06 LAB
ALBUMIN SERPL BCP-MCNC: 3 G/DL (ref 3.5–5)
ALP SERPL-CCNC: 76 U/L (ref 46–116)
ALT SERPL W P-5'-P-CCNC: 12 U/L (ref 12–78)
ANION GAP SERPL CALCULATED.3IONS-SCNC: 4 MMOL/L (ref 4–13)
AST SERPL W P-5'-P-CCNC: 13 U/L (ref 5–45)
BASOPHILS # BLD AUTO: 0.01 THOUSANDS/ΜL (ref 0–0.1)
BASOPHILS NFR BLD AUTO: 0 % (ref 0–1)
BILIRUB SERPL-MCNC: 0.5 MG/DL (ref 0.2–1)
BUN SERPL-MCNC: 12 MG/DL (ref 5–25)
CALCIUM SERPL-MCNC: 8.2 MG/DL (ref 8.3–10.1)
CHLORIDE SERPL-SCNC: 101 MMOL/L (ref 100–108)
CO2 SERPL-SCNC: 37 MMOL/L (ref 21–32)
CREAT SERPL-MCNC: 0.63 MG/DL (ref 0.6–1.3)
EOSINOPHIL # BLD AUTO: 0.12 THOUSAND/ΜL (ref 0–0.61)
EOSINOPHIL NFR BLD AUTO: 2 % (ref 0–6)
ERYTHROCYTE [DISTWIDTH] IN BLOOD BY AUTOMATED COUNT: 11.7 % (ref 11.6–15.1)
GFR SERPL CREATININE-BSD FRML MDRD: 86 ML/MIN/1.73SQ M
GLUCOSE SERPL-MCNC: 226 MG/DL (ref 65–140)
HCT VFR BLD AUTO: 40.5 % (ref 34.8–46.1)
HGB BLD-MCNC: 12.7 G/DL (ref 11.5–15.4)
LYMPHOCYTES # BLD AUTO: 0.48 THOUSANDS/ΜL (ref 0.6–4.47)
LYMPHOCYTES NFR BLD AUTO: 8 % (ref 14–44)
MCH RBC QN AUTO: 31.8 PG (ref 26.8–34.3)
MCHC RBC AUTO-ENTMCNC: 31.4 G/DL (ref 31.4–37.4)
MCV RBC AUTO: 101 FL (ref 82–98)
MONOCYTES # BLD AUTO: 0.53 THOUSAND/ΜL (ref 0.17–1.22)
MONOCYTES NFR BLD AUTO: 8 % (ref 4–12)
NEUTROPHILS # BLD AUTO: 5.29 THOUSANDS/ΜL (ref 1.85–7.62)
NEUTS SEG NFR BLD AUTO: 82 % (ref 43–75)
PLATELET # BLD AUTO: 134 THOUSANDS/UL (ref 149–390)
PMV BLD AUTO: 10.7 FL (ref 8.9–12.7)
POTASSIUM SERPL-SCNC: 4 MMOL/L (ref 3.5–5.3)
PROT SERPL-MCNC: 6.3 G/DL (ref 6.4–8.2)
RBC # BLD AUTO: 4 MILLION/UL (ref 3.81–5.12)
SODIUM SERPL-SCNC: 142 MMOL/L (ref 136–145)
WBC # BLD AUTO: 6.43 THOUSAND/UL (ref 4.31–10.16)

## 2018-03-06 PROCEDURE — 77336 RADIATION PHYSICS CONSULT: CPT | Performed by: RADIOLOGY

## 2018-03-06 PROCEDURE — 85025 COMPLETE CBC W/AUTO DIFF WBC: CPT | Performed by: INTERNAL MEDICINE

## 2018-03-06 PROCEDURE — 80053 COMPREHEN METABOLIC PANEL: CPT | Performed by: INTERNAL MEDICINE

## 2018-03-06 PROCEDURE — 96413 CHEMO IV INFUSION 1 HR: CPT

## 2018-03-06 PROCEDURE — 99214 OFFICE O/P EST MOD 30 MIN: CPT | Performed by: INTERNAL MEDICINE

## 2018-03-06 PROCEDURE — 77386 HB NTSTY MODUL RAD TX DLVR CPLX: CPT | Performed by: RADIOLOGY

## 2018-03-06 RX ORDER — ALBUTEROL SULFATE 2.5 MG/3ML
2.5 SOLUTION RESPIRATORY (INHALATION) 4 TIMES DAILY
Qty: 120 VIAL | Refills: 6 | Status: SHIPPED | OUTPATIENT
Start: 2018-03-06 | End: 2018-03-08 | Stop reason: SDUPTHER

## 2018-03-06 RX ADMIN — ATEZOLIZUMAB 1200 MG: 1200 INJECTION, SOLUTION INTRAVENOUS at 09:09

## 2018-03-06 RX ADMIN — SODIUM CHLORIDE 40 ML/HR: 9 INJECTION, SOLUTION INTRAVENOUS at 08:50

## 2018-03-06 RX ADMIN — Medication 300 UNITS: at 09:52

## 2018-03-06 NOTE — ASSESSMENT & PLAN NOTE
· Continues to receive immunotherapy by Dr Hugh New  · Radiation therapy for adrenal gland continues

## 2018-03-06 NOTE — ASSESSMENT & PLAN NOTE
· Remains symptomatic  Wheezing bronchodilator is appropriate Lety however  · Continue nebulizer 3 times daily  · Continue Advair and Spiriva  · Continue Zithromax 3 times a week  · Tobacco cessation was once again encouraged    Smoking 5 cigarettes a day but not interested in quitting

## 2018-03-06 NOTE — PROGRESS NOTES
Progress note - Pulmonary Medicine   Leah Loja 65 y o  female MRN: [id number]       Impression & Plan:     Chronic hypoxemic respiratory failure (HCC)  · Currently using 2 5L on her home concentrator and 3 L with the portable oxygen concentrator  Chronic obstructive pulmonary disease (HCC)  · Remains symptomatic  Wheezing bronchodilator is appropriate Lety however  · Continue nebulizer 3 times daily  · Continue Advair and Spiriva  · Continue Zithromax 3 times a week  · Tobacco cessation was once again encouraged  Smoking 5 cigarettes a day but not interested in quitting    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)  · Continues to receive immunotherapy by Dr Bri Chaudhry  · Radiation therapy for adrenal gland continues        ______________________________________________________________________    HPI:    Leah Loja presents today for follow-up of Hang COPD and chronic hypoxemic respiratory failure  She predominantly has emphysema which is centrilobular and severe at baseline  She is still smoking  She has cut back but still continues to smoke 5 cigarettes daily  She has been undergoing treatment for her lung cancer and radiation for the adrenal metastasis  She has felt quite fatigued as a result of her treatments  She has not noticed significant differences in her breathing however  She denies cough but does wheeze during the daytime  She has less wheezing at night  She is smoking 5 cigarettes daily  She has no interest in quitting  She denies any chest pain or chest tightness  She does not have any cardiac Larissa palpitations  She does not have any abdominal pain or GERD symptoms  She denies headache, syncopal symptoms, sore throat, or other upper respiratory tract complaints  Her weight has been relatively stable with an intact appetite  She has not had fevers, chills, or infection symptoms          Review of Systems:  Review of Systems   Constitutional: Negative for unexpected weight change  As per HPI   HENT: Negative for congestion, postnasal drip, sinus pressure, sore throat, trouble swallowing and voice change  As per HPI   Eyes: Negative  Respiratory:        As per HPI   Cardiovascular:        As per HPI   Gastrointestinal: Negative for abdominal pain  No GERD   Endocrine: Negative  Genitourinary: Negative  Musculoskeletal: Negative  Negative for arthralgias and myalgias  Skin: Negative  Negative for rash  Allergic/Immunologic: Negative for environmental allergies  Neurological: Negative  Negative for speech difficulty and headaches  Hematological:        As per HPI   Psychiatric/Behavioral: Negative for confusion and sleep disturbance  The patient is nervous/anxious  All other systems reviewed and are negative  Past medical history, surgical history, and family history were reviewed and updated as appropriate    Social history updates:  History   Smoking Status    Current Some Day Smoker    Packs/day: 0 25   Smokeless Tobacco    Never Used       PhysicalExamination:  Vitals:   /62 (BP Location: Left arm, Patient Position: Sitting, Cuff Size: Standard)   Pulse 66   Temp 97 9 °F (36 6 °C) (Tympanic)   Ht 5' (1 524 m)   Wt 47 9 kg (105 lb 8 oz)   SpO2 98%   BMI 20 60 kg/m²     Gen:    Appears comfortable On nasal cannula  No distress today  Slightly anxious  HEENT: PERRL  Oropharynx is clear, moist  Neck: Supple  There is no JVD, lymphadenopathy or thyromegaly  Trachea is midline  Chest:  Chest excursion symmetric  Lungs are hyperinflated  Breath sounds are distant bilaterally but otherwise clear  Hyper-resonant to percussion  Cardiac:  Regular, no murmur    There are no murmurs  Abdomen: Soft and nontender  Benign  Extremities: No clubbing, cyanosis or edema  Neurologic: No focal deficits  Normal affect  Skin: No appreciable rashes  Diagnostic Data:  Labs:   I personally reviewed the most recent laboratory data pertinent to today's visit    Lab Results   Component Value Date    WBC 6 43 03/06/2018    HGB 12 7 03/06/2018    HCT 40 5 03/06/2018     (H) 03/06/2018     (L) 03/06/2018     Lab Results   Component Value Date    GLUCOSE 226 (H) 03/06/2018    CALCIUM 8 2 (L) 03/06/2018     03/06/2018    K 4 0 03/06/2018    CO2 37 (H) 03/06/2018     03/06/2018    BUN 12 03/06/2018    CREATININE 0 63 03/06/2018     No results found for: IGE  Lab Results   Component Value Date    ALT 12 03/06/2018    AST 13 03/06/2018    ALKPHOS 76 03/06/2018    BILITOT 0 50 03/06/2018       Imaging:  I personally reviewed the images on the SKIN[employer]price system pertinent to today's visit  Her most recent CT scan of the chest from December 2017 shows a stable right suprahilar mass with reduction in the size of the satellite nodule    There was evidence of increase in the adrenal nodule however      Lisseth Renner MD

## 2018-03-06 NOTE — LETTER
March 6, 2018     Riana Lynch MD  [address]    Patient: Panfilo Ho   YOB: 1940   Date of Visit: 3/6/2018       Dear Dr Thania Alfaro:    Thank you for referring Aditya Montalvo to me for evaluation  Below are my notes for this consultation  If you have questions, please do not hesitate to call me  I look forward to following your patient along with you           Sincerely,        Ralph Meadows MD        CC: No Recipients

## 2018-03-08 ENCOUNTER — TELEPHONE (OUTPATIENT)
Dept: PULMONOLOGY | Facility: CLINIC | Age: 78
End: 2018-03-08

## 2018-03-08 DIAGNOSIS — J43.2 CENTRILOBULAR EMPHYSEMA (HCC): ICD-10-CM

## 2018-03-08 PROCEDURE — 77386 HB NTSTY MODUL RAD TX DLVR CPLX: CPT | Performed by: RADIOLOGY

## 2018-03-08 RX ORDER — ALBUTEROL SULFATE 2.5 MG/3ML
2.5 SOLUTION RESPIRATORY (INHALATION) 4 TIMES DAILY
Qty: 360 ML | Refills: 1 | Status: SHIPPED | OUTPATIENT
Start: 2018-03-08 | End: 2018-03-18 | Stop reason: SDUPTHER

## 2018-03-08 NOTE — TELEPHONE ENCOUNTER
Patient  called stating that his wife's medication that was sent in by Dr July Call should have been sent in for a 3 month supply because of insurance purposes

## 2018-03-08 NOTE — TELEPHONE ENCOUNTER
Pt's  had called requesting 90 days supply of albuterol nebulizer, advair 250 and spiriva 18 mcgs to pharmacy on file  She received a 30 days supply  Please order 90 days supply

## 2018-03-09 PROCEDURE — 77386 HB NTSTY MODUL RAD TX DLVR CPLX: CPT | Performed by: RADIOLOGY

## 2018-03-12 PROCEDURE — 77386 HB NTSTY MODUL RAD TX DLVR CPLX: CPT | Performed by: RADIOLOGY

## 2018-03-13 PROCEDURE — 77386 HB NTSTY MODUL RAD TX DLVR CPLX: CPT | Performed by: RADIOLOGY

## 2018-03-14 PROCEDURE — 77386 HB NTSTY MODUL RAD TX DLVR CPLX: CPT | Performed by: RADIOLOGY

## 2018-03-14 PROCEDURE — 77336 RADIATION PHYSICS CONSULT: CPT | Performed by: RADIOLOGY

## 2018-03-15 ENCOUNTER — OFFICE VISIT (OUTPATIENT)
Dept: HEMATOLOGY ONCOLOGY | Facility: CLINIC | Age: 78
End: 2018-03-15
Payer: MEDICARE

## 2018-03-15 VITALS
WEIGHT: 104 LBS | TEMPERATURE: 98.3 F | DIASTOLIC BLOOD PRESSURE: 64 MMHG | SYSTOLIC BLOOD PRESSURE: 144 MMHG | BODY MASS INDEX: 20.42 KG/M2 | HEIGHT: 60 IN | RESPIRATION RATE: 18 BRPM | HEART RATE: 64 BPM

## 2018-03-15 DIAGNOSIS — C34.11 MALIGNANT NEOPLASM OF UPPER LOBE OF RIGHT LUNG (HCC): Primary | ICD-10-CM

## 2018-03-15 DIAGNOSIS — C79.72 MALIGNANT NEOPLASM METASTATIC TO LEFT ADRENAL GLAND (HCC): ICD-10-CM

## 2018-03-15 PROCEDURE — 99214 OFFICE O/P EST MOD 30 MIN: CPT | Performed by: INTERNAL MEDICINE

## 2018-03-15 NOTE — PROGRESS NOTES
Gely Eller  1940  [id number] [facility] [address] [address] [phone]    Chief Complaint   Patient presents with    Follow-up              Malignant neoplasm of upper lobe of right lung ([address] )    7/30/2016 Initial Diagnosis     CT of the neck for evaluation of the carotid arteries incidentally showed an infiltrating mass in the right upper lobe extending to the hilum  PET-CT June 2016â patient was found to have a thyroid mass on physical examination  Ultrasound showed bilateral thyroid nodules  In July 2016 she underwent CAT scan of the neck for evaluation of the carotid arteries  Incidentally noted, infiltrating mass in the right upper lobe extending to the hilum was noted  4, 2016âPET/CT showed a right upper lobe mass measuring 4 8 cm, SUV 21 5  This extends to the right hilum  Right paratracheal and subcarinal nodes measure up to 12 mm  The left adrenal showed some hypermetabolism with SUV of 3 7, without discrete mass  Uptake in the right parotid gland is noted with SUV of 22 3 and a soft tissue nodule, measuring 11 mm  endoscopy and bronchoscopy showed 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  Lymph node biopsies did not show malignancy  and radiation therapy were not felt to be applicable  Due to background pulmonary dysfunction as well as the potential for left adrenal metastatic disease  Chemotherapy was chosen as primary therapy  Alternatively  6, 2016 started Abraxane 80 mg/m² day 1, 8, 15, carbo AUC-5, day 1 only, every 21 days  2017progressive disease noted  Tecentriq 1200 mg IV every 3 weeks initiated  Current Therapy: May 2017âprogressive disease noted  Tecentriq 1200 mg IV every 3 weeks initiated  Interval History: Has had back pain started about 4 days ago   Started after some housework, has been getting better with heating pad           9/6/2016 - 11/25/2016 Chemotherapy Abraxane 80 mg/m2 day 1, 8, 15, carbo AUC-5, day 1 only, Q 21 days  5/3/2017 Progression     Progressive disease  5/16/2017 -  Chemotherapy     Tecentriq 1200 mg IV Q 3 weeks  2/21/2018 - 3/14/2018 Radiation     Radiation to left adrenal              History of Present Illness:  -No ref  provider found:  -Previous Therapy (if not listed in Oncology History):  -Current Therapy (if not listed in Oncology History):  -Disease Status:  -Interval History:  -Tumor Markers:    Review of Systems:  Review of Systems   Constitutional: Negative for appetite change, diaphoresis, fatigue and fever  HENT: Negative for sinus pain  Eyes: Negative for discharge  Respiratory: Negative for cough and shortness of breath  Cardiovascular: Negative for chest pain  Gastrointestinal: Negative for abdominal pain, constipation and diarrhea  Endocrine: Negative for cold intolerance  Genitourinary: Negative for difficulty urinating and hematuria  Musculoskeletal: Negative for joint swelling  Skin: Negative for rash  Allergic/Immunologic: Negative for environmental allergies  Neurological: Negative for dizziness and headaches  Hematological: Negative for adenopathy  Psychiatric/Behavioral: Negative for agitation         Patient Active Problem List   Diagnosis    Malignant neoplasm of upper lobe of right lung ([address] )    Asymptomatic carotid artery stenosis    Back pain, lumbosacral    Benign essential hypertension    Bursitis, ischial    Cardiomyopathy ([address] )    Cataract of right eye    Chemotherapy-induced nausea    Chronic hypoxemic respiratory failure (HCC)    Chronic obstructive pulmonary disease (HCC)    Congestive heart failure (HCC)    Depression    DMII (diabetes mellitus, type 2) ([address] )    Hyperlipidemia    Metastasis to adrenal gland (HCC)    Multiple thyroid nodules    Osteoporosis    Other chronic pain    Parotid adenoma    Psoriasis    Primary localized osteoarthrosis of the hip    [facility]ipheral vascular [facility]    Restless legs syndrome    Sciatica    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)    Seborrheic dermatitis of scalp     Past Medical History:   Diagnosis Date    Arthritis     Cataract of right eye 3/19/2015    CHF (congestive heart failure) (HCC)     Common cold     Coronary artery disease     Depression     Diabetes mellitus ([facility] )     Fracture of multiple pubic rami ([facility] ) 2015    Hyperlipidemia     Hypertension     Lung cancer ([facility] )     Multiple thyroid nodules 2016    Pulmonary nodule     Shortness of breath      Past Surgical History:   Procedure Laterality Date    BRONCHOSCOPY N/A 8/10/2016    Procedure: BRONCHOSCOPY FLEXIBLE;  Surgeon: Sarina Feldman MD;  [facility];  Service:    Jeimy Sawyer  SECTION      CHOLECYSTECTOMY      EYE SURGERY      HYSTERECTOMY      PA BRONCHOSCOPY NEEDLE BX TRACHEA MAIN STEM&/BRON N/A 8/10/2016    Procedure: ENDOBRONCHIAL ULTRASOUND (FROZEN SECTION) ; Surgeon: Sarina Feldman MD;  [facility];  Service: Thoracic    PA INSJ TUNNELED CTR VAD W/SUBQ PORT AGE 5 YR/> Left 2016    Procedure: INSERTION VENOUS PORT (PORT-A-CATH); Surgeon: Sarina Feldman MD;  [facility];  Service: Thoracic    TONSILLECTOMY       Family History   Problem Relation Age of Onset    Brain cancer Sister      Social History     Social History    Marital status: /Civil Union     Spouse name: N/A    Number of children: N/A    Years of education: N/A     Occupational History    Not on file       Social History Main Topics    Smoking status: Current Some Day Smoker     Packs/day: 0 25    Smokeless tobacco: Never Used    Alcohol use No    Drug use: No    Sexual activity: Not on file     Other Topics Concern    Not on file     Social History Narrative    No narrative on file       Current Outpatient Prescriptions:     albuterol (2 5 mg/3 mL) 0 083 % nebulizer solution, Take 3 mL (2 5 mg total) by nebulization 4 (four) times a day, Disp: 360 mL, Rfl: 1    azithromycin (ZITHROMAX) 250 mg tablet, TAKE 1 TAB EVERY MONDAY WEDNESDAY AND FRIDAY, Disp: , Rfl: 3    betamethasone, augmented, (DIPROLENE-AF) 0 05 % cream, Apply topically 2 (two) times a day, Disp: 50 g, Rfl: 1    fluticasone-salmeterol (ADVAIR) 250-50 mcg/dose inhaler, Inhale 1 puff every 12 (twelve) hours, Disp: 1 Inhaler, Rfl: 6    FREESTYLE LITE test strip, Test Blood sugar 3 times daily  Diagnosis: Type 2 Diabetes, Disp: 300 each, Rfl: 0    metoprolol tartrate (LOPRESSOR) 100 mg tablet, Take 100 mg by mouth daily  , Disp: , Rfl:     mometasone (ELOCON) 0 1 % lotion, Apply topically daily, Disp: 60 mL, Rfl: 3    oxyCODONE-acetaminophen (PERCOCET) 5-325 mg per tablet, Take 1 tablet by mouth every 4 (four) hours as needed for moderate pain Max Daily Amount: 6 tablets, Disp: 180 tablet, Rfl: 0    pentoxifylline (TRENtal) 400 mg ER tablet, Take 400 mg by mouth 3 (three) times a day with meals  , Disp: , Rfl:     pregabalin (LYRICA) 100 mg capsule, Take 100 mg by mouth daily  , Disp: , Rfl:     simvastatin (ZOCOR) 80 mg tablet, Take 80 mg by mouth daily at bedtime  , Disp: , Rfl:     tiotropium (SPIRIVA) 18 mcg inhalation capsule, Place 1 capsule (18 mcg total) into inhaler and inhale daily, Disp: 30 capsule, Rfl: 6  Allergies   Allergen Reactions    Penicillins Swelling     Legs swell up     Vitals:    03/15/18 0952   BP: 144/64   Pulse: 64   Resp: 18   Temp: 98 3 °F (36 8 °C)         Physical Exam   Constitutional: She is oriented to person, place, and time  She appears well-developed  HENT:   Head: Normocephalic  Eyes: Pupils are equal, round, and reactive to light  Neck: Neck supple  Cardiovascular: Normal rate  No murmur heard  Pulmonary/Chest: No respiratory distress  She has no wheezes  She has no rales  Abdominal: Soft  She exhibits no distension  There is no tenderness  There is no rebound     Musculoskeletal: She exhibits no edema  Lymphadenopathy:     She has no cervical adenopathy  Neurological: She is alert and oriented to person, place, and time  She displays normal reflexes  Skin: Skin is warm  No rash noted  Psychiatric: She has a normal mood and affect  Thought content normal            Performance Status: ECOG/Zubrod/WHO: 2 - Symptomatic, <50% confined to bed    Labs:  CBC, Coags, BMP, Mg, Phos     Imaging  No results found  I reviewed the above laboratory and imaging data  Discussion/Summary: In summary, this is a 50-year-old female history of squamous cell carcinoma of the lung  She continues on Tecentriq  She tolerates this without difficulty  She had some recent increase in urinary frequency  She denies any dysuria or hematuria  ? Radiation related, doubtful  She has no nocturia  Clinically she is doing fairly well  She has slight increase in fatigue after radiation therapy  I expect this will improve  Blood work has been normal     We will move forward with immunotherapy as it has been ongoing  I will see her back in 1 month for review  Reimaging in 3 weeks  I reviewed the above with the patient and her

## 2018-03-17 DIAGNOSIS — E78.5 HYPERLIPIDEMIA, UNSPECIFIED HYPERLIPIDEMIA TYPE: Primary | ICD-10-CM

## 2018-03-17 RX ORDER — SIMVASTATIN 80 MG
TABLET ORAL
Qty: 90 TABLET | Refills: 0 | Status: SHIPPED | OUTPATIENT
Start: 2018-03-17 | End: 2018-06-20 | Stop reason: SDUPTHER

## 2018-03-18 DIAGNOSIS — J43.2 CENTRILOBULAR EMPHYSEMA (HCC): ICD-10-CM

## 2018-03-18 RX ORDER — ALBUTEROL SULFATE 2.5 MG/3ML
SOLUTION RESPIRATORY (INHALATION)
Qty: 375 ML | Refills: 1 | Status: SHIPPED | OUTPATIENT
Start: 2018-03-18 | End: 2018-09-18 | Stop reason: SDUPTHER

## 2018-03-20 RX ORDER — SODIUM CHLORIDE 9 MG/ML
20 INJECTION, SOLUTION INTRAVENOUS CONTINUOUS
Status: DISPENSED | OUTPATIENT
Start: 2018-03-27 | End: 2018-03-28

## 2018-03-27 ENCOUNTER — HOSPITAL ENCOUNTER (OUTPATIENT)
Dept: INFUSION CENTER | Facility: HOSPITAL | Age: 78
Discharge: HOME | End: 2018-03-27
Payer: MEDICARE

## 2018-03-27 VITALS
TEMPERATURE: 96.5 F | HEART RATE: 60 BPM | DIASTOLIC BLOOD PRESSURE: 60 MMHG | RESPIRATION RATE: 18 BRPM | SYSTOLIC BLOOD PRESSURE: 140 MMHG

## 2018-03-27 LAB
ALBUMIN SERPL BCP-MCNC: 3 G/DL (ref 3.5–5)
ALP SERPL-CCNC: 77 U/L (ref 46–116)
ALT SERPL W P-5'-P-CCNC: 14 U/L (ref 12–78)
ANION GAP SERPL CALCULATED.3IONS-SCNC: 0 MMOL/L (ref 4–13)
AST SERPL W P-5'-P-CCNC: 12 U/L (ref 5–45)
BASOPHILS # BLD AUTO: 0.02 THOUSANDS/ΜL (ref 0–0.1)
BASOPHILS NFR BLD AUTO: 0 % (ref 0–1)
BILIRUB SERPL-MCNC: 0.4 MG/DL (ref 0.2–1)
BUN SERPL-MCNC: 9 MG/DL (ref 5–25)
CALCIUM SERPL-MCNC: 8 MG/DL (ref 8.3–10.1)
CHLORIDE SERPL-SCNC: 103 MMOL/L (ref 100–108)
CO2 SERPL-SCNC: 40 MMOL/L (ref 21–32)
CREAT SERPL-MCNC: 0.62 MG/DL (ref 0.6–1.3)
EOSINOPHIL # BLD AUTO: 0.09 THOUSAND/ΜL (ref 0–0.61)
EOSINOPHIL NFR BLD AUTO: 2 % (ref 0–6)
ERYTHROCYTE [DISTWIDTH] IN BLOOD BY AUTOMATED COUNT: 11.9 % (ref 11.6–15.1)
GFR SERPL CREATININE-BSD FRML MDRD: 86 ML/MIN/1.73SQ M
GLUCOSE SERPL-MCNC: 157 MG/DL (ref 65–140)
HCT VFR BLD AUTO: 40.6 % (ref 34.8–46.1)
HGB BLD-MCNC: 12.3 G/DL (ref 11.5–15.4)
LYMPHOCYTES # BLD AUTO: 0.61 THOUSANDS/ΜL (ref 0.6–4.47)
LYMPHOCYTES NFR BLD AUTO: 11 % (ref 14–44)
MCH RBC QN AUTO: 30.9 PG (ref 26.8–34.3)
MCHC RBC AUTO-ENTMCNC: 30.3 G/DL (ref 31.4–37.4)
MCV RBC AUTO: 102 FL (ref 82–98)
MONOCYTES # BLD AUTO: 0.49 THOUSAND/ΜL (ref 0.17–1.22)
MONOCYTES NFR BLD AUTO: 9 % (ref 4–12)
NEUTROPHILS # BLD AUTO: 4.14 THOUSANDS/ΜL (ref 1.85–7.62)
NEUTS SEG NFR BLD AUTO: 78 % (ref 43–75)
PLATELET # BLD AUTO: 119 THOUSANDS/UL (ref 149–390)
PMV BLD AUTO: 10.6 FL (ref 8.9–12.7)
POTASSIUM SERPL-SCNC: 3.9 MMOL/L (ref 3.5–5.3)
PROT SERPL-MCNC: 6.2 G/DL (ref 6.4–8.2)
RBC # BLD AUTO: 3.98 MILLION/UL (ref 3.81–5.12)
SODIUM SERPL-SCNC: 143 MMOL/L (ref 136–145)
WBC # BLD AUTO: 5.35 THOUSAND/UL (ref 4.31–10.16)

## 2018-03-27 PROCEDURE — 85025 COMPLETE CBC W/AUTO DIFF WBC: CPT | Performed by: INTERNAL MEDICINE

## 2018-03-27 PROCEDURE — 96413 CHEMO IV INFUSION 1 HR: CPT

## 2018-03-27 PROCEDURE — 36593 DECLOT VASCULAR DEVICE: CPT

## 2018-03-27 PROCEDURE — 80053 COMPREHEN METABOLIC PANEL: CPT | Performed by: INTERNAL MEDICINE

## 2018-03-27 RX ADMIN — ATEZOLIZUMAB 1200 MG: 1200 INJECTION, SOLUTION INTRAVENOUS at 09:34

## 2018-03-27 RX ADMIN — ALTEPLASE 2 MG: 2.2 INJECTION, POWDER, LYOPHILIZED, FOR SOLUTION INTRAVENOUS at 10:20

## 2018-03-27 RX ADMIN — Medication 300 UNITS: at 11:30

## 2018-03-27 RX ADMIN — SODIUM CHLORIDE 20 ML/HR: 9 INJECTION, SOLUTION INTRAVENOUS at 08:50

## 2018-03-27 NOTE — PROGRESS NOTES
Pt finished treatment today and port did not have a blood return  Cath flow instilled at this time  Will recheck in 30minutes

## 2018-03-27 NOTE — PROGRESS NOTES
Pt tolerated treatment today with no adverse reactions  After one hr of cathflow pts port  has a positive blood return

## 2018-03-30 ENCOUNTER — TELEPHONE (OUTPATIENT)
Dept: PULMONOLOGY | Facility: CLINIC | Age: 78
End: 2018-03-30

## 2018-03-30 DIAGNOSIS — G89.29 OTHER CHRONIC PAIN: ICD-10-CM

## 2018-03-30 DIAGNOSIS — J44.9 CHRONIC OBSTRUCTIVE PULMONARY DISEASE, UNSPECIFIED COPD TYPE (HCC): Primary | ICD-10-CM

## 2018-03-30 RX ORDER — OXYCODONE HYDROCHLORIDE AND ACETAMINOPHEN 5; 325 MG/1; MG/1
1 TABLET ORAL EVERY 4 HOURS PRN
Qty: 180 TABLET | Refills: 0 | Status: SHIPPED | OUTPATIENT
Start: 2018-03-30 | End: 2018-05-01 | Stop reason: SDUPTHER

## 2018-03-30 NOTE — TELEPHONE ENCOUNTER
is calling requesting spiriva respimat # 90 days supply with [facility]  Iqra Almendarez

## 2018-04-02 NOTE — TELEPHONE ENCOUNTER
Dr Jennifer Blue, please place an order for Spiriva Respimat, medication is not in patient's list, send to [facility] 90 day supply

## 2018-04-05 ENCOUNTER — HOSPITAL ENCOUNTER (OUTPATIENT)
Dept: CT IMAGING | Facility: HOSPITAL | Age: 78
Discharge: HOME | End: 2018-04-05
Attending: INTERNAL MEDICINE
Payer: MEDICARE

## 2018-04-05 DIAGNOSIS — C79.72 MALIGNANT NEOPLASM METASTATIC TO LEFT ADRENAL GLAND (HCC): ICD-10-CM

## 2018-04-05 DIAGNOSIS — C34.11 MALIGNANT NEOPLASM OF UPPER LOBE OF RIGHT LUNG (HCC): ICD-10-CM

## 2018-04-05 PROCEDURE — 71260 CT THORAX DX C+: CPT

## 2018-04-05 PROCEDURE — 74177 CT ABD & PELVIS W/CONTRAST: CPT

## 2018-04-05 RX ADMIN — Medication 300 UNITS: at 09:38

## 2018-04-05 RX ADMIN — IOHEXOL 100 ML: 350 INJECTION, SOLUTION INTRAVENOUS at 09:40

## 2018-04-12 ENCOUNTER — OFFICE VISIT (OUTPATIENT)
Dept: HEMATOLOGY ONCOLOGY | Facility: CLINIC | Age: 78
End: 2018-04-12
Payer: MEDICARE

## 2018-04-12 VITALS
BODY MASS INDEX: 20.62 KG/M2 | DIASTOLIC BLOOD PRESSURE: 68 MMHG | RESPIRATION RATE: 16 BRPM | HEIGHT: 60 IN | HEART RATE: 87 BPM | WEIGHT: 105 LBS | TEMPERATURE: 97.8 F | SYSTOLIC BLOOD PRESSURE: 118 MMHG

## 2018-04-12 DIAGNOSIS — C34.11 MALIGNANT NEOPLASM OF UPPER LOBE OF RIGHT LUNG (HCC): Primary | ICD-10-CM

## 2018-04-12 PROCEDURE — 99214 OFFICE O/P EST MOD 30 MIN: CPT | Performed by: INTERNAL MEDICINE

## 2018-04-12 NOTE — LETTER
April 12, 2018     Debbie Martinez MD  [facility]    Patient: Ean Cr   YOB: 1940   Date of Visit: 4/12/2018       Dear Dr Leland Andre:    Thank you for referring Robert Cueto to me for evaluation  Below are my notes for this consultation  If you have questions, please do not hesitate to call me  I look forward to following your patient along with you  Sincerely,        Severino Mead DO        CC: No Recipients  Severino Mead DO  4/12/2018  1:31 PM  Sign at close encounter  Ean Cr  1940  Kunal Mckeon [address] [place] [address]  [phone]    Chief Complaint   Patient presents with    Follow-up           Oncology History    3/15/18 Dr Makayla Carmona, Hem Onc:     She continues on Tecentriq  She tolerates this without difficulty  She had some recent increase in urinary frequency  She denies any dysuria or hematuria  ? Radiation related, doubtful  She has no nocturia  Clinically she is doing fairly well  She has slight increase in fatigue after radiation therapy  I expect this will improve  Blood work has been normal     We will move forward with immunotherapy as it has been ongoing  I will see her back in 1 month for review  Reimaging in 3 weeks  4/5/18 ;  CT chest abd pelvis:   Right suprahilar mass and satellite nodule, not significant changed from prior examination  Slight progression of narrowing of anterior right upper lobe bronchus      Slight interval decrease in the size of metastatic lesion in the anterior limb of the left adrenal gland      No new discrete metastatic lesions identified in the chest, abdomen or pelvis          Malignant neoplasm of upper lobe of right lung ([address] )    7/30/2016 Initial Diagnosis     CT of the neck for evaluation of the carotid arteries incidentally showed an infiltrating mass in the right upper lobe extending to the hilum  PET-CT June 2016â patient was found to have a thyroid mass on physical examination  Ultrasound showed bilateral thyroid nodules  In July 2016 she underwent CAT scan of the neck for evaluation of the carotid arteries  Incidentally noted, infiltrating mass in the right upper lobe extending to the hilum was noted  4, 2016âPET/CT showed a right upper lobe mass measuring 4 8 cm, SUV 21 5  This extends to the right hilum  Right paratracheal and subcarinal nodes measure up to 12 mm  The left adrenal showed some hypermetabolism with SUV of 3 7, without discrete mass  Uptake in the right parotid gland is noted with SUV of 22 3 and a soft tissue nodule, measuring 11 mm  endoscopy and bronchoscopy showed 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  Lymph node biopsies did not show malignancy  and radiation therapy were not felt to be applicable  Due to background pulmonary dysfunction as well as the potential for left adrenal metastatic disease  Chemotherapy was chosen as primary therapy  Alternatively  6, 2016 started Abraxane 80 mg/m² day 1, 8, 15, carbo AUC-5, day 1 only, every 21 days  2017progressive disease noted  Tecentriq 1200 mg IV every 3 weeks initiated  Current Therapy: May 2017âprogressive disease noted  Tecentriq 1200 mg IV every 3 weeks initiated  Interval History: Has had back pain started about 4 days ago  Started after some housework, has been getting better with heating pad           9/6/2016 - 11/25/2016 Chemotherapy     Abraxane 80 mg/m2 day 1, 8, 15, carbo AUC-5, day 1 only, Q 21 days  5/3/2017 Progression     Progressive disease  5/16/2017 -  Chemotherapy     Tecentriq 1200 mg IV Q 3 weeks           2/21/2018 - 3/14/2018 Radiation     C1    Plan ID Energy Fractions Dose per Fraction (cGy) Total Dose Delivered (cGy) Elapsed Days   Abdomen 6X 15 / 15 250 3,750 21      Treatment dates:  C1: 2/21/2018 - 3/14/2018             Metastasis to adrenal gland ([facility] )    7/17/2017 Initial Diagnosis     Metastasis to adrenal gland (HCC)A mass in the anterior limb of the left adrenal gland with delayed postcontrast enhancement measuring 18 mm is either new or increasing from as far back as November 2016  The finding is suspicious for adrenal metastatic lesion superimposed upon   underlying bilateral adenomatous hyperplasia  History of Present Illness:  -Krissy Butler MD:  -Previous Therapy (if not listed in Oncology History):  -Current Therapy (if not listed in Oncology History):  -Disease Status:  -Interval History:  -Tumor Markers:    Review of Systems:  Review of Systems   Constitutional: Negative for appetite change, diaphoresis, fatigue and fever  HENT: Negative for sinus pain  Eyes: Negative for discharge  Respiratory: Negative for cough and shortness of breath  Cardiovascular: Negative for chest pain  Gastrointestinal: Negative for abdominal pain, constipation and diarrhea  Endocrine: Negative for cold intolerance  Genitourinary: Negative for difficulty urinating and hematuria  Musculoskeletal: Negative for joint swelling  Skin: Negative for rash  Allergic/Immunologic: Negative for environmental allergies  Neurological: Negative for dizziness and headaches  Hematological: Negative for adenopathy  Psychiatric/Behavioral: Negative for agitation         Patient Active Problem List   Diagnosis    Malignant neoplasm of upper lobe of right lung (HCC)    Asymptomatic carotid artery stenosis    Back pain, lumbosacral    Benign essential hypertension    Bursitis, ischial    Cardiomyopathy ([facility] )    Cataract of right eye    Chemotherapy-induced nausea    Chronic hypoxemic respiratory failure (HCC)    Chronic obstructive pulmonary disease (HCC)    Congestive heart failure (HCC)    Depression    DMII (diabetes mellitus, type 2) ([facility] )    Hyperlipidemia    Metastasis to adrenal gland (HCC)    Multiple thyroid nodules  Osteoporosis    Other chronic pain    Parotid adenoma    Psoriasis    Primary localized osteoarthrosis of the hip    PVD (peripheral vascular disease) (HCC)    Restless legs syndrome    Sciatica    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)    Seborrheic dermatitis of scalp     Past Medical History:   Diagnosis Date    Arthritis     Cataract of right eye 3/19/2015    CHF (congestive heart failure) (HCC)     Common cold     Coronary artery disease     Depression     Diabetes mellitus ([facility] )     Fracture of multiple pubic rami ([facility] ) 2015    Hyperlipidemia     Hypertension     [facility]     Multiple thyroid nodules 2016    Pulmonary nodule     Shortness of breath      Past Surgical History:   Procedure Laterality Date    BRONCHOSCOPY N/A 8/10/2016    Procedure: BRONCHOSCOPY FLEXIBLE;  Surgeon: Zelda Morocho MD;  [facility];  Service:    Lacy Latasha  SECTION      CHOLECYSTECTOMY      EYE SURGERY      HYSTERECTOMY      OR BRONCHOSCOPY NEEDLE BX TRACHEA MAIN STEM&/BRON N/A 8/10/2016    Procedure: ENDOBRONCHIAL ULTRASOUND (FROZEN SECTION) ; Surgeon: Zelda Morocho MD;  [facility];  Service: Thoracic    OR INSJ TUNNELED CTR VAD W/SUBQ PORT AGE 5 YR/> Left 2016    Procedure: INSERTION VENOUS PORT (PORT-A-CATH); Surgeon: Zleda Morocho MD;  [facility];  Service: Thoracic    TONSILLECTOMY       Family History   Problem Relation Age of Onset    Brain cancer Sister      Social History     Social History    Marital status: /Civil Union     Spouse name: N/A    Number of children: N/A    Years of education: N/A     Occupational History    Not on file       Social History Main Topics    Smoking status: Current Some Day Smoker     Packs/day: 0 25    Smokeless tobacco: Never Used    Alcohol use No    Drug use: No    Sexual activity: Not on file     Other Topics Concern    Not on file     Social History Narrative    No narrative on file Current Outpatient Prescriptions:     albuterol (2 5 mg/3 mL) 0 083 % nebulizer solution, INHALE CONTENTS OF 1 VIAL IN NEBULIZER FOUR TIMES A DAY IF NEEDED, Disp: 375 mL, Rfl: 1    azithromycin (ZITHROMAX) 250 mg tablet, TAKE 1 TAB EVERY MONDAY WEDNESDAY AND FRIDAY, Disp: , Rfl: 3    betamethasone, augmented, (DIPROLENE-AF) 0 05 % cream, Apply topically 2 (two) times a day, Disp: 50 g, Rfl: 1    fluticasone-salmeterol (ADVAIR) 250-50 mcg/dose inhaler, Inhale 1 puff every 12 (twelve) hours, Disp: 1 Inhaler, Rfl: 6    FREESTYLE LITE test strip, Test Blood sugar 3 times daily  Diagnosis: Type 2 Diabetes, Disp: 300 each, Rfl: 0    metoprolol tartrate (LOPRESSOR) 100 mg tablet, Take 100 mg by mouth daily  , Disp: , Rfl:     mometasone (ELOCON) 0 1 % lotion, Apply topically daily, Disp: 60 mL, Rfl: 3    oxyCODONE-acetaminophen (PERCOCET) 5-325 mg per tablet, Take 1 tablet by mouth every 4 (four) hours as needed for moderate pain Max Daily Amount: 6 tablets, Disp: 180 tablet, Rfl: 0    pentoxifylline (TRENtal) 400 mg ER tablet, Take 400 mg by mouth 3 (three) times a day with meals  , Disp: , Rfl:     pregabalin (LYRICA) 100 mg capsule, Take 100 mg by mouth daily  , Disp: , Rfl:     simvastatin (ZOCOR) 80 mg tablet, TAKE 1 TABLET DAILY, Disp: 90 tablet, Rfl: 0    Tiotropium Bromide Monohydrate (SPIRIVA RESPIMAT) 2 5 MCG/ACT AERS, Inhale 2 Act (5 mcg total) daily, Disp: 3 Inhaler, Rfl: 3  Allergies   Allergen Reactions    Penicillins Swelling     Legs swell up     Vitals:    04/12/18 1305   BP: 118/68   Pulse: 87   Resp: 16   Temp: 97 8 °F (36 6 °C)         Physical Exam   Constitutional: She is oriented to person, place, and time  She appears well-developed  HENT:   Head: Normocephalic  Eyes: Pupils are equal, round, and reactive to light  Neck: Neck supple  Cardiovascular: Normal rate  No murmur heard  Pulmonary/Chest: No respiratory distress  She has no wheezes  She has no rales     Abdominal: Soft  She exhibits no distension  There is no tenderness  There is no rebound  Musculoskeletal: She exhibits no edema  Lymphadenopathy:     She has no cervical adenopathy  Neurological: She is alert and oriented to person, place, and time  She displays normal reflexes  Skin: Skin is warm  No rash noted  Psychiatric: She has a normal mood and affect  Thought content normal            Performance Status: ECOG/Zubrod/WHO: 1 - Symptomatic but completely ambulatory    Labs:  CBC, Coags, BMP, Mg, Phos     Imaging  Ct Chest Abdomen Pelvis W Contrast    Result Date: 4/6/2018  Narrative: CT CHEST, ABDOMEN AND PELVIS WITH IV CONTRAST INDICATION:   C34 11: Malignant neoplasm of upper lobe, right bronchus or lung C79 72: Secondary malignant neoplasm of left adrenal gland  Malignant mesothelioma  COMPARISON: December 27, 2017  TECHNIQUE: CT examination of the chest, abdomen and pelvis was performed  In addition to portal venous phase postcontrast scanning through the abdomen and pelvis, delayed phase postcontrast scanning was performed through the upper abdominal viscera  Axial, sagittal, and coronal 2D reformatted images were created from the source data and submitted for interpretation  Radiation dose length product (DLP) for this visit:  361 mGy-cm   This examination, like all CT scans performed in the [facility], was performed utilizing techniques to minimize radiation dose exposure, including the use of iterative reconstruction and automated exposure control  IV Contrast:  100 mL of iohexol (OMNIPAQUE) Enteric Contrast: Enteric contrast was administered  FINDINGS: CHEST LUNGS:  Stable right suprahilar mass measures 2 5 x 1 1 cm on image 24 of series 3, not significant changed from 2 5 x 1 0 cm on most recent prior examination  No significant change in conspicuity of satellite nodule in the periphery of the lateral right upper lobe measuring 6 mm on image 23 of series 3    Reticular changes with minor tubular bronchiectatic changes patchy in distribution throughout the right upper lobe appears to represent the sequela of prior postobstructive pneumonia and is similar from previous examination  Narrowing of anterior right upper lobe bronchus appears slightly progressed, best demonstrated on image 24 of series 3 as compared with image 23 of series 3 on the previous examination  Stable left lower lobe linear scarring  No other new pulmonary nodule or mass  PLEURA:  Unremarkable  HEART/GREAT VESSELS:  Unremarkable for patient's age  MEDIASTINUM AND CARLO:  Prominent right hilar node unchanged, 17 x 13 mm in size compared with 17 x 11 mm when measured using similar technique on previous examination  No other adenopathy  CHEST WALL AND LOWER NECK:  Heterogeneous thyroid with stable bilateral thyroid nodules  Left chest wall port again identified  Again noted is a prominent lipoma either within or deep to the left latissimus dorsi musculature in the posterolateral left chest wall, approximately 7 cm  ABDOMEN LIVER/BILIARY TREE:  Unremarkable  GALLBLADDER:  Gallbladder is surgically absent  SPLEEN:  Unremarkable  PANCREAS:  Pancreas is atrophic but otherwise unremarkable  ADRENAL GLANDS: [Adrenal mass has slightly decreased in size currently measuring 2 2 x 2 0 cm on image 55 of series 2 compared with 2 8 x 2 4 cm on the previous examination  Also again noted is low-density nodular thickening of both adrenal glands probably representing adenomas hyperplasia  KIDNEYS/URETERS:  Unchanged bilateral renal cysts  Bilateral renal scarring again noted  STOMACH AND BOWEL:  Sigmoid diverticulosis without acute diverticulitis  Stomach and bowel appear otherwise unremarkable  APPENDIX:  No findings to suggest appendicitis  ABDOMINOPELVIC CAVITY:  Surgical clips throughout the retroperitoneum in keeping with a history of prior retroperitoneal surgery  No lymphadenopathy  No ascites  No free intracranial air  VESSELS:  Extensive atherosclerotic change  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  Stable chronically occluded aortobifemoral grafts  PELVIS REPRODUCTIVE ORGANS:  Unremarkable for patient's age  URINARY BLADDER:  Unremarkable  ABDOMINAL WALL/INGUINAL REGIONS:  Unremarkable  OSSEOUS STRUCTURES:  No acute fracture or destructive osseous lesion  Again noted is sclerosis surrounding a prominent central inferior endplate L1 Schmorl's node  Chronic healed fractures of right pubic symphysis and ischium noted  Impression: Right suprahilar mass and satellite nodule, not significant changed from prior examination  Slight progression of narrowing of anterior right upper lobe bronchus  Slight interval decrease in the size of metastatic lesion in the anterior limb of the left adrenal gland  No new discrete metastatic lesions identified in the chest, abdomen or pelvis  Workstation performed: [id number]     I reviewed the above laboratory and imaging data  Discussion/Summary:  In summary, this is a 76-year-old female history of squamous cell carcinoma of the lung  She continues on Tecentriq Q 3 weeks  She has tolerated this without difficulty  Chemistry is essentially normal, glucose 152  CBC shows stable thrombocytopenia 100-140  This had been present prior to Tecentriq initiation, however  It may represent mild background ITP  In any event observation remains appropriate  She has no bleeding symptoms  CT scan shows essentially stable disease  There is some improvement in the previously noted adrenal mass  Slight narrowing of the right upper anterior bronchus is noted  No new disease is noted  I reviewed the above findings and significance with the patient and her   They voiced understanding and agreement

## 2018-04-12 NOTE — PROGRESS NOTES
Melina Barajas  1940  [address] [facility] [address] [address] [phone]    Chief Complaint   Patient presents with    Follow-up           Oncology History    3/15/18 Dr Jose Luis Amaya, Hem Onc:     She continues on Tecentriq  She tolerates this without difficulty  She had some recent increase in urinary frequency  She denies any dysuria or hematuria  ? Radiation related, doubtful  She has no nocturia  Clinically she is doing fairly well  She has slight increase in fatigue after radiation therapy  I expect this will improve  Blood work has been normal     We will move forward with immunotherapy as it has been ongoing  I will see her back in 1 month for review  Reimaging in 3 weeks  4/5/18 ;  CT chest abd pelvis:   Right suprahilar mass and satellite nodule, not significant changed from prior examination  Slight progression of narrowing of anterior right upper lobe bronchus      Slight interval decrease in the size of metastatic lesion in the anterior limb of the left adrenal gland      No new discrete metastatic lesions identified in the chest, abdomen or pelvis  Malignant neoplasm of upper lobe of right lung ([address] )    7/30/2016 Initial Diagnosis     CT of the neck for evaluation of the carotid arteries incidentally showed an infiltrating mass in the right upper lobe extending to the hilum  PET-CT June 2016â patient was found to have a thyroid mass on physical examination  Ultrasound showed bilateral thyroid nodules  In July 2016 she underwent CAT scan of the neck for evaluation of the carotid arteries  Incidentally noted, infiltrating mass in the right upper lobe extending to the hilum was noted  4, 2016âPET/CT showed a right upper lobe mass measuring 4 8 cm, SUV 21 5  This extends to the right hilum  Right paratracheal and subcarinal nodes measure up to 12 mm   The left adrenal showed some hypermetabolism with SUV of 3 7, without discrete mass  Uptake in the right parotid gland is noted with SUV of 22 3 and a soft tissue nodule, measuring 11 mm  endoscopy and bronchoscopy showed 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  Lymph node biopsies did not show malignancy  and radiation therapy were not felt to be applicable  Due to background pulmonary dysfunction as well as the potential for left adrenal metastatic disease  Chemotherapy was chosen as primary therapy  Alternatively  6, 2016 started Abraxane 80 mg/m² day 1, 8, 15, carbo AUC-5, day 1 only, every 21 days  2017progressive disease noted  Tecentriq 1200 mg IV every 3 weeks initiated  Current Therapy: May 2017âprogressive disease noted  Tecentriq 1200 mg IV every 3 weeks initiated  Interval History: Has had back pain started about 4 days ago  Started after some housework, has been getting better with heating pad           9/6/2016 - 11/25/2016 Chemotherapy     Abraxane 80 mg/m2 day 1, 8, 15, carbo AUC-5, day 1 only, Q 21 days  5/3/2017 Progression     Progressive disease  5/16/2017 -  Chemotherapy     Tecentriq 1200 mg IV Q 3 weeks  2/21/2018 - 3/14/2018 Radiation     C1    Plan ID Energy Fractions Dose per Fraction (cGy) Total Dose Delivered (cGy) Elapsed Days   Abdomen 6X 15 / 15 250 3,750 21      Treatment dates:  C1: 2/21/2018 - 3/14/2018             Metastasis to adrenal gland ([facility] [address] )    7/17/2017 Initial Diagnosis     Metastasis to adrenal gland (HCC)A mass in the anterior limb of the left adrenal gland with delayed postcontrast enhancement measuring 18 mm is either new or increasing from as far back as November 2016  The finding is suspicious for adrenal metastatic lesion superimposed upon   underlying bilateral adenomatous hyperplasia                 History of Present Illness:  -Da Parikh MD:  -Previous Therapy (if not listed in Oncology History):  -Current Therapy (if not listed in Oncology History):  -Disease Status:  -Interval History:  -Tumor Markers:    Review of Systems:  Review of Systems   Constitutional: Negative for appetite change, diaphoresis, fatigue and fever  HENT: Negative for sinus pain  Eyes: Negative for discharge  Respiratory: Negative for cough and shortness of breath  Cardiovascular: Negative for chest pain  Gastrointestinal: Negative for abdominal pain, constipation and diarrhea  Endocrine: Negative for cold intolerance  Genitourinary: Negative for difficulty urinating and hematuria  Musculoskeletal: Negative for joint swelling  Skin: Negative for rash  Allergic/Immunologic: Negative for environmental allergies  Neurological: Negative for dizziness and headaches  Hematological: Negative for adenopathy  Psychiatric/Behavioral: Negative for agitation         Patient Active Problem List   Diagnosis    Malignant neoplasm of upper lobe of right lung (HCC)    Asymptomatic carotid artery stenosis    Back pain, lumbosacral    Benign essential hypertension    Bursitis, ischial    Cardiomyopathy ([facility] [address] )    Cataract of right eye    Chemotherapy-induced nausea    Chronic hypoxemic respiratory failure (HCC)    Chronic obstructive pulmonary disease (HCC)    Congestive heart failure (HCC)    Depression    DMII (diabetes mellitus, type 2) (HCC)    Hyperlipidemia    Metastasis to adrenal gland (HCC)    Multiple thyroid nodules    Osteoporosis    Other chronic pain    Parotid adenoma    Psoriasis    Primary localized osteoarthrosis of the hip    PVD (peripheral vascular disease) ([address] )    Restless legs syndrome    Sciatica    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)    Seborrheic dermatitis of scalp     Past Medical History:   Diagnosis Date    Arthritis     Cataract of right eye 3/19/2015    CHF (congestive heart failure) (HCC)     Common cold     Coronary artery disease     Depression     Diabetes mellitus ([address] )     Fracture of multiple pubic rami ([facility] ) 2015    Hyperlipidemia     Hypertension     Lung cancer ([facility] )     Multiple thyroid nodules 2016    Pulmonary nodule     Shortness of breath      Past Surgical History:   Procedure Laterality Date    BRONCHOSCOPY N/A 8/10/2016    Procedure: BRONCHOSCOPY FLEXIBLE;  Surgeon: Connie Tellez MD;  [facility];  Service:    Reesa Reichmann  SECTION      CHOLECYSTECTOMY      EYE SURGERY      HYSTERECTOMY      VA BRONCHOSCOPY NEEDLE BX TRACHEA MAIN STEM&/BRON N/A 8/10/2016    Procedure: ENDOBRONCHIAL ULTRASOUND (FROZEN SECTION) ; Surgeon: Connie Tellez MD;  [facility]: [facility];  Service: Thoracic    VA INSJ TUNNELED CTR VAD W/SUBQ PORT AGE 5 YR/> Left 2016    Procedure: INSERTION VENOUS PORT (PORT-A-CATH); Surgeon: Connie Tellez MD;  [facility]: [facility];  Service: Thoracic    TONSILLECTOMY       Family History   Problem Relation Age of Onset    Brain cancer Sister      Social History     Social History    Marital status: /Civil Union     Spouse name: N/A    Number of children: N/A    Years of education: N/A     Occupational History    Not on file       Social History Main Topics    Smoking status: Current Some Day Smoker     Packs/day: 0 25    Smokeless tobacco: Never Used    Alcohol use No    Drug use: No    Sexual activity: Not on file     Other Topics Concern    Not on file     Social History Narrative    No narrative on file       Current Outpatient Prescriptions:     albuterol (2 5 mg/3 mL) 0 083 % nebulizer solution, INHALE CONTENTS OF 1 VIAL IN NEBULIZER FOUR TIMES A DAY IF NEEDED, Disp: 375 mL, Rfl: 1    azithromycin (ZITHROMAX) 250 mg tablet, TAKE 1 TAB EVERY  AND FRIDAY, Disp: , Rfl: 3    betamethasone, augmented, (DIPROLENE-AF) 0 05 % cream, Apply topically 2 (two) times a day, Disp: 50 g, Rfl: 1    fluticasone-salmeterol (ADVAIR) 250-50 mcg/dose inhaler, Inhale 1 puff every 12 (twelve) hours, Disp: 1 Inhaler, Rfl: 6    FREESTYLE LITE test strip, Test Blood sugar 3 times daily  Diagnosis: Type 2 Diabetes, Disp: 300 each, Rfl: 0    metoprolol tartrate (LOPRESSOR) 100 mg tablet, Take 100 mg by mouth daily  , Disp: , Rfl:     mometasone (ELOCON) 0 1 % lotion, Apply topically daily, Disp: 60 mL, Rfl: 3    oxyCODONE-acetaminophen (PERCOCET) 5-325 mg per tablet, Take 1 tablet by mouth every 4 (four) hours as needed for moderate pain Max Daily Amount: 6 tablets, Disp: 180 tablet, Rfl: 0    pentoxifylline (TRENtal) 400 mg ER tablet, Take 400 mg by mouth 3 (three) times a day with meals  , Disp: , Rfl:     pregabalin (LYRICA) 100 mg capsule, Take 100 mg by mouth daily  , Disp: , Rfl:     simvastatin (ZOCOR) 80 mg tablet, TAKE 1 TABLET DAILY, Disp: 90 tablet, Rfl: 0    Tiotropium Bromide Monohydrate (SPIRIVA RESPIMAT) 2 5 MCG/ACT AERS, Inhale 2 Act (5 mcg total) daily, Disp: 3 Inhaler, Rfl: 3  Allergies   Allergen Reactions    Penicillins Swelling     Legs swell up     Vitals:    04/12/18 1305   BP: 118/68   Pulse: 87   Resp: 16   Temp: 97 8 °F (36 6 °C)         Physical Exam   Constitutional: She is oriented to person, place, and time  She appears well-developed  HENT:   Head: Normocephalic  Eyes: Pupils are equal, round, and reactive to light  Neck: Neck supple  Cardiovascular: Normal rate  No murmur heard  Pulmonary/Chest: No respiratory distress  She has no wheezes  She has no rales  Abdominal: Soft  She exhibits no distension  There is no tenderness  There is no rebound  Musculoskeletal: She exhibits no edema  Lymphadenopathy:     She has no cervical adenopathy  Neurological: She is alert and oriented to person, place, and time  She displays normal reflexes  Skin: Skin is warm  No rash noted  Psychiatric: She has a normal mood and affect   Thought content normal            Performance Status: ECOG/Zubrod/WHO: 1 - Symptomatic but completely ambulatory    Labs:  CBC, Coags, BMP, Mg, Phos     Imaging  Ct Chest Abdomen Pelvis W Contrast    Result Date: 4/6/2018  Narrative: CT CHEST, ABDOMEN AND PELVIS WITH IV CONTRAST INDICATION:   C34 11: Malignant neoplasm of upper lobe, right bronchus or lung C79 72: Secondary malignant neoplasm of left adrenal gland  Malignant mesothelioma  COMPARISON: December 27, 2017  TECHNIQUE: CT examination of the chest, abdomen and pelvis was performed  In addition to portal venous phase postcontrast scanning through the abdomen and pelvis, delayed phase postcontrast scanning was performed through the upper abdominal viscera  Axial, sagittal, and coronal 2D reformatted images were created from the source data and submitted for interpretation  Radiation dose length product (DLP) for this visit:  361 mGy-cm   This examination, like all CT scans [facility], was performed utilizing techniques to minimize radiation dose exposure, including the use of iterative reconstruction and automated exposure control  IV Contrast:  100 mL of iohexol (OMNIPAQUE) Enteric Contrast: Enteric contrast was administered  FINDINGS: CHEST LUNGS:  Stable right suprahilar mass measures 2 5 x 1 1 cm on image 24 of series 3, not significant changed from 2 5 x 1 0 cm on most recent prior examination  No significant change in conspicuity of satellite nodule in the periphery of the lateral right upper lobe measuring 6 mm on image 23 of series 3  Reticular changes with minor tubular bronchiectatic changes patchy in distribution throughout the right upper lobe appears to represent the sequela of prior postobstructive pneumonia and is similar from previous examination  Narrowing of anterior right upper lobe bronchus appears slightly progressed, best demonstrated on image 24 of series 3 as compared with image 23 of series 3 on the previous examination  Stable left lower lobe linear scarring  No other new pulmonary nodule or mass  PLEURA:  Unremarkable   HEART/GREAT VESSELS:  Unremarkable for patient's age  MEDIASTINUM AND CARLO:  Prominent right hilar node unchanged, 17 x 13 mm in size compared with 17 x 11 mm when measured using similar technique on previous examination  No other adenopathy  CHEST WALL AND LOWER NECK:  Heterogeneous thyroid with stable bilateral thyroid nodules  Left chest wall port again identified  Again noted is a prominent lipoma either within or deep to the left latissimus dorsi musculature in the posterolateral left chest wall, approximately 7 cm  ABDOMEN LIVER/BILIARY TREE:  Unremarkable  GALLBLADDER:  Gallbladder is surgically absent  SPLEEN:  Unremarkable  PANCREAS:  Pancreas is atrophic but otherwise unremarkable  ADRENAL GLANDS: [Adrenal mass has slightly decreased in size currently measuring 2 2 x 2 0 cm on image 55 of series 2 compared with 2 8 x 2 4 cm on the previous examination  Also again noted is low-density nodular thickening of both adrenal glands probably representing adenomas hyperplasia  KIDNEYS/URETERS:  Unchanged bilateral renal cysts  Bilateral renal scarring again noted  STOMACH AND BOWEL:  Sigmoid diverticulosis without acute diverticulitis  Stomach and bowel appear otherwise unremarkable  APPENDIX:  No findings to suggest appendicitis  ABDOMINOPELVIC CAVITY:  Surgical clips throughout the retroperitoneum in keeping with a history of prior retroperitoneal surgery  No lymphadenopathy  No ascites  No free intracranial air  VESSELS:  Extensive atherosclerotic change  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  Stable chronically occluded aortobifemoral grafts  PELVIS REPRODUCTIVE ORGANS:  Unremarkable for patient's age  URINARY BLADDER:  Unremarkable  ABDOMINAL WALL/INGUINAL REGIONS:  Unremarkable  OSSEOUS STRUCTURES:  No acute fracture or destructive osseous lesion  Again noted is sclerosis surrounding a prominent central inferior endplate L1 Schmorl's node  Chronic healed fractures of right pubic symphysis and ischium noted  Impression: Right suprahilar mass and satellite nodule, not significant changed from prior examination  Slight progression of narrowing of anterior right upper lobe bronchus  Slight interval decrease in the size of metastatic lesion in the anterior limb of the left adrenal gland  No new discrete metastatic lesions identified in the chest, abdomen or pelvis  Workstation performed: [id number]     I reviewed the above laboratory and imaging data  Discussion/Summary:  In summary, this is a 74-year-old female history of squamous cell carcinoma of the lung  She continues on Tecentriq Q 3 weeks  She has tolerated this without difficulty  Chemistry is essentially normal, glucose 152  CBC shows stable thrombocytopenia 100-140  This had been present prior to Tecentriq initiation, however  It may represent mild background ITP  In any event observation remains appropriate  She has no bleeding symptoms  CT scan shows essentially stable disease  There is some improvement in the previously noted adrenal mass  Slight narrowing of the right upper anterior bronchus is noted  No new disease is noted  I reviewed the above findings and significance with the patient and her   They voiced understanding and agreement

## 2018-04-13 ENCOUNTER — RADIATION ONCOLOGY FOLLOW-UP (OUTPATIENT)
Dept: RADIATION ONCOLOGY | Facility: HOSPITAL | Age: 78
End: 2018-04-13

## 2018-04-13 ENCOUNTER — APPOINTMENT (OUTPATIENT)
Dept: RADIATION ONCOLOGY | Facility: HOSPITAL | Age: 78
End: 2018-04-13
Attending: RADIOLOGY
Payer: MEDICARE

## 2018-04-13 VITALS
SYSTOLIC BLOOD PRESSURE: 122 MMHG | TEMPERATURE: 98.3 F | HEART RATE: 84 BPM | WEIGHT: 104.4 LBS | OXYGEN SATURATION: 92 % | BODY MASS INDEX: 19.21 KG/M2 | HEIGHT: 62 IN | RESPIRATION RATE: 24 BRPM | DIASTOLIC BLOOD PRESSURE: 60 MMHG

## 2018-04-13 DIAGNOSIS — C79.72 MALIGNANT NEOPLASM METASTATIC TO LEFT ADRENAL GLAND (HCC): Primary | ICD-10-CM

## 2018-04-13 PROCEDURE — 99214 OFFICE O/P EST MOD 30 MIN: CPT | Performed by: RADIOLOGY

## 2018-04-13 RX ORDER — SODIUM CHLORIDE 9 MG/ML
20 INJECTION, SOLUTION INTRAVENOUS CONTINUOUS
Status: DISPENSED | OUTPATIENT
Start: 2018-04-17 | End: 2018-04-18

## 2018-04-13 NOTE — PROGRESS NOTES
Follow-Up - Radiation Oncology   Clarisa Aguilar 1940 77 y o  female [id number]      History of Present Illness   No matching staging information was found for the patient  HPI: Clarisa Aguilar returns today for routine scheduled follow-up visit  Interval History: 3/15/18 Dr Adilene Irby, Hem Onc:     She continues on Tecentriq  She tolerates this without difficulty  She had some recent increase in urinary frequency  She denies any dysuria or hematuria  ? Radiation related, doubtful  She has no nocturia  Clinically she is doing fairly well  She has slight increase in fatigue after radiation therapy  I expect this will improve  Blood work has been normal     We will move forward with immunotherapy as it has been ongoing  I will see her back in 1 month for review  Reimaging in 3 weeks  4/5/18 ;  CT chest abd pelvis:   Right suprahilar mass and satellite nodule, not significant changed from prior examination  Slight progression of narrowing of anterior right upper lobe bronchus      Slight interval decrease in the size of metastatic lesion in the anterior limb of the left adrenal gland      No new discrete metastatic lesions identified in the chest, abdomen or pelvis  Today:  The patient presents today without new complaints  She denies any abdominal pain nausea or vomiting  Historical Information   Previous Oncology History:      Malignant neoplasm of upper lobe of right lung ([address] )    7/30/2016 Initial Diagnosis     CT of the neck for evaluation of the carotid arteries incidentally showed an infiltrating mass in the right upper lobe extending to the hilum  PET-CT June 2016 patient was found to have a thyroid mass on physical examination  Ultrasound showed bilateral thyroid nodules  In July 2016 she underwent CAT scan of the neck for evaluation of the carotid arteries  Incidentally noted, infiltrating mass in the right upper lobe extending to the hilum was noted   4, 2016âPET/CT showed a right upper lobe mass measuring 4 8 cm, SUV 21 5  This extends to the right hilum  Right paratracheal and subcarinal nodes measure up to 12 mm  The left adrenal showed some hypermetabolism with SUV of 3 7, without discrete mass  Uptake in the right parotid gland is noted with SUV of 22 3 and a soft tissue nodule, measuring 11 mm  endoscopy and bronchoscopy showed 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  Lymph node biopsies did not show malignancy  and radiation therapy were not felt to be applicable  Due to background pulmonary dysfunction as well as the potential for left adrenal metastatic disease  Chemotherapy was chosen as primary therapy  Alternatively  6, 2016 started Abraxane 80 mg/m² day 1, 8, 15, carbo AUC-5, day 1 only, every 21 days  2017progressive disease noted  Tecentriq 1200 mg IV every 3 weeks initiated  Current Therapy: May 2017progressive disease noted  Tecentriq 1200 mg IV every 3 weeks initiated  Interval History: Has had back pain started about 4 days ago  Started after some housework, has been getting better with heating pad           9/6/2016 - 11/25/2016 Chemotherapy     Abraxane 80 mg/m2 day 1, 8, 15, carbo AUC-5, day 1 only, Q 21 days  5/3/2017 Progression     Progressive disease  5/16/2017 -  Chemotherapy     Tecentriq 1200 mg IV Q 3 weeks  2/21/2018 - 3/14/2018 Radiation     C1    Plan ID Energy Fractions Dose per Fraction (cGy) Total Dose Delivered (cGy) Elapsed Days   Abdomen 6X 15 / 15 250 3,750 21      Treatment dates:  C1: 2/21/2018 - 3/14/2018             Metastasis to adrenal gland ([facility] [address] )    7/17/2017 Initial Diagnosis     Metastasis to adrenal gland (HCC)A mass in the anterior limb of the left adrenal gland with delayed postcontrast enhancement measuring 18 mm is either new or increasing from as far back as November 2016    The finding is suspicious for adrenal metastatic lesion superimposed upon   underlying bilateral adenomatous hyperplasia  Past Medical History:   Diagnosis Date    Arthritis     Cataract of right eye 3/19/2015    CHF (congestive heart failure) (HCC)     Common cold     Coronary artery disease     Depression     Diabetes mellitus ([facility] [address] )     Fracture of multiple pubic rami ([facility] [address] ) 2015    History of radiation therapy     Hyperlipidemia     Hypertension     [facility]     Multiple thyroid nodules 2016    Pulmonary nodule     Shortness of breath       Past Surgical History:   Procedure Laterality Date    BRONCHOSCOPY N/A 8/10/2016    Procedure: BRONCHOSCOPY FLEXIBLE;  Surgeon: Radha Clark MD;  [facility]: [facility];  Service:    Areta Emmer  SECTION      CHOLECYSTECTOMY      EYE SURGERY      HYSTERECTOMY      IL BRONCHOSCOPY NEEDLE BX TRACHEA MAIN STEM&/BRON N/A 8/10/2016    Procedure: ENDOBRONCHIAL ULTRASOUND (FROZEN SECTION) ; Surgeon: Radha Clark MD;  Location: [facility];  Service: Thoracic    IL INSJ TUNNELED CTR VAD W/SUBQ PORT AGE 5 YR/> Left 2016    Procedure: INSERTION VENOUS PORT (PORT-A-CATH);   Surgeon: Radha Clark MD;  [facility]: [facility];  Service: Thoracic    TONSILLECTOMY         Social History   History   Alcohol Use No     History   Drug Use No     History   Smoking Status    Current Some Day Smoker    Packs/day: 0 25   Smokeless Tobacco    Never Used         Meds/Allergies     Current Outpatient Prescriptions:     albuterol (2 5 mg/3 mL) 0 083 % nebulizer solution, INHALE CONTENTS OF 1 VIAL IN NEBULIZER FOUR TIMES A DAY IF NEEDED, Disp: 375 mL, Rfl: 1    azithromycin (ZITHROMAX) 250 mg tablet, TAKE 1 TAB EVERY  AND FRIDAY, Disp: , Rfl: 3    betamethasone, augmented, (DIPROLENE-AF) 0 05 % cream, Apply topically 2 (two) times a day, Disp: 50 g, Rfl: 1    fluticasone-salmeterol (ADVAIR) 250-50 mcg/dose inhaler, Inhale 1 puff every 12 (twelve) hours, Disp: 1 Inhaler, Rfl: 6    FREESTYLE LITE test strip, Test Blood sugar 3 times daily  Diagnosis: Type 2 Diabetes, Disp: 300 each, Rfl: 0    metoprolol tartrate (LOPRESSOR) 100 mg tablet, Take 100 mg by mouth daily  , Disp: , Rfl:     mometasone (ELOCON) 0 1 % lotion, Apply topically daily, Disp: 60 mL, Rfl: 3    oxyCODONE-acetaminophen (PERCOCET) 5-325 mg per tablet, Take 1 tablet by mouth every 4 (four) hours as needed for moderate pain Max Daily Amount: 6 tablets, Disp: 180 tablet, Rfl: 0    pentoxifylline (TRENtal) 400 mg ER tablet, Take 400 mg by mouth 3 (three) times a day with meals  , Disp: , Rfl:     pregabalin (LYRICA) 100 mg capsule, Take 100 mg by mouth daily  , Disp: , Rfl:     simvastatin (ZOCOR) 80 mg tablet, TAKE 1 TABLET DAILY, Disp: 90 tablet, Rfl: 0    Tiotropium Bromide Monohydrate (SPIRIVA RESPIMAT) 2 5 MCG/ACT AERS, Inhale 2 Act (5 mcg total) daily, Disp: 3 Inhaler, Rfl: 3  No current facility-administered medications for this visit  Facility-Administered Medications Ordered in Other Visits:     [START ON 4/17/2018] alteplase (CATHFLO) injection 2 mg, 2 mg, Intracatheter, PRN, Janeth Felix, DO    [START ON 4/17/2018] Atezolizumab 1,200 mg in sodium chloride 0 9 % 250 mL IVPB, 1,200 mg, Intravenous, Once, Janeth Felix, DO    [START ON 4/17/2018] heparin lock flush 100 units/mL injection 300 Units, 300 Units, Intracatheter, Q1H PRN, Janeth Rosaless, DO    [START ON 4/17/2018] sodium chloride 0 9 % infusion, 20 mL/hr, Intravenous, Continuous, Janeth Felix, DO  Allergies   Allergen Reactions    Penicillins Swelling     Legs swell up         Review of Systems  Review of Systems   Constitutional: Negative  Eyes: Negative  Respiratory: Positive for cough (WALTER) and shortness of breath          3 L NC  Gastrointestinal: Negative  Endocrine: Negative  Genitourinary:        Nocturia x 1  Musculoskeletal: Negative  Skin: Positive for rash (scalp and buttocks  )  Negative for pallor (scalp)  Allergic/Immunologic: Negative      Neurological: Negative  Hematological: Negative  Psychiatric/Behavioral: Negative  Objective   /60 (BP Location: Left arm, Patient Position: Sitting, Cuff Size: Standard)   Pulse 84   Temp 98 3 °F (36 8 °C) (Temporal)   Resp (!) 24   Ht 5' 1 5" (1 562 m)   Wt 47 4 kg (104 lb 6 4 oz)   SpO2 92%   BMI 19 41 kg/m²      Karnofsky: 70 - Cares for self; unable to carry on normal activity or do normal work      Physical Exam  The patient presents today no apparent distress  Sclera anicteric  Normal respiratory effort  She is on 3 L nasal cannula  Normal speech  Normal affect      Recent Results (from the past 672 hour(s))   CBC and differential    Collection Time: 03/27/18  8:49 AM   Result Value Ref Range    WBC 5 35 4 31 - 10 16 Thousand/uL    RBC 3 98 3 81 - 5 12 Million/uL    Hemoglobin 12 3 11 5 - 15 4 g/dL    Hematocrit 40 6 34 8 - 46 1 %     (H) 82 - 98 fL    MCH 30 9 26 8 - 34 3 pg    MCHC 30 3 (L) 31 4 - 37 4 g/dL    RDW 11 9 11 6 - 15 1 %    MPV 10 6 8 9 - 12 7 fL    Platelets 560 (L) 524 - 390 Thousands/uL    Neutrophils Relative 78 (H) 43 - 75 %    Lymphocytes Relative 11 (L) 14 - 44 %    Monocytes Relative 9 4 - 12 %    Eosinophils Relative 2 0 - 6 %    Basophils Relative 0 0 - 1 %    Neutrophils Absolute 4 14 1 85 - 7 62 Thousands/µL    Lymphocytes Absolute 0 61 0 60 - 4 47 Thousands/µL    Monocytes Absolute 0 49 0 17 - 1 22 Thousand/µL    Eosinophils Absolute 0 09 0 00 - 0 61 Thousand/µL    Basophils Absolute 0 02 0 00 - 0 10 Thousands/µL   Comprehensive metabolic panel    Collection Time: 03/27/18  8:49 AM   Result Value Ref Range    Sodium 143 136 - 145 mmol/L    Potassium 3 9 3 5 - 5 3 mmol/L    Chloride 103 100 - 108 mmol/L    CO2 40 (H) 21 - 32 mmol/L    Anion Gap 0 (L) 4 - 13 mmol/L    BUN 9 5 - 25 mg/dL    Creatinine 0 62 0 60 - 1 30 mg/dL    Glucose 157 (H) 65 - 140 mg/dL    Calcium 8 0 (L) 8 3 - 10 1 mg/dL    AST 12 5 - 45 U/L    ALT 14 12 - 78 U/L    Alkaline Phosphatase 77 46 - 116 U/L    Total Protein 6 2 (L) 6 4 - 8 2 g/dL    Albumin 3 0 (L) 3 5 - 5 0 g/dL    Total Bilirubin 0 40 0 20 - 1 00 mg/dL    eGFR 86 ml/min/1 73sq m         Ct Chest Abdomen Pelvis W Contrast    Result Date: 4/6/2018  Narrative: CT CHEST, ABDOMEN AND PELVIS WITH IV CONTRAST INDICATION:   C34 11: Malignant neoplasm of upper lobe, right bronchus or lung C79 72: Secondary malignant neoplasm of left adrenal gland  Malignant mesothelioma  COMPARISON: December 27, 2017  TECHNIQUE: CT examination of the chest, abdomen and pelvis was performed  In addition to portal venous phase postcontrast scanning through the abdomen and pelvis, delayed phase postcontrast scanning was performed through the upper abdominal viscera  Axial, sagittal, and coronal 2D reformatted images were created from the source data and submitted for interpretation  Radiation dose length product (DLP) for this visit:  361 mGy-cm   This examination, like all CT scans performed in the [facility], was performed utilizing techniques to minimize radiation dose exposure, including the use of iterative reconstruction and automated exposure control  IV Contrast:  100 mL of iohexol (OMNIPAQUE) Enteric Contrast: Enteric contrast was administered  FINDINGS: CHEST LUNGS:  Stable right suprahilar mass measures 2 5 x 1 1 cm on image 24 of series 3, not significant changed from 2 5 x 1 0 cm on most recent prior examination  No significant change in conspicuity of satellite nodule in the periphery of the lateral right upper lobe measuring 6 mm on image 23 of series 3  Reticular changes with minor tubular bronchiectatic changes patchy in distribution throughout the right upper lobe appears to represent the sequela of prior postobstructive pneumonia and is similar from previous examination    Narrowing of anterior right upper lobe bronchus appears slightly progressed, best demonstrated on image 24 of series 3 as compared with image 23 of series 3 on the previous examination  Stable left lower lobe linear scarring  No other new pulmonary nodule or mass  PLEURA:  Unremarkable  HEART/GREAT VESSELS:  Unremarkable for patient's age  MEDIASTINUM AND CARLO:  Prominent right hilar node unchanged, 17 x 13 mm in size compared with 17 x 11 mm when measured using similar technique on previous examination  No other adenopathy  CHEST WALL AND LOWER NECK:  Heterogeneous thyroid with stable bilateral thyroid nodules  Left chest wall port again identified  Again noted is a prominent lipoma either within or deep to the left latissimus dorsi musculature in the posterolateral left chest wall, approximately 7 cm  ABDOMEN LIVER/BILIARY TREE:  Unremarkable  GALLBLADDER:  Gallbladder is surgically absent  SPLEEN:  Unremarkable  PANCREAS:  Pancreas is atrophic but otherwise unremarkable  ADRENAL GLANDS: [Adrenal mass has slightly decreased in size currently measuring 2 2 x 2 0 cm on image 55 of series 2 compared with 2 8 x 2 4 cm on the previous examination  Also again noted is low-density nodular thickening of both adrenal glands probably representing adenomas hyperplasia  KIDNEYS/URETERS:  Unchanged bilateral renal cysts  Bilateral renal scarring again noted  STOMACH AND BOWEL:  Sigmoid diverticulosis without acute diverticulitis  Stomach and bowel appear otherwise unremarkable  APPENDIX:  No findings to suggest appendicitis  ABDOMINOPELVIC CAVITY:  Surgical clips throughout the retroperitoneum in keeping with a history of prior retroperitoneal surgery  No lymphadenopathy  No ascites  No free intracranial air  VESSELS:  Extensive atherosclerotic change  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  Stable chronically occluded aortobifemoral grafts  PELVIS REPRODUCTIVE ORGANS:  Unremarkable for patient's age  URINARY BLADDER:  Unremarkable  ABDOMINAL WALL/INGUINAL REGIONS:  Unremarkable  OSSEOUS STRUCTURES:  No acute fracture or destructive osseous lesion  Again noted is sclerosis surrounding a prominent central inferior endplate L1 Schmorl's node  Chronic healed fractures of right pubic symphysis and ischium noted  Impression: Right suprahilar mass and satellite nodule, not significant changed from prior examination  Slight progression of narrowing of anterior right upper lobe bronchus  Slight interval decrease in the size of metastatic lesion in the anterior limb of the left adrenal gland  No new discrete metastatic lesions identified in the chest, abdomen or pelvis  Workstation performed: [id number]           Assessment/Plan      Nahun Booker returns today for routine follow-up  Repeat imaging shows stable lung disease with slight decrease in size of the recently treated left adrenal metastasis  No new disease  She will now continue to follow closely with Medical Oncology for ongoing systemic therapy will return to our department on an as-needed basis

## 2018-04-13 NOTE — PROGRESS NOTES
Melina Barajas  1940   Ms Annie Davidson is a 77 y o  female       Chief Complaint   Patient presents with    Follow-up       No matching staging information was found for the patient  Malignant neoplasm of upper lobe of right lung ([address] )    7/30/2016 Initial Diagnosis     CT of the neck for evaluation of the carotid arteries incidentally showed an infiltrating mass in the right upper lobe extending to the hilum  PET-CT June 2016 patient was found to have a thyroid mass on physical examination  Ultrasound showed bilateral thyroid nodules  In July 2016 she underwent CAT scan of the neck for evaluation of the carotid arteries  Incidentally noted, infiltrating mass in the right upper lobe extending to the hilum was noted  4, 2016âPET/CT showed a right upper lobe mass measuring 4 8 cm, SUV 21 5  This extends to the right hilum  Right paratracheal and subcarinal nodes measure up to 12 mm  The left adrenal showed some hypermetabolism with SUV of 3 7, without discrete mass  Uptake in the right parotid gland is noted with SUV of 22 3 and a soft tissue nodule, measuring 11 mm  endoscopy and bronchoscopy showed 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  Lymph node biopsies did not show malignancy  and radiation therapy were not felt to be applicable  Due to background pulmonary dysfunction as well as the potential for left adrenal metastatic disease  Chemotherapy was chosen as primary therapy  Alternatively  6, 2016 started Abraxane 80 mg/m² day 1, 8, 15, carbo AUC-5, day 1 only, every 21 days  2017progressive disease noted  Tecentriq 1200 mg IV every 3 weeks initiated  Current Therapy: May 2017progressive disease noted  Tecentriq 1200 mg IV every 3 weeks initiated  Interval History: Has had back pain started about 4 days ago   Started after some housework, has been getting better with heating pad           9/6/2016 - 11/25/2016 Chemotherapy     Abraxane 80 mg/m2 day 1, 8, 15, carbo AUC-5, day 1 only, Q 21 days  5/3/2017 Progression     Progressive disease  5/16/2017 -  Chemotherapy     Tecentriq 1200 mg IV Q 3 weeks  2/21/2018 - 3/14/2018 Radiation     C1    Plan ID Energy Fractions Dose per Fraction (cGy) Total Dose Delivered (cGy) Elapsed Days   Abdomen 6X 15 / 15 250 3,750 21      Treatment dates:  C1: 2/21/2018 - 3/14/2018             Metastasis to adrenal gland ([address] )    7/17/2017 Initial Diagnosis     Metastasis to adrenal gland (HCC)A mass in the anterior limb of the left adrenal gland with delayed postcontrast enhancement measuring 18 mm is either new or increasing from as far back as November 2016  The finding is suspicious for adrenal metastatic lesion superimposed upon   underlying bilateral adenomatous hyperplasia  Clinical Trial: no        Interval History: 3/15/18 Dr Justina Murguia, Hem Onc:     She continues on Tecentriq  She tolerates this without difficulty  She had some recent increase in urinary frequency  She denies any dysuria or hematuria  ? Radiation related, doubtful  She has no nocturia  Clinically she is doing fairly well  She has slight increase in fatigue after radiation therapy  I expect this will improve  Blood work has been normal     We will move forward with immunotherapy as it has been ongoing  I will see her back in 1 month for review  Reimaging in 3 weeks  4/5/18 ;  CT chest abd pelvis:   Right suprahilar mass and satellite nodule, not significant changed from prior examination  Slight progression of narrowing of anterior right upper lobe bronchus      Slight interval decrease in the size of metastatic lesion in the anterior limb of the left adrenal gland      No new discrete metastatic lesions identified in the chest, abdomen or pelvis      Health Maintenance   Topic Date Due    URINE MICROALBUMIN  03/18/1949    Depression Screening PHQ-9  03/18/1951    DTaP,Tdap,and Td Vaccines (1 - Tdap) 03/18/1960    Fall Risk  2004    Urinary Incontinence Screening  2004    PNEUMOCOCCAL POLYSACCHARIDE VACCINE AGE 72 AND OVER  2004    GLAUCOMA SCREENING 67+ YR  2006    Diabetic Foot Exam  2016    INFLUENZA VACCINE  2017    OPHTHALMOLOGY EXAM  2018       Patient Active Problem List   Diagnosis    Malignant neoplasm of upper lobe of right lung ([address] )    Asymptomatic carotid artery stenosis    Back pain, lumbosacral    Benign essential hypertension    Bursitis, ischial    Cardiomyopathy ([address] )    Cataract of right eye    Chemotherapy-induced nausea    Chronic hypoxemic respiratory failure (HCC)    Chronic obstructive pulmonary disease (HCC)    Congestive heart failure (HCC)    Depression    DMII (diabetes mellitus, type 2) (HCC)    Hyperlipidemia    Metastasis to adrenal gland (HCC)    Multiple thyroid nodules    Osteoporosis    Other chronic pain    Parotid adenoma    Psoriasis    Primary localized osteoarthrosis of the hip    PVD (peripheral vascular disease) ([address] )    Restless legs syndrome    Sciatica    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)    Seborrheic dermatitis of scalp     Past Medical History:   Diagnosis Date    Arthritis     Cataract of right eye 3/19/2015    CHF (congestive heart failure) (HCC)     Common cold     Coronary artery disease     Depression     Diabetes mellitus ([address] )     Fracture of multiple pubic rami ([address] ) 2015    History of radiation therapy     Hyperlipidemia     Hypertension     Lung cancer ([address] )     Multiple thyroid nodules 2016    Pulmonary nodule     Shortness of breath      Past Surgical History:   Procedure Laterality Date    BRONCHOSCOPY N/A 8/10/2016    Procedure: BRONCHOSCOPY FLEXIBLE;  Surgeon: Anatoliy Gatica MD;  [facility];  Service:    Pavan Boswell  SECTION      CHOLECYSTECTOMY      EYE SURGERY      HYSTERECTOMY      DC BRONCHOSCOPY NEEDLE BX TRACHEA MAIN STEM&/BRON N/A 8/10/2016 Procedure: ENDOBRONCHIAL ULTRASOUND (FROZEN SECTION) ; Surgeon: Ernie Sprague MD;  [facility];  Service: Thoracic    [place] INSJ TUNNELED CTR VAD W/SUBQ PORT AGE 5 YR/> Left 8/30/2016    Procedure: INSERTION VENOUS PORT (PORT-A-CATH); Surgeon: Ernie Sprague MD;  [facility];  Service: Thoracic    TONSILLECTOMY       Family History   Problem Relation Age of Onset    Brain cancer Sister      Social History     Social History    Marital status: /Civil Union     Spouse name: N/A    Number of children: N/A    Years of education: N/A     Occupational History    Not on file  Social History Main Topics    Smoking status: Current Some Day Smoker     Packs/day: 0 25    Smokeless tobacco: Never Used    Alcohol use No    Drug use: No    Sexual activity: Not on file     Other Topics Concern    Not on file     Social History Narrative    No narrative on file       Current Outpatient Prescriptions:     albuterol (2 5 mg/3 mL) 0 083 % nebulizer solution, INHALE CONTENTS OF 1 VIAL IN NEBULIZER FOUR TIMES A DAY IF NEEDED, Disp: 375 mL, Rfl: 1    azithromycin (ZITHROMAX) 250 mg tablet, TAKE 1 TAB EVERY MONDAY WEDNESDAY AND FRIDAY, Disp: , Rfl: 3    betamethasone, augmented, (DIPROLENE-AF) 0 05 % cream, Apply topically 2 (two) times a day, Disp: 50 g, Rfl: 1    fluticasone-salmeterol (ADVAIR) 250-50 mcg/dose inhaler, Inhale 1 puff every 12 (twelve) hours, Disp: 1 Inhaler, Rfl: 6    FREESTYLE LITE test strip, Test Blood sugar 3 times daily  Diagnosis: Type 2 Diabetes, Disp: 300 each, Rfl: 0    metoprolol tartrate (LOPRESSOR) 100 mg tablet, Take 100 mg by mouth daily  , Disp: , Rfl:     mometasone (ELOCON) 0 1 % lotion, Apply topically daily, Disp: 60 mL, Rfl: 3    oxyCODONE-acetaminophen (PERCOCET) 5-325 mg per tablet, Take 1 tablet by mouth every 4 (four) hours as needed for moderate pain Max Daily Amount: 6 tablets, Disp: 180 tablet, Rfl: 0    pentoxifylline (TRENtal) 400 mg ER tablet, Take 400 mg by mouth 3 (three) times a day with meals  , Disp: , Rfl:     pregabalin (LYRICA) 100 mg capsule, Take 100 mg by mouth daily  , Disp: , Rfl:     simvastatin (ZOCOR) 80 mg tablet, TAKE 1 TABLET DAILY, Disp: 90 tablet, Rfl: 0    Tiotropium Bromide Monohydrate (SPIRIVA RESPIMAT) 2 5 MCG/ACT AERS, Inhale 2 Act (5 mcg total) daily, Disp: 3 Inhaler, Rfl: 3  Allergies   Allergen Reactions    Penicillins Swelling     Legs swell up       Review of Systems:  Review of Systems   Constitutional: Negative  Eyes: Negative  Respiratory: Positive for cough (WALTER) and shortness of breath          3 L NC  Gastrointestinal: Negative  Endocrine: Negative  Genitourinary:        Nocturia x 1  Musculoskeletal: Negative  Skin: Positive for rash (scalp and buttocks  )  Negative for pallor (scalp)  Allergic/Immunologic: Negative  Neurological: Negative  Hematological: Negative  Psychiatric/Behavioral: Negative  Vitals:    04/13/18 1304   BP: 122/60   BP Location: Left arm   Patient Position: Sitting   Cuff Size: Standard   Pulse: 84   Resp: (!) 24   Temp: 98 3 °F (36 8 °C)   TempSrc: Temporal   SpO2: 92%   Weight: 47 4 kg (104 lb 6 4 oz)   Height: 5' 1 5" (1 562 m)       Imaging:Ct Chest Abdomen Pelvis W Contrast    Result Date: 4/6/2018  Narrative: CT CHEST, ABDOMEN AND PELVIS WITH IV CONTRAST INDICATION:   C34 11: Malignant neoplasm of upper lobe, right bronchus or lung C79 72: Secondary malignant neoplasm of left adrenal gland  Malignant mesothelioma  COMPARISON: December 27, 2017  TECHNIQUE: CT examination of the chest, abdomen and pelvis was performed  In addition to portal venous phase postcontrast scanning through the abdomen and pelvis, delayed phase postcontrast scanning was performed through the upper abdominal viscera  Axial, sagittal, and coronal 2D reformatted images were created from the source data and submitted for interpretation   Radiation dose length product (DLP) for this visit:  361 mGy-cm   This examination, like all CT scans performed in the [facility], was performed utilizing techniques to minimize radiation dose exposure, including the use of iterative reconstruction and automated exposure control  IV Contrast:  100 mL of iohexol (OMNIPAQUE) Enteric Contrast: Enteric contrast was administered  FINDINGS: CHEST LUNGS:  Stable right suprahilar mass measures 2 5 x 1 1 cm on image 24 of series 3, not significant changed from 2 5 x 1 0 cm on most recent prior examination  No significant change in conspicuity of satellite nodule in the periphery of the lateral right upper lobe measuring 6 mm on image 23 of series 3  Reticular changes with minor tubular bronchiectatic changes patchy in distribution throughout the right upper lobe appears to represent the sequela of prior postobstructive pneumonia and is similar from previous examination  Narrowing of anterior right upper lobe bronchus appears slightly progressed, best demonstrated on image 24 of series 3 as compared with image 23 of series 3 on the previous examination  Stable left lower lobe linear scarring  No other new pulmonary nodule or mass  PLEURA:  Unremarkable  HEART/GREAT VESSELS:  Unremarkable for patient's age  MEDIASTINUM AND CARLO:  Prominent right hilar node unchanged, 17 x 13 mm in size compared with 17 x 11 mm when measured using similar technique on previous examination  No other adenopathy  CHEST WALL AND LOWER NECK:  Heterogeneous thyroid with stable bilateral thyroid nodules  Left chest wall port again identified  Again noted is a prominent lipoma either within or deep to the left latissimus dorsi musculature in the posterolateral left chest wall, approximately 7 cm  ABDOMEN LIVER/BILIARY TREE:  Unremarkable  GALLBLADDER:  Gallbladder is surgically absent  SPLEEN:  Unremarkable  PANCREAS:  Pancreas is atrophic but otherwise unremarkable   ADRENAL GLANDS: [Adrenal mass has slightly decreased in size currently measuring 2 2 x 2 0 cm on image 55 of series 2 compared with 2 8 x 2 4 cm on the previous examination  Also again noted is low-density nodular thickening of both adrenal glands probably representing adenomas hyperplasia  KIDNEYS/URETERS:  Unchanged bilateral renal cysts  Bilateral renal scarring again noted  STOMACH AND BOWEL:  Sigmoid diverticulosis without acute diverticulitis  Stomach and bowel appear otherwise unremarkable  APPENDIX:  No findings to suggest appendicitis  ABDOMINOPELVIC CAVITY:  Surgical clips throughout the retroperitoneum in keeping with a history of prior retroperitoneal surgery  No lymphadenopathy  No ascites  No free intracranial air  VESSELS:  Extensive atherosclerotic change  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  Stable chronically occluded aortobifemoral grafts  PELVIS REPRODUCTIVE ORGANS:  Unremarkable for patient's age  URINARY BLADDER:  Unremarkable  ABDOMINAL WALL/INGUINAL REGIONS:  Unremarkable  OSSEOUS STRUCTURES:  No acute fracture or destructive osseous lesion  Again noted is sclerosis surrounding a prominent central inferior endplate L1 Schmorl's node  Chronic healed fractures of right pubic symphysis and ischium noted  Impression: Right suprahilar mass and satellite nodule, not significant changed from prior examination  Slight progression of narrowing of anterior right upper lobe bronchus  Slight interval decrease in the size of metastatic lesion in the anterior limb of the left adrenal gland  No new discrete metastatic lesions identified in the chest, abdomen or pelvis   Workstation performed: [id number]

## 2018-04-17 ENCOUNTER — HOSPITAL ENCOUNTER (OUTPATIENT)
Dept: INFUSION CENTER | Facility: HOSPITAL | Age: 78
Discharge: HOME | End: 2018-04-17
Payer: MEDICARE

## 2018-04-17 VITALS
HEART RATE: 79 BPM | RESPIRATION RATE: 18 BRPM | TEMPERATURE: 96.4 F | DIASTOLIC BLOOD PRESSURE: 68 MMHG | SYSTOLIC BLOOD PRESSURE: 143 MMHG | OXYGEN SATURATION: 100 %

## 2018-04-17 LAB
ALBUMIN SERPL BCP-MCNC: 3 G/DL (ref 3.5–5)
ALP SERPL-CCNC: 77 U/L (ref 46–116)
ALT SERPL W P-5'-P-CCNC: 15 U/L (ref 12–78)
ANION GAP SERPL CALCULATED.3IONS-SCNC: 4 MMOL/L (ref 4–13)
AST SERPL W P-5'-P-CCNC: 12 U/L (ref 5–45)
BASOPHILS # BLD AUTO: 0.01 THOUSANDS/ΜL (ref 0–0.1)
BASOPHILS NFR BLD AUTO: 0 % (ref 0–1)
BILIRUB SERPL-MCNC: 0.4 MG/DL (ref 0.2–1)
BUN SERPL-MCNC: 11 MG/DL (ref 5–25)
CALCIUM SERPL-MCNC: 8.3 MG/DL (ref 8.3–10.1)
CHLORIDE SERPL-SCNC: 103 MMOL/L (ref 100–108)
CO2 SERPL-SCNC: 38 MMOL/L (ref 21–32)
CREAT SERPL-MCNC: 0.76 MG/DL (ref 0.6–1.3)
EOSINOPHIL # BLD AUTO: 0.1 THOUSAND/ΜL (ref 0–0.61)
EOSINOPHIL NFR BLD AUTO: 2 % (ref 0–6)
ERYTHROCYTE [DISTWIDTH] IN BLOOD BY AUTOMATED COUNT: 11.7 % (ref 11.6–15.1)
GFR SERPL CREATININE-BSD FRML MDRD: 75 ML/MIN/1.73SQ M
GLUCOSE SERPL-MCNC: 180 MG/DL (ref 65–140)
HCT VFR BLD AUTO: 40.7 % (ref 34.8–46.1)
HGB BLD-MCNC: 12.7 G/DL (ref 11.5–15.4)
LYMPHOCYTES # BLD AUTO: 0.57 THOUSANDS/ΜL (ref 0.6–4.47)
LYMPHOCYTES NFR BLD AUTO: 12 % (ref 14–44)
MCH RBC QN AUTO: 31.5 PG (ref 26.8–34.3)
MCHC RBC AUTO-ENTMCNC: 31.2 G/DL (ref 31.4–37.4)
MCV RBC AUTO: 101 FL (ref 82–98)
MONOCYTES # BLD AUTO: 0.48 THOUSAND/ΜL (ref 0.17–1.22)
MONOCYTES NFR BLD AUTO: 10 % (ref 4–12)
NEUTROPHILS # BLD AUTO: 3.79 THOUSANDS/ΜL (ref 1.85–7.62)
NEUTS SEG NFR BLD AUTO: 76 % (ref 43–75)
PLATELET # BLD AUTO: 129 THOUSANDS/UL (ref 149–390)
PMV BLD AUTO: 10.2 FL (ref 8.9–12.7)
POTASSIUM SERPL-SCNC: 3.7 MMOL/L (ref 3.5–5.3)
PROT SERPL-MCNC: 6.3 G/DL (ref 6.4–8.2)
RBC # BLD AUTO: 4.03 MILLION/UL (ref 3.81–5.12)
SODIUM SERPL-SCNC: 145 MMOL/L (ref 136–145)
WBC # BLD AUTO: 4.95 THOUSAND/UL (ref 4.31–10.16)

## 2018-04-17 PROCEDURE — 85025 COMPLETE CBC W/AUTO DIFF WBC: CPT | Performed by: INTERNAL MEDICINE

## 2018-04-17 PROCEDURE — 96413 CHEMO IV INFUSION 1 HR: CPT

## 2018-04-17 PROCEDURE — 80053 COMPREHEN METABOLIC PANEL: CPT | Performed by: INTERNAL MEDICINE

## 2018-04-17 RX ADMIN — Medication 300 UNITS: at 09:53

## 2018-04-17 RX ADMIN — ATEZOLIZUMAB 1200 MG: 1200 INJECTION, SOLUTION INTRAVENOUS at 09:17

## 2018-04-17 RX ADMIN — SODIUM CHLORIDE 20 ML/HR: 9 INJECTION, SOLUTION INTRAVENOUS at 08:46

## 2018-04-30 DIAGNOSIS — G89.29 OTHER CHRONIC PAIN: Primary | ICD-10-CM

## 2018-04-30 RX ORDER — PREGABALIN 100 MG/1
100 CAPSULE ORAL
Qty: 30 CAPSULE | Refills: 4 | Status: SHIPPED | OUTPATIENT
Start: 2018-04-30 | End: 2018-10-30 | Stop reason: SDUPTHER

## 2018-05-01 DIAGNOSIS — G89.29 OTHER CHRONIC PAIN: ICD-10-CM

## 2018-05-01 RX ORDER — OXYCODONE HYDROCHLORIDE AND ACETAMINOPHEN 5; 325 MG/1; MG/1
1 TABLET ORAL EVERY 4 HOURS PRN
Qty: 180 TABLET | Refills: 0 | Status: SHIPPED | OUTPATIENT
Start: 2018-05-01 | End: 2018-05-31 | Stop reason: SDUPTHER

## 2018-05-06 RX ORDER — SODIUM CHLORIDE 9 MG/ML
20 INJECTION, SOLUTION INTRAVENOUS CONTINUOUS
Status: DISPENSED | OUTPATIENT
Start: 2018-05-08 | End: 2018-05-09

## 2018-05-08 ENCOUNTER — HOSPITAL ENCOUNTER (OUTPATIENT)
Dept: INFUSION CENTER | Facility: HOSPITAL | Age: 78
Discharge: HOME | End: 2018-05-08
Payer: MEDICARE

## 2018-05-08 VITALS
SYSTOLIC BLOOD PRESSURE: 160 MMHG | DIASTOLIC BLOOD PRESSURE: 77 MMHG | HEART RATE: 74 BPM | RESPIRATION RATE: 20 BRPM | OXYGEN SATURATION: 99 % | TEMPERATURE: 96.4 F

## 2018-05-08 DIAGNOSIS — R91.1 SOLITARY PULMONARY NODULE: ICD-10-CM

## 2018-05-08 LAB
ALBUMIN SERPL BCP-MCNC: 3 G/DL (ref 3.5–5)
ALP SERPL-CCNC: 75 U/L (ref 46–116)
ALT SERPL W P-5'-P-CCNC: 11 U/L (ref 12–78)
ANION GAP SERPL CALCULATED.3IONS-SCNC: 0 MMOL/L (ref 4–13)
AST SERPL W P-5'-P-CCNC: 15 U/L (ref 5–45)
BASOPHILS # BLD AUTO: 0.01 THOUSANDS/ΜL (ref 0–0.1)
BASOPHILS NFR BLD AUTO: 0 % (ref 0–1)
BILIRUB SERPL-MCNC: 0.5 MG/DL (ref 0.2–1)
BUN SERPL-MCNC: 11 MG/DL (ref 5–25)
CALCIUM SERPL-MCNC: 8.7 MG/DL (ref 8.3–10.1)
CHLORIDE SERPL-SCNC: 104 MMOL/L (ref 100–108)
CO2 SERPL-SCNC: 40 MMOL/L (ref 21–32)
CREAT SERPL-MCNC: 0.64 MG/DL (ref 0.6–1.3)
EOSINOPHIL # BLD AUTO: 0.09 THOUSAND/ΜL (ref 0–0.61)
EOSINOPHIL NFR BLD AUTO: 2 % (ref 0–6)
ERYTHROCYTE [DISTWIDTH] IN BLOOD BY AUTOMATED COUNT: 12 % (ref 11.6–15.1)
GFR SERPL CREATININE-BSD FRML MDRD: 85 ML/MIN/1.73SQ M
GLUCOSE SERPL-MCNC: 156 MG/DL (ref 65–140)
HCT VFR BLD AUTO: 41.1 % (ref 34.8–46.1)
HGB BLD-MCNC: 12.8 G/DL (ref 11.5–15.4)
LYMPHOCYTES # BLD AUTO: 0.78 THOUSANDS/ΜL (ref 0.6–4.47)
LYMPHOCYTES NFR BLD AUTO: 13 % (ref 14–44)
MCH RBC QN AUTO: 32 PG (ref 26.8–34.3)
MCHC RBC AUTO-ENTMCNC: 31.1 G/DL (ref 31.4–37.4)
MCV RBC AUTO: 103 FL (ref 82–98)
MONOCYTES # BLD AUTO: 0.45 THOUSAND/ΜL (ref 0.17–1.22)
MONOCYTES NFR BLD AUTO: 8 % (ref 4–12)
NEUTROPHILS # BLD AUTO: 4.47 THOUSANDS/ΜL (ref 1.85–7.62)
NEUTS SEG NFR BLD AUTO: 77 % (ref 43–75)
PLATELET # BLD AUTO: 128 THOUSANDS/UL (ref 149–390)
PMV BLD AUTO: 10.5 FL (ref 8.9–12.7)
POTASSIUM SERPL-SCNC: 4.1 MMOL/L (ref 3.5–5.3)
PROT SERPL-MCNC: 6.2 G/DL (ref 6.4–8.2)
RBC # BLD AUTO: 4 MILLION/UL (ref 3.81–5.12)
SODIUM SERPL-SCNC: 144 MMOL/L (ref 136–145)
TSH SERPL DL<=0.05 MIU/L-ACNC: 4.74 UIU/ML (ref 0.36–3.74)
WBC # BLD AUTO: 5.8 THOUSAND/UL (ref 4.31–10.16)

## 2018-05-08 PROCEDURE — 96413 CHEMO IV INFUSION 1 HR: CPT

## 2018-05-08 PROCEDURE — 85025 COMPLETE CBC W/AUTO DIFF WBC: CPT

## 2018-05-08 PROCEDURE — 80053 COMPREHEN METABOLIC PANEL: CPT

## 2018-05-08 PROCEDURE — 84443 ASSAY THYROID STIM HORMONE: CPT

## 2018-05-08 RX ADMIN — Medication 300 UNITS: at 10:35

## 2018-05-08 RX ADMIN — SODIUM CHLORIDE 20 ML/HR: 9 INJECTION, SOLUTION INTRAVENOUS at 09:00

## 2018-05-08 RX ADMIN — ATEZOLIZUMAB 1200 MG: 1200 INJECTION, SOLUTION INTRAVENOUS at 09:56

## 2018-05-08 NOTE — PROGRESS NOTES
Pt here for Tecentriq  infusion  Labs obtained, wnl, Tecentriq infused, NSS flush done  Port deaccessed after flushing with heparin, matthew well   dsd applied  Disch amb to home, steady gait

## 2018-05-09 ENCOUNTER — TELEPHONE (OUTPATIENT)
Dept: PULMONOLOGY | Facility: CLINIC | Age: 78
End: 2018-05-09

## 2018-05-09 NOTE — TELEPHONE ENCOUNTER
Patient  called stating that is needs a refill on her ventolin HFA 90mcg  Please send to [facility] pharmacy   Any question [phone]

## 2018-05-10 ENCOUNTER — OFFICE VISIT (OUTPATIENT)
Dept: HEMATOLOGY ONCOLOGY | Facility: CLINIC | Age: 78
End: 2018-05-10
Payer: MEDICARE

## 2018-05-10 VITALS
TEMPERATURE: 98.1 F | HEART RATE: 76 BPM | HEIGHT: 60 IN | SYSTOLIC BLOOD PRESSURE: 124 MMHG | DIASTOLIC BLOOD PRESSURE: 70 MMHG | OXYGEN SATURATION: 94 % | RESPIRATION RATE: 17 BRPM | WEIGHT: 103.6 LBS | BODY MASS INDEX: 20.34 KG/M2

## 2018-05-10 DIAGNOSIS — C34.11 MALIGNANT NEOPLASM OF UPPER LOBE OF RIGHT LUNG (HCC): Primary | ICD-10-CM

## 2018-05-10 DIAGNOSIS — J44.9 CHRONIC OBSTRUCTIVE PULMONARY DISEASE, UNSPECIFIED COPD TYPE (HCC): Primary | ICD-10-CM

## 2018-05-10 DIAGNOSIS — C79.70 MALIGNANT NEOPLASM METASTATIC TO ADRENAL GLAND, UNSPECIFIED LATERALITY (HCC): ICD-10-CM

## 2018-05-10 PROCEDURE — 99214 OFFICE O/P EST MOD 30 MIN: CPT | Performed by: INTERNAL MEDICINE

## 2018-05-10 RX ORDER — ALBUTEROL SULFATE 90 UG/1
2 AEROSOL, METERED RESPIRATORY (INHALATION) EVERY 6 HOURS PRN
Qty: 3 INHALER | Refills: 3 | Status: SHIPPED | OUTPATIENT
Start: 2018-05-10 | End: 2019-01-01 | Stop reason: SDUPTHER

## 2018-05-10 NOTE — PROGRESS NOTES
Clarisa Aguilar  1940  [address] [facility]  [address]  [place] [address] [address] [phone]    Chief Complaint   Patient presents with    Follow-up          Malignant neoplasm of upper lobe of right lung ([address] )    7/30/2016 Initial Diagnosis     CT of the neck for evaluation of the carotid arteries incidentally showed an infiltrating mass in the right upper lobe extending to the hilum  PET-CT June 2016 patient was found to have a thyroid mass on physical examination  Ultrasound showed bilateral thyroid nodules  In July 2016 she underwent CAT scan of the neck for evaluation of the carotid arteries  Incidentally noted, infiltrating mass in the right upper lobe extending to the hilum was noted  4, 2016âPET/CT showed a right upper lobe mass measuring 4 8 cm, SUV 21 5  This extends to the right hilum  Right paratracheal and subcarinal nodes measure up to 12 mm  The left adrenal showed some hypermetabolism with SUV of 3 7, without discrete mass  Uptake in the right parotid gland is noted with SUV of 22 3 and a soft tissue nodule, measuring 11 mm  endoscopy and bronchoscopy showed 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  Lymph node biopsies did not show malignancy  and radiation therapy were not felt to be applicable  Due to background pulmonary dysfunction as well as the potential for left adrenal metastatic disease  Chemotherapy was chosen as primary therapy  Alternatively  6, 2016 started Abraxane 80 mg/m² day 1, 8, 15, carbo AUC-5, day 1 only, every 21 days  2017progressive disease noted  Tecentriq 1200 mg IV every 3 weeks initiated  Current Therapy: May 2017progressive disease noted  Tecentriq 1200 mg IV every 3 weeks initiated  Interval History: Has had back pain started about 4 days ago   Started after some housework, has been getting better with heating pad           9/6/2016 - 11/25/2016 Chemotherapy     Abraxane 80 mg/m2 day 1, 8, 15, carbo AUC-5, day 1 only, Q 21 days  5/3/2017 Progression     Progressive disease  5/16/2017 -  Chemotherapy     Tecentriq 1200 mg IV Q 3 weeks  2/21/2018 - 3/14/2018 Radiation     C1    Plan ID Energy Fractions Dose per Fraction (cGy) Total Dose Delivered (cGy) Elapsed Days   Abdomen 6X 15 / 15 250 3,750 21      Treatment dates:  C1: 2/21/2018 - 3/14/2018             Metastasis to adrenal gland ([address] )    7/17/2017 Initial Diagnosis     Metastasis to adrenal gland (HCC)A mass in the anterior limb of the left adrenal gland with delayed postcontrast enhancement measuring 18 mm is either new or increasing from as far back as November 2016  The finding is suspicious for adrenal metastatic lesion superimposed upon   underlying bilateral adenomatous hyperplasia  History of Present Illness:  -No ref  provider found:    -Interval History:Patient is clinically stable  Review of Systems:  Review of Systems   Constitutional: Negative for appetite change, diaphoresis, fatigue and fever  HENT: Negative for sinus pain  Eyes: Negative for discharge  Respiratory: Negative for cough and shortness of breath  Cardiovascular: Negative for chest pain  Gastrointestinal: Negative for abdominal pain, constipation and diarrhea  Endocrine: Negative for cold intolerance  Genitourinary: Negative for difficulty urinating and hematuria  Musculoskeletal: Negative for joint swelling  Skin: Negative for rash  Allergic/Immunologic: Negative for environmental allergies  Neurological: Negative for dizziness and headaches  Hematological: Negative for adenopathy  Psychiatric/Behavioral: Negative for agitation         Patient Active Problem List   Diagnosis    Malignant neoplasm of upper lobe of right lung ([address] )    Asymptomatic carotid artery stenosis    Back pain, lumbosacral    Benign essential hypertension    Bursitis, ischial    Cardiomyopathy ([address] )    Cataract of right eye    Chemotherapy-induced nausea    Chronic hypoxemic respiratory failure (HCC)    Chronic obstructive pulmonary disease (HCC)    Congestive heart failure (HCC)    Depression    DMII (diabetes mellitus, type 2) (HCC)    Hyperlipidemia    Metastasis to adrenal gland (HCC)    Multiple thyroid nodules    Osteoporosis    Other chronic pain    Parotid adenoma    Psoriasis    Primary localized osteoarthrosis of the hip    PVD (peripheral vascular disease) (HCC)    Restless legs syndrome    Sciatica    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)    Seborrheic dermatitis of scalp     Past Medical History:   Diagnosis Date    Arthritis     Cataract of right eye 3/19/2015    CHF (congestive heart failure) (HCC)     Common cold     Coronary artery disease     Depression     [facility]     Fracture of multiple pubic rami ([facility] ) 2015    History of radiation therapy     Hyperlipidemia     Hypertension     [facility]     Multiple thyroid nodules 2016    Pulmonary nodule     Shortness of breath      Past Surgical History:   Procedure Laterality Date    BRONCHOSCOPY N/A 8/10/2016    Procedure: BRONCHOSCOPY FLEXIBLE;  Surgeon: Yarelis Rodriguez MD;  [facility];  Service:    [facility]  SECTION      CHOLECYSTECTOMY      EYE SURGERY      HYSTERECTOMY      [place] BRONCHOSCOPY NEEDLE BX TRACHEA MAIN STEM&/BRON N/A 8/10/2016    Procedure: ENDOBRONCHIAL ULTRASOUND (FROZEN SECTION) ; Surgeon: Yarelis Rodriguez MD;  [facility];  Service: Thoracic    [place] INSJ TUNNELED CTR VAD W/SUBQ PORT AGE 5 YR/> Left 2016    Procedure: INSERTION VENOUS PORT (PORT-A-CATH);   Surgeon: Yarelis Rodriguez MD;  [facility];  Service: Thoracic    TONSILLECTOMY       Family History   Problem Relation Age of Onset    Brain cancer Sister      Social History     Social History    Marital status: /Civil Union     Spouse name: N/A    Number of children: N/A  Years of education: N/A     Occupational History    Not on file  Social History Main Topics    Smoking status: Current Some Day Smoker     Packs/day: 0 25    Smokeless tobacco: Never Used      Comment: smokes 3-4 cigs /day    Alcohol use No    Drug use: No    Sexual activity: Not on file     Other Topics Concern    Not on file     Social History Narrative    No narrative on file       Current Outpatient Prescriptions:     albuterol (2 5 mg/3 mL) 0 083 % nebulizer solution, INHALE CONTENTS OF 1 VIAL IN NEBULIZER FOUR TIMES A DAY IF NEEDED, Disp: 375 mL, Rfl: 1    albuterol (VENTOLIN HFA) 90 mcg/act inhaler, Inhale 2 puffs every 6 (six) hours as needed for wheezing, Disp: 3 Inhaler, Rfl: 3    azithromycin (ZITHROMAX) 250 mg tablet, TAKE 1 TAB EVERY MONDAY WEDNESDAY AND FRIDAY, Disp: , Rfl: 3    betamethasone, augmented, (DIPROLENE-AF) 0 05 % cream, Apply topically 2 (two) times a day, Disp: 50 g, Rfl: 1    fluticasone-salmeterol (ADVAIR) 250-50 mcg/dose inhaler, Inhale 1 puff every 12 (twelve) hours, Disp: 1 Inhaler, Rfl: 6    FREESTYLE LITE test strip, Test Blood sugar 3 times daily  Diagnosis: Type 2 Diabetes, Disp: 300 each, Rfl: 0    metoprolol tartrate (LOPRESSOR) 100 mg tablet, Take 100 mg by mouth daily  , Disp: , Rfl:     mometasone (ELOCON) 0 1 % lotion, Apply topically daily, Disp: 60 mL, Rfl: 3    oxyCODONE-acetaminophen (PERCOCET) 5-325 mg per tablet, Take 1 tablet by mouth every 4 (four) hours as needed for moderate pain Max Daily Amount: 6 tablets, Disp: 180 tablet, Rfl: 0    pentoxifylline (TRENtal) 400 mg ER tablet, Take 400 mg by mouth 3 (three) times a day with meals  , Disp: , Rfl:     pregabalin (LYRICA) 100 mg capsule, Take 1 capsule (100 mg total) by mouth daily at bedtime, Disp: 30 capsule, Rfl: 4    simvastatin (ZOCOR) 80 mg tablet, TAKE 1 TABLET DAILY, Disp: 90 tablet, Rfl: 0    Tiotropium Bromide Monohydrate (SPIRIVA RESPIMAT) 2 5 MCG/ACT AERS, Inhale 2 Act (5 mcg total) daily, Disp: 3 Inhaler, Rfl: 3  Allergies   Allergen Reactions    Penicillins Swelling     Legs swell up     Vitals:    05/10/18 1525   BP: 124/70   Pulse: 76   Resp: 17   Temp: 98 1 °F (36 7 °C)   SpO2: 94%         Physical Exam   Constitutional: She is oriented to person, place, and time  She appears well-developed  HENT:   Head: Normocephalic  Eyes: Pupils are equal, round, and reactive to light  Neck: Neck supple  Cardiovascular: Normal rate  No murmur heard  Pulmonary/Chest: No respiratory distress  She has no wheezes  She has no rales  Abdominal: Soft  She exhibits no distension  There is no tenderness  There is no rebound  Musculoskeletal: She exhibits no edema  Lymphadenopathy:     She has no cervical adenopathy  Neurological: She is alert and oriented to person, place, and time  She displays normal reflexes  Skin: Skin is warm  No rash noted  Psychiatric: She has a normal mood and affect  Thought content normal            Performance Status: ECOG/Zubrod/WHO: 0 - Asymptomatic    Labs:  CBC, Coags, BMP, Mg, Phos     Imaging  No results found  I reviewed the above laboratory and imaging data  Discussion/Summary:  In summary, this is a 69-year-old female history of advanced lung cancer  She is currently on Tecentriq  She is tolerating this relatively well  She has fatigue but benefits from a nap on a daily basis  Blood work shows minor abnormalities but is generally normal     Overall she is doing quite well  We made arrangements for follow-up appointment in 6 weeks with repeat imaging just prior to that visit  I reviewed the above with the patient her   They voiced understanding and agreement

## 2018-05-10 NOTE — LETTER
May 10, 2018     Phylicia Blancas MD  [facility]  [address] [facility]    Patient: Bud Alexander   YOB: 1940   Date of Visit: 5/10/2018       Dear Dr Riana Elena:    Thank you for referring Riana Doan to me for evaluation  Below are my notes for this consultation  If you have questions, please do not hesitate to call me  I look forward to following your patient along with you  Sincerely,        Lorraine Khan DO        CC: No Recipients  Lorraine Khan DO  5/10/2018  3:47 PM  Sign at close encounter  Bud Alexander  1940  [facility]  [address] [address] [phone]    Chief Complaint   Patient presents with    Follow-up          Malignant neoplasm of upper lobe of right lung ([address] )    7/30/2016 Initial Diagnosis     CT of the neck for evaluation of the carotid arteries incidentally showed an infiltrating mass in the right upper lobe extending to the hilum  PET-CT June 2016 patient was found to have a thyroid mass on physical examination  Ultrasound showed bilateral thyroid nodules  In July 2016 she underwent CAT scan of the neck for evaluation of the carotid arteries  Incidentally noted, infiltrating mass in the right upper lobe extending to the hilum was noted  4, 2016âPET/CT showed a right upper lobe mass measuring 4 8 cm, SUV 21 5  This extends to the right hilum  Right paratracheal and subcarinal nodes measure up to 12 mm  The left adrenal showed some hypermetabolism with SUV of 3 7, without discrete mass  Uptake in the right parotid gland is noted with SUV of 22 3 and a soft tissue nodule, measuring 11 mm  endoscopy and bronchoscopy showed 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  Lymph node biopsies did not show malignancy  and radiation therapy were not felt to be applicable   Due to background pulmonary dysfunction as well as the potential for left adrenal metastatic disease  Chemotherapy was chosen as primary therapy  Alternatively  6, 2016 started Abraxane 80 mg/m² day 1, 8, 15, carbo AUC-5, day 1 only, every 21 days  2017progressive disease noted  Tecentriq 1200 mg IV every 3 weeks initiated  Current Therapy: May 2017progressive disease noted  Tecentriq 1200 mg IV every 3 weeks initiated  Interval History: Has had back pain started about 4 days ago  Started after some housework, has been getting better with heating pad           9/6/2016 - 11/25/2016 Chemotherapy     Abraxane 80 mg/m2 day 1, 8, 15, carbo AUC-5, day 1 only, Q 21 days  5/3/2017 Progression     Progressive disease  5/16/2017 -  Chemotherapy     Tecentriq 1200 mg IV Q 3 weeks  2/21/2018 - 3/14/2018 Radiation     C1    Plan ID Energy Fractions Dose per Fraction (cGy) Total Dose Delivered (cGy) Elapsed Days   Abdomen 6X 15 / 15 250 3,750 21      Treatment dates:  C1: 2/21/2018 - 3/14/2018             Metastasis to adrenal gland ([facility] [address] )    7/17/2017 Initial Diagnosis     Metastasis to adrenal gland (HCC)A mass in the anterior limb of the left adrenal gland with delayed postcontrast enhancement measuring 18 mm is either new or increasing from as far back as November 2016  The finding is suspicious for adrenal metastatic lesion superimposed upon   underlying bilateral adenomatous hyperplasia  History of Present Illness:  -No ref  provider found:    -Interval History:      Review of Systems:  Review of Systems   Constitutional: Negative for appetite change, diaphoresis, fatigue and fever  HENT: Negative for sinus pain  Eyes: Negative for discharge  Respiratory: Negative for cough and shortness of breath  Cardiovascular: Negative for chest pain  Gastrointestinal: Negative for abdominal pain, constipation and diarrhea  Endocrine: Negative for cold intolerance  Genitourinary: Negative for difficulty urinating and hematuria     Musculoskeletal: Negative for joint swelling  Skin: Negative for rash  Allergic/Immunologic: Negative for environmental allergies  Neurological: Negative for dizziness and headaches  Hematological: Negative for adenopathy  Psychiatric/Behavioral: Negative for agitation         Patient Active Problem List   Diagnosis    Malignant neoplasm of upper lobe of right lung ([facility] )    Asymptomatic carotid artery stenosis    Back pain, lumbosacral    Benign essential hypertension    Bursitis, ischial    Cardiomyopathy ([facility] )    Cataract of right eye    Chemotherapy-induced nausea    Chronic hypoxemic respiratory failure (HCC)    Chronic obstructive pulmonary disease (HCC)    Congestive heart failure (HCC)    Depression    DMII (diabetes mellitus, type 2) (HCC)    Hyperlipidemia    Metastasis to adrenal gland (HCC)    Multiple thyroid nodules    Osteoporosis    Other chronic pain    Parotid adenoma    Psoriasis    Primary localized osteoarthrosis of the hip    PVD (peripheral vascular disease) ([facility] )    Restless legs syndrome    Sciatica    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)    Seborrheic dermatitis of scalp     Past Medical History:   Diagnosis Date    Arthritis     Cataract of right eye 3/19/2015    CHF (congestive heart failure) (HCC)     Common cold     Coronary artery disease     Depression     Diabetes mellitus ([facility] )     Fract[facility] rami [facility] 2015    History of radiation therapy     Hyperlipidemia     Hypertension     Lung cancer ([facility] )     Multiple thyroid nodules 2016    Pulmonary nodule     Shortness of breath      Past Surgical History:   Procedure Laterality Date    BRONCHOSCOPY N/A 8/10/2016    Procedure: BRONCHOSCOPY FLEXIBLE;  Surgeon: Rj De La Paz MD;  Location[facility];  Service:    [facility]  SECTION      CHOLECYSTECTOMY      EYE SURGERY      HYSTERECTOMY      NE BRONCHOSCOPY NEEDLE BX TRACHEA MAIN STEM&/BRON N/A 8/10/2016    Procedure: ENDOBRONCHIAL ULTRASOUND (FROZEN SECTION) ; Surgeon: Rj De La Paz MD;  Location: [facility];  Service: Thoracic    AR INSJ TUNNELED CTR VAD W/SUBQ PORT AGE 5 YR/> Left 8/30/2016    Procedure: INSERTION VENOUS PORT (PORT-A-CATH); Surgeon: Rj De La Paz MD;  Location: [facility];  Service: Thoracic    TONSILLECTOMY       Family History   Problem Relation Age of Onset    Brain cancer Sister      Social History     Social History    Marital status: /Civil Union     Spouse name: N/A    Number of children: N/A    Years of education: N/A     Occupational History    Not on file  Social History Main Topics    Smoking status: Current Some Day Smoker     Packs/day: 0 25    Smokeless tobacco: Never Used      Comment: smokes 3-4 cigs /day    Alcohol use No    Drug use: No    Sexual activity: Not on file     Other Topics Concern    Not on file     Social History Narrative    No narrative on file       Current Outpatient Prescriptions:     albuterol (2 5 mg/3 mL) 0 083 % nebulizer solution, INHALE CONTENTS OF 1 VIAL IN NEBULIZER FOUR TIMES A DAY IF NEEDED, Disp: 375 mL, Rfl: 1    albuterol (VENTOLIN HFA) 90 mcg/act inhaler, Inhale 2 puffs every 6 (six) hours as needed for wheezing, Disp: 3 Inhaler, Rfl: 3    azithromycin (ZITHROMAX) 250 mg tablet, TAKE 1 TAB EVERY MONDAY WEDNESDAY AND FRIDAY, Disp: , Rfl: 3    betamethasone, augmented, (DIPROLENE-AF) 0 05 % cream, Apply topically 2 (two) times a day, Disp: 50 g, Rfl: 1    fluticasone-salmeterol (ADVAIR) 250-50 mcg/dose inhaler, Inhale 1 puff every 12 (twelve) hours, Disp: 1 Inhaler, Rfl: 6    FREESTYLE LITE test strip, Test Blood sugar 3 times daily  Diagnosis: Type 2 Diabetes, Disp: 300 each, Rfl: 0    metoprolol tartrate (LOPRESSOR) 100 mg tablet, Take 100 mg by mouth daily  , Disp: , Rfl:     mometasone (ELOCON) 0 1 % lotion, Apply topically daily, Disp: 60 mL, Rfl: 3    oxyCODONE-acetaminophen (PERCOCET) 5-325 mg per tablet, Take 1 tablet by mouth every 4 (four) hours as needed for moderate pain Max Daily Amount: 6 tablets, Disp: 180 tablet, Rfl: 0    pentoxifylline (TRENtal) 400 mg ER tablet, Take 400 mg by mouth 3 (three) times a day with meals  , Disp: , Rfl:     pregabalin (LYRICA) 100 mg capsule, Take 1 capsule (100 mg total) by mouth daily at bedtime, Disp: 30 capsule, Rfl: 4    simvastatin (ZOCOR) 80 mg tablet, TAKE 1 TABLET DAILY, Disp: 90 tablet, Rfl: 0    Tiotropium Bromide Monohydrate (SPIRIVA RESPIMAT) 2 5 MCG/ACT AERS, Inhale 2 Act (5 mcg total) daily, Disp: 3 Inhaler, Rfl: 3  Allergies   Allergen Reactions    Penicillins Swelling     Legs swell up     Vitals:    05/10/18 1525   BP: 124/70   Pulse: 76   Resp: 17   Temp: 98 1 °F (36 7 °C)   SpO2: 94%         Physical Exam   Constitutional: She is oriented to person, place, and time  She appears well-developed  HENT:   Head: Normocephalic  Eyes: Pupils are equal, round, and reactive to light  Neck: Neck supple  Cardiovascular: Normal rate  No murmur heard  Pulmonary/Chest: No respiratory distress  She has no wheezes  She has no rales  Abdominal: Soft  She exhibits no distension  There is no tenderness  There is no rebound  Musculoskeletal: She exhibits no edema  Lymphadenopathy:     She has no cervical adenopathy  Neurological: She is alert and oriented to person, place, and time  She displays normal reflexes  Skin: Skin is warm  No rash noted  Psychiatric: She has a normal mood and affect  Thought content normal            Performance Status: ECOG/Zubrod/WHO: 0 - Asymptomatic    Labs:  CBC, Coags, BMP, Mg, Phos     Imaging  No results found  I reviewed the above laboratory and imaging data        Discussion/Summary:

## 2018-05-11 DIAGNOSIS — E11.9 TYPE 2 DIABETES MELLITUS WITHOUT COMPLICATION, WITH LONG-TERM CURRENT USE OF INSULIN (HCC): ICD-10-CM

## 2018-05-11 DIAGNOSIS — Z79.4 TYPE 2 DIABETES MELLITUS WITHOUT COMPLICATION, WITH LONG-TERM CURRENT USE OF INSULIN (HCC): ICD-10-CM

## 2018-05-11 RX ORDER — BLOOD-GLUCOSE METER
KIT MISCELLANEOUS
Qty: 300 EACH | Refills: 0 | Status: SHIPPED | OUTPATIENT
Start: 2018-05-11 | End: 2018-06-20 | Stop reason: SDUPTHER

## 2018-05-25 RX ORDER — SODIUM CHLORIDE 9 MG/ML
20 INJECTION, SOLUTION INTRAVENOUS CONTINUOUS
Status: DISPENSED | OUTPATIENT
Start: 2018-05-29 | End: 2018-05-30

## 2018-05-29 ENCOUNTER — HOSPITAL ENCOUNTER (OUTPATIENT)
Dept: INFUSION CENTER | Facility: HOSPITAL | Age: 78
Discharge: HOME | End: 2018-05-29
Payer: MEDICARE

## 2018-05-29 VITALS
SYSTOLIC BLOOD PRESSURE: 142 MMHG | DIASTOLIC BLOOD PRESSURE: 54 MMHG | TEMPERATURE: 97.8 F | RESPIRATION RATE: 18 BRPM | OXYGEN SATURATION: 100 % | HEART RATE: 58 BPM

## 2018-05-29 LAB
ALBUMIN SERPL BCP-MCNC: 3 G/DL (ref 3.5–5)
ALP SERPL-CCNC: 81 U/L (ref 46–116)
ALT SERPL W P-5'-P-CCNC: 11 U/L (ref 12–78)
ANION GAP SERPL CALCULATED.3IONS-SCNC: 0 MMOL/L (ref 4–13)
AST SERPL W P-5'-P-CCNC: 15 U/L (ref 5–45)
BASOPHILS # BLD AUTO: 0.01 THOUSANDS/ΜL (ref 0–0.1)
BASOPHILS NFR BLD AUTO: 0 % (ref 0–1)
BILIRUB SERPL-MCNC: 0.4 MG/DL (ref 0.2–1)
BUN SERPL-MCNC: 11 MG/DL (ref 5–25)
CALCIUM SERPL-MCNC: 8.5 MG/DL (ref 8.3–10.1)
CHLORIDE SERPL-SCNC: 103 MMOL/L (ref 100–108)
CO2 SERPL-SCNC: 40 MMOL/L (ref 21–32)
CREAT SERPL-MCNC: 0.78 MG/DL (ref 0.6–1.3)
EOSINOPHIL # BLD AUTO: 0.11 THOUSAND/ΜL (ref 0–0.61)
EOSINOPHIL NFR BLD AUTO: 2 % (ref 0–6)
ERYTHROCYTE [DISTWIDTH] IN BLOOD BY AUTOMATED COUNT: 11.8 % (ref 11.6–15.1)
GFR SERPL CREATININE-BSD FRML MDRD: 73 ML/MIN/1.73SQ M
GLUCOSE SERPL-MCNC: 101 MG/DL (ref 65–140)
HCT VFR BLD AUTO: 39.9 % (ref 34.8–46.1)
HGB BLD-MCNC: 12.4 G/DL (ref 11.5–15.4)
LYMPHOCYTES # BLD AUTO: 0.71 THOUSANDS/ΜL (ref 0.6–4.47)
LYMPHOCYTES NFR BLD AUTO: 12 % (ref 14–44)
MCH RBC QN AUTO: 31.5 PG (ref 26.8–34.3)
MCHC RBC AUTO-ENTMCNC: 31.1 G/DL (ref 31.4–37.4)
MCV RBC AUTO: 101 FL (ref 82–98)
MONOCYTES # BLD AUTO: 0.61 THOUSAND/ΜL (ref 0.17–1.22)
MONOCYTES NFR BLD AUTO: 11 % (ref 4–12)
NEUTROPHILS # BLD AUTO: 4.33 THOUSANDS/ΜL (ref 1.85–7.62)
NEUTS SEG NFR BLD AUTO: 75 % (ref 43–75)
PLATELET # BLD AUTO: 143 THOUSANDS/UL (ref 149–390)
PMV BLD AUTO: 10.5 FL (ref 8.9–12.7)
POTASSIUM SERPL-SCNC: 3.9 MMOL/L (ref 3.5–5.3)
PROT SERPL-MCNC: 6.2 G/DL (ref 6.4–8.2)
RBC # BLD AUTO: 3.94 MILLION/UL (ref 3.81–5.12)
SODIUM SERPL-SCNC: 143 MMOL/L (ref 136–145)
T4 FREE SERPL-MCNC: 1.47 NG/DL (ref 0.76–1.46)
TSH SERPL DL<=0.05 MIU/L-ACNC: 5.25 UIU/ML (ref 0.36–3.74)
WBC # BLD AUTO: 5.77 THOUSAND/UL (ref 4.31–10.16)

## 2018-05-29 PROCEDURE — 84443 ASSAY THYROID STIM HORMONE: CPT | Performed by: INTERNAL MEDICINE

## 2018-05-29 PROCEDURE — 96413 CHEMO IV INFUSION 1 HR: CPT

## 2018-05-29 PROCEDURE — 84439 ASSAY OF FREE THYROXINE: CPT | Performed by: INTERNAL MEDICINE

## 2018-05-29 PROCEDURE — 80053 COMPREHEN METABOLIC PANEL: CPT | Performed by: INTERNAL MEDICINE

## 2018-05-29 PROCEDURE — 85025 COMPLETE CBC W/AUTO DIFF WBC: CPT | Performed by: INTERNAL MEDICINE

## 2018-05-29 RX ADMIN — ATEZOLIZUMAB 1200 MG: 1200 INJECTION, SOLUTION INTRAVENOUS at 09:14

## 2018-05-29 RX ADMIN — SODIUM CHLORIDE 20 ML/HR: 9 INJECTION, SOLUTION INTRAVENOUS at 09:10

## 2018-05-29 RX ADMIN — Medication 300 UNITS: at 09:53

## 2018-05-29 NOTE — PROGRESS NOTES
Pt arrives on unit for Tecentriq  Offers no c/o at this time  Labs drawn and sent as ordered  VSS  Will continue to monitor

## 2018-05-31 DIAGNOSIS — G89.29 OTHER CHRONIC PAIN: ICD-10-CM

## 2018-05-31 RX ORDER — OXYCODONE HYDROCHLORIDE AND ACETAMINOPHEN 5; 325 MG/1; MG/1
1 TABLET ORAL EVERY 4 HOURS PRN
Qty: 180 TABLET | Refills: 0 | Status: SHIPPED | OUTPATIENT
Start: 2018-05-31 | End: 2018-06-28 | Stop reason: SDUPTHER

## 2018-06-12 ENCOUNTER — HOSPITAL ENCOUNTER (OUTPATIENT)
Dept: CT IMAGING | Facility: HOSPITAL | Age: 78
Discharge: HOME | End: 2018-06-12
Attending: INTERNAL MEDICINE
Payer: MEDICARE

## 2018-06-12 DIAGNOSIS — C79.70 MALIGNANT NEOPLASM METASTATIC TO ADRENAL GLAND, UNSPECIFIED LATERALITY (HCC): ICD-10-CM

## 2018-06-12 DIAGNOSIS — C34.11 MALIGNANT NEOPLASM OF UPPER LOBE OF RIGHT LUNG (HCC): ICD-10-CM

## 2018-06-12 PROCEDURE — 74177 CT ABD & PELVIS W/CONTRAST: CPT

## 2018-06-12 PROCEDURE — 71260 CT THORAX DX C+: CPT

## 2018-06-12 RX ORDER — HEPARIN SODIUM (PORCINE) LOCK FLUSH IV SOLN 100 UNIT/ML 100 UNIT/ML
300 SOLUTION INTRAVENOUS ONCE
Status: COMPLETED | OUTPATIENT
Start: 2018-06-12 | End: 2018-06-12

## 2018-06-12 RX ADMIN — IOHEXOL 100 ML: 350 INJECTION, SOLUTION INTRAVENOUS at 08:01

## 2018-06-12 RX ADMIN — HEPARIN SODIUM (PORCINE) LOCK FLUSH IV SOLN 100 UNIT/ML 300 UNITS: 100 SOLUTION at 07:47

## 2018-06-15 RX ORDER — SODIUM CHLORIDE 9 MG/ML
20 INJECTION, SOLUTION INTRAVENOUS CONTINUOUS
Status: DISPENSED | OUTPATIENT
Start: 2018-06-19 | End: 2018-06-20

## 2018-06-19 ENCOUNTER — HOSPITAL ENCOUNTER (OUTPATIENT)
Dept: INFUSION CENTER | Facility: HOSPITAL | Age: 78
Discharge: HOME | End: 2018-06-19
Payer: MEDICARE

## 2018-06-19 ENCOUNTER — APPOINTMENT (EMERGENCY)
Dept: RADIOLOGY | Facility: HOSPITAL | Age: 78
End: 2018-06-19
Payer: MEDICARE

## 2018-06-19 ENCOUNTER — HOSPITAL ENCOUNTER (EMERGENCY)
Facility: HOSPITAL | Age: 78
Discharge: LEFT BEFORE DISCHARGE | End: 2018-06-19
Attending: EMERGENCY MEDICINE
Payer: MEDICARE

## 2018-06-19 VITALS
HEIGHT: 60 IN | WEIGHT: 104.72 LBS | OXYGEN SATURATION: 96 % | RESPIRATION RATE: 41 BRPM | SYSTOLIC BLOOD PRESSURE: 150 MMHG | BODY MASS INDEX: 20.56 KG/M2 | TEMPERATURE: 97.9 F | DIASTOLIC BLOOD PRESSURE: 65 MMHG | HEART RATE: 76 BPM

## 2018-06-19 VITALS
OXYGEN SATURATION: 95 % | WEIGHT: 104.8 LBS | DIASTOLIC BLOOD PRESSURE: 76 MMHG | TEMPERATURE: 97 F | RESPIRATION RATE: 20 BRPM | SYSTOLIC BLOOD PRESSURE: 159 MMHG | BODY MASS INDEX: 20.47 KG/M2 | HEART RATE: 76 BPM

## 2018-06-19 DIAGNOSIS — R91.1 SOLITARY PULMONARY NODULE: ICD-10-CM

## 2018-06-19 DIAGNOSIS — I50.9 CHF (CONGESTIVE HEART FAILURE) (HCC): Primary | ICD-10-CM

## 2018-06-19 DIAGNOSIS — J44.9 COPD (CHRONIC OBSTRUCTIVE PULMONARY DISEASE) (HCC): ICD-10-CM

## 2018-06-19 LAB
ALBUMIN SERPL BCP-MCNC: 2.9 G/DL (ref 3.5–5)
ALBUMIN SERPL BCP-MCNC: 3.2 G/DL (ref 3.5–5)
ALP SERPL-CCNC: 101 U/L (ref 46–116)
ALP SERPL-CCNC: 90 U/L (ref 46–116)
ALT SERPL W P-5'-P-CCNC: 14 U/L (ref 12–78)
ALT SERPL W P-5'-P-CCNC: 16 U/L (ref 12–78)
ANION GAP SERPL CALCULATED.3IONS-SCNC: -2 MMOL/L (ref 4–13)
ANION GAP SERPL CALCULATED.3IONS-SCNC: 2 MMOL/L (ref 4–13)
AST SERPL W P-5'-P-CCNC: 14 U/L (ref 5–45)
AST SERPL W P-5'-P-CCNC: 14 U/L (ref 5–45)
BASOPHILS # BLD AUTO: 0.02 THOUSANDS/ΜL (ref 0–0.1)
BASOPHILS # BLD AUTO: 0.03 THOUSANDS/ΜL (ref 0–0.1)
BASOPHILS NFR BLD AUTO: 0 % (ref 0–1)
BASOPHILS NFR BLD AUTO: 0 % (ref 0–1)
BILIRUB SERPL-MCNC: 0.5 MG/DL (ref 0.2–1)
BILIRUB SERPL-MCNC: 0.5 MG/DL (ref 0.2–1)
BUN SERPL-MCNC: 10 MG/DL (ref 5–25)
BUN SERPL-MCNC: 11 MG/DL (ref 5–25)
CALCIUM SERPL-MCNC: 8.1 MG/DL (ref 8.3–10.1)
CALCIUM SERPL-MCNC: 8.8 MG/DL (ref 8.3–10.1)
CHLORIDE SERPL-SCNC: 102 MMOL/L (ref 100–108)
CHLORIDE SERPL-SCNC: 99 MMOL/L (ref 100–108)
CO2 SERPL-SCNC: 42 MMOL/L (ref 21–32)
CO2 SERPL-SCNC: 44 MMOL/L (ref 21–32)
CREAT SERPL-MCNC: 0.6 MG/DL (ref 0.6–1.3)
CREAT SERPL-MCNC: 0.6 MG/DL (ref 0.6–1.3)
EOSINOPHIL # BLD AUTO: 0.08 THOUSAND/ΜL (ref 0–0.61)
EOSINOPHIL # BLD AUTO: 0.08 THOUSAND/ΜL (ref 0–0.61)
EOSINOPHIL NFR BLD AUTO: 1 % (ref 0–6)
EOSINOPHIL NFR BLD AUTO: 1 % (ref 0–6)
ERYTHROCYTE [DISTWIDTH] IN BLOOD BY AUTOMATED COUNT: 11.3 % (ref 11.6–15.1)
ERYTHROCYTE [DISTWIDTH] IN BLOOD BY AUTOMATED COUNT: 11.6 % (ref 11.6–15.1)
GFR SERPL CREATININE-BSD FRML MDRD: 87 ML/MIN/1.73SQ M
GFR SERPL CREATININE-BSD FRML MDRD: 87 ML/MIN/1.73SQ M
GLUCOSE SERPL-MCNC: 125 MG/DL (ref 65–140)
GLUCOSE SERPL-MCNC: 182 MG/DL (ref 65–140)
HCT VFR BLD AUTO: 40.1 % (ref 34.8–46.1)
HCT VFR BLD AUTO: 41.9 % (ref 34.8–46.1)
HGB BLD-MCNC: 12 G/DL (ref 11.5–15.4)
HGB BLD-MCNC: 12.7 G/DL (ref 11.5–15.4)
IMM GRANULOCYTES # BLD AUTO: 0.01 THOUSAND/UL (ref 0–0.2)
IMM GRANULOCYTES # BLD AUTO: 0.02 THOUSAND/UL (ref 0–0.2)
IMM GRANULOCYTES NFR BLD AUTO: 0 % (ref 0–2)
IMM GRANULOCYTES NFR BLD AUTO: 0 % (ref 0–2)
LYMPHOCYTES # BLD AUTO: 0.49 THOUSANDS/ΜL (ref 0.6–4.47)
LYMPHOCYTES # BLD AUTO: 0.73 THOUSANDS/ΜL (ref 0.6–4.47)
LYMPHOCYTES NFR BLD AUTO: 7 % (ref 14–44)
LYMPHOCYTES NFR BLD AUTO: 9 % (ref 14–44)
MAGNESIUM SERPL-MCNC: 2.2 MG/DL (ref 1.6–2.6)
MCH RBC QN AUTO: 31.1 PG (ref 26.8–34.3)
MCH RBC QN AUTO: 31.3 PG (ref 26.8–34.3)
MCHC RBC AUTO-ENTMCNC: 29.9 G/DL (ref 31.4–37.4)
MCHC RBC AUTO-ENTMCNC: 30.3 G/DL (ref 31.4–37.4)
MCV RBC AUTO: 103 FL (ref 82–98)
MCV RBC AUTO: 104 FL (ref 82–98)
MONOCYTES # BLD AUTO: 0.53 THOUSAND/ΜL (ref 0.17–1.22)
MONOCYTES # BLD AUTO: 0.69 THOUSAND/ΜL (ref 0.17–1.22)
MONOCYTES NFR BLD AUTO: 8 % (ref 4–12)
MONOCYTES NFR BLD AUTO: 8 % (ref 4–12)
NEUTROPHILS # BLD AUTO: 5.69 THOUSANDS/ΜL (ref 1.85–7.62)
NEUTROPHILS # BLD AUTO: 6.99 THOUSANDS/ΜL (ref 1.85–7.62)
NEUTS SEG NFR BLD AUTO: 82 % (ref 43–75)
NEUTS SEG NFR BLD AUTO: 84 % (ref 43–75)
NRBC BLD AUTO-RTO: 0 /100 WBCS
NT-PROBNP SERPL-MCNC: 6061 PG/ML
PLATELET # BLD AUTO: 178 THOUSANDS/UL (ref 149–390)
PLATELET # BLD AUTO: 191 THOUSANDS/UL (ref 149–390)
PMV BLD AUTO: 10.3 FL (ref 8.9–12.7)
PMV BLD AUTO: 10.7 FL (ref 8.9–12.7)
POTASSIUM SERPL-SCNC: 3.9 MMOL/L (ref 3.5–5.3)
POTASSIUM SERPL-SCNC: 4.6 MMOL/L (ref 3.5–5.3)
PROT SERPL-MCNC: 6.4 G/DL (ref 6.4–8.2)
PROT SERPL-MCNC: 7 G/DL (ref 6.4–8.2)
RBC # BLD AUTO: 3.84 MILLION/UL (ref 3.81–5.12)
RBC # BLD AUTO: 4.08 MILLION/UL (ref 3.81–5.12)
SODIUM SERPL-SCNC: 143 MMOL/L (ref 136–145)
SODIUM SERPL-SCNC: 144 MMOL/L (ref 136–145)
T4 FREE SERPL-MCNC: 1.38 NG/DL (ref 0.76–1.46)
TROPONIN I SERPL-MCNC: 0.02 NG/ML
TSH SERPL DL<=0.05 MIU/L-ACNC: 5.73 UIU/ML (ref 0.36–3.74)
WBC # BLD AUTO: 6.82 THOUSAND/UL (ref 4.31–10.16)
WBC # BLD AUTO: 8.54 THOUSAND/UL (ref 4.31–10.16)

## 2018-06-19 PROCEDURE — 83735 ASSAY OF MAGNESIUM: CPT | Performed by: EMERGENCY MEDICINE

## 2018-06-19 PROCEDURE — 99285 EMERGENCY DEPT VISIT HI MDM: CPT

## 2018-06-19 PROCEDURE — 85025 COMPLETE CBC W/AUTO DIFF WBC: CPT | Performed by: EMERGENCY MEDICINE

## 2018-06-19 PROCEDURE — 96374 THER/PROPH/DIAG INJ IV PUSH: CPT

## 2018-06-19 PROCEDURE — 84484 ASSAY OF TROPONIN QUANT: CPT | Performed by: EMERGENCY MEDICINE

## 2018-06-19 PROCEDURE — 96375 TX/PRO/DX INJ NEW DRUG ADDON: CPT

## 2018-06-19 PROCEDURE — 36415 COLL VENOUS BLD VENIPUNCTURE: CPT | Performed by: EMERGENCY MEDICINE

## 2018-06-19 PROCEDURE — 80053 COMPREHEN METABOLIC PANEL: CPT | Performed by: EMERGENCY MEDICINE

## 2018-06-19 PROCEDURE — 80053 COMPREHEN METABOLIC PANEL: CPT | Performed by: INTERNAL MEDICINE

## 2018-06-19 PROCEDURE — 84443 ASSAY THYROID STIM HORMONE: CPT | Performed by: INTERNAL MEDICINE

## 2018-06-19 PROCEDURE — 83880 ASSAY OF NATRIURETIC PEPTIDE: CPT | Performed by: EMERGENCY MEDICINE

## 2018-06-19 PROCEDURE — 85025 COMPLETE CBC W/AUTO DIFF WBC: CPT | Performed by: INTERNAL MEDICINE

## 2018-06-19 PROCEDURE — 94644 CONT INHLJ TX 1ST HOUR: CPT

## 2018-06-19 PROCEDURE — 96413 CHEMO IV INFUSION 1 HR: CPT

## 2018-06-19 PROCEDURE — 71045 X-RAY EXAM CHEST 1 VIEW: CPT

## 2018-06-19 PROCEDURE — 84439 ASSAY OF FREE THYROXINE: CPT | Performed by: INTERNAL MEDICINE

## 2018-06-19 PROCEDURE — 93005 ELECTROCARDIOGRAM TRACING: CPT

## 2018-06-19 RX ORDER — METHYLPREDNISOLONE SODIUM SUCCINATE 125 MG/2ML
125 INJECTION, POWDER, LYOPHILIZED, FOR SOLUTION INTRAMUSCULAR; INTRAVENOUS ONCE
Status: COMPLETED | OUTPATIENT
Start: 2018-06-19 | End: 2018-06-19

## 2018-06-19 RX ORDER — SODIUM CHLORIDE FOR INHALATION 0.9 %
3 VIAL, NEBULIZER (ML) INHALATION ONCE
Status: COMPLETED | OUTPATIENT
Start: 2018-06-19 | End: 2018-06-19

## 2018-06-19 RX ORDER — PREDNISONE 20 MG/1
40 TABLET ORAL DAILY
Qty: 12 TABLET | Refills: 0 | Status: SHIPPED | OUTPATIENT
Start: 2018-06-20 | End: 2018-06-24

## 2018-06-19 RX ORDER — 0.9 % SODIUM CHLORIDE 0.9 %
3 VIAL (ML) INJECTION AS NEEDED
Status: DISCONTINUED | OUTPATIENT
Start: 2018-06-19 | End: 2018-06-19 | Stop reason: HOSPADM

## 2018-06-19 RX ORDER — FUROSEMIDE 10 MG/ML
40 INJECTION INTRAMUSCULAR; INTRAVENOUS ONCE
Status: COMPLETED | OUTPATIENT
Start: 2018-06-19 | End: 2018-06-19

## 2018-06-19 RX ADMIN — Medication 300 UNITS: at 09:57

## 2018-06-19 RX ADMIN — ALBUTEROL SULFATE 10 MG: 2.5 SOLUTION RESPIRATORY (INHALATION) at 10:29

## 2018-06-19 RX ADMIN — METHYLPREDNISOLONE SODIUM SUCCINATE 125 MG: 125 INJECTION, POWDER, FOR SOLUTION INTRAMUSCULAR; INTRAVENOUS at 12:09

## 2018-06-19 RX ADMIN — IPRATROPIUM BROMIDE 0.5 MG: 0.5 SOLUTION RESPIRATORY (INHALATION) at 10:29

## 2018-06-19 RX ADMIN — ISODIUM CHLORIDE 3 ML: 0.03 SOLUTION RESPIRATORY (INHALATION) at 10:29

## 2018-06-19 RX ADMIN — FUROSEMIDE 40 MG: 10 INJECTION, SOLUTION INTRAMUSCULAR; INTRAVENOUS at 12:09

## 2018-06-19 RX ADMIN — SODIUM CHLORIDE 20 ML/HR: 9 INJECTION, SOLUTION INTRAVENOUS at 09:00

## 2018-06-19 RX ADMIN — ATEZOLIZUMAB 1200 MG: 1200 INJECTION, SOLUTION INTRAVENOUS at 09:20

## 2018-06-19 NOTE — ED PROVIDER NOTES
History  Chief Complaint   Patient presents with    Shortness of Breath     pt presents to ED c/o of SOB  Pt's uses nasal cannula at 3L daily upon arrival pt's pulse ox 88% on 3L     Patient complains of trouble breathing chronic but worse over last few weeks and then acute flare up just prior to arrival walking around in hospital after chemotherapy for lung cancer  Patient denies cough or mucous  She takes inhalers and oxygen 3 liters all the time  Prior to Admission Medications   Prescriptions Last Dose Informant Patient Reported? Taking? FREESTYLE LITE test strip   No No   Sig: Test Blood sugar 3 times daily  Diagnosis: Type 2 Diabetes   Tiotropium Bromide Monohydrate (SPIRIVA RESPIMAT) 2 5 MCG/ACT AERS   No No   Sig: Inhale 2 Act (5 mcg total) daily   albuterol (2 5 mg/3 mL) 0 083 % nebulizer solution   No No   Sig: INHALE CONTENTS OF 1 VIAL IN NEBULIZER FOUR TIMES A DAY IF NEEDED   albuterol (VENTOLIN HFA) 90 mcg/act inhaler   No No   Sig: Inhale 2 puffs every 6 (six) hours as needed for wheezing   azithromycin (ZITHROMAX) 250 mg tablet   Yes No   Sig: TAKE 1 TAB EVERY MONDAY WEDNESDAY AND FRIDAY   betamethasone, augmented, (DIPROLENE-AF) 0 05 % cream   No No   Sig: Apply topically 2 (two) times a day   fluticasone-salmeterol (ADVAIR) 250-50 mcg/dose inhaler   No No   Sig: Inhale 1 puff every 12 (twelve) hours   metoprolol tartrate (LOPRESSOR) 100 mg tablet   Yes No   Sig: Take 100 mg by mouth daily  mometasone (ELOCON) 0 1 % lotion   No No   Sig: Apply topically daily   oxyCODONE-acetaminophen (PERCOCET) 5-325 mg per tablet   No No   Sig: Take 1 tablet by mouth every 4 (four) hours as needed for moderate pain Max Daily Amount: 6 tablets   pentoxifylline (TRENtal) 400 mg ER tablet   Yes No   Sig: Take 400 mg by mouth 3 (three) times a day with meals     pregabalin (LYRICA) 100 mg capsule   No No   Sig: Take 1 capsule (100 mg total) by mouth daily at bedtime   simvastatin (ZOCOR) 80 mg tablet No No   Sig: TAKE 1 TABLET DAILY      Facility-Administered Medications: None       Past Medical History:   Diagnosis Date    Arthritis     Cataract of right eye 3/19/2015    CHF (congestive heart failure) (HCC)     Common cold     Coronary artery disease     Depression     Diabetes mellitus ([facility] )     Fracture of multiple pubic rami ([facility] ) 2015    History of radiation therapy     Hyperlipidemia     Hypertension     Lung cancer ([place] [address] )     Multiple thyroid nodules 2016    Pulmonary nodule     Shortness of breath        Past Surgical History:   Procedure Laterality Date    BRONCHOSCOPY N/A 8/10/2016    Procedure: BRONCHOSCOPY FLEXIBLE;  Surgeon: Jenaro Garcia MD;  Location[facility];  Service:    [facility]  SECTION      CHOLECYSTECTOMY      EYE SURGERY      HYSTERECTOMY      ND BRONCHOSCOPY NEEDLE BX TRACHEA MAIN STEM&/BRON N/A 8/10/2016    Procedure: ENDOBRONCHIAL ULTRASOUND (FROZEN SECTION) ; Surgeon: Jenaro Garcia MD;  Location: [facility];  Service: Thoracic    ND INSJ TUNNELED CTR VAD W/SUBQ PORT AGE 5 YR/> Left 2016    Procedure: INSERTION VENOUS PORT (PORT-A-CATH); Surgeon: Jenaro Garcia MD;  [facility];  Service: Thoracic    TONSILLECTOMY         Family History   Problem Relation Age of Onset    Brain cancer Sister      I have reviewed and agree with the history as documented  Social History   Substance Use Topics    Smoking status: Current Some Day Smoker     Packs/day: 0 25    Smokeless tobacco: Never Used      Comment: smokes 3-4 cigs /day    Alcohol use No        Review of Systems   All other systems reviewed and are negative  Physical Exam  Physical Exam   Constitutional: She is oriented to person, place, and time  She appears well-developed and well-nourished  HENT:   Mouth/Throat: Oropharynx is clear and moist    Eyes: Conjunctivae and EOM are normal  Pupils are equal, round, and reactive to light  Neck: Normal range of motion  Neck supple   No spinous process tenderness present  Cardiovascular: Normal rate, regular rhythm, normal heart sounds and intact distal pulses  Pulmonary/Chest: Accessory muscle usage present  Tachypnea noted  She is in respiratory distress  She has wheezes  Abdominal: Soft  Bowel sounds are normal  She exhibits no distension  There is no tenderness  Musculoskeletal: Normal range of motion  Neurological: She is alert and oriented to person, place, and time  She has normal strength  No sensory deficit  GCS eye subscore is 4  GCS verbal subscore is 5  GCS motor subscore is 6  Skin: Skin is warm and dry  No rash noted  Psychiatric: She has a normal mood and affect  Nursing note and vitals reviewed        Vital Signs  ED Triage Vitals   Temperature Pulse Respirations Blood Pressure SpO2   06/19/18 1017 06/19/18 1017 06/19/18 1017 06/19/18 1020 06/19/18 1017   97 9 °F (36 6 °C) 88 22 (!) 186/86 91 %      Temp Source Heart Rate Source Patient Position - Orthostatic VS BP Location FiO2 (%)   06/19/18 1017 06/19/18 1017 -- -- --   Temporal Monitor         Pain Score       --                  Vitals:    06/19/18 1115 06/19/18 1130 06/19/18 1145 06/19/18 1200   BP: (!) 171/83 158/70 150/65    Pulse: 74 69 69 76       Visual Acuity  Visual Acuity      Most Recent Value   L Pupil Size (mm)  2   R Pupil Size (mm)  2          ED Medications  Medications   albuterol inhalation solution 10 mg (10 mg Nebulization Given 6/19/18 1029)     And   ipratropium (ATROVENT) 0 02 % inhalation solution 0 5 mg (0 5 mg Nebulization Given 6/19/18 1029)     And   sodium chloride 0 9 % inhalation solution 3 mL (3 mL Nebulization Given 6/19/18 1029)   methylPREDNISolone sodium succinate (Solu-MEDROL) injection 125 mg (125 mg Intravenous Given 6/19/18 1209)   furosemide (LASIX) injection 40 mg (40 mg Intravenous Given 6/19/18 1209)       Diagnostic Studies  Results Reviewed     Procedure Component Value Units Date/Time    B-type natriuretic peptide [[id number]]  (Abnormal) Collected:  06/19/18 1032    Lab Status:  Final result Specimen:  Blood from Arm, Right Updated:  06/19/18 1116     NT-proBNP 6,061 (H) pg/mL     Comprehensive metabolic panel [[id number]]  (Abnormal) Collected:  06/19/18 1032    Lab Status:  Final result Specimen:  Blood from Arm, Right Updated:  06/19/18 1114     Sodium 144 mmol/L      Potassium 4 6 mmol/L      Chloride 102 mmol/L      CO2 44 (H) mmol/L      Anion Gap -2 (L) mmol/L      BUN 10 mg/dL      Creatinine 0 60 mg/dL      Glucose 125 mg/dL      Calcium 8 8 mg/dL      AST 14 U/L      ALT 14 U/L      Alkaline Phosphatase 101 U/L      Total Protein 7 0 g/dL      Albumin 3 2 (L) g/dL      Total Bilirubin 0 50 mg/dL      eGFR 87 ml/min/1 73sq m     Narrative:         National Kidney Disease Education Program recommendations are as follows:  GFR calculation is accurate only with a steady state creatinine  Chronic Kidney disease less than 60 ml/min/1 73 sq  meters  Kidney failure less than 15 ml/min/1 73 sq  meters      Magnesium [[id number]]  (Normal) Collected:  06/19/18 1032    Lab Status:  Final result Specimen:  Blood from Arm, Right Updated:  06/19/18 1114     Magnesium 2 2 mg/dL     Troponin I [[id number]]  (Normal) Collected:  06/19/18 1032    Lab Status:  Final result Specimen:  Blood from Arm, Right Updated:  06/19/18 1111     Troponin I 0 02 ng/mL     CBC and differential [[id number]]  (Abnormal) Collected:  06/19/18 1032    Lab Status:  Final result Specimen:  Blood from Arm, Right Updated:  06/19/18 1109     WBC 8 54 Thousand/uL      RBC 4 08 Million/uL      Hemoglobin 12 7 g/dL      Hematocrit 41 9 %       (H) fL      MCH 31 1 pg      MCHC 30 3 (L) g/dL      RDW 11 3 (L) %      MPV 10 7 fL      Platelets 044 Thousands/uL      nRBC 0 /100 WBCs      Neutrophils Relative 82 (H) %      Immat GRANS % 0 %      Lymphocytes Relative 9 (L) %      Monocytes Relative 8 %      Eosinophils Relative 1 %      Basophils Relative 0 % Neutrophils Absolute 6 99 Thousands/µL      Immature Grans Absolute 0 02 Thousand/uL      Lymphocytes Absolute 0 73 Thousands/µL      Monocytes Absolute 0 69 Thousand/µL      Eosinophils Absolute 0 08 Thousand/µL      Basophils Absolute 0 03 Thousands/µL                  XR chest 1 view portable   ED Interpretation by Rakesh Alas DO (06/19 1283)   No acute abnl      Final Result by Todd Mosley MD (06/19 1128)      Nonspecific interstitial prominence in the lower lung fields  Stable cardiomegaly        Severe atherosclerosis of aorta            Workstation performed: [id number]                    Procedures  ECG 12 Lead Documentation  Date/Time: 6/19/2018 1:18 PM  Performed by: Drew Chandra  Authorized by: Drew Chandra     Indications / Diagnosis:  Sob  ECG reviewed by me, the ED Provider: yes    Patient location:  ED  Previous ECG:     Previous ECG:  Compared to current    Comparison ECG info:  2008    Similarity:  Changes noted  Interpretation:     Interpretation: normal    Quality:     Tracing quality:  Limited by artifact  Rate:     ECG rate:  86    ECG rate assessment: normal    Rhythm:     Rhythm: sinus rhythm    Ectopy:     Ectopy: none    QRS:     QRS axis:  Right    QRS intervals:  Normal  Conduction:     Conduction: normal    ST segments:     ST segments:  Normal  T waves:     T waves: normal             Phone Contacts  ED Phone Contact    ED Course                               MDM  Number of Diagnoses or Management Options  CHF (congestive heart failure) ([facility] [address] ): new and requires workup  COPD (chronic obstructive pulmonary disease) ([facility] ): established and worsening     Amount and/or Complexity of Data Reviewed  Clinical lab tests: ordered and reviewed  Tests in the radiology section of CPT®: ordered and reviewed    Patient Progress  Patient progress: improved    CritCare Time    Disposition  Final diagnoses:   CHF (congestive heart failure) ([facility]) - acute exacerbation   COPD (chronic obstructive pulmonary disease) ([place] [address] ) - acute exacerbation     Time reflects when diagnosis was documented in both MDM as applicable and the Disposition within this note     Time User Action Codes Description Comment    6/19/2018 12:09 PM Norrine Gobble Add [I50 9] CHF (congestive heart failure) ([place] [address] )     6/19/2018 12:09 PM Norrine Gobble Modify [I50 9] CHF (congestive heart failure) ([place] [address] ) acute exacerbation    6/19/2018 12:09 PM Norrine Gobble Add [J44 9] COPD (chronic obstructive pulmonary disease) ([place] [address] )     6/19/2018 12:09 PM Norrine Gobble Modify [J44 9] COPD (chronic obstructive pulmonary disease) ([place] [address] ) acute exacerbation      ED Disposition     ED Disposition Condition Comment    AMA  Date: 6/19/2018  Patient: Alondra Grant  Admitted: 6/19/2018 10:16 AM  Attending Provider: Quyen Alford DO    Alondra Grant or her authorized caregiver has made the decision for the patient to leave the emergency department against the advice of  her attending physician  She or her authorized caregiver has been informed and understands the inherent risks, including death, respiratory distress, heart attack  She or her authorized caregiver has decided to accept the responsibility for this decisi on  Alondra Grant and all necessary parties have been advised that she may return for further evaluation or treatment  Her condition at time of discharge was poor    Alondra Grant had current vital signs as follows:  /65   Pulse 76   Temp  97 9 °F (36 6 °C) (Temporal)   Resp (!) 41   Ht 5' (1 524 m)   Wt 47 5 kg (104 lb 11 5 oz)         Follow-up Information     Follow up With Specialties Details Why [id number] Emil Phillips MD Cardiology   [address] [facility]  [address]  [address] [facility] Rogelio Lindsay MD Oncology, Hematology and Oncology, Hematology Today  [facility]  3rd floor  [address] [facility] [address]            Discharge Medication List as of 6/19/2018 12:14 PM      START taking these medications Details   predniSONE 20 mg tablet Take 2 tablets (40 mg total) by mouth daily for 4 days, Starting Wed 6/20/2018, Until Sun 6/24/2018, Normal         CONTINUE these medications which have NOT CHANGED    Details   albuterol (2 5 mg/3 mL) 0 083 % nebulizer solution INHALE CONTENTS OF 1 VIAL IN NEBULIZER FOUR TIMES A DAY IF NEEDED, Normal      albuterol (VENTOLIN HFA) 90 mcg/act inhaler Inhale 2 puffs every 6 (six) hours as needed for wheezing, Starting Thu 5/10/2018, Normal      azithromycin (ZITHROMAX) 250 mg tablet TAKE 1 TAB EVERY MONDAY WEDNESDAY AND FRIDAY, Historical Med      betamethasone, augmented, (DIPROLENE-AF) 0 05 % cream Apply topically 2 (two) times a day, Starting Thu 2/15/2018, Normal      fluticasone-salmeterol (ADVAIR) 250-50 mcg/dose inhaler Inhale 1 puff every 12 (twelve) hours, Starting Tue 3/6/2018, Normal      FREESTYLE LITE test strip Test Blood sugar 3 times daily  Diagnosis: Type 2 Diabetes, Print      metoprolol tartrate (LOPRESSOR) 100 mg tablet Take 100 mg by mouth daily  , Until Discontinued, Historical Med      mometasone (ELOCON) 0 1 % lotion Apply topically daily, Starting Thu 2/15/2018, Normal      oxyCODONE-acetaminophen (PERCOCET) 5-325 mg per tablet Take 1 tablet by mouth every 4 (four) hours as needed for moderate pain Max Daily Amount: 6 tablets, Starting Thu 5/31/2018, Print      pentoxifylline (TRENtal) 400 mg ER tablet Take 400 mg by mouth 3 (three) times a day with meals  , Until Discontinued, Historical Med      pregabalin (LYRICA) 100 mg capsule Take 1 capsule (100 mg total) by mouth daily at bedtime, Starting Mon 4/30/2018, Print      simvastatin (ZOCOR) 80 mg tablet TAKE 1 TABLET DAILY, Normal      Tiotropium Bromide Monohydrate (SPIRIVA RESPIMAT) 2 5 MCG/ACT AERS Inhale 2 Act (5 mcg total) daily, Starting Mon 4/2/2018, Normal           No discharge procedures on file      ED Provider  Electronically Signed by           Tommy Shetty DO  06/19/18 1947

## 2018-06-19 NOTE — PROGRESS NOTES
Pt matthew infusion well  Port deaccessed after flushing with Heparin  Dsd applied  Assisted to w/c for discharge  Pt appears more sob @ present  Resps =  35  Using her O2 from home @ 3 L  Denies any difficulty  Disch via w/c with

## 2018-06-19 NOTE — PROGRESS NOTES
Pt arrived via w/c for chemo  Appears frail today  Has not lost weight  Moist cough, rales, rhonchi and wheezing throughout lungs  Denies fever  VSS  Made comfortable  O2 @ 3L using O2 concentrator  Spoke with [facility], Dr Julio Alfred nurse to make her aware of pt's condition  OK to proceed  Port accessed  NSS to [facility]

## 2018-06-19 NOTE — DISCHARGE INSTRUCTIONS
COPD (Chronic Obstructive Pulmonary Disease)   WHAT YOU NEED TO KNOW:   Chronic obstructive pulmonary disease (COPD) is a lung disease that makes it hard for you to breathe  It is usually a result of lung damage caused by years of irritation and inflammation in your lungs  DISCHARGE INSTRUCTIONS:   Call 911 if:   · You feel lightheaded, short of breath, and have chest pain  Seek care immediately if:   · You are confused, dizzy, or feel faint  · Your arm or leg feels warm, tender, and painful  It may look swollen and red  · You cough up blood  Contact your healthcare provider if:   · You have more shortness of breath than usual      · You need more medicine than usual to control your symptoms  · You are coughing or wheezing more than usual      · You are coughing up more mucus, or it is a different color or has a different odor  · You gain more than 3 pounds in a week  · You have a fever, a runny or stuffy nose, and a sore throat, or other cold or flu symptoms  · Your skin, lips, or nails start to turn blue  · You have swelling in your legs or ankles  · You are very tired or weak for more than a day  · You notice changes in your mood, or changes in your ability to think or concentrate  · You have questions or concerns about your condition or care  Medicines:   · Medicines  may be used to open your airways, decrease swelling and inflammation in your lungs, or treat an infection  You may need 2 or more medicines  A short-acting medicine relieves symptoms quickly  Long-acting medicines will control or prevent symptoms  Ask for more information about the medicines you are given and how to use them safely  · Take your medicine as directed  Contact your healthcare provider if you think your medicine is not helping or if you have side effects  Tell him or her if you are allergic to any medicine  Keep a list of the medicines, vitamins, and herbs you take  Include the amounts, and when and why you take them  Bring the list or the pill bottles to follow-up visits  Carry your medicine list with you in case of an emergency  Help make breathing easier:   · Use pursed-lip breathing any time you feel short of breath  Take a deep breath in through your nose  Slowly breathe out through your mouth with your lips pursed for twice as long as you inhaled  You can also practice this breathing pattern while you bend, lift, climb stairs, or exercise  It slows down your breathing and helps move more air in and out of your lungs  · Do not smoke, and avoid others who smoke  Nicotine and other substances can cause lung irritation or damage and make it harder for you to breathe  Do not use e-cigarettes or smokeless tobacco  They still contain nicotine  Ask your healthcare provider for information if you currently smoke and need help to quit  For support and more information:  ¨ [employer]  Phone: 7- 473 - 687-6027  Web Address: [employer]      · Be aware of and avoid anything that makes your symptoms worse  Stay out of high altitudes and places with high humidity  Stay inside, or cover your mouth and nose with a scarf when you are outside during cold weather  Stay inside on days when air pollution or pollen counts are high  Do not use aerosol sprays such as deodorant, bug spray, and hair spray  Manage COPD and help prevent exacerbations:  COPD is a serious condition that gets worse over time  A COPD exacerbation means your symptoms suddenly get worse  It is important to prevent exacerbations  An exacerbation can cause more lung damage  COPD cannot be cured, but you can take action to feel better and prevent COPD exacerbations:  · Protect yourself from germs  Germs can get into your lungs and cause an infection  An infection in your lungs can create more mucus and make it harder to breathe   An infection can also create swelling in your airways and prevent air from getting in  You can decrease your risk for infection by doing the following:     [facility] your hands often with soap and water  Carry germ-killing gel with you  You can use the gel to clean your hands when soap and water are not available  ¨ Do not touch your eyes, nose, or mouth unless you have washed your hands first      ¨ Always cover your mouth when you cough  Cough into a tissue or your shirtsleeve so you do not spread germs from your hands  ¨ Try to avoid people who have a cold or the flu  If you are sick, stay away from others as much as possible  · Drink more liquids  This will help to keep your air passages moist and help you cough up mucus  Ask how much liquid to drink each day and which liquids are best for you  · Exercise daily  Exercise for at least 20 minutes each day to help increase your energy and decrease shortness of breath  Walking or riding a bike are good ways to exercise  Talk to your healthcare provider about the best exercise plan for you  · Ask about vaccines  Your healthcare provider may recommend that you get regular flu and pneumonia vaccines  Pneumonia can become life-threatening for a person who has COPD  Ask about other vaccines you may need  Ask your healthcare provider about the flu and pneumonia vaccines  All adults should get the flu (influenza) vaccine every year as soon as it becomes available  The pneumonia vaccine is given to adults aged 72 or older to prevent pneumococcal disease, such as pneumonia  Adults aged 23 to 59 years who are at high risk for pneumococcal disease also should get the pneumococcal vaccine  It may need to be repeated 1 or 5 years later  Pulmonary rehabilitation:  Your healthcare provider may recommend a program to help you manage your symptoms and improve your quality of life  It may include nutritional counseling and exercise to strengthen your lungs     Make decisions about your choices for future treatment:  Ask for information about advanced medical directives and living vickers  These documents help you decide and write down your choices for treatment and end-of-life care  It is best to complete them when you feel well and can think clearly about your wishes  The information can then be kept for future use if you are in the hospital or become very ill  Follow up with your healthcare provider as directed: You may need more tests  Your healthcare provider may refer you to a pulmonary (lung) specialist  Write down your questions so you remember to ask them during your visits  © 2017 [facility] Information is for End User's use only and may not be sold, redistributed or otherwise used for commercial purposes  All illustrations and images included in CareNotes® are the copyrighted property of [employer]  or [employer]  The above information is an  only  It is not intended as medical advice for individual conditions or treatments  Talk to your doctor, nurse or pharmacist before following any medical regimen to see if it is safe and effective for you  Heart Failure   WHAT YOU NEED TO KNOW:   Heart failure (HF) is a condition that does not allow your heart to fill or pump properly  Not enough oxygen in your blood gets to your organs and tissues  HF can occur in the right side, the left side, or both lower chambers of your heart  HF is often caused by damage or injury to your heart  The damage may be caused by heart attack, other heart conditions, or high blood pressure  HF is a long-term condition that tends to get worse over time  It is important to manage your health to improve your quality of life  HF can be worsened by heavy alcohol use, smoking, diabetes that is not controlled, or obesity          DISCHARGE INSTRUCTIONS:   Call 911 if:   · You have any of the following signs of a heart attack:      ¨ Squeezing, pressure, or pain in your chest that lasts longer than 5 minutes or returns    ¨ Discomfort or pain in your back, neck, jaw, stomach, or arm     ¨ Trouble breathing    ¨ Nausea or vomiting    ¨ Lightheadedness or a sudden cold sweat, especially with chest pain or trouble breathing    Seek care immediately if:   · You gain 3 or more pounds (1 4 kg) in a day, or more than your healthcare provider says you should  · Your heartbeat is fast, slow, or uneven all the time  Contact your healthcare provider if:   · You have symptoms of worsening HF:      ¨ Shortness of breath at rest, at night, or that is getting worse in any way     ¨ Weight gain of 5 or more pounds (2 2 kg) in a week     ¨ More swelling in your legs or ankles     ¨ Abdominal pain or swelling     ¨ More coughing     ¨ Loss of appetite     ¨ Feeling tired all the time    · You feel hopeless or depressed, or you have lost interest in things you used to enjoy  · You often feel worried or afraid  · You have questions or concerns about your condition or care  Medicines: You may  need any of the following:  · Medicines  may be given to help regulate your heart rhythm  You may also need medicines to lower your blood pressure, and to get rid of extra fluids  · Take your medicine as directed  Contact your healthcare provider if you think your medicine is not helping or if you have side effects  Tell him or her if you are allergic to any medicine  Keep a list of the medicines, vitamins, and herbs you take  Include the amounts, and when and why you take them  Bring the list or the pill bottles to follow-up visits  Carry your medicine list with you in case of an emergency  Follow up with your healthcare provider or cardiologist as directed: You may need to return for other tests  You may need home health care  A healthcare provider will monitor your vital signs, weight, and make sure your medicines are working  Write down your questions so you remember to ask them during your visits     Go to cardiac rehab as directed:  Cardiac rehab is a program run by specialists who will help you safely strengthen your heart  The program includes exercise, relaxation, stress management, and heart-healthy nutrition  Healthcare providers will also make sure your medicines are helping to reduce your symptoms  Manage your HF:  · Do not smoke  Nicotine and other chemicals in cigarettes and cigars can cause lung damage and make HF difficult to manage  Ask your healthcare provider for information if you currently smoke and need help to quit  E-cigarettes or smokeless tobacco still contain nicotine  Talk to your healthcare provider before you use these products  · Do not drink alcohol or take illegal drugs  Alcohol and drugs can worsen your symptoms quickly  · Weigh yourself every morning  Use the same scale, in the same spot  Do this after you use the bathroom, but before you eat or drink anything  Wear the same type of clothing  Do not wear shoes  Record your weight each day so you will notice any sudden weight gain  Swelling and weight gain are signs of fluid retention  If you are overweight, ask how to lose weight safely  · Check your blood pressure and heart rate every day  Ask for more information about how to measure your blood pressure and heart rate correctly  Ask what these numbers should be for you  · Manage any chronic health conditions you have  These include high blood pressure, diabetes, obesity, high cholesterol, metabolic syndrome, and COPD  You will have fewer symptoms if you manage these health conditions  Follow your healthcare provider's recommendations and follow up with him or her regularly  · Eat heart-healthy foods and limit sodium (salt)  An easy way to do this is to eat more fresh fruits and vegetables and fewer canned and processed foods  Replace butter and margarine with heart-healthy oils such as olive oil and canola oil   Other heart-healthy foods include walnuts, whole-grain breads, low-fat dairy products, beans, and lean meats  Fatty fish such as salmon and tuna are also heart healthy  Ask how much salt you can eat each day  Do not use salt substitutes  · Drink liquids as directed  You may need to limit the amount of liquids you drink if you retain fluid  Ask how much liquid to drink each day and which liquids are best for you  · Stay active  If you are not active, your symptoms are likely to worsen quickly  Walking, bicycling, and other types of physical activity help maintain your strength and improve your mood  Physical activity also helps you manage your weight  Work with your healthcare provider to create an exercise plan that is right for you  · Get vaccines as directed  Get a flu shot every year  You may also need the pneumonia vaccine  The flu and pneumonia can be severe for a person who has HF  Vaccines protect you from these infections  Join a support group:  Living with HF can be difficult  It may be helpful to talk with others who have HF  You may learn how to better manage your condition or get emotional support  For more information:   · [address]  [place] , [place]   Phone: 4- 630 - [phone]  Web Address: https://www strong com/  org   © 2017 [address] [employer] Information is for End User's use only and may not be sold, redistributed or otherwise used for commercial purposes  All illustrations and images included in CareNotes® are the copyrighted property of [employer]  or [employer]  The above information is an  only  It is not intended as medical advice for individual conditions or treatments  Talk to your doctor, nurse or pharmacist before following any medical regimen to see if it is safe and effective for you

## 2018-06-20 ENCOUNTER — OFFICE VISIT (OUTPATIENT)
Dept: CARDIOLOGY CLINIC | Facility: CLINIC | Age: 78
End: 2018-06-20
Payer: MEDICARE

## 2018-06-20 VITALS
WEIGHT: 102 LBS | DIASTOLIC BLOOD PRESSURE: 50 MMHG | HEART RATE: 76 BPM | BODY MASS INDEX: 20.03 KG/M2 | SYSTOLIC BLOOD PRESSURE: 130 MMHG | HEIGHT: 60 IN

## 2018-06-20 DIAGNOSIS — E11.9 TYPE 2 DIABETES MELLITUS WITHOUT COMPLICATION, WITH LONG-TERM CURRENT USE OF INSULIN (HCC): ICD-10-CM

## 2018-06-20 DIAGNOSIS — Z79.4 TYPE 2 DIABETES MELLITUS WITHOUT COMPLICATION, WITH LONG-TERM CURRENT USE OF INSULIN (HCC): ICD-10-CM

## 2018-06-20 DIAGNOSIS — J96.11 CHRONIC HYPOXEMIC RESPIRATORY FAILURE (HCC): ICD-10-CM

## 2018-06-20 DIAGNOSIS — I42.8 OTHER CARDIOMYOPATHY (HCC): Primary | ICD-10-CM

## 2018-06-20 DIAGNOSIS — I50.32 CHRONIC DIASTOLIC CONGESTIVE HEART FAILURE (HCC): ICD-10-CM

## 2018-06-20 DIAGNOSIS — E78.5 HYPERLIPIDEMIA, UNSPECIFIED HYPERLIPIDEMIA TYPE: ICD-10-CM

## 2018-06-20 LAB
ATRIAL RATE: 86 BPM
P AXIS: 70 DEGREES
PR INTERVAL: 136 MS
QRS AXIS: 91 DEGREES
QRSD INTERVAL: 78 MS
QT INTERVAL: 376 MS
QTC INTERVAL: 449 MS
T WAVE AXIS: 57 DEGREES
VENTRICULAR RATE: 86 BPM

## 2018-06-20 PROCEDURE — 93010 ELECTROCARDIOGRAM REPORT: CPT | Performed by: INTERNAL MEDICINE

## 2018-06-20 PROCEDURE — 99214 OFFICE O/P EST MOD 30 MIN: CPT | Performed by: INTERNAL MEDICINE

## 2018-06-20 RX ORDER — BLOOD-GLUCOSE METER
KIT MISCELLANEOUS
Qty: 300 EACH | Refills: 0 | Status: SHIPPED | OUTPATIENT
Start: 2018-06-20 | End: 2018-06-20 | Stop reason: SDUPTHER

## 2018-06-20 RX ORDER — BLOOD-GLUCOSE METER
KIT MISCELLANEOUS
Qty: 300 EACH | Refills: 0 | Status: SHIPPED | OUTPATIENT
Start: 2018-06-20 | End: 2018-10-26 | Stop reason: SDUPTHER

## 2018-06-20 RX ORDER — SIMVASTATIN 80 MG
80 TABLET ORAL DAILY
Qty: 90 TABLET | Refills: 0 | Status: SHIPPED | OUTPATIENT
Start: 2018-06-20 | End: 2018-09-16 | Stop reason: SDUPTHER

## 2018-06-20 RX ORDER — FUROSEMIDE 20 MG/1
20 TABLET ORAL DAILY
Qty: 90 TABLET | Refills: 2 | Status: SHIPPED | OUTPATIENT
Start: 2018-06-20 | End: 2018-11-30 | Stop reason: SDUPTHER

## 2018-06-20 NOTE — PROGRESS NOTES
Cardiology Follow Up    Nkechi Blue  1940  [id number]  HEART & VASCULAR  [facility] [place] [address]    1  Other cardiomyopathy ([address] )     2  Chronic diastolic congestive heart failure ([address] )     3  Chronic hypoxemic respiratory failure (HCC)         Interval History:  Cardiology follow-up  Patient was seen at the emergency room yesterday with worsening dyspnea  Admission was recommended, she did refused  She is on chronic oxygen therapy in the setting of advanced COPD, she does have concomitant diastolic congestive heart failure  A chest x-ray revealed mild CHF her BNP was and 6000 range  She is on chronic oxygen therapy, she admits to smoking occasionally still  I asked her not to continue to do that  She did finish her chemotherapy regimen, and she is now on suppressive immunotherapy  She denies any chest pain  She denies any orthopnea or PND, she is tachypneic at rest, on oxygen      Patient Active Problem List   Diagnosis    Malignant neoplasm of upper lobe of right lung (HCC)    Asymptomatic carotid artery stenosis    Back pain, lumbosacral    Benign essential hypertension    Bursitis, ischial    Cardiomyopathy ([address] )    Cataract of right eye    Chemotherapy-induced nausea    Chronic hypoxemic respiratory failure (HCC)    Chronic obstructive pulmonary disease (HCC)    Congestive heart failure (HCC)    Depression    DMII (diabetes mellitus, type 2) (HCC)    Hyperlipidemia    Metastasis to adrenal gland (HCC)    Multiple thyroid nodules    Osteoporosis    Other chronic pain    Parotid adenoma    Psoriasis    Primary localized osteoarthrosis of the hip    PVD (peripheral vascular disease) (HCC)    Restless legs syndrome    Sciatica    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)    Seborrheic dermatitis of scalp     Past Medical History:   Diagnosis Date    Arthritis     Cataract of right eye 3/19/2015    CHF (congestive heart failure) (HCC)     Common cold     Coronary artery disease     Depression     Diabetes mellitus ([facility] )     Fracture of multiple pubic rami ([facility] ) 2015    History of radiation therapy     Hyperlipidemia     Hypertension     Lung cancer ([facility] )     Multiple thyroid nodules 2016    Pulmonary nodule     Shortness of breath      Social History     Social History    Marital status: /Civil Union     Spouse name: N/A    Number of children: N/A    Years of education: N/A     Occupational History    Not on file  Social History Main Topics    Smoking status: Current Some Day Smoker     Packs/day: 0 25    Smokeless tobacco: Never Used      Comment: smokes 3-4 cigs /day    Alcohol use No    Drug use: No    Sexual activity: Not on file     Other Topics Concern    Not on file     Social History Narrative    No narrative on file      Family History   Problem Relation Age of Onset    Brain cancer Sister      Past Surgical History:   Procedure Laterality Date    BRONCHOSCOPY N/A 8/10/2016    Procedure: BRONCHOSCOPY FLEXIBLE;  Surgeon: Anatoliy Gatica MD;  [facility];  Service:    Pena  SECTION      CHOLECYSTECTOMY      EYE SURGERY      HYSTERECTOMY      MS BRONCHOSCOPY NEEDLE BX TRACHEA MAIN STEM&/BRON N/A 8/10/2016    Procedure: ENDOBRONCHIAL ULTRASOUND (FROZEN SECTION) ; Surgeon: Anatoliy Gatica MD;  [facility];  Service: Thoracic    MS INSJ TUNNELED CTR VAD W/SUBQ PORT AGE 5 YR/> Left 2016    Procedure: INSERTION VENOUS PORT (PORT-A-CATH);   Surgeon: Anatoliy Gatica MD;  [facility]: [facility];  Service: Thoracic    TONSILLECTOMY         Current Outpatient Prescriptions:     albuterol (2 5 mg/3 mL) 0 083 % nebulizer solution, INHALE CONTENTS OF 1 VIAL IN NEBULIZER FOUR TIMES A DAY IF NEEDED, Disp: 375 mL, Rfl: 1    albuterol (VENTOLIN HFA) 90 mcg/act inhaler, Inhale 2 puffs every 6 (six) hours as needed for wheezing, Disp: 3 Inhaler, Rfl: 3    azithromycin (ZITHROMAX) 250 mg tablet, TAKE 1 TAB EVERY MONDAY WEDNESDAY AND FRIDAY, Disp: , Rfl: 3    betamethasone, augmented, (DIPROLENE-AF) 0 05 % cream, Apply topically 2 (two) times a day, Disp: 50 g, Rfl: 1    fluticasone-salmeterol (ADVAIR) 250-50 mcg/dose inhaler, Inhale 1 puff every 12 (twelve) hours, Disp: 1 Inhaler, Rfl: 6    metoprolol tartrate (LOPRESSOR) 100 mg tablet, Take 100 mg by mouth daily  , Disp: , Rfl:     mometasone (ELOCON) 0 1 % lotion, Apply topically daily, Disp: 60 mL, Rfl: 3    oxyCODONE-acetaminophen (PERCOCET) 5-325 mg per tablet, Take 1 tablet by mouth every 4 (four) hours as needed for moderate pain Max Daily Amount: 6 tablets, Disp: 180 tablet, Rfl: 0    pentoxifylline (TRENtal) 400 mg ER tablet, Take 400 mg by mouth 3 (three) times a day with meals  , Disp: , Rfl:     predniSONE 20 mg tablet, Take 2 tablets (40 mg total) by mouth daily for 4 days, Disp: 12 tablet, Rfl: 0    simvastatin (ZOCOR) 80 mg tablet, Take 1 tablet (80 mg total) by mouth daily, Disp: 90 tablet, Rfl: 0    Tiotropium Bromide Monohydrate (SPIRIVA RESPIMAT) 2 5 MCG/ACT AERS, Inhale 2 Act (5 mcg total) daily, Disp: 3 Inhaler, Rfl: 3    FREESTYLE LITE test strip, Test Blood sugar 3 times daily  Diagnosis: Type 2 Diabetes, Disp: 300 each, Rfl: 0    pregabalin (LYRICA) 100 mg capsule, Take 1 capsule (100 mg total) by mouth daily at bedtime, Disp: 30 capsule, Rfl: 4  No current facility-administered medications for this visit       Facility-Administered Medications Ordered in Other Visits:     alteplase (CATHFLO) injection 2 mg, 2 mg, Intracatheter, Once PRN, Garth Flores,   Allergies   Allergen Reactions    Penicillins Swelling     Legs swell up       Labs:  Admission on 06/19/2018, Discharged on 06/19/2018   Component Date Value    WBC 06/19/2018 8 54     RBC 06/19/2018 4 08     Hemoglobin 06/19/2018 12 7     Hematocrit 06/19/2018 41 9     MCV 06/19/2018 103*  MCH 06/19/2018 31 1     MCHC 06/19/2018 30 3*    RDW 06/19/2018 11 3*    MPV 06/19/2018 10 7     Platelets 88/65/2160 191     nRBC 06/19/2018 0     Neutrophils Relative 06/19/2018 82*    Immat GRANS % 06/19/2018 0     Lymphocytes Relative 06/19/2018 9*    Monocytes Relative 06/19/2018 8     Eosinophils Relative 06/19/2018 1     Basophils Relative 06/19/2018 0     Neutrophils Absolute 06/19/2018 6 99     Immature Grans Absolute 06/19/2018 0 02     Lymphocytes Absolute 06/19/2018 0 73     Monocytes Absolute 06/19/2018 0 69     Eosinophils Absolute 06/19/2018 0 08     Basophils Absolute 06/19/2018 0 03     Sodium 06/19/2018 144     Potassium 06/19/2018 4 6     Chloride 06/19/2018 102     CO2 06/19/2018 44*    Anion Gap 06/19/2018 -2*    BUN 06/19/2018 10     Creatinine 06/19/2018 0 60     Glucose 06/19/2018 125     Calcium 06/19/2018 8 8     AST 06/19/2018 14     ALT 06/19/2018 14     Alkaline Phosphatase 06/19/2018 101     Total Protein 06/19/2018 7 0     Albumin 06/19/2018 3 2*    Total Bilirubin 06/19/2018 0 50     eGFR 06/19/2018 87     NT-proBNP 06/19/2018 6061*    Magnesium 06/19/2018 2 2     Troponin I 06/19/2018 0 02    Hospital Outpatient Visit on 06/19/2018   Component Date Value    WBC 06/19/2018 6 82     RBC 06/19/2018 3 84     Hemoglobin 06/19/2018 12 0     Hematocrit 06/19/2018 40 1     MCV 06/19/2018 104*    MCH 06/19/2018 31 3     MCHC 06/19/2018 29 9*    RDW 06/19/2018 11 6     MPV 06/19/2018 10 3     Platelets 28/41/2369 178     Neutrophils Relative 06/19/2018 84*    Immat GRANS % 06/19/2018 0     Lymphocytes Relative 06/19/2018 7*    Monocytes Relative 06/19/2018 8     Eosinophils Relative 06/19/2018 1     Basophils Relative 06/19/2018 0     Neutrophils Absolute 06/19/2018 5 69     Immature Grans Absolute 06/19/2018 0 01     Lymphocytes Absolute 06/19/2018 0 49*    Monocytes Absolute 06/19/2018 0 53     Eosinophils Absolute 06/19/2018 0 08  Basophils Absolute 06/19/2018 0 02     Sodium 06/19/2018 143     Potassium 06/19/2018 3 9     Chloride 06/19/2018 99*    CO2 06/19/2018 42*    Anion Gap 06/19/2018 2*    BUN 06/19/2018 11     Creatinine 06/19/2018 0 60     Glucose 06/19/2018 182*    Calcium 06/19/2018 8 1*    AST 06/19/2018 14     ALT 06/19/2018 16     Alkaline Phosphatase 06/19/2018 90     Total Protein 06/19/2018 6 4     Albumin 06/19/2018 2 9*    Total Bilirubin 06/19/2018 0 50     eGFR 06/19/2018 87     TSH 3RD GENERATON 06/19/2018 5 729*    Free T4 06/19/2018 1 38     Ventricular Rate 06/19/2018 86     Atrial Rate 06/19/2018 86     MS Interval 06/19/2018 136     QRSD Interval 06/19/2018 78     QT Interval 06/19/2018 376     QTC Interval 06/19/2018 449     P Axis 06/19/2018 70     QRS Axis 06/19/2018 91     T Wave Axis 06/19/2018 57    Hospital Outpatient Visit on 05/29/2018   Component Date Value    WBC 05/29/2018 5 77     RBC 05/29/2018 3 94     Hemoglobin 05/29/2018 12 4     Hematocrit 05/29/2018 39 9     MCV 05/29/2018 101*    MCH 05/29/2018 31 5     MCHC 05/29/2018 31 1*    RDW 05/29/2018 11 8     MPV 05/29/2018 10 5     Platelets 22/47/1573 143*    Neutrophils Relative 05/29/2018 75     Lymphocytes Relative 05/29/2018 12*    Monocytes Relative 05/29/2018 11     Eosinophils Relative 05/29/2018 2     Basophils Relative 05/29/2018 0     Neutrophils Absolute 05/29/2018 4 33     Lymphocytes Absolute 05/29/2018 0 71     Monocytes Absolute 05/29/2018 0 61     Eosinophils Absolute 05/29/2018 0 11     Basophils Absolute 05/29/2018 0 01     Sodium 05/29/2018 143     Potassium 05/29/2018 3 9     Chloride 05/29/2018 103     CO2 05/29/2018 40*    Anion Gap 05/29/2018 0*    BUN 05/29/2018 11     Creatinine 05/29/2018 0 78     Glucose 05/29/2018 101     Calcium 05/29/2018 8 5     AST 05/29/2018 15     ALT 05/29/2018 11*    Alkaline Phosphatase 05/29/2018 81     Total Protein 05/29/2018 6 2*  Albumin 05/29/2018 3 0*    Total Bilirubin 05/29/2018 0 40     eGFR 05/29/2018 73     TSH 3RD GENERATON 05/29/2018 5 253*    Free T4 05/29/2018 1 47*   Hospital Outpatient Visit on 05/08/2018   Component Date Value    WBC 05/08/2018 5 80     RBC 05/08/2018 4 00     Hemoglobin 05/08/2018 12 8     Hematocrit 05/08/2018 41 1     MCV 05/08/2018 103*    MCH 05/08/2018 32 0     MCHC 05/08/2018 31 1*    RDW 05/08/2018 12 0     MPV 05/08/2018 10 5     Platelets 78/63/3725 128*    Neutrophils Relative 05/08/2018 77*    Lymphocytes Relative 05/08/2018 13*    Monocytes Relative 05/08/2018 8     Eosinophils Relative 05/08/2018 2     Basophils Relative 05/08/2018 0     Neutrophils Absolute 05/08/2018 4 47     Lymphocytes Absolute 05/08/2018 0 78     Monocytes Absolute 05/08/2018 0 45     Eosinophils Absolute 05/08/2018 0 09     Basophils Absolute 05/08/2018 0 01     Sodium 05/08/2018 144     Potassium 05/08/2018 4 1     Chloride 05/08/2018 104     CO2 05/08/2018 40*    Anion Gap 05/08/2018 0*    BUN 05/08/2018 11     Creatinine 05/08/2018 0 64     Glucose 05/08/2018 156*    Calcium 05/08/2018 8 7     AST 05/08/2018 15     ALT 05/08/2018 11*    Alkaline Phosphatase 05/08/2018 75     Total Protein 05/08/2018 6 2*    Albumin 05/08/2018 3 0*    Total Bilirubin 05/08/2018 0 50     eGFR 05/08/2018 85     TSH 3RD GENERATON 05/08/2018 4 736*     Imaging: Xr Chest 1 View Portable    Result Date: 6/19/2018  Narrative: CHEST INDICATION:   SOB  Dyspnea after infusion  History of left lung cancer COMPARISON:  August 30, 2016 EXAM PERFORMED/VIEWS:  XR CHEST PORTABLE FINDINGS:  Left-sided infusion port catheter device stable from previous exam  There is cardiomegaly  The aorta is atherosclerotic  There is interstitial prominence in both lungs favoring the mid to lower lung fields which is new since the prior study but is nonspecific and could indicate some interstitial edema or viral syndrome  No focal consolidation or dominant mass identified  No visible pleural fluid or pneumothorax  Osseous structures appear within normal limits for patient age  Impression: Nonspecific interstitial prominence in the lower lung fields  Stable cardiomegaly  Severe atherosclerosis of aorta Workstation performed: [id number]     Ct Chest Abdomen Pelvis W Contrast    Result Date: 6/14/2018  Narrative: CT CHEST, ABDOMEN AND PELVIS WITH IV CONTRAST INDICATION:   C34 11: Malignant neoplasm of upper lobe, right bronchus or lung C79 70: Secondary malignant neoplasm of unspecified adrenal gland  Surveillance study COMPARISON: 4/5/2018 TECHNIQUE: CT examination of the chest, abdomen and pelvis was performed  Axial, sagittal, and coronal 2D reformatted images were created from the source data and submitted for interpretation  Radiation dose length product (DLP) for this visit:  365 24 mGy-cm   This examination, like all CT scans performed in the [facility], was performed utilizing techniques to minimize radiation dose exposure, including the use of iterative  reconstruction and automated exposure control  IV Contrast:  100 mL of iohexol (OMNIPAQUE) Enteric Contrast: Enteric contrast was administered  FINDINGS: CHEST LUNGS:  Right suprahilar mass noted, on earlier study measuring 2 5 x 1 1 cm, measured today on image 3/22, 2 7 x 1 2 cm  Mass effect upon the right upper lobe bronchus again noted  Peripheral opacity on image 3/21 has not changed since the prior study  again measuring approximately 6 mm  New small nodule present in the right upper lobe image 3/22 measuring 4 mm  Also peripheral to the mass, upper lobe cylindrical bronchiectasis and scattered groundglass opacities  Increased from prior study may be related to postobstructive change PLEURA:  Interval development of a very small right-sided pleural effusion  HEART/GREAT VESSELS:  Unremarkable for patient's age   MEDIASTINUM AND CARLO:  Right hilar lymph node measured on image 2/24 as 1 7 x 1 3 cm, essentially unchanged from prior [address]:   Bilateral thyroid nodules unchanged from prior  Left lateral chest wall lipoma as before  No axillary adenopathy  ABDOMEN LIVER/BILIARY TREE:  Unremarkable  GALLBLADDER:  No calcified gallstones  No pericholecystic inflammatory change  SPLEEN:  Unchanged PANCREAS:  Unremarkable  ADRENAL GLANDS:  Left adrenal metastasis earlier measured 2 2 x 2 0 cm, measured today on image 2/56, she was 2 1 x 1 6 cm  KIDNEYS/URETERS:  There are bilateral renal cysts  No hydronephrosis or acute finding STOMACH AND BOWEL:  Colonic diverticulosis  No bowel obstruction or inflammation  APPENDIX:  No findings to suggest appendicitis  ABDOMINOPELVIC CAVITY:  No ascites or free intraperitoneal air  No lymphadenopathy  VESSELS:  No change from prior  PELVIS REPRODUCTIVE ORGANS:  Unremarkable for patient's age  URINARY BLADDER:  Unremarkable  ABDOMINAL WALL/INGUINAL REGIONS:  Unremarkable  OSSEOUS STRUCTURES:  No acute fracture or destructive osseous lesion  Impression: 1  The left adrenal metastasis has slightly diminished in size since the prior study  No new metastatic lesions are seen within the abdomen or pelvis 2  The right suprahilar mass is minimally larger than the prior study  Increased postobstructive changes are seen within the right upper lobe  New 4 mm right upper lobe nodule either inflammatory or metastatic  3   Right hilar lymph node unchanged from prior 4  Interval development of a very small right-sided pleural effusion Workstation performed: [id number]       Review of Systems:  Review of Systems   Constitutional: Positive for activity change and fatigue  Negative for diaphoresis and fever  HENT: Negative for nosebleeds  Eyes: Negative for visual disturbance  Respiratory: Positive for apnea, shortness of breath, wheezing and stridor      Cardiovascular: Negative for chest pain, palpitations and leg swelling  Gastrointestinal: Negative for abdominal pain and blood in stool  Genitourinary: Negative for hematuria  Musculoskeletal: Negative for myalgias  Skin: Positive for pallor  Negative for rash  Allergic/Immunologic: Positive for immunocompromised state  Neurological: Positive for weakness  Negative for dizziness and syncope  Hematological: Does not bruise/bleed easily  Psychiatric/Behavioral: Negative for confusion  Physical Exam:  Physical Exam   Constitutional: She appears distressed (Tachypnea, fragile appearing)  Neck: No JVD present  Cardiovascular: Normal rate and regular rhythm  Occasional extrasystoles are present  Exam reveals gallop, S4 and distant heart sounds  Exam reveals no friction rub  No murmur heard  Pulses:       Carotid pulses are 2+ on the right side, and 2+ on the left side  Radial pulses are 2+ on the right side, and 2+ on the left side  Dorsalis pedis pulses are 1+ on the right side, and 1+ on the left side  Posterior tibial pulses are 1+ on the left side  Pulmonary/Chest: She is in respiratory distress  She has wheezes  She has rales (Diffuse expiratory rhonchi, increased expiration)  Neurological: She is alert  Skin: No rash noted  She is not diaphoretic  Discussion/Summary:  History of mild nonischemic cardiomyopathy that improved on treatment  Echocardiogram last year revealed normal left systolic function with stage I diastolic dysfunction  And mild mitral insufficiency with mild mitral stenosis  Concomitant diastolic congestive failure, will start low-dose diuretic  She does have significant component of pulmonary disease with COPD oxygen dependent  Peripheral vascular disease, with possibly occluded distal abdominal aorta and left SFA  80% left carotid stenosis on CTA  Not a candidate for interventions at the present time    The long-term benefits of smoking cessation were explained to the patient  Including but not limited to decreased mortality and morbidity from cardiovascular causes, respiratory diseases and decrease cancer risk  Strong recommendation for cessation was given to the patient  Pharmacological help was offered as well emotional support was given to the patient    Spent 5 minutes

## 2018-06-21 ENCOUNTER — OFFICE VISIT (OUTPATIENT)
Dept: HEMATOLOGY ONCOLOGY | Facility: CLINIC | Age: 78
End: 2018-06-21
Payer: MEDICARE

## 2018-06-21 VITALS
RESPIRATION RATE: 17 BRPM | HEIGHT: 60 IN | BODY MASS INDEX: 20.42 KG/M2 | DIASTOLIC BLOOD PRESSURE: 74 MMHG | HEART RATE: 84 BPM | TEMPERATURE: 98.4 F | WEIGHT: 104 LBS | OXYGEN SATURATION: 91 % | SYSTOLIC BLOOD PRESSURE: 130 MMHG

## 2018-06-21 DIAGNOSIS — C34.11 MALIGNANT NEOPLASM OF UPPER LOBE OF RIGHT LUNG (HCC): Primary | ICD-10-CM

## 2018-06-21 PROCEDURE — 99214 OFFICE O/P EST MOD 30 MIN: CPT | Performed by: INTERNAL MEDICINE

## 2018-06-21 NOTE — LETTER
June 21, 2018     Rhonda Barney MD  [address] [place] [address]    Patient: Michelle Munson   YOB: 1940   Date of Visit: 6/21/2018       Dear Dr Tenorio Mean:    Thank you for referring Maria Eugenia Beltran to me for evaluation  Below are my notes for this consultation  If you have questions, please do not hesitate to call me  I look forward to following your patient along with you  Sincerely,        Karen Hung DO        CC: No Recipients  Karen Hung DO  6/21/2018  5:47 PM  Sign at close encounter  Michelle Munson  1940  [address] [address] [phone]    Chief Complaint   Patient presents with    Follow-up            Malignant neoplasm of upper lobe of right lung ([address] )    7/30/2016 Initial Diagnosis     CT of the neck for evaluation of the carotid arteries incidentally showed an infiltrating mass in the right upper lobe extending to the hilum  PET-CT June 2016 patient was found to have a thyroid mass on physical examination  Ultrasound showed bilateral thyroid nodules  In July 2016 she underwent CAT scan of the neck for evaluation of the carotid arteries  Incidentally noted, infiltrating mass in the right upper lobe extending to the hilum was noted  4, 2016âPET/CT showed a right upper lobe mass measuring 4 8 cm, SUV 21 5  This extends to the right hilum  Right paratracheal and subcarinal nodes measure up to 12 mm  The left adrenal showed some hypermetabolism with SUV of 3 7, without discrete mass  Uptake in the right parotid gland is noted with SUV of 22 3 and a soft tissue nodule, measuring 11 mm  endoscopy and bronchoscopy showed 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  Lymph node biopsies did not show malignancy  and radiation therapy were not felt to be applicable   Due to background pulmonary dysfunction as well as the potential for left adrenal metastatic disease  Chemotherapy was chosen as primary therapy  Alternatively  6, 2016 started Abraxane 80 mg/m² day 1, 8, 15, carbo AUC-5, day 1 only, every 21 days  2017progressive disease noted  Tecentriq 1200 mg IV every 3 weeks initiated  Current Therapy: May 2017progressive disease noted  Tecentriq 1200 mg IV every 3 weeks initiated  Interval History: Has had back pain started about 4 days ago  Started after some housework, has been getting better with heating pad           9/6/2016 - 11/25/2016 Chemotherapy     Abraxane 80 mg/m2 day 1, 8, 15, carbo AUC-5, day 1 only, Q 21 days  5/3/2017 Progression     Progressive disease  5/16/2017 -  Chemotherapy     Tecentriq 1200 mg IV Q 3 weeks  2/21/2018 - 3/14/2018 Radiation     C1    Plan ID Energy Fractions Dose per Fraction (cGy) Total Dose Delivered (cGy) Elapsed Days   Abdomen 6X 15 / 15 250 3,750 21      Treatment dates:  C1: 2/21/2018 - 3/14/2018             Metastasis to adrenal gland ([facility] )    7/17/2017 Initial Diagnosis     Metastasis to adrenal gland (HCC)A mass in the anterior limb of the left adrenal gland with delayed postcontrast enhancement measuring 18 mm is either new or increasing from as far back as November 2016  The finding is suspicious for adrenal metastatic lesion superimposed upon   underlying bilateral adenomatous hyperplasia  History of Present Illness:  -No ref  provider found:  -Previous Therapy (if not listed in Oncology History):  -Current Therapy (if not listed in Oncology History):  -Disease Status:  -Interval History:  -Tumor Markers:    Review of Systems:  Review of Systems   Constitutional: Negative for appetite change, diaphoresis, fatigue and fever  HENT: Negative for sinus pain  Eyes: Negative for discharge  Respiratory: Negative for cough and shortness of breath  Cardiovascular: Negative for chest pain  Gastrointestinal: Negative for abdominal pain, constipation and diarrhea  Endocrine: Negative for cold intolerance  Genitourinary: Negative for difficulty urinating and hematuria  Musculoskeletal: Negative for joint swelling  Skin: Negative for rash  Allergic/Immunologic: Negative for environmental allergies  Neurological: Negative for dizziness and headaches  Hematological: Negative for adenopathy  Psychiatric/Behavioral: Negative for agitation         Patient Active Problem List   Diagnosis    Malignant neoplasm of upper lobe of right lung (HCC)    Asymptomatic carotid artery stenosis    Back pain, lumbosacral    Benign essential hypertension    Bursitis, ischial    Cardiomyopathy ([facility] )    Cataract of right eye    Chemotherapy-induced nausea    Chronic hypoxemic respiratory failure (HCC)    Chronic obstructive pulmonary disease (HCC)    Congestive heart failure (HCC)    Depression    DMII (diabetes mellitus, type 2) (HCC)    Hyperlipidemia    Metastasis to adrenal gland (HCC)    Multiple thyroid nodules    Osteoporosis    Other chronic pain    Parotid adenoma    Psoriasis    Primary localized osteoarthrosis of the hip    PVD (peripheral vascular disease) ([facility] )    Restless legs syndrome    Sciatica    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)    Seborrheic dermatitis of scalp     Past Medical History:   Diagnosis Date    Arthritis     Cataract of right eye 3/19/2015    CHF (congestive heart failure) (HCC)     Common cold     Coronary artery disease     Depression     Diabetes mellitus ([facility] )     Fracture of multiple pubic rami ([facility] ) 2015    History of radiation therapy     Hyperlipidemia     Hypertension     Lung cancer ([facility] )     Multiple thyroid nodules 2016    Pulmonary nodule     Shortness of breath      Past Surgical History:   Procedure Laterality Date    BRONCHOSCOPY N/A 8/10/2016    Procedure: BRONCHOSCOPY FLEXIBLE;  Surgeon: Glo Bailey MD;  [facility];  Service:    Rogerio Christian  SECTION      CHOLECYSTECTOMY      EYE SURGERY      HYSTERECTOMY      DE BRONCHOSCOPY NEEDLE BX TRACHEA MAIN STEM&/BRON N/A 8/10/2016    Procedure: ENDOBRONCHIAL ULTRASOUND (FROZEN SECTION) ; Surgeon: Heraclio Arechiga MD;  [facility];  Service: Thoracic    DE INSJ TUNNELED CTR VAD W/SUBQ PORT AGE 5 YR/> Left 8/30/2016    Procedure: INSERTION VENOUS PORT (PORT-A-CATH); Surgeon: Heraclio Arechiga MD;  [facility];  Service: Thoracic    TONSILLECTOMY       Family History   Problem Relation Age of Onset    Brain cancer Sister      Social History     Social History    Marital status: /Civil Union     Spouse name: N/A    Number of children: N/A    Years of education: N/A     Occupational History    Not on file  Social History Main Topics    Smoking status: Current Some Day Smoker     Packs/day: 0 25    Smokeless tobacco: Never Used      Comment: smokes 3-4 cigs /day    Alcohol use No    Drug use: No    Sexual activity: Not on file     Other Topics Concern    Not on file     Social History Narrative    No narrative on file       Current Outpatient Prescriptions:     albuterol (2 5 mg/3 mL) 0 083 % nebulizer solution, INHALE CONTENTS OF 1 VIAL IN NEBULIZER FOUR TIMES A DAY IF NEEDED, Disp: 375 mL, Rfl: 1    albuterol (VENTOLIN HFA) 90 mcg/act inhaler, Inhale 2 puffs every 6 (six) hours as needed for wheezing, Disp: 3 Inhaler, Rfl: 3    azithromycin (ZITHROMAX) 250 mg tablet, TAKE 1 TAB EVERY MONDAY WEDNESDAY AND FRIDAY, Disp: , Rfl: 3    betamethasone, augmented, (DIPROLENE-AF) 0 05 % cream, Apply topically 2 (two) times a day, Disp: 50 g, Rfl: 1    fluticasone-salmeterol (ADVAIR) 250-50 mcg/dose inhaler, Inhale 1 puff every 12 (twelve) hours, Disp: 1 Inhaler, Rfl: 6    FREESTYLE LITE test strip, Test Blood sugar 3 times daily   Diagnosis: Type 2 Diabetes, Disp: 300 each, Rfl: 0    furosemide (LASIX) 20 mg tablet, Take 1 tablet (20 mg total) by mouth daily, Disp: 90 tablet, Rfl: 2   metoprolol tartrate (LOPRESSOR) 100 mg tablet, Take 100 mg by mouth daily  , Disp: , Rfl:     mometasone (ELOCON) 0 1 % lotion, Apply topically daily, Disp: 60 mL, Rfl: 3    oxyCODONE-acetaminophen (PERCOCET) 5-325 mg per tablet, Take 1 tablet by mouth every 4 (four) hours as needed for moderate pain Max Daily Amount: 6 tablets, Disp: 180 tablet, Rfl: 0    pentoxifylline (TRENtal) 400 mg ER tablet, Take 400 mg by mouth 3 (three) times a day with meals  , Disp: , Rfl:     predniSONE 20 mg tablet, Take 2 tablets (40 mg total) by mouth daily for 4 days, Disp: 12 tablet, Rfl: 0    pregabalin (LYRICA) 100 mg capsule, Take 1 capsule (100 mg total) by mouth daily at bedtime, Disp: 30 capsule, Rfl: 4    simvastatin (ZOCOR) 80 mg tablet, Take 1 tablet (80 mg total) by mouth daily, Disp: 90 tablet, Rfl: 0    Tiotropium Bromide Monohydrate (SPIRIVA RESPIMAT) 2 5 MCG/ACT AERS, Inhale 2 Act (5 mcg total) daily, Disp: 3 Inhaler, Rfl: 3  No current facility-administered medications for this visit  Facility-Administered Medications Ordered in Other Visits:     alteplase (CATHFLO) injection 2 mg, 2 mg, Intracatheter, Once PRN, [place], DO  Allergies   Allergen Reactions    Penicillins Swelling     Legs swell up     Vitals:    06/21/18 1505   BP: 130/74   Pulse: 84   Resp: 17   Temp: 98 4 °F (36 9 °C)   SpO2: 91%         Physical Exam   Constitutional: She is oriented to person, place, and time  She appears well-developed  HENT:   Head: Normocephalic  Eyes: Pupils are equal, round, and reactive to light  Neck: Neck supple  Cardiovascular: Normal rate  No murmur heard  Pulmonary/Chest: No respiratory distress  She has no wheezes  She has no rales  Abdominal: Soft  She exhibits no distension  There is no tenderness  There is no rebound  Musculoskeletal: She exhibits no edema  Lymphadenopathy:     She has no cervical adenopathy  Neurological: She is alert and oriented to person, place, and time   She displays normal reflexes  Skin: Skin is warm  No rash noted  Psychiatric: She has a normal mood and affect  Thought content normal            Performance Status: ECOG/Zubrod/WHO: 2 - Symptomatic, <50% confined to bed    Labs:  CBC, Coags, BMP, Mg, Phos     Imaging  Xr Chest 1 View Portable    Result Date: 6/19/2018  Narrative: CHEST INDICATION:   SOB  Dyspnea after infusion  History of left lung cancer COMPARISON:  August 30, 2016 EXAM PERFORMED/VIEWS:  XR CHEST PORTABLE FINDINGS:  Left-sided infusion port catheter device stable from previous exam  There is cardiomegaly  The aorta is atherosclerotic  There is interstitial prominence in both lungs favoring the mid to lower lung fields which is new since the prior study but is nonspecific and could indicate some interstitial edema or viral syndrome  No focal consolidation or dominant mass identified  No visible pleural fluid or pneumothorax  Osseous structures appear within normal limits for patient age  Impression: Nonspecific interstitial prominence in the lower lung fields  Stable cardiomegaly  Severe atherosclerosis of aorta Workstation performed: [id number]     Ct Chest Abdomen Pelvis W Contrast    Result Date: 6/14/2018  Narrative: CT CHEST, ABDOMEN AND PELVIS WITH IV CONTRAST INDICATION:   C34 11: Malignant neoplasm of upper lobe, right bronchus or lung C79 70: Secondary malignant neoplasm of unspecified adrenal gland  Surveillance study COMPARISON: 4/5/2018 TECHNIQUE: CT examination of the chest, abdomen and pelvis was performed  Axial, sagittal, and coronal 2D reformatted images were created from the source data and submitted for interpretation  Radiation dose length product (DLP) for this visit:  365 24 mGy-cm   This examination, like all CT scans performed in the [facility], was performed utilizing techniques to minimize radiation dose exposure, including the use of iterative  reconstruction and automated exposure control  IV Contrast:  100 mL of iohexol (OMNIPAQUE) Enteric Contrast: Enteric contrast was administered  FINDINGS: CHEST LUNGS:  Right suprahilar mass noted, on earlier study measuring 2 5 x 1 1 cm, measured today on image 3/22, 2 7 x 1 2 cm  Mass effect upon the right upper lobe bronchus again noted  Peripheral opacity on image 3/21 has not changed since the prior study  again measuring approximately 6 mm  New small nodule present in the right upper lobe image 3/22 measuring 4 mm  Also peripheral to the mass, upper lobe cylindrical bronchiectasis and scattered groundglass opacities  Increased from prior study may be related to postobstructive change PLEURA:  Interval development of a very small right-sided pleural effusion  HEART/GREAT VESSELS:  Unremarkable for patient's age  MEDIASTINUM AND CARLO:  Right hilar lymph node measured on image 2/24 as 1 7 x 1 3 cm, essentially unchanged from prior [address]:   Bilateral thyroid nodules unchanged from prior  Left lateral chest wall lipoma as before  No axillary adenopathy  ABDOMEN LIVER/BILIARY TREE:  Unremarkable  GALLBLADDER:  No calcified gallstones  No pericholecystic inflammatory change  SPLEEN:  Unchanged PANCREAS:  Unremarkable  ADRENAL GLANDS:  Left adrenal metastasis earlier measured 2 2 x 2 0 cm, measured today on image 2/56, she was 2 1 x 1 6 cm  KIDNEYS/URETERS:  There are bilateral renal cysts  No hydronephrosis or acute finding STOMACH AND BOWEL:  Colonic diverticulosis  No bowel obstruction or inflammation  APPENDIX:  No findings to suggest appendicitis  ABDOMINOPELVIC CAVITY:  No ascites or free intraperitoneal air  No lymphadenopathy  VESSELS:  No change from prior  PELVIS REPRODUCTIVE ORGANS:  Unremarkable for patient's age  URINARY BLADDER:  Unremarkable  ABDOMINAL WALL/INGUINAL REGIONS:  Unremarkable  OSSEOUS STRUCTURES:  No acute fracture or destructive osseous lesion  Impression: 1    The left adrenal metastasis has slightly diminished in size since the prior study  No new metastatic lesions are seen within the abdomen or pelvis 2  The right suprahilar mass is minimally larger than the prior study  Increased postobstructive changes are seen within the right upper lobe  New 4 mm right upper lobe nodule either inflammatory or metastatic  3   Right hilar lymph node unchanged from prior 4  Interval development of a very small right-sided pleural effusion Workstation performed: [id number]     I reviewed the above laboratory and imaging data  Discussion/Summary:  In summary, this is a 41-year-old female history of advanced lung cancer  She is currently on Tecentriq  She has generally been tolerating this well  She has had significant increase in fatigue over the past month or so  She was in the emergency room with increased dyspnea  BNP was approximately 6000  She was treated with diuretics, steroids, inhalers with improvement and discharged home  She sought cardiology and steroids were prescribed  I do not believe that her symptoms are primarily resulting from her cancer or treatment  I reviewed the above considerations with the patient and her   They voiced understanding and agreement

## 2018-06-21 NOTE — PROGRESS NOTES
Ursula Sykes  1940  [address] [facility] HEMATOLOGY [facility] [address]  [address] [facility] [address] [phone]    Chief Complaint   Patient presents with    Follow-up            Malignant neoplasm of upper lobe of right lung ([address] )    7/30/2016 Initial Diagnosis     CT of the neck for evaluation of the carotid arteries incidentally showed an infiltrating mass in the right upper lobe extending to the hilum  PET-CT June 2016 patient was found to have a thyroid mass on physical examination  Ultrasound showed bilateral thyroid nodules  In July 2016 she underwent CAT scan of the neck for evaluation of the carotid arteries  Incidentally noted, infiltrating mass in the right upper lobe extending to the hilum was noted  4, 2016âPET/CT showed a right upper lobe mass measuring 4 8 cm, SUV 21 5  This extends to the right hilum  Right paratracheal and subcarinal nodes measure up to 12 mm  The left adrenal showed some hypermetabolism with SUV of 3 7, without discrete mass  Uptake in the right parotid gland is noted with SUV of 22 3 and a soft tissue nodule, measuring 11 mm  endoscopy and bronchoscopy showed 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  Lymph node biopsies did not show malignancy  and radiation therapy were not felt to be applicable  Due to background pulmonary dysfunction as well as the potential for left adrenal metastatic disease  Chemotherapy was chosen as primary therapy  Alternatively  6, 2016 started Abraxane 80 mg/m² day 1, 8, 15, carbo AUC-5, day 1 only, every 21 days  2017progressive disease noted  Tecentriq 1200 mg IV every 3 weeks initiated  Current Therapy: May 2017progressive disease noted  Tecentriq 1200 mg IV every 3 weeks initiated  Interval History: Has had back pain started about 4 days ago   Started after some housework, has been getting better with heating pad           9/6/2016 - 11/25/2016 Chemotherapy     Abraxane 80 mg/m2 day 1, 8, 15, carbo AUC-5, day 1 only, Q 21 days  5/3/2017 Progression     Progressive disease  5/16/2017 -  Chemotherapy     Tecentriq 1200 mg IV Q 3 weeks  2/21/2018 - 3/14/2018 Radiation     C1    Plan ID Energy Fractions Dose per Fraction (cGy) Total Dose Delivered (cGy) Elapsed Days   Abdomen 6X 15 / 15 250 3,750 21      Treatment dates:  C1: 2/21/2018 - 3/14/2018             Metastasis to adrenal gland ([address] )    7/17/2017 Initial Diagnosis     Metastasis to adrenal gland (HCC)A mass in the anterior limb of the left adrenal gland with delayed postcontrast enhancement measuring 18 mm is either new or increasing from as far back as November 2016  The finding is suspicious for adrenal metastatic lesion superimposed upon   underlying bilateral adenomatous hyperplasia  History of Present Illness:  -No ref  provider found:  -Previous Therapy (if not listed in Oncology History):  -Current Therapy (if not listed in Oncology History):  -Disease Status:  -Interval History:  -Tumor Markers:    Review of Systems:  Review of Systems   Constitutional: Negative for appetite change, diaphoresis, fatigue and fever  HENT: Negative for sinus pain  Eyes: Negative for discharge  Respiratory: Negative for cough and shortness of breath  Cardiovascular: Negative for chest pain  Gastrointestinal: Negative for abdominal pain, constipation and diarrhea  Endocrine: Negative for cold intolerance  Genitourinary: Negative for difficulty urinating and hematuria  Musculoskeletal: Negative for joint swelling  Skin: Negative for rash  Allergic/Immunologic: Negative for environmental allergies  Neurological: Negative for dizziness and headaches  Hematological: Negative for adenopathy  Psychiatric/Behavioral: Negative for agitation         Patient Active Problem List   Diagnosis    Malignant neoplasm of upper lobe of right lung ([address] )    Asymptomatic carotid artery stenosis  Back pain, lumbosacral    Benign essential hypertension    Bursitis, ischial    Cardiomyopathy (HCC)    Cataract of right eye    Chemotherapy-induced nausea    Chronic hypoxemic respiratory failure (HCC)    Chronic obstructive pulmonary disease (HCC)    Congestive heart failure (HCC)    Depression    DMII (diabetes mellitus, type 2) (HCC)    Hyperlipidemia    Metastasis to adrenal gland (HCC)    Multiple thyroid nodules    Osteoporosis    Other chronic pain    Parotid adenoma    Psoriasis    Primary localized osteoarthrosis of the hip    PVD (peripheral vascular disease) (HCC)    Restless legs syndrome    Sciatica    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)    Seborrheic dermatitis of scalp     Past Medical History:   Diagnosis Date    Arthritis     Cataract of right eye 3/19/2015    CHF (congestive heart failure) (HCC)     Common cold     Coronary artery disease     Depression     Diabetes mellitus (HCC)     Fracture of multiple pubic rami (HCC) 2015    History of radiation therapy     Hyperlipidemia     Hypertension     Lung [facility] )     Multiple thyroid nodules 2016    Pulmonary nodule     Shortness of breath      Past Surgical History:   Procedure Laterality Date    BRONCHOSCOPY N/A 8/10/2016    Procedure: BRONCHOSCOPY FLEXIBLE;  Surgeon: Vic Pike MD;  [facility];  Service:    [facility]  SECTION      CHOLECYSTECTOMY      EYE SURGERY      HYSTERECTOMY      NV BRONCHOSCOPY NEEDLE BX TRACHEA MAIN STEM&/BRON N/A 8/10/2016    Procedure: ENDOBRONCHIAL ULTRASOUND (FROZEN SECTION) ; Surgeon: Vic Pike MD;  [facility];  Service: Thoracic    NV INSJ TUNNELED CTR VAD W/SUBQ PORT AGE 5 YR/> Left 2016    Procedure: INSERTION VENOUS PORT (PORT-A-CATH);   Surgeon: Vic Pike MD;  [facility];  Service: Thoracic    TONSILLECTOMY       Family History   Problem Relation Age of Onset    Brain cancer Sister      Social History     Social History    Marital status: /Civil Union     Spouse name: N/A    Number of children: N/A    Years of education: N/A     Occupational History    Not on file  Social History Main Topics    Smoking status: Current Some Day Smoker     Packs/day: 0 25    Smokeless tobacco: Never Used      Comment: smokes 3-4 cigs /day    Alcohol use No    Drug use: No    Sexual activity: Not on file     Other Topics Concern    Not on file     Social History Narrative    No narrative on file       Current Outpatient Prescriptions:     albuterol (2 5 mg/3 mL) 0 083 % nebulizer solution, INHALE CONTENTS OF 1 VIAL IN NEBULIZER FOUR TIMES A DAY IF NEEDED, Disp: 375 mL, Rfl: 1    albuterol (VENTOLIN HFA) 90 mcg/act inhaler, Inhale 2 puffs every 6 (six) hours as needed for wheezing, Disp: 3 Inhaler, Rfl: 3    azithromycin (ZITHROMAX) 250 mg tablet, TAKE 1 TAB EVERY MONDAY WEDNESDAY AND FRIDAY, Disp: , Rfl: 3    betamethasone, augmented, (DIPROLENE-AF) 0 05 % cream, Apply topically 2 (two) times a day, Disp: 50 g, Rfl: 1    fluticasone-salmeterol (ADVAIR) 250-50 mcg/dose inhaler, Inhale 1 puff every 12 (twelve) hours, Disp: 1 Inhaler, Rfl: 6    FREESTYLE LITE test strip, Test Blood sugar 3 times daily  Diagnosis: Type 2 Diabetes, Disp: 300 each, Rfl: 0    furosemide (LASIX) 20 mg tablet, Take 1 tablet (20 mg total) by mouth daily, Disp: 90 tablet, Rfl: 2    metoprolol tartrate (LOPRESSOR) 100 mg tablet, Take 100 mg by mouth daily  , Disp: , Rfl:     mometasone (ELOCON) 0 1 % lotion, Apply topically daily, Disp: 60 mL, Rfl: 3    oxyCODONE-acetaminophen (PERCOCET) 5-325 mg per tablet, Take 1 tablet by mouth every 4 (four) hours as needed for moderate pain Max Daily Amount: 6 tablets, Disp: 180 tablet, Rfl: 0    pentoxifylline (TRENtal) 400 mg ER tablet, Take 400 mg by mouth 3 (three) times a day with meals  , Disp: , Rfl:     predniSONE 20 mg tablet, Take 2 tablets (40 mg total) by mouth daily for 4 days, Disp: 12 tablet, Rfl: 0    pregabalin (LYRICA) 100 mg capsule, Take 1 capsule (100 mg total) by mouth daily at bedtime, Disp: 30 capsule, Rfl: 4    simvastatin (ZOCOR) 80 mg tablet, Take 1 tablet (80 mg total) by mouth daily, Disp: 90 tablet, Rfl: 0    Tiotropium Bromide Monohydrate (SPIRIVA RESPIMAT) 2 5 MCG/ACT AERS, Inhale 2 Act (5 mcg total) daily, Disp: 3 Inhaler, Rfl: 3  No current facility-administered medications for this visit  Facility-Administered Medications Ordered in Other Visits:     alteplase (CATHFLO) injection 2 mg, 2 mg, Intracatheter, Once PRN, Elliott Ernst,   Allergies   Allergen Reactions    Penicillins Swelling     Legs swell up     Vitals:    06/21/18 1505   BP: 130/74   Pulse: 84   Resp: 17   Temp: 98 4 °F (36 9 °C)   SpO2: 91%         Physical Exam   Constitutional: She is oriented to person, place, and time  She appears well-developed  HENT:   Head: Normocephalic  Eyes: Pupils are equal, round, and reactive to light  Neck: Neck supple  Cardiovascular: Normal rate  No murmur heard  Pulmonary/Chest: No respiratory distress  She has no wheezes  She has no rales  Abdominal: Soft  She exhibits no distension  There is no tenderness  There is no rebound  Musculoskeletal: She exhibits no edema  Lymphadenopathy:     She has no cervical adenopathy  Neurological: She is alert and oriented to person, place, and time  She displays normal reflexes  Skin: Skin is warm  No rash noted  Psychiatric: She has a normal mood and affect  Thought content normal            Performance Status: ECOG/Zubrod/WHO: 2 - Symptomatic, <50% confined to bed    Labs:  CBC, Coags, BMP, Mg, Phos     Imaging  Xr Chest 1 View Portable    Result Date: 6/19/2018  Narrative: CHEST INDICATION:   SOB  Dyspnea after infusion    History of left lung cancer COMPARISON:  August 30, 2016 EXAM PERFORMED/VIEWS:  XR CHEST PORTABLE FINDINGS:  Left-sided infusion port catheter device stable from previous exam  There is cardiomegaly  The aorta is atherosclerotic  There is interstitial prominence in both lungs favoring the mid to lower lung fields which is new since the prior study but is nonspecific and could indicate some interstitial edema or viral syndrome  No focal consolidation or dominant mass identified  No visible pleural fluid or pneumothorax  Osseous structures appear within normal limits for patient age  Impression: Nonspecific interstitial prominence in the lower lung fields  Stable cardiomegaly  Severe atherosclerosis of aorta Workstation performed: [id number]     Ct Chest Abdomen Pelvis W Contrast    Result Date: 6/14/2018  Narrative: CT CHEST, ABDOMEN AND PELVIS WITH IV CONTRAST INDICATION:   C34 11: Malignant neoplasm of upper lobe, right bronchus or lung C79 70: Secondary malignant neoplasm of unspecified adrenal gland  Surveillance study COMPARISON: 4/5/2018 TECHNIQUE: CT examination of the chest, abdomen and pelvis was performed  Axial, sagittal, and coronal 2D reformatted images were created from the source data and submitted for interpretation  Radiation dose length product (DLP) for this visit:  365 24 mGy-cm   This examination, like all CT scans performed in the [facility], was performed utilizing techniques to minimize radiation dose exposure, including the use of iterative  reconstruction and automated exposure control  IV Contrast:  100 mL of iohexol (OMNIPAQUE) Enteric Contrast: Enteric contrast was administered  FINDINGS: CHEST LUNGS:  Right suprahilar mass noted, on earlier study measuring 2 5 x 1 1 cm, measured today on image 3/22, 2 7 x 1 2 cm  Mass effect upon the right upper lobe bronchus again noted  Peripheral opacity on image 3/21 has not changed since the prior study  again measuring approximately 6 mm  New small nodule present in the right upper lobe image 3/22 measuring 4 mm    Also peripheral to the mass, upper lobe cylindrical bronchiectasis and scattered groundglass opacities  Increased from prior study may be related to postobstructive change PLEURA:  Interval development of a very small right-sided pleural effusion  HEART/GREAT VESSELS:  Unremarkable for patient's age  MEDIASTINUM AND CARLO:  Right hilar lymph node measured on image 2/24 as 1 7 x 1 3 cm, essentially unchanged from prior [address]:   Bilateral thyroid nodules unchanged from prior  Left lateral chest wall lipoma as before  No axillary adenopathy  ABDOMEN LIVER/BILIARY TREE:  Unremarkable  GALLBLADDER:  No calcified gallstones  No pericholecystic inflammatory change  SPLEEN:  Unchanged PANCREAS:  Unremarkable  ADRENAL GLANDS:  Left adrenal metastasis earlier measured 2 2 x 2 0 cm, measured today on image 2/56, she was 2 1 x 1 6 cm  KIDNEYS/URETERS:  There are bilateral renal cysts  No hydronephrosis or acute finding STOMACH AND BOWEL:  Colonic diverticulosis  No bowel obstruction or inflammation  APPENDIX:  No findings to suggest appendicitis  ABDOMINOPELVIC CAVITY:  No ascites or free intraperitoneal air  No lymphadenopathy  VESSELS:  No change from prior  PELVIS REPRODUCTIVE ORGANS:  Unremarkable for patient's age  URINARY BLADDER:  Unremarkable  ABDOMINAL WALL/INGUINAL REGIONS:  Unremarkable  OSSEOUS STRUCTURES:  No acute fracture or destructive osseous lesion  Impression: 1  The left adrenal metastasis has slightly diminished in size since the prior study  No new metastatic lesions are seen within the abdomen or pelvis 2  The right suprahilar mass is minimally larger than the prior study  Increased postobstructive changes are seen within the right upper lobe  New 4 mm right upper lobe nodule either inflammatory or metastatic  3   Right hilar lymph node unchanged from prior 4    Interval development of a very small right-sided pleural effusion Workstation performed: [id number]     I reviewed the above laboratory and imaging data       Discussion/Summary:  In summary, this is a 69-year-old female history of advanced lung cancer  She is currently on Tecentriq  She has generally been tolerating this well  She has had significant increase in fatigue over the past month or so  She was in the emergency room with increased dyspnea  BNP was approximately 6000  She was treated with diuretics, steroids, inhalers with improvement and discharged home  She sought cardiology and steroids were prescribed  I do not believe that her symptoms are primarily resulting from her cancer or treatment  I reviewed the above considerations with the patient and her   They voiced understanding and agreement

## 2018-06-27 ENCOUNTER — OFFICE VISIT (OUTPATIENT)
Dept: PULMONOLOGY | Facility: CLINIC | Age: 78
End: 2018-06-27
Payer: MEDICARE

## 2018-06-27 VITALS
HEART RATE: 117 BPM | WEIGHT: 98 LBS | SYSTOLIC BLOOD PRESSURE: 128 MMHG | OXYGEN SATURATION: 92 % | BODY MASS INDEX: 19.24 KG/M2 | DIASTOLIC BLOOD PRESSURE: 60 MMHG | HEIGHT: 60 IN | TEMPERATURE: 97.5 F

## 2018-06-27 DIAGNOSIS — J96.11 CHRONIC HYPOXEMIC RESPIRATORY FAILURE (HCC): ICD-10-CM

## 2018-06-27 DIAGNOSIS — J44.1 CHRONIC OBSTRUCTIVE PULMONARY DISEASE WITH ACUTE EXACERBATION (HCC): Primary | ICD-10-CM

## 2018-06-27 PROCEDURE — 99214 OFFICE O/P EST MOD 30 MIN: CPT | Performed by: INTERNAL MEDICINE

## 2018-06-27 RX ORDER — PREDNISONE 10 MG/1
TABLET ORAL
Qty: 30 TABLET | Refills: 0 | Status: SHIPPED | OUTPATIENT
Start: 2018-06-27 | End: 2018-07-10 | Stop reason: ALTCHOICE

## 2018-06-27 RX ORDER — LEVOFLOXACIN 500 MG/1
500 TABLET, FILM COATED ORAL EVERY 24 HOURS
Qty: 7 TABLET | Refills: 0 | Status: SHIPPED | OUTPATIENT
Start: 2018-06-27 | End: 2018-08-30 | Stop reason: SDUPTHER

## 2018-06-27 NOTE — PATIENT INSTRUCTIONS

## 2018-06-27 NOTE — ASSESSMENT & PLAN NOTE
Patient has had minimal improvement with short prednisone course  I will prescribe a more prolonged course with 40 mg daily for 3 days, tapering by 10 mg every 3 days  I will also give her Levaquin 500 mg for 7 days  During that time, she should withhold azithromycin  She was counseled on potential side effect of tendon rupture and was instructed to discontinue the regimen if she should develop any pain in her legs  She will continue with Advair, Spiriva and nebulizer regimen 4 times daily

## 2018-06-27 NOTE — PROGRESS NOTES
Pulmonary Follow Up Note   Michelle Munson 77 y o  female MRN: [id number]  6/27/2018      Assessment/Plan:    Chronic obstructive pulmonary disease with acute exacerbation [facility])   Patient has had minimal improvement with short prednisone course  I will prescribe a more prolonged course with 40 mg daily for 3 days, tapering by 10 mg every 3 days  I will also give her Levaquin 500 mg for 7 days  During that time, she should withhold azithromycin  She was counseled on potential side effect of tendon rupture and was instructed to discontinue the regimen if she should develop any pain in her legs  She will continue with Advair, Spiriva and nebulizer regimen 4 times daily  Chronic hypoxemic respiratory failure ([address] )    She will continue with oxygen at 4 L/min  continuously    She was instructed to go to the emergency department if her symptoms do not improve with this regimen  She will follow up with me in 3 weeks as already scheduled    Visit orders:    Diagnoses and all orders for this visit:    Chronic obstructive pulmonary disease with acute exacerbation (HCC)  -     predniSONE 10 mg tablet; 4 tabs daily x 3 D then 3 tabs daily x 3 D then 2 tabs daily x 3 D then 1 tab daily x 3 D  -     levofloxacin (LEVAQUIN) 500 mg tablet; Take 1 tablet (500 mg total) by mouth every 24 hours for 7 days    Chronic hypoxemic respiratory failure (HCC)    History of Present Illness   HPI:  Michelle Munson is a 77 y o  female who Is here today for sick visit  She has very severe COPD and chronic hypoxemic respiratory failure, maintained on 3-4   L per minute continuously  Over the past month, she has increasing cough, shortness of breath and wheezing  She was seen in the emergency department earlier this week and given a course of steroids   For COPD exacerbation and started on Lasix for concerns of mild CHF  She feels slightly better  She still has significant, audible wheezing and shortness of breath    No fevers or chills  She is unable to expectorate sputum and feels as if it gets stuck in her chest      she is maintained on Advair and Spiriva  She has been using her nebulizer with albuterol 4 times daily  She is also getting immunotherapy for metastatic lung cancer  Unfortunately, she continues to smoke and has no intention of quitting  Review of Systems   Constitutional: Positive for unexpected weight change (5 lb weight loss)  Negative for chills and fever  HENT: Negative for postnasal drip and sore throat  Eyes: Negative for visual disturbance  Respiratory:        As noted in HPI   Cardiovascular: Negative for chest pain  Gastrointestinal: Negative for abdominal pain, diarrhea and vomiting  Genitourinary: Negative for difficulty urinating  Skin: Negative for rash  Neurological: Negative for headaches  Hematological: Negative for adenopathy  Psychiatric/Behavioral: Negative  All other systems reviewed and are negative  Historical Information   Past Medical History:   Diagnosis Date    Arthritis     Cataract of right eye 3/19/2015    CHF (congestive heart failure) (HCC)     Common cold     Coronary artery disease     Depression     Diabetes mellitus [facility] [address]     Fracture of multiple pubic rami ([facility] [address] [facility] ) 2015    History of radiation therapy     Hyperlipidemia     Hypertension     Lung cancer ([facility] [address] [facility] )     Multiple thyroid nodules 2016    Pulmonary nodule     Shortness of breath      Past Surgical History:   Procedure Laterality Date    BRONCHOSCOPY N/A 8/10/2016    Procedure: BRONCHOSCOPY FLEXIBLE;  Surgeon: Ishan Reyna MD;  [facility];  Service:    [employer]  SECTION      CHOLECYSTECTOMY      EYE SURGERY      HYSTERECTOMY      MD BRONCHOSCOPY NEEDLE BX TRACHEA MAIN STEM&/BRON N/A 8/10/2016    Procedure: ENDOBRONCHIAL ULTRASOUND (FROZEN SECTION) ;   Surgeon: Ishan Reyna MD;  [facility];  Service: Thoracic    MD INSJ TUNNELED CTR VAD W/SUBQ PORT AGE 5 YR/> Left 8/30/2016    Procedure: INSERTION VENOUS PORT (PORT-A-CATH); Surgeon: Zelda Morocho MD;  [facility]: [facility];  Service: Thoracic    TONSILLECTOMY       Family History   Problem Relation Age of Onset    Brain cancer Sister        History   Smoking Status    Current Some Day Smoker    Packs/day: 0 25   Smokeless Tobacco    Never Used     Comment: smokes 3-4 cigs /day         Meds/Allergies     Current Outpatient Prescriptions:     albuterol (2 5 mg/3 mL) 0 083 % nebulizer solution, INHALE CONTENTS OF 1 VIAL IN NEBULIZER FOUR TIMES A DAY IF NEEDED, Disp: 375 mL, Rfl: 1    albuterol (VENTOLIN HFA) 90 mcg/act inhaler, Inhale 2 puffs every 6 (six) hours as needed for wheezing, Disp: 3 Inhaler, Rfl: 3    azithromycin (ZITHROMAX) 250 mg tablet, TAKE 1 TAB EVERY MONDAY WEDNESDAY AND FRIDAY, Disp: , Rfl: 3    betamethasone, augmented, (DIPROLENE-AF) 0 05 % cream, Apply topically 2 (two) times a day, Disp: 50 g, Rfl: 1    fluticasone-salmeterol (ADVAIR) 250-50 mcg/dose inhaler, Inhale 1 puff every 12 (twelve) hours, Disp: 1 Inhaler, Rfl: 6    FREESTYLE LITE test strip, Test Blood sugar 3 times daily  Diagnosis: Type 2 Diabetes, Disp: 300 each, Rfl: 0    furosemide (LASIX) 20 mg tablet, Take 1 tablet (20 mg total) by mouth daily, Disp: 90 tablet, Rfl: 2    metoprolol tartrate (LOPRESSOR) 100 mg tablet, Take 100 mg by mouth daily  , Disp: , Rfl:     mometasone (ELOCON) 0 1 % lotion, Apply topically daily, Disp: 60 mL, Rfl: 3    oxyCODONE-acetaminophen (PERCOCET) 5-325 mg per tablet, Take 1 tablet by mouth every 4 (four) hours as needed for moderate pain Max Daily Amount: 6 tablets, Disp: 180 tablet, Rfl: 0    pentoxifylline (TRENtal) 400 mg ER tablet, Take 400 mg by mouth 3 (three) times a day with meals  , Disp: , Rfl:     pregabalin (LYRICA) 100 mg capsule, Take 1 capsule (100 mg total) by mouth daily at bedtime, Disp: 30 capsule, Rfl: 4    simvastatin (ZOCOR) 80 mg tablet, Take 1 tablet (80 mg total) by mouth daily, Disp: 90 tablet, Rfl: 0    Tiotropium Bromide Monohydrate (SPIRIVA RESPIMAT) 2 5 MCG/ACT AERS, Inhale 2 Act (5 mcg total) daily, Disp: 3 Inhaler, Rfl: 3    levofloxacin (LEVAQUIN) 500 mg tablet, Take 1 tablet (500 mg total) by mouth every 24 hours for 7 days, Disp: 7 tablet, Rfl: 0    predniSONE 10 mg tablet, 4 tabs daily x 3 D then 3 tabs daily x 3 D then 2 tabs daily x 3 D then 1 tab daily x 3 D, Disp: 30 tablet, Rfl: 0  Allergies   Allergen Reactions    Penicillins Swelling     Legs swell up       Vitals: Blood pressure 128/60, pulse (!) 117, temperature 97 5 °F (36 4 °C), temperature source Tympanic, height 5' (1 524 m), weight 44 5 kg (98 lb), SpO2 92 %  Body mass index is 19 14 kg/m²  Oxygen Therapy  SpO2: 92 %  Oxygen Therapy: Supplemental oxygen  O2 Delivery Method: Nasal cannula  O2 Flow Rate (L/min): 3 L/min    Physical Exam   Physical Exam   Constitutional: She is oriented to person, place, and time  No distress  Frail, cachectic, ill-appearing   HENT:   Head: Normocephalic  Mouth/Throat: No oropharyngeal exudate  Eyes: Pupils are equal, round, and reactive to light  No scleral icterus  Neck: Neck supple  No JVD present  Cardiovascular: Normal rate and regular rhythm  Pulmonary/Chest: She has wheezes ( diffuse bilateral)  Rhonchi throughout   Abdominal: Soft  There is no tenderness  Musculoskeletal: She exhibits no edema  Lymphadenopathy:     She has no cervical adenopathy  Neurological: She is alert and oriented to person, place, and time  Skin: Skin is warm and dry  Psychiatric: She has a normal mood and affect  Labs: I have personally reviewed pertinent lab results    Lab Results   Component Value Date    WBC 8 54 06/19/2018    HGB 12 7 06/19/2018    HCT 41 9 06/19/2018     (H) 06/19/2018     06/19/2018     Lab Results   Component Value Date    GLUCOSE 125 06/19/2018    CALCIUM 8 8 06/19/2018     06/19/2018    K 4 6 06/19/2018    CO2 44 (H) 06/19/2018     06/19/2018    BUN 10 06/19/2018    CREATININE 0 60 06/19/2018     No results found for: IGE  Lab Results   Component Value Date    ALT 14 06/19/2018    AST 14 06/19/2018    ALKPHOS 101 06/19/2018    BILITOT 0 50 06/19/2018       Imaging and other studies: I have personally reviewed pertinent films in PACS   Chest x-ray from 06/19/2018 shows interstitial prominence most notable in the mid to lower lung zones      Pulmonary function testing:  Performed 8/8/16  FEV1/FVC ratio 53%   FEV1 20% predicted  FVC 28% predicted   there is no significant response to bronchodilators   % predicted   % predicted  DLCO corrected for hemoglobin 42 % predicted   this study shows very severe obstruction with reduced vital capacity due to air trapping and severe reduction in diffusion capacity

## 2018-06-28 ENCOUNTER — TELEPHONE (OUTPATIENT)
Dept: FAMILY MEDICINE CLINIC | Facility: CLINIC | Age: 78
End: 2018-06-28

## 2018-06-28 ENCOUNTER — OFFICE VISIT (OUTPATIENT)
Dept: FAMILY MEDICINE CLINIC | Facility: CLINIC | Age: 78
End: 2018-06-28
Payer: MEDICARE

## 2018-06-28 VITALS
HEART RATE: 84 BPM | HEIGHT: 60 IN | TEMPERATURE: 98.3 F | DIASTOLIC BLOOD PRESSURE: 60 MMHG | OXYGEN SATURATION: 93 % | BODY MASS INDEX: 19.04 KG/M2 | WEIGHT: 97 LBS | SYSTOLIC BLOOD PRESSURE: 124 MMHG

## 2018-06-28 DIAGNOSIS — E11.9 TYPE 2 DIABETES MELLITUS WITHOUT COMPLICATION, WITHOUT LONG-TERM CURRENT USE OF INSULIN (HCC): ICD-10-CM

## 2018-06-28 DIAGNOSIS — G89.29 OTHER CHRONIC PAIN: ICD-10-CM

## 2018-06-28 DIAGNOSIS — I50.32 CHRONIC DIASTOLIC CONGESTIVE HEART FAILURE (HCC): ICD-10-CM

## 2018-06-28 DIAGNOSIS — M54.50 BACK PAIN, LUMBOSACRAL: ICD-10-CM

## 2018-06-28 DIAGNOSIS — E11.9 CONTROLLED TYPE 2 DIABETES MELLITUS WITHOUT COMPLICATION, UNSPECIFIED WHETHER LONG TERM INSULIN USE (HCC): Primary | ICD-10-CM

## 2018-06-28 LAB — SL AMB POCT HEMOGLOBIN AIC: 6.3

## 2018-06-28 PROCEDURE — 83036 HEMOGLOBIN GLYCOSYLATED A1C: CPT | Performed by: FAMILY MEDICINE

## 2018-06-28 PROCEDURE — 99214 OFFICE O/P EST MOD 30 MIN: CPT | Performed by: FAMILY MEDICINE

## 2018-06-28 RX ORDER — OXYCODONE HYDROCHLORIDE AND ACETAMINOPHEN 5; 325 MG/1; MG/1
1 TABLET ORAL EVERY 4 HOURS PRN
Qty: 180 TABLET | Refills: 0 | Status: SHIPPED | OUTPATIENT
Start: 2018-06-28 | End: 2018-07-20 | Stop reason: SDUPTHER

## 2018-06-28 NOTE — PATIENT INSTRUCTIONS
Please continue with your current treatment regimen  We will see you back in 3-4 months for follow-up  Call sooner if your condition necessitates

## 2018-06-28 NOTE — ASSESSMENT & PLAN NOTE
She continues with topical corticosteroid treatment for her psoriasis which has been more effective recently

## 2018-06-28 NOTE — PROGRESS NOTES
Assessment/Plan:  DMII (diabetes mellitus, type 2) ([facility] )  Lab Results   Component Value Date    HGBA1C 6 3 06/28/2018    Patient's hemoglobin A1c today is 6 3  She is going to continue with reasonable dietary measures to address her type 2 diabetes  Currently she is underweight due to her lung carcinoma, severe COPD and other conditions  No results for input(s): POCGLU in the last 72 hours  Blood Sugar Average: Last 72 hrs:      Chronic obstructive pulmonary disease with acute exacerbation (HCC)  She is going to continue with the current course outlined by Dr Padmini Baker  Benign essential hypertension  Blood pressure is well controlled today  She is going to continue with metoprolol  Congestive heart failure ([facility] )  She will continue with Lasix in addition to treatment of her COPD  Psoriasis  She continues with topical corticosteroid treatment for her psoriasis which has been more effective recently    Hyperlipidemia  Continue with simvastatin    Other chronic pain  Continue with Percocet which is refill today  Diagnoses and all orders for this visit:    Controlled type 2 diabetes mellitus without complication, unspecified whether long term insulin use (HCC)  -     POCT hemoglobin A1c    Other chronic pain  -     oxyCODONE-acetaminophen (PERCOCET) 5-325 mg per tablet; Take 1 tablet by mouth every 4 (four) hours as needed for moderate pain Max Daily Amount: 6 tablets    Type 2 diabetes mellitus without complication, without long-term current use of insulin (HCC)    Chronic diastolic congestive heart failure (HCC)    Back pain, lumbosacral          Subjective:   Chief Complaint   Patient presents with    Diabetes     pt here for a diabetic check an her med check today  i will do her hba1c today  she does need her diabetic foot exam today  Patient ID: Bang Hernandez is a 77 y o  female  Saw Dr Padmini Baker yesterday  Started on prednisone and Levaquin  Told to hold azithromycin  Told to continue Advair, Spiriva and nebulizer  Needs Percocet RF  Calf pain alleviated by percocet  4L O2  HPI  The patient is a 71-year-old female presents today for follow-up of her multiple medical conditions  Recently she had an exacerbation of COPD  She was seen in the office yesterday by Pulmonary, Dr Mirella Jin who gave her a prednisone taper as well as Levaquin  She was told to hold her azithromycin while she was on it  She was also told to continue with her Advair and Spiriva as well as her nebulizer treatments  Her nasal oxygen was increased to 4 L  her pulse ox was 88  Currently it is 80 and she is feeling somewhat improved  She continues to have significant pain in her back and legs which has been longstanding due to failed procedures in the past   She has been taking Percocet 5/325 q 4 hours for many years  She continues on this and requests a refill today  She continues on Zocor for hyperlipidemia, metoprolol for hypertension  She was recently started on furosemide after hospitalization with acute diastolic CHF   states she has lost several lb since then and she is feeling somewhat less short of breath  He is concerned whether the weight loss is purely from fluid or due to progression of her underlying illnesses  She does have lung carcinoma  She has diet controlled diabetes and her hemoglobin A1c today is 6 3  The following portions of the patient's history were reviewed and updated as appropriate: allergies, current medications, past family history, past medical history, past social history, past surgical history and problem list     Review of Systems   Constitution: Positive for weight loss  Negative for decreased appetite, fever, malaise/fatigue and night sweats  HENT: Negative for congestion and sore throat  Cardiovascular: Negative for chest pain, cyanosis, irregular heartbeat, leg swelling, orthopnea, palpitations and paroxysmal nocturnal dyspnea     Respiratory: Positive for cough, shortness of breath, sputum production and wheezing  Negative for hemoptysis and snoring  Endocrine: Negative for polydipsia and polyphagia  Hematologic/Lymphatic: Negative for adenopathy and bleeding problem  Skin: Negative for rash  Musculoskeletal: Positive for back pain, joint pain, muscle cramps and stiffness  Gastrointestinal: Negative  Genitourinary: Negative  Neurological: Negative for headaches and light-headedness  Psychiatric/Behavioral: Negative for substance abuse  The patient has insomnia  The patient is not nervous/anxious  Objective:    Physical Exam   Constitutional: She appears well-developed and well-nourished  Somewhat cachectic in appearance, wearing nasal oxygen in no acute distress   HENT:   Mouth/Throat: No oropharyngeal exudate  Eyes: Conjunctivae are normal  No scleral icterus  Neck: No JVD present  No thyromegaly present  No JVD present today   Cardiovascular: Normal rate and regular rhythm  Heart sounds are distant though regular   Pulmonary/Chest: Effort normal  No respiratory distress  She has wheezes  She has no rales  Breath sounds are distant with scattered rhonchi and expiratory wheezing which is diffuse  No focal crackles noted  No significant respiratory distress  Musculoskeletal: She exhibits no edema  Lymphadenopathy:     She has no cervical adenopathy  Neurological: She is alert  She answers questions appropriately  Skin: No rash noted  No erythema  Psychiatric: She has a normal mood and affect   Thought content normal

## 2018-06-28 NOTE — ASSESSMENT & PLAN NOTE
Lab Results   Component Value Date    HGBA1C 6 3 06/28/2018    Patient's hemoglobin A1c today is 6 3  She is going to continue with reasonable dietary measures to address her type 2 diabetes  Currently she is underweight due to her lung carcinoma, severe COPD and other conditions  No results for input(s): POCGLU in the last 72 hours      Blood Sugar Average: Last 72 hrs:

## 2018-06-28 NOTE — TELEPHONE ENCOUNTER
Pt's  and inquiring about the ekg that was done in the er and heart failure  Would like you to call him when you have time  Looking for advice on this  He forgot to ask you about this at her appt today

## 2018-06-29 DIAGNOSIS — L40.9 PSORIASIS: ICD-10-CM

## 2018-06-29 RX ORDER — BETAMETHASONE DIPROPIONATE 0.5 MG/G
CREAM TOPICAL 2 TIMES DAILY
Qty: 50 G | Refills: 1 | Status: SHIPPED | OUTPATIENT
Start: 2018-06-29 | End: 2019-01-01 | Stop reason: SDUPTHER

## 2018-07-09 RX ORDER — SODIUM CHLORIDE 9 MG/ML
20 INJECTION, SOLUTION INTRAVENOUS CONTINUOUS
Status: DISPENSED | OUTPATIENT
Start: 2018-07-10 | End: 2018-07-10

## 2018-07-10 ENCOUNTER — HOSPITAL ENCOUNTER (OUTPATIENT)
Dept: INFUSION CENTER | Facility: HOSPITAL | Age: 78
Discharge: HOME | End: 2018-07-10
Payer: MEDICARE

## 2018-07-10 ENCOUNTER — TELEPHONE (OUTPATIENT)
Dept: HEMATOLOGY ONCOLOGY | Facility: CLINIC | Age: 78
End: 2018-07-10

## 2018-07-10 VITALS
TEMPERATURE: 97.2 F | RESPIRATION RATE: 20 BRPM | OXYGEN SATURATION: 100 % | DIASTOLIC BLOOD PRESSURE: 64 MMHG | HEART RATE: 69 BPM | SYSTOLIC BLOOD PRESSURE: 152 MMHG

## 2018-07-10 LAB
ALBUMIN SERPL BCP-MCNC: 2.9 G/DL (ref 3.5–5)
ALP SERPL-CCNC: 69 U/L (ref 46–116)
ALT SERPL W P-5'-P-CCNC: 20 U/L (ref 12–78)
AST SERPL W P-5'-P-CCNC: 16 U/L (ref 5–45)
BASOPHILS # BLD AUTO: 0.01 THOUSANDS/ΜL (ref 0–0.1)
BASOPHILS NFR BLD AUTO: 0 % (ref 0–1)
BILIRUB SERPL-MCNC: 0.72 MG/DL (ref 0.2–1)
BUN SERPL-MCNC: 11 MG/DL (ref 5–25)
CALCIUM SERPL-MCNC: 8.3 MG/DL (ref 8.3–10.1)
CHLORIDE SERPL-SCNC: 99 MMOL/L (ref 100–108)
CO2 SERPL-SCNC: >45 MMOL/L (ref 21–32)
CREAT SERPL-MCNC: 0.76 MG/DL (ref 0.6–1.3)
EOSINOPHIL # BLD AUTO: 0.09 THOUSAND/ΜL (ref 0–0.61)
EOSINOPHIL NFR BLD AUTO: 1 % (ref 0–6)
ERYTHROCYTE [DISTWIDTH] IN BLOOD BY AUTOMATED COUNT: 11.5 % (ref 11.6–15.1)
GFR SERPL CREATININE-BSD FRML MDRD: 75 ML/MIN/1.73SQ M
GLUCOSE SERPL-MCNC: 173 MG/DL (ref 65–140)
HCT VFR BLD AUTO: 40.3 % (ref 34.8–46.1)
HGB BLD-MCNC: 12.5 G/DL (ref 11.5–15.4)
IMM GRANULOCYTES # BLD AUTO: 0.04 THOUSAND/UL (ref 0–0.2)
IMM GRANULOCYTES NFR BLD AUTO: 1 % (ref 0–2)
LYMPHOCYTES # BLD AUTO: 0.51 THOUSANDS/ΜL (ref 0.6–4.47)
LYMPHOCYTES NFR BLD AUTO: 7 % (ref 14–44)
MCH RBC QN AUTO: 31.5 PG (ref 26.8–34.3)
MCHC RBC AUTO-ENTMCNC: 31 G/DL (ref 31.4–37.4)
MCV RBC AUTO: 102 FL (ref 82–98)
MONOCYTES # BLD AUTO: 0.43 THOUSAND/ΜL (ref 0.17–1.22)
MONOCYTES NFR BLD AUTO: 6 % (ref 4–12)
NEUTROPHILS # BLD AUTO: 6.53 THOUSANDS/ΜL (ref 1.85–7.62)
NEUTS SEG NFR BLD AUTO: 85 % (ref 43–75)
NRBC BLD AUTO-RTO: 0 /100 WBCS
PLATELET # BLD AUTO: 106 THOUSANDS/UL (ref 149–390)
PMV BLD AUTO: 10.5 FL (ref 8.9–12.7)
POTASSIUM SERPL-SCNC: 3.2 MMOL/L (ref 3.5–5.3)
PROT SERPL-MCNC: 6 G/DL (ref 6.4–8.2)
RBC # BLD AUTO: 3.97 MILLION/UL (ref 3.81–5.12)
SODIUM SERPL-SCNC: 144 MMOL/L (ref 136–145)
T4 FREE SERPL-MCNC: 1.6 NG/DL (ref 0.76–1.46)
TSH SERPL DL<=0.05 MIU/L-ACNC: 4.37 UIU/ML (ref 0.36–3.74)
WBC # BLD AUTO: 7.61 THOUSAND/UL (ref 4.31–10.16)

## 2018-07-10 PROCEDURE — 96413 CHEMO IV INFUSION 1 HR: CPT

## 2018-07-10 PROCEDURE — 80053 COMPREHEN METABOLIC PANEL: CPT | Performed by: INTERNAL MEDICINE

## 2018-07-10 PROCEDURE — 85025 COMPLETE CBC W/AUTO DIFF WBC: CPT | Performed by: INTERNAL MEDICINE

## 2018-07-10 PROCEDURE — 84443 ASSAY THYROID STIM HORMONE: CPT | Performed by: INTERNAL MEDICINE

## 2018-07-10 PROCEDURE — 84439 ASSAY OF FREE THYROXINE: CPT | Performed by: INTERNAL MEDICINE

## 2018-07-10 RX ADMIN — ATEZOLIZUMAB 1200 MG: 1200 INJECTION, SOLUTION INTRAVENOUS at 08:52

## 2018-07-10 RX ADMIN — Medication 300 UNITS: at 09:32

## 2018-07-10 NOTE — TELEPHONE ENCOUNTER
Kayla JARA from [facility] infusion called with critical CO2 > 45  Pt received Tecentriq today

## 2018-07-10 NOTE — PROGRESS NOTES
César Gonzalez RN with Dr Maria C Mendiola office notified of critical lab results CO2  Greater than 45

## 2018-07-10 NOTE — PROGRESS NOTES
INfusion complete w/o adverse reaction, pt offered no complaints  Port deaccessed per protocol  Pt then d/c'd to home with steady gait

## 2018-07-10 NOTE — PLAN OF CARE
Patient/family/caregiver demonstrates understanding of disease process, treatment plan, medications, and discharge instructions Progressing      Patient will remain free of falls Progressing      Patient will remain free of falls Progressing      Patient will remain free of falls Progressing

## 2018-07-12 ENCOUNTER — TELEPHONE (OUTPATIENT)
Dept: PULMONOLOGY | Facility: CLINIC | Age: 78
End: 2018-07-12

## 2018-07-12 DIAGNOSIS — J96.12 CHRONIC RESPIRATORY FAILURE WITH HYPOXIA AND HYPERCAPNIA (HCC): Primary | ICD-10-CM

## 2018-07-12 DIAGNOSIS — J96.11 CHRONIC RESPIRATORY FAILURE WITH HYPOXIA AND HYPERCAPNIA (HCC): Primary | ICD-10-CM

## 2018-07-12 NOTE — TELEPHONE ENCOUNTER
Patient's  called in stating that he would like a phone call back  Patient had blood work done recently that Dr Brittaney Amado had ordered and the patient reports that her CO2 level was higher then it normally is, and she and her  are very concerned about this  Her  also states that she had to increase her Oxygen liter flow from 2L to 3L due to SOB  Patient is aware that she has an appointment next week but the  would like a phone call in the meantime  Please advise   They can be reached at [phone]

## 2018-07-12 NOTE — PROGRESS NOTES
called with concerns related to patient's recent blood work  Serum bicarbonate is greater than 45  I suspect the patient does have an element of chronic hypercapnic respiratory failure, though we do not have ABG on file  I will make arrangements for arterial blood gas to be obtained before our visit next week  I also instructed titrate supplemental oxygen only as needed to keep saturations between 88-92%

## 2018-07-16 ENCOUNTER — TELEPHONE (OUTPATIENT)
Dept: PULMONOLOGY | Facility: CLINIC | Age: 78
End: 2018-07-16

## 2018-07-16 ENCOUNTER — HOSPITAL ENCOUNTER (OUTPATIENT)
Dept: PULMONOLOGY | Facility: HOSPITAL | Age: 78
Discharge: HOME | End: 2018-07-16
Attending: INTERNAL MEDICINE
Payer: MEDICARE

## 2018-07-16 DIAGNOSIS — J96.12 CHRONIC RESPIRATORY FAILURE WITH HYPOXIA AND HYPERCAPNIA (HCC): ICD-10-CM

## 2018-07-16 DIAGNOSIS — J96.11 CHRONIC RESPIRATORY FAILURE WITH HYPOXIA AND HYPERCAPNIA (HCC): ICD-10-CM

## 2018-07-16 LAB
ARTERIAL PATENCY WRIST A: 11
BASE EXCESS BLDA CALC-SCNC: 16 MMOL/L (ref -2–3)
FIO2 GAS DIL.REBREATH: 21 L
HCO3 BLDA-SCNC: 44.9 MMOL/L (ref 22–28)
PCO2 BLD: 72.2 MM HG (ref 36–44)
PCO2 BLD: >45 MMOL/L (ref 21–32)
PH BLD: 7.4 [PH] (ref 7.35–7.45)
PO2 BLD: 72 MM HG (ref 75–129)
SAMPLE SITE: ABNORMAL
SAO2 % BLD FROM PO2: 93 % (ref 95–98)
SPECIMEN SOURCE: ABNORMAL

## 2018-07-16 PROCEDURE — 36600 WITHDRAWAL OF ARTERIAL BLOOD: CPT

## 2018-07-16 PROCEDURE — 82803 BLOOD GASES ANY COMBINATION: CPT

## 2018-07-17 ENCOUNTER — OFFICE VISIT (OUTPATIENT)
Dept: PULMONOLOGY | Facility: CLINIC | Age: 78
End: 2018-07-17
Payer: MEDICARE

## 2018-07-17 ENCOUNTER — DOCUMENTATION (OUTPATIENT)
Dept: PULMONOLOGY | Facility: CLINIC | Age: 78
End: 2018-07-17

## 2018-07-17 VITALS
HEART RATE: 79 BPM | BODY MASS INDEX: 19.08 KG/M2 | OXYGEN SATURATION: 91 % | DIASTOLIC BLOOD PRESSURE: 58 MMHG | HEIGHT: 60 IN | TEMPERATURE: 98.3 F | SYSTOLIC BLOOD PRESSURE: 140 MMHG | WEIGHT: 97.2 LBS

## 2018-07-17 DIAGNOSIS — J44.9 COPD, VERY SEVERE (HCC): ICD-10-CM

## 2018-07-17 DIAGNOSIS — J96.11 CHRONIC RESPIRATORY FAILURE WITH HYPOXIA AND HYPERCAPNIA (HCC): Primary | ICD-10-CM

## 2018-07-17 DIAGNOSIS — J96.12 CHRONIC RESPIRATORY FAILURE WITH HYPOXIA AND HYPERCAPNIA (HCC): Primary | ICD-10-CM

## 2018-07-17 PROCEDURE — 99215 OFFICE O/P EST HI 40 MIN: CPT | Performed by: INTERNAL MEDICINE

## 2018-07-17 RX ORDER — FLUTICASONE FUROATE AND VILANTEROL 200; 25 UG/1; UG/1
1 POWDER RESPIRATORY (INHALATION) DAILY
Qty: 3 INHALER | Refills: 3 | Status: SHIPPED | OUTPATIENT
Start: 2018-07-17 | End: 2018-09-11 | Stop reason: ALTCHOICE

## 2018-07-17 NOTE — PROGRESS NOTES
Pulmonary Follow Up Note   Trent Bush 77 y o  female MRN: [id number]  7/17/2018      Assessment/Plan:    COPD, very severe ([address] )   Despite compliance with her inhaler regimen and recent course of steroids, she continues to have wheezing  I suggested the possibility of transitioning her to an all- nebulized regimen, however she would like to avoid that for now  I gave her sample of Breo Ellipta 200 mcg to use once daily in place of the Advair  Alternatively, we could always try the high-dose Advair  All of these options are in an effort to avoid systemic corticosteroids  She will continue with azithromycin 3 times weekly  Chronic respiratory failure with hypoxia and hypercapnia (HCC)    Oxygenation today is adequate on 2 liters/minute  She has chronic  Hypercapnia as demonstrated on ABG from yesterday  We will proceed with further testing to include overnight pulse oximetry on 2 L  If she shows evidence of desaturation for at least 5 minutes, then we will proceed with getting BiPAP to use on a nightly basis  If she does not desaturate, I would favor formal sleep study because ultimately, she needs BiPAP at night to avoid worsening hypercapnia  Visit orders:    Diagnoses and all orders for this visit:    Chronic respiratory failure with hypoxia and hypercapnia (HCC)  -     Pulse Oximeter    COPD, very severe ([address] )  -     fluticasone-vilanterol (BREO ELLIPTA) 200-25 MCG/INH inhaler; Inhale 1 puff daily Rinse mouth after use  Return in about 2 months (around 9/17/2018)  History of Present Illness   HPI:  Trent Bush is a 77 y o  female who is here today for follow-up regarding very severe COPD and chronic hypoxemic respiratory failure  She underwent routine blood work earlier this month and it was noted that her serum bicarbonate level was greater than 45  She then underwent ABG yesterday which confirms chronic hypercapnic respiratory failure with compensated pH    Patient has been using oxygen at 2-4   L per minute  Since completing course of antibiotics and steroids, her shortness of breath and wheezing has improved  She still wheezes on occasion  She is compliant with Advair and Spiriva  She is using albuterol via nebulizer 4 times daily  She denies fever, chills or sweats  Review of Systems   Constitutional: Negative for chills, fever and unexpected weight change  HENT: Negative for postnasal drip and sore throat  Eyes: Negative for visual disturbance  Respiratory:        As noted in HPI   Cardiovascular: Negative for chest pain  Gastrointestinal: Negative for abdominal pain, diarrhea and vomiting  Genitourinary: Negative for difficulty urinating  Skin: Negative for rash  Neurological: Negative for headaches  Memory problems   Hematological: Negative for adenopathy  Psychiatric/Behavioral: Negative  All other systems reviewed and are negative          Historical Information   Past Medical History:   Diagnosis Date    Abnormal CT of the chest     last assessed: Aug 8, 2016    Arthritis     Asthma     Asymptomatic carotid artery stenosis     last assessed: March 2, 2017    Benign essential hypertension     last assessed: March 2, 2017    Cataract of right eye 3/19/2015    Cataract of right eye     last assessed: March 19, 2015    CHF (congestive heart failure) (HCC)     Common cold     Coronary artery disease     Depression     Diabetes mellitus ([address] )     Fracture of multiple pubic rami ([facility] ) 1/6/2015    History of radiation therapy     Hyperlipidemia     Hypertension     Lung cancer ([facility] )     Multiple thyroid nodules 7/26/2016    Myocardial infarction ([facility] )     Pulmonary emphysema (HCC)     Pulmonary nodule     Shortness of breath      Past Surgical History:   Procedure Laterality Date    BRONCHOSCOPY N/A 8/10/2016    Procedure: BRONCHOSCOPY FLEXIBLE;  Surgeon: Divine Osman MD;  Location: [facility];  Service:    [facility] BYPASS GRAFT using vein - aortic-bifemoral - last assessed: Aug 22, 2016     SECTION      CHOLECYSTECTOMY      EYE SURGERY      HYSTERECTOMY      IN BRONCHOSCOPY NEEDLE BX TRACHEA MAIN STEM&/BRON N/A 8/10/2016    Procedure: ENDOBRONCHIAL ULTRASOUND (FROZEN SECTION) ; Surgeon: Rj De La Paz MD;  Location: [facility];  Service: Thoracic    IN INSJ TUNNELED CTR VAD W/SUBQ PORT AGE 5 YR/> Left 2016    Procedure: INSERTION VENOUS PORT (PORT-A-CATH); Surgeon: Rj De La Paz MD;  [facility]: [facility];  Service: Thoracic    TONSILLECTOMY       Family History   Problem Relation Age of Onset    Brain cancer Sister    [facility] Cancer Sister     Thyroid disease Mother        History   Smoking Status    Current Some Day Smoker    Packs/day: 0 25    Types: Cigarettes   Smokeless Tobacco    Never Used     Comment: smokes 3-4 cigs /day - current every day smoker noted in "allscripts" smoke for less than 1/2 pack per day for 50 years       Meds/Allergies     Current Outpatient Prescriptions:     albuterol (2 5 mg/3 mL) 0 083 % nebulizer solution, INHALE CONTENTS OF 1 VIAL IN NEBULIZER FOUR TIMES A DAY IF NEEDED, Disp: 375 mL, Rfl: 1    albuterol (VENTOLIN HFA) 90 mcg/act inhaler, Inhale 2 puffs every 6 (six) hours as needed for wheezing, Disp: 3 Inhaler, Rfl: 3    azithromycin (ZITHROMAX) 250 mg tablet, TAKE 1 TAB EVERY  AND FRIDAY is on hold till she finishes her other antibiotic then she will resume this , Disp: , Rfl: 3    betamethasone, augmented, (DIPROLENE-AF) 0 05 % cream, APPLY TOPICALLY 2 (TWO) TIMES A DAY, Disp: 50 g, Rfl: 1    FREESTYLE LITE test strip, Test Blood sugar 3 times daily  Diagnosis: Type 2 Diabetes, Disp: 300 each, Rfl: 0    furosemide (LASIX) 20 mg tablet, Take 1 tablet (20 mg total) by mouth daily, Disp: 90 tablet, Rfl: 2    metoprolol tartrate (LOPRESSOR) 100 mg tablet, Take 100 mg by mouth daily  , Disp: , Rfl:     mometasone (ELOCON) 0 1 % lotion, Apply topically daily, Disp: 60 mL, Rfl: 3    oxyCODONE-acetaminophen (PERCOCET) 5-325 mg per tablet, Take 1 tablet by mouth every 4 (four) hours as needed for moderate pain Max Daily Amount: 6 tablets, Disp: 180 tablet, Rfl: 0    pentoxifylline (TRENtal) 400 mg ER tablet, Take 400 mg by mouth 3 (three) times a day with meals  , Disp: , Rfl:     pregabalin (LYRICA) 100 mg capsule, Take 1 capsule (100 mg total) by mouth daily at bedtime, Disp: 30 capsule, Rfl: 4    simvastatin (ZOCOR) 80 mg tablet, Take 1 tablet (80 mg total) by mouth daily, Disp: 90 tablet, Rfl: 0    Tiotropium Bromide Monohydrate (SPIRIVA RESPIMAT) 2 5 MCG/ACT AERS, Inhale 2 Act (5 mcg total) daily, Disp: 3 Inhaler, Rfl: 3    fluticasone-vilanterol (BREO ELLIPTA) 200-25 MCG/INH inhaler, Inhale 1 puff daily Rinse mouth after use , Disp: 3 Inhaler, Rfl: 3  Allergies   Allergen Reactions    Penicillins Swelling     Legs swell up       Vitals: Blood pressure 140/58, pulse 79, temperature 98 3 °F (36 8 °C), temperature source Tympanic, height 5' (1 524 m), weight 44 1 kg (97 lb 3 2 oz), SpO2 91 %  Body mass index is 18 98 kg/m²  Oxygen Therapy  SpO2: 91 %  Oxygen Therapy: Supplemental oxygen  O2 Delivery Method: Nasal cannula  O2 Flow Rate (L/min): 2 L/min    Physical Exam   Physical Exam   Constitutional: She is oriented to person, place, and time  No distress  HENT:   Head: Normocephalic  Mouth/Throat: No oropharyngeal exudate  Eyes: Pupils are equal, round, and reactive to light  No scleral icterus  Neck: Neck supple  No JVD present  Cardiovascular: Normal rate and regular rhythm  Pulmonary/Chest:    Bilateral wheeze and rhonchi, overall improved from prior visit   Abdominal: Soft  There is no tenderness  Musculoskeletal: She exhibits no edema  Lymphadenopathy:     She has no cervical adenopathy  Neurological: She is alert and oriented to person, place, and time  Skin: Skin is warm and dry  Psychiatric: She has a normal mood and affect  Labs: I have personally reviewed pertinent lab results  ,     ABG:  Performed on 7/16/18 -  PH 7 40, pCO2 72, PO2 72    Lab Results   Component Value Date    WBC 7 61 07/10/2018    HGB 12 5 07/10/2018    HCT 40 3 07/10/2018     (H) 07/10/2018     (L) 07/10/2018     Lab Results   Component Value Date    GLUCOSE 173 (H) 07/10/2018    CALCIUM 8 3 07/10/2018     07/10/2018    K 3 2 (L) 07/10/2018    CO2 >45 (HH) 07/10/2018    CL 99 (L) 07/10/2018    BUN 11 07/10/2018    CREATININE 0 76 07/10/2018     No results found for: IGE  Lab Results   Component Value Date    ALT 20 07/10/2018    AST 16 07/10/2018    ALKPHOS 69 07/10/2018    BILITOT 0 72 07/10/2018       Imaging and other studies: I have personally reviewed pertinent films in PACS  Chest x-ray from 6/19/18 shows nonspecific interstitial prominence    Chest CT from 6/12/18 shows right suprahilar mass, minimally larger than prior study with increased postobstructive changes in the right upper lobe  There is a new 4 mm right upper lobe nodule  Right hilar adenopathy is stable  There is very small right-sided pleural effusion    Left adrenal metastasis slightly diminished compared with prior study    Pulmonary function testing:  Performed 8/8/16  FEV1/FVC ratio 53%   FEV1 20% predicted  FVC 28% predicted   there is no significant response to bronchodilators   % predicted   % predicted  DLCO corrected for hemoglobin 42 % predicted   this study shows very severe obstruction with reduced vital capacity due to air trapping and severe reduction in diffusion capacity

## 2018-07-17 NOTE — PATIENT INSTRUCTIONS
COPD (Chronic Obstructive Pulmonary Disease)   WHAT YOU NEED TO KNOW:   Chronic obstructive pulmonary disease (COPD) is a lung disease that makes it hard for you to breathe  It is usually a result of lung damage caused by years of irritation and inflammation in your lungs  DISCHARGE INSTRUCTIONS:   Call 911 if:   · You feel lightheaded, short of breath, and have chest pain  Return to the emergency department if:   · You are confused, dizzy, or feel faint  · Your arm or leg feels warm, tender, and painful  It may look swollen and red  · You cough up blood  Contact your healthcare provider if:   · You have more shortness of breath than usual      · You need more medicine than usual to control your symptoms  · You are coughing or wheezing more than usual      · You are coughing up more mucus, or it is a different color or has a different odor  · You gain more than 3 pounds in a week  · You have a fever, a runny or stuffy nose, and a sore throat, or other cold or flu symptoms  · Your skin, lips, or nails start to turn blue  · You have swelling in your legs or ankles  · You are very tired or weak for more than a day  · You notice changes in your mood, or changes in your ability to think or concentrate  · You have questions or concerns about your condition or care  Medicines:   · Medicines  may be used to open your airways, decrease swelling and inflammation in your lungs, or treat an infection  You may need 2 or more medicines  A short-acting medicine relieves symptoms quickly  Long-acting medicines will control or prevent symptoms  Ask for more information about the medicines you are given and how to use them safely  · Take your medicine as directed  Contact your healthcare provider if you think your medicine is not helping or if you have side effects  Tell him or her if you are allergic to any medicine  Keep a list of the medicines, vitamins, and herbs you take  Include the amounts, and when and why you take them  Bring the list or the pill bottles to follow-up visits  Carry your medicine list with you in case of an emergency  Help make breathing easier:   · Use pursed-lip breathing any time you feel short of breath  Take a deep breath in through your nose  Slowly breathe out through your mouth with your lips pursed for twice as long as you inhaled  You can also practice this breathing pattern while you bend, lift, climb stairs, or exercise  It slows down your breathing and helps move more air in and out of your lungs  · Do not smoke, and avoid others who smoke  Nicotine and other substances can cause lung irritation or damage and make it harder for you to breathe  Do not use e-cigarettes or smokeless tobacco  They still contain nicotine  Ask your healthcare provider for information if you currently smoke and need help to quit  For support and more information:  ¨ [employer]  Phone: 5- 117 - 210-8095  Web Address: Tamr      · Be aware of and avoid anything that makes your symptoms worse  Stay out of high altitudes and places with high humidity  Stay inside, or cover your mouth and nose with a scarf when you are outside during cold weather  Stay inside on days when air pollution or pollen counts are high  Do not use aerosol sprays such as deodorant, bug spray, and hair spray  Manage COPD and help prevent exacerbations:  COPD is a serious condition that gets worse over time  A COPD exacerbation means your symptoms suddenly get worse  It is important to prevent exacerbations  An exacerbation can cause more lung damage  COPD cannot be cured, but you can take action to feel better and prevent COPD exacerbations:  · Protect yourself from germs  Germs can get into your lungs and cause an infection  An infection in your lungs can create more mucus and make it harder to breathe   An infection can also create swelling in your airways and prevent air from getting in  You can decrease your risk for infection by doing the following:     [facility] your hands often with soap and water  Carry germ-killing gel with you  You can use the gel to clean your hands when soap and water are not available  ¨ Do not touch your eyes, nose, or mouth unless you have washed your hands first      ¨ Always cover your mouth when you cough  Cough into a tissue or your shirtsleeve so you do not spread germs from your hands  ¨ Try to avoid people who have a cold or the flu  If you are sick, stay away from others as much as possible  · Drink more liquids  This will help to keep your air passages moist and help you cough up mucus  Ask how much liquid to drink each day and which liquids are best for you  · Exercise daily  Exercise for at least 20 minutes each day to help increase your energy and decrease shortness of breath  Walking or riding a bike are good ways to exercise  Talk to your healthcare provider about the best exercise plan for you  · Ask about vaccines  Your healthcare provider may recommend that you get regular flu and pneumonia vaccines  Pneumonia can become life-threatening for a person who has COPD  Ask about other vaccines you may need  Ask your healthcare provider about the flu and pneumonia vaccines  All adults should get the flu (influenza) vaccine every year as soon as it becomes available  The pneumonia vaccine is given to adults aged 72 or older to prevent pneumococcal disease, such as pneumonia  Adults aged 23 to 59 years who are at high risk for pneumococcal disease also should get the pneumococcal vaccine  It may need to be repeated 1 or 5 years later  Pulmonary rehabilitation:  Your healthcare provider may recommend a program to help you manage your symptoms and improve your quality of life  It may include nutritional counseling and exercise to strengthen your lungs     Make decisions about your choices for future treatment:  Ask for information about advanced medical directives and living vickers  These documents help you decide and write down your choices for treatment and end-of-life care  It is best to complete them when you feel well and can think clearly about your wishes  The information can then be kept for future use if you are in the hospital or become very ill  Follow up with your healthcare provider as directed: You may need more tests  Your healthcare provider may refer you to a pulmonary (lung) specialist  Write down your questions so you remember to ask them during your visits  © 2017 [facility] Information is for End User's use only and may not be sold, redistributed or otherwise used for commercial purposes  All illustrations and images included in CareNotes® are the copyrighted property of A D A M [employer] Inc  or [employer]  The above information is an  only  It is not intended as medical advice for individual conditions or treatments  Talk to your doctor, nurse or pharmacist before following any medical regimen to see if it is safe and effective for you

## 2018-07-17 NOTE — ASSESSMENT & PLAN NOTE
Despite compliance with her inhaler regimen and recent course of steroids, she continues to have wheezing  I suggested the possibility of transitioning her to an all- nebulized regimen, however she would like to avoid that for now  I gave her sample of Breo Ellipta 200 mcg to use once daily in place of the Advair  Alternatively, we could always try the high-dose Advair  All of these options are in an effort to avoid systemic corticosteroids  She will continue with azithromycin 3 times weekly

## 2018-07-17 NOTE — ASSESSMENT & PLAN NOTE
Oxygenation today is adequate on 2 liters/minute  She has chronic  Hypercapnia as demonstrated on ABG from yesterday  We will proceed with further testing to include overnight pulse oximetry on 2 L  If she shows evidence of desaturation for at least 5 minutes, then we will proceed with getting BiPAP to use on a nightly basis  If she does not desaturate, I would favor formal sleep study because ultimately, she needs BiPAP at night to avoid worsening hypercapnia

## 2018-07-20 DIAGNOSIS — G89.29 OTHER CHRONIC PAIN: ICD-10-CM

## 2018-07-20 RX ORDER — OXYCODONE HYDROCHLORIDE AND ACETAMINOPHEN 5; 325 MG/1; MG/1
1 TABLET ORAL EVERY 4 HOURS PRN
Qty: 180 TABLET | Refills: 0 | Status: SHIPPED | OUTPATIENT
Start: 2018-07-20 | End: 2018-08-30 | Stop reason: SDUPTHER

## 2018-07-23 ENCOUNTER — TELEPHONE (OUTPATIENT)
Dept: PULMONOLOGY | Facility: CLINIC | Age: 78
End: 2018-07-23

## 2018-07-23 NOTE — TELEPHONE ENCOUNTER
Son calling saying she saw Dr Kenneth Cox on 7/17 and she ordered an overnight pulse ox  They have not heard from [facility] yet

## 2018-07-23 NOTE — TELEPHONE ENCOUNTER
Spoke with [facility] and they will call her to schedule for this week   I called pt and left message informing her

## 2018-07-26 ENCOUNTER — TELEPHONE (OUTPATIENT)
Dept: PULMONOLOGY | Facility: CLINIC | Age: 78
End: 2018-07-26

## 2018-07-26 DIAGNOSIS — J44.9 CHRONIC OBSTRUCTIVE PULMONARY DISEASE, UNSPECIFIED COPD TYPE (HCC): Primary | ICD-10-CM

## 2018-07-26 NOTE — TELEPHONE ENCOUNTER
Patient's  called stating patient is not doing any better after taking Breo  However, Reyes Oka stated  Dr Juan Carlos Carter would increase strength on Advair   Mr  Mayo Pascal also has other question and would like a call back at [phone]

## 2018-07-26 NOTE — TELEPHONE ENCOUNTER
I called and spoke with [facility]   He reports that [facility] has had some increased jitteriness since last week with the change to the [facility] inhaler  He does not note a significant change in breathing  He notes no other changes  He requests to change to Advair 500 as recommended by Dr Nazario Borden if [facility] does not work  I placed a 90 day prescription for this  He is aware to call should symptoms worsen or not resolve

## 2018-07-31 RX ORDER — SODIUM CHLORIDE 9 MG/ML
40 INJECTION, SOLUTION INTRAVENOUS ONCE
Status: COMPLETED | OUTPATIENT
Start: 2018-08-02 | End: 2018-08-02

## 2018-08-01 DIAGNOSIS — E11.9 TYPE 2 DIABETES MELLITUS WITHOUT COMPLICATION, WITH LONG-TERM CURRENT USE OF INSULIN (HCC): ICD-10-CM

## 2018-08-01 DIAGNOSIS — Z79.4 TYPE 2 DIABETES MELLITUS WITHOUT COMPLICATION, WITH LONG-TERM CURRENT USE OF INSULIN (HCC): ICD-10-CM

## 2018-08-01 RX ORDER — BLOOD-GLUCOSE METER
1 KIT MISCELLANEOUS AS NEEDED
Refills: 0 | OUTPATIENT
Start: 2018-08-01

## 2018-08-02 ENCOUNTER — HOSPITAL ENCOUNTER (OUTPATIENT)
Dept: INFUSION CENTER | Facility: HOSPITAL | Age: 78
Discharge: HOME | End: 2018-08-02
Payer: MEDICARE

## 2018-08-02 VITALS
TEMPERATURE: 96.9 F | DIASTOLIC BLOOD PRESSURE: 59 MMHG | SYSTOLIC BLOOD PRESSURE: 137 MMHG | OXYGEN SATURATION: 94 % | HEART RATE: 60 BPM | RESPIRATION RATE: 20 BRPM

## 2018-08-02 LAB
ALBUMIN SERPL BCP-MCNC: 2.9 G/DL (ref 3.5–5)
ALP SERPL-CCNC: 84 U/L (ref 46–116)
ALT SERPL W P-5'-P-CCNC: 15 U/L (ref 12–78)
ANION GAP SERPL CALCULATED.3IONS-SCNC: 4 MMOL/L (ref 4–13)
AST SERPL W P-5'-P-CCNC: 18 U/L (ref 5–45)
BASOPHILS # BLD AUTO: 0.03 THOUSANDS/ΜL (ref 0–0.1)
BASOPHILS NFR BLD AUTO: 0 % (ref 0–1)
BILIRUB SERPL-MCNC: 0.4 MG/DL (ref 0.2–1)
BUN SERPL-MCNC: 10 MG/DL (ref 5–25)
CALCIUM SERPL-MCNC: 8.8 MG/DL (ref 8.3–10.1)
CHLORIDE SERPL-SCNC: 100 MMOL/L (ref 100–108)
CO2 SERPL-SCNC: 38 MMOL/L (ref 21–32)
CREAT SERPL-MCNC: 0.64 MG/DL (ref 0.6–1.3)
EOSINOPHIL # BLD AUTO: 0.04 THOUSAND/ΜL (ref 0–0.61)
EOSINOPHIL NFR BLD AUTO: 1 % (ref 0–6)
ERYTHROCYTE [DISTWIDTH] IN BLOOD BY AUTOMATED COUNT: 11.7 % (ref 11.6–15.1)
GFR SERPL CREATININE-BSD FRML MDRD: 85 ML/MIN/1.73SQ M
GLUCOSE SERPL-MCNC: 144 MG/DL (ref 65–140)
HCT VFR BLD AUTO: 41.8 % (ref 34.8–46.1)
HGB BLD-MCNC: 12.9 G/DL (ref 11.5–15.4)
IMM GRANULOCYTES # BLD AUTO: 0.02 THOUSAND/UL (ref 0–0.2)
IMM GRANULOCYTES NFR BLD AUTO: 0 % (ref 0–2)
LYMPHOCYTES # BLD AUTO: 0.61 THOUSANDS/ΜL (ref 0.6–4.47)
LYMPHOCYTES NFR BLD AUTO: 9 % (ref 14–44)
MCH RBC QN AUTO: 31.3 PG (ref 26.8–34.3)
MCHC RBC AUTO-ENTMCNC: 30.9 G/DL (ref 31.4–37.4)
MCV RBC AUTO: 102 FL (ref 82–98)
MONOCYTES # BLD AUTO: 0.46 THOUSAND/ΜL (ref 0.17–1.22)
MONOCYTES NFR BLD AUTO: 7 % (ref 4–12)
NEUTROPHILS # BLD AUTO: 5.77 THOUSANDS/ΜL (ref 1.85–7.62)
NEUTS SEG NFR BLD AUTO: 83 % (ref 43–75)
NRBC BLD AUTO-RTO: 0 /100 WBCS
PLATELET # BLD AUTO: 151 THOUSANDS/UL (ref 149–390)
PMV BLD AUTO: 10.7 FL (ref 8.9–12.7)
POTASSIUM SERPL-SCNC: 4 MMOL/L (ref 3.5–5.3)
PROT SERPL-MCNC: 6.3 G/DL (ref 6.4–8.2)
RBC # BLD AUTO: 4.12 MILLION/UL (ref 3.81–5.12)
SODIUM SERPL-SCNC: 142 MMOL/L (ref 136–145)
TSH SERPL DL<=0.05 MIU/L-ACNC: 4.14 UIU/ML (ref 0.36–3.74)
WBC # BLD AUTO: 6.93 THOUSAND/UL (ref 4.31–10.16)

## 2018-08-02 PROCEDURE — 96413 CHEMO IV INFUSION 1 HR: CPT

## 2018-08-02 PROCEDURE — 84443 ASSAY THYROID STIM HORMONE: CPT | Performed by: INTERNAL MEDICINE

## 2018-08-02 PROCEDURE — 85025 COMPLETE CBC W/AUTO DIFF WBC: CPT | Performed by: INTERNAL MEDICINE

## 2018-08-02 PROCEDURE — 80053 COMPREHEN METABOLIC PANEL: CPT | Performed by: INTERNAL MEDICINE

## 2018-08-02 RX ADMIN — SODIUM CHLORIDE 40 ML/HR: 9 INJECTION, SOLUTION INTRAVENOUS at 09:19

## 2018-08-02 RX ADMIN — ATEZOLIZUMAB 1200 MG: 1200 INJECTION, SOLUTION INTRAVENOUS at 09:19

## 2018-08-02 RX ADMIN — Medication 300 UNITS: at 09:52

## 2018-08-02 NOTE — PLAN OF CARE
Patient/family/caregiver demonstrates understanding of disease process, treatment plan, medications, and discharge instructions Progressing      Patient will remain free of falls Progressing

## 2018-08-06 ENCOUNTER — OFFICE VISIT (OUTPATIENT)
Dept: HEMATOLOGY ONCOLOGY | Facility: CLINIC | Age: 78
End: 2018-08-06
Payer: MEDICARE

## 2018-08-06 VITALS
WEIGHT: 98.2 LBS | RESPIRATION RATE: 16 BRPM | BODY MASS INDEX: 19.28 KG/M2 | HEART RATE: 78 BPM | OXYGEN SATURATION: 95 % | HEIGHT: 60 IN | SYSTOLIC BLOOD PRESSURE: 136 MMHG | TEMPERATURE: 97 F | DIASTOLIC BLOOD PRESSURE: 78 MMHG

## 2018-08-06 DIAGNOSIS — R53.82 CHRONIC FATIGUE: ICD-10-CM

## 2018-08-06 DIAGNOSIS — C34.11 SQUAMOUS CELL CARCINOMA OF BRONCHUS IN RIGHT UPPER LOBE (HCC): Primary | ICD-10-CM

## 2018-08-06 PROCEDURE — 99215 OFFICE O/P EST HI 40 MIN: CPT | Performed by: INTERNAL MEDICINE

## 2018-08-06 NOTE — PROGRESS NOTES
[facility] HEMATOLOGY ONCOLOGY Jossue Palma  [address] [place] [address] [address] [place] [id number] [phone]    Jennifer Riley [id number]  08/06/18    Discussion:   In summary, this is a 76-year-old female history of lung cancer with metastases  She is currently on Tecentriq  She tolerates this relatively well  Her  states that the day after her infusion she has moderate fatigue  This returns to baseline until her next infusion  Her fatigue level has gradually been worsening over the past year or so  Appetite is fair  She has no bowel or urinary complaints  She has dyspnea exertion  She wears nasal oxygen 24/7  She is having some panic attacks at bedtime over the past week or 2  This is not correlated with hypoxic episodes as her  has checked oxygen and verified adequacy  The fatigue is of uncertain etiology  Certainly her cancer could contribute although this has been stable over months  Additionally creeping progression of treatment toxicity is considered  TSH is slightly elevated although T4 is also slightly elevated  I suspect these abnormalities are minor and not contributing to her fatigue  I note that she was in the emergency room about 6 weeks ago and BNP was 6100  I suggested follow-up with cardiology regarding any reversible contributor to her fatigue  Previous echo was March 2017, EF 50%  I discussed the above with the patient  The patient and her  voiced understanding and agreement   ______________________________________________________________________    Chief Complaint   Patient presents with    Follow-up       HPI:     Malignant neoplasm of upper lobe of right lung ([address] )    7/30/2016 Initial Diagnosis     CT of the neck for evaluation of the carotid arteries incidentally showed an infiltrating mass in the right upper lobe extending to the hilum    PET-CT June 2016 patient was found to have a thyroid mass on physical examination  Ultrasound showed bilateral thyroid nodules  In July 2016 she underwent CAT scan of the neck for evaluation of the carotid arteries  Incidentally noted, infiltrating mass in the right upper lobe extending to the hilum was noted  4, 2016âPET/CT showed a right upper lobe mass measuring 4 8 cm, SUV 21 5  This extends to the right hilum  Right paratracheal and subcarinal nodes measure up to 12 mm  The left adrenal showed some hypermetabolism with SUV of 3 7, without discrete mass  Uptake in the right parotid gland is noted with SUV of 22 3 and a soft tissue nodule, measuring 11 mm  endoscopy and bronchoscopy showed 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  Lymph node biopsies did not show malignancy  and radiation therapy were not felt to be applicable  Due to background pulmonary dysfunction as well as the potential for left adrenal metastatic disease  Chemotherapy was chosen as primary therapy  Alternatively  6, 2016 started Abraxane 80 mg/m² day 1, 8, 15, carbo AUC-5, day 1 only, every 21 days  2017progressive disease noted  Tecentriq 1200 mg IV every 3 weeks initiated  Current Therapy: May 2017progressive disease noted  Tecentriq 1200 mg IV every 3 weeks initiated  Interval History: Has had back pain started about 4 days ago  Started after some housework, has been getting better with heating pad           9/6/2016 - 11/25/2016 Chemotherapy     Abraxane 80 mg/m2 day 1, 8, 15, carbo AUC-5, day 1 only, Q 21 days  5/3/2017 Progression     Progressive disease  5/16/2017 -  Chemotherapy     Tecentriq 1200 mg IV Q 3 weeks           2/21/2018 - 3/14/2018 Radiation     C1    Plan ID Energy Fractions Dose per Fraction (cGy) Total Dose Delivered (cGy) Elapsed Days   Abdomen 6X 15 / 15 250 3,750 21      Treatment dates:  C1: 2/21/2018 - 3/14/2018             Metastasis to adrenal gland ([address] )    7/17/2017 Initial Diagnosis     Metastasis to adrenal gland (HCC)A mass in the anterior limb of the left adrenal gland with delayed postcontrast enhancement measuring 18 mm is either new or increasing from as far back as November 2016  The finding is suspicious for adrenal metastatic lesion superimposed upon   underlying bilateral adenomatous hyperplasia  Interval History:  Clinically stable  1 - Symptomatic but completely ambulatory    Review of Systems   Constitutional: Negative for appetite change, diaphoresis, fatigue and fever  HENT: Negative for sinus pain  Eyes: Negative for discharge  Respiratory: Negative for cough and shortness of breath  Cardiovascular: Negative for chest pain  Gastrointestinal: Negative for abdominal pain, constipation and diarrhea  Endocrine: Negative for cold intolerance  Genitourinary: Negative for difficulty urinating and hematuria  Musculoskeletal: Negative for joint swelling  Skin: Negative for rash  Allergic/Immunologic: Negative for environmental allergies  Neurological: Negative for dizziness and headaches  Hematological: Negative for adenopathy  Psychiatric/Behavioral: Negative for agitation         Past Medical History:   Diagnosis Date    Abnormal CT of the chest     last assessed: Aug 8, 2016    Arthritis     Asthma     Asymptomatic carotid artery stenosis     last assessed: March 2, 2017    Benign essential hypertension     last assessed: March 2, 2017    Cataract of right eye 3/19/2015    Cataract of right eye     last assessed: March 19, 2015    CHF (congestive heart failure) (HCC)     Common cold     Coronary artery disease     Depression     Diabetes mellitus ([address] )     Fracture of multiple pubic rami ([facility] ) 1/6/2015    History of radiation therapy     Hyperlipidemia     Hypertension     Lung cancer ([address] )     Multiple thyroid nodules 7/26/2016    Myocardial infarction ([address] )     Pulmonary emphysema (HCC)     Pulmonary nodule     Shortness of breath      Patient Active Problem List   Diagnosis  Malignant neoplasm of upper lobe of right lung (HCC)    Asymptomatic carotid artery stenosis    Back pain, lumbosacral    Benign essential hypertension    Bursitis, ischial    Cardiomyopathy (HCC)    Cataract of right eye    Chemotherapy-induced nausea    Chronic respiratory failure with hypoxia and hypercapnia (HCC)    COPD, very severe (HCC)    Congestive heart failure (HCC)    Depression    DMII (diabetes mellitus, type 2) (HCC)    Hyperlipidemia    Metastasis to adrenal gland (HCC)    Multiple thyroid nodules    Osteoporosis    Other chronic pain    Parotid adenoma    Psoriasis    Primary localized osteoarthrosis of the hip    PVD (peripheral vascular disease) (HCC)    Restless legs syndrome    Sciatica    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)    Seborrheic dermatitis of scalp       Current Outpatient Prescriptions:     albuterol (2 5 mg/3 mL) 0 083 % nebulizer solution, INHALE CONTENTS OF 1 VIAL IN NEBULIZER FOUR TIMES A DAY IF NEEDED, Disp: 375 mL, Rfl: 1    albuterol (VENTOLIN HFA) 90 mcg/act inhaler, Inhale 2 puffs every 6 (six) hours as needed for wheezing, Disp: 3 Inhaler, Rfl: 3    azithromycin (ZITHROMAX) 250 mg tablet, TAKE 1 TAB EVERY MONDAY WEDNESDAY AND FRIDAY is on hold till she finishes her other antibiotic then she will resume this , Disp: , Rfl: 3    betamethasone, augmented, (DIPROLENE-AF) 0 05 % cream, APPLY TOPICALLY 2 (TWO) TIMES A DAY, Disp: 50 g, Rfl: 1    fluticasone-salmeterol (ADVAIR) 500-50 mcg/dose inhaler, Inhale 1 puff every 12 (twelve) hours for 90 days, Disp: 13 Inhaler, Rfl: 0    fluticasone-vilanterol (BREO ELLIPTA) 200-25 MCG/INH inhaler, Inhale 1 puff daily Rinse mouth after use , Disp: 3 Inhaler, Rfl: 3    FREESTYLE LITE test strip, Test Blood sugar 3 times daily   Diagnosis: Type 2 Diabetes, Disp: 300 each, Rfl: 0    furosemide (LASIX) 20 mg tablet, Take 1 tablet (20 mg total) by mouth daily, Disp: 90 tablet, Rfl: 2    metoprolol tartrate (LOPRESSOR) 100 mg tablet, Take 100 mg by mouth daily  , Disp: , Rfl:     mometasone (ELOCON) 0 1 % lotion, Apply topically daily, Disp: 60 mL, Rfl: 3    oxyCODONE-acetaminophen (PERCOCET) 5-325 mg per tablet, Take 1 tablet by mouth every 4 (four) hours as needed for moderate pain Max Daily Amount: 6 tablets, Disp: 180 tablet, Rfl: 0    pentoxifylline (TRENtal) 400 mg ER tablet, Take 400 mg by mouth 3 (three) times a day with meals  , Disp: , Rfl:     pregabalin (LYRICA) 100 mg capsule, Take 1 capsule (100 mg total) by mouth daily at bedtime, Disp: 30 capsule, Rfl: 4    simvastatin (ZOCOR) 80 mg tablet, Take 1 tablet (80 mg total) by mouth daily, Disp: 90 tablet, Rfl: 0    Tiotropium Bromide Monohydrate (SPIRIVA RESPIMAT) 2 5 MCG/ACT AERS, Inhale 2 Act (5 mcg total) daily, Disp: 3 Inhaler, Rfl: 3  Allergies   Allergen Reactions    Penicillins Swelling     Legs swell up     Past Surgical History:   Procedure Laterality Date    BRONCHOSCOPY N/A 8/10/2016    Procedure: BRONCHOSCOPY FLEXIBLE;  Surgeon: Divine Osman MD;  [facility]: [facility];  Service:    Taj Roles BYPASS GRAFT      using vein - aortic-bifemoral - last assessed: Aug 22, 2016     SECTION      CHOLECYSTECTOMY      EYE SURGERY      HYSTERECTOMY      CT BRONCHOSCOPY NEEDLE BX TRACHEA MAIN STEM&/BRON N/A 8/10/2016    Procedure: ENDOBRONCHIAL ULTRASOUND (FROZEN SECTION) ; Surgeon: Divine Osman MD;  Location: [facility];  Service: Thoracic    CT INSJ TUNNELED CTR VAD W/SUBQ PORT AGE 5 YR/> Left 2016    Procedure: INSERTION VENOUS PORT (PORT-A-CATH); Surgeon: Divine Osman MD;  Location: [facility];  Service: Thoracic    TONSILLECTOMY       Social History     Objective: There were no vitals filed for this visit  Physical Exam   Constitutional: She is oriented to person, place, and time  She appears well-developed  HENT:   Head: Normocephalic  Eyes: Pupils are equal, round, and reactive to light  Neck: Neck supple  Cardiovascular: Normal rate  No murmur heard  Pulmonary/Chest: No respiratory distress  She has no wheezes  She has no rales  Abdominal: Soft  She exhibits no distension  There is no tenderness  There is no rebound  Musculoskeletal: She exhibits no edema  Lymphadenopathy:     She has no cervical adenopathy  Neurological: She is alert and oriented to person, place, and time  She displays normal reflexes  Skin: Skin is warm  No rash noted  Psychiatric: She has a normal mood and affect  Thought content normal          Labs: I personally reviewed the labs and imaging pertinent to this patient care

## 2018-08-06 NOTE — LETTER
August 6, 2018     Bruno Delatorre MD  [address] [facility]  [address] [facility]    Patient: Farhana Benitez   YOB: 1940   Date of Visit: 8/6/2018       Dear Dr Fadi Neal:    Thank you for referring Stephanie Miller to me for evaluation  Below are my notes for this consultation  If you have questions, please do not hesitate to call me  I look forward to following your patient along with you  Sincerely,        Joni Vazquez DO        CC: Valentino Blue, MD Aron Adam, DO  8/6/2018  9:04 AM  Sign at close encounter  [address] [facility]  [address] [facility] [address] [facility] [address]  [phone]    Choco Johansen [id number]  08/06/18    Discussion:   In summary, this is a 57-year-old female history of lung cancer with metastases  She is currently on Tecentriq  She tolerates this relatively well  Her  states that the day after her infusion she has moderate fatigue  This returns to baseline until her next infusion  Her fatigue level has gradually been worsening over the past year or so  Appetite is fair  She has no bowel or urinary complaints  She has dyspnea exertion  She wears nasal oxygen 24/7  She is having some panic attacks at bedtime over the past week or 2  This is not correlated with hypoxic episodes as her  has checked oxygen and verified adequacy  The fatigue is of uncertain etiology  Certainly her cancer could contribute although this has been stable over months  Additionally creeping progression of treatment toxicity is considered  TSH is slightly elevated although T4 is also slightly elevated  I suspect these abnormalities are minor and not contributing to her fatigue  I note that she was in the emergency room about 6 weeks ago and BNP was 6100  I suggested follow-up with cardiology regarding any reversible contributor to her fatigue  Previous echo was March 2017, EF 50%      I discussed the above with the patient  The patient and her  voiced understanding and agreement   ______________________________________________________________________    Chief Complaint   Patient presents with    Follow-up       HPI:     Malignant neoplasm of upper lobe of right lung ([address] )    7/30/2016 Initial Diagnosis     CT of the neck for evaluation of the carotid arteries incidentally showed an infiltrating mass in the right upper lobe extending to the hilum  PET-CT June 2016 patient was found to have a thyroid mass on physical examination  Ultrasound showed bilateral thyroid nodules  In July 2016 she underwent CAT scan of the neck for evaluation of the carotid arteries  Incidentally noted, infiltrating mass in the right upper lobe extending to the hilum was noted  4, 2016âPET/CT showed a right upper lobe mass measuring 4 8 cm, SUV 21 5  This extends to the right hilum  Right paratracheal and subcarinal nodes measure up to 12 mm  The left adrenal showed some hypermetabolism with SUV of 3 7, without discrete mass  Uptake in the right parotid gland is noted with SUV of 22 3 and a soft tissue nodule, measuring 11 mm  endoscopy and bronchoscopy showed 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  Lymph node biopsies did not show malignancy  and radiation therapy were not felt to be applicable  Due to background pulmonary dysfunction as well as the potential for left adrenal metastatic disease  Chemotherapy was chosen as primary therapy  Alternatively  6, 2016 started Abraxane 80 mg/m² day 1, 8, 15, carbo AUC-5, day 1 only, every 21 days  2017progressive disease noted  Tecentriq 1200 mg IV every 3 weeks initiated  Current Therapy: May 2017progressive disease noted  Tecentriq 1200 mg IV every 3 weeks initiated  Interval History: Has had back pain started about 4 days ago   Started after some housework, has been getting better with heating pad           9/6/2016 - 11/25/2016 Chemotherapy     Abraxane 80 mg/m2 day 1, 8, 15, carbo AUC-5, day 1 only, Q 21 days  5/3/2017 Progression     Progressive disease  5/16/2017 -  Chemotherapy     Tecentriq 1200 mg IV Q 3 weeks  2/21/2018 - 3/14/2018 Radiation     C1    Plan ID Energy Fractions Dose per Fraction (cGy) Total Dose Delivered (cGy) Elapsed Days   Abdomen 6X 15 / 15 250 3,750 21      Treatment dates:  C1: 2/21/2018 - 3/14/2018             Metastasis to adrenal gland ([address] )    7/17/2017 Initial Diagnosis     Metastasis to adrenal gland (HCC)A mass in the anterior limb of the left adrenal gland with delayed postcontrast enhancement measuring 18 mm is either new or increasing from as far back as November 2016  The finding is suspicious for adrenal metastatic lesion superimposed upon   underlying bilateral adenomatous hyperplasia  Interval History:  Clinically stable  1 - Symptomatic but completely ambulatory    Review of Systems   Constitutional: Negative for appetite change, diaphoresis, fatigue and fever  HENT: Negative for sinus pain  Eyes: Negative for discharge  Respiratory: Negative for cough and shortness of breath  Cardiovascular: Negative for chest pain  Gastrointestinal: Negative for abdominal pain, constipation and diarrhea  Endocrine: Negative for cold intolerance  Genitourinary: Negative for difficulty urinating and hematuria  Musculoskeletal: Negative for joint swelling  Skin: Negative for rash  Allergic/Immunologic: Negative for environmental allergies  Neurological: Negative for dizziness and headaches  Hematological: Negative for adenopathy  Psychiatric/Behavioral: Negative for agitation         Past Medical History:   Diagnosis Date    Abnormal CT of the chest     last assessed: Aug 8, 2016    Arthritis     Asthma     Asymptomatic carotid artery stenosis     last assessed: March 2, 2017    Benign essential hypertension     last assessed: March 2, 2017    Cataract of right eye 3/19/2015    Cataract of right eye     last assessed: March 19, 2015    CHF (congestive heart failure) (HCC)     Common cold     Coronary artery disease     Depression     Diabetes mellitus ([facility] )     Fracture of multiple pubic rami ([facility] ) 1/6/2015    History of radiation therapy     Hyperlipidemia     Hypertension     [facility]     Multiple thyroid nodules 7/26/2016    Myocardial infarction ([facility] )     Pulmonary emphysema (HCC)     Pulmonary nodule     Shortness of breath      Patient Active Problem List   Diagnosis    Malignant neoplasm of upper lobe of right lung (HCC)    Asymptomatic carotid artery stenosis    Back pain, lumbosacral    Benign essential hypertension    Bursitis, ischial    Cardiomyopathy [facility]    Cataract of right eye    Chemotherapy-induced nausea    Chronic respiratory failure with hypoxia and hypercapnia (HCC)    COPD, very severe (HCC)    Congest[facility] heart failure ([facility]  )    Depression    DMII (diabetes mellitus, type 2) ([place] [address] )    Hyperlipidemia    Metastasis to adrenal gland (HCC)    Multiple thyroid nodules    Osteoporosis    Other chronic pain    Parotid adenoma    Psoriasis    Primary localized osteoarthrosis of the hip    PVD (peripheral vascular disease) ([place] [address] )    Restless legs syndrome    Sciatica    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)    Seborrheic dermatitis of scalp       Current Outpatient Prescriptions:     albuterol (2 5 mg/3 mL) 0 083 % nebulizer solution, INHALE CONTENTS OF 1 VIAL IN NEBULIZER FOUR TIMES A DAY IF NEEDED, Disp: 375 mL, Rfl: 1    albuterol (VENTOLIN HFA) 90 mcg/act inhaler, Inhale 2 puffs every 6 (six) hours as needed for wheezing, Disp: 3 Inhaler, Rfl: 3    azithromycin (ZITHROMAX) 250 mg tablet, TAKE 1 TAB EVERY MONDAY WEDNESDAY AND FRIDAY is on hold till she finishes her other antibiotic then she will resume this , Disp: , Rfl: 3    betamethasone, augmented, (DIPROLENE-AF) 0 05 % cream, APPLY TOPICALLY 2 (TWO) TIMES A DAY, Disp: 50 g, Rfl: 1    fluticasone-salmeterol (ADVAIR) 500-50 mcg/dose inhaler, Inhale 1 puff every 12 (twelve) hours for 90 days, Disp: 13 Inhaler, Rfl: 0    fluticasone-vilanterol (BREO ELLIPTA) 200-25 MCG/INH inhaler, Inhale 1 puff daily Rinse mouth after use , Disp: 3 Inhaler, Rfl: 3    FREESTYLE LITE test strip, Test Blood sugar 3 times daily  Diagnosis: Type 2 Diabetes, Disp: 300 each, Rfl: 0    furosemide (LASIX) 20 mg tablet, Take 1 tablet (20 mg total) by mouth daily, Disp: 90 tablet, Rfl: 2    metoprolol tartrate (LOPRESSOR) 100 mg tablet, Take 100 mg by mouth daily  , Disp: , Rfl:     mometasone (ELOCON) 0 1 % lotion, Apply topically daily, Disp: 60 mL, Rfl: 3    oxyCODONE-acetaminophen (PERCOCET) 5-325 mg per tablet, Take 1 tablet by mouth every 4 (four) hours as needed for moderate pain Max Daily Amount: 6 tablets, Disp: 180 tablet, Rfl: 0    pentoxifylline (TRENtal) 400 mg ER tablet, Take 400 mg by mouth 3 (three) times a day with meals  , Disp: , Rfl:     pregabalin (LYRICA) 100 mg capsule, Take 1 capsule (100 mg total) by mouth daily at bedtime, Disp: 30 capsule, Rfl: 4    simvastatin (ZOCOR) 80 mg tablet, Take 1 tablet (80 mg total) by mouth daily, Disp: 90 tablet, Rfl: 0    Tiotropium Bromide Monohydrate (SPIRIVA RESPIMAT) 2 5 MCG/ACT AERS, Inhale 2 Act (5 mcg total) daily, Disp: 3 Inhaler, Rfl: 3  Allergies   Allergen Reactions    Penicillins Swelling     Legs swell up     Past Surgical History:   Procedure Laterality Date    BRONCHOSCOPY N/A 8/10/2016    Procedure: BRONCHOSCOPY FLEXIBLE;  Surgeon: Ishan Reyna MD;  Location: [facility];  Service:    [facility] BYPASS GRAFT      using vein - aortic-bifemoral - last assessed: Aug 22, 2016     SECTION      CHOLECYSTECTOMY      EYE SURGERY      HYSTERECTOMY      MT BRONCHOSCOPY NEEDLE BX TRACHEA MAIN STEM&/BRON N/A 8/10/2016    Procedure: ENDOBRONCHIAL ULTRASOUND (FROZEN SECTION) ;   Surgeon: Ishan Reyna MD;  [facility]: [facility]; Service: Thoracic    LA INSJ TUNNELED CTR VAD W/SUBQ PORT AGE 5 YR/> Left 8/30/2016    Procedure: INSERTION VENOUS PORT (PORT-A-CATH); Surgeon: Joseph Acosta MD;  [facility]: [facility];  Service: Thoracic    TONSILLECTOMY       Social History     Objective: There were no vitals filed for this visit  Physical Exam   Constitutional: She is oriented to person, place, and time  She appears well-developed  HENT:   Head: Normocephalic  Eyes: Pupils are equal, round, and reactive to light  Neck: Neck supple  Cardiovascular: Normal rate  No murmur heard  Pulmonary/Chest: No respiratory distress  She has no wheezes  She has no rales  Abdominal: Soft  She exhibits no distension  There is no tenderness  There is no rebound  Musculoskeletal: She exhibits no edema  Lymphadenopathy:     She has no cervical adenopathy  Neurological: She is alert and oriented to person, place, and time  She displays normal reflexes  Skin: Skin is warm  No rash noted  Psychiatric: She has a normal mood and affect  Thought content normal          Labs: I personally reviewed the labs and imaging pertinent to this patient care

## 2018-08-07 ENCOUNTER — TELEPHONE (OUTPATIENT)
Dept: HEMATOLOGY ONCOLOGY | Facility: CLINIC | Age: 78
End: 2018-08-07

## 2018-08-07 NOTE — TELEPHONE ENCOUNTER
Spoke with   He stated that he was able to locate what he needed in [place]  Does not need any clarification  No further questions at this time

## 2018-08-07 NOTE — TELEPHONE ENCOUNTER
PT called and wanted his results read to him over the phone for 06/19/18   He is a little confused on what all his wife blood work entails

## 2018-08-08 ENCOUNTER — TRANSCRIBE ORDERS (OUTPATIENT)
Dept: ADMINISTRATIVE | Facility: HOSPITAL | Age: 78
End: 2018-08-08

## 2018-08-08 ENCOUNTER — OFFICE VISIT (OUTPATIENT)
Dept: CARDIOLOGY CLINIC | Facility: CLINIC | Age: 78
End: 2018-08-08
Payer: MEDICARE

## 2018-08-08 VITALS
DIASTOLIC BLOOD PRESSURE: 50 MMHG | SYSTOLIC BLOOD PRESSURE: 128 MMHG | BODY MASS INDEX: 19.12 KG/M2 | WEIGHT: 97.4 LBS | HEART RATE: 76 BPM | HEIGHT: 60 IN

## 2018-08-08 DIAGNOSIS — E78.5 HYPERLIPIDEMIA, UNSPECIFIED HYPERLIPIDEMIA TYPE: ICD-10-CM

## 2018-08-08 DIAGNOSIS — I10 BENIGN ESSENTIAL HYPERTENSION: ICD-10-CM

## 2018-08-08 DIAGNOSIS — J96.11 CHRONIC RESPIRATORY FAILURE WITH HYPOXIA AND HYPERCAPNIA (HCC): Primary | ICD-10-CM

## 2018-08-08 DIAGNOSIS — I73.9 PVD (PERIPHERAL VASCULAR DISEASE) (HCC): ICD-10-CM

## 2018-08-08 DIAGNOSIS — J44.9 COPD, VERY SEVERE (HCC): ICD-10-CM

## 2018-08-08 DIAGNOSIS — I50.32 CHRONIC DIASTOLIC CONGESTIVE HEART FAILURE (HCC): ICD-10-CM

## 2018-08-08 DIAGNOSIS — I42.8 OTHER CARDIOMYOPATHY (HCC): ICD-10-CM

## 2018-08-08 DIAGNOSIS — J96.12 CHRONIC RESPIRATORY FAILURE WITH HYPOXIA AND HYPERCAPNIA (HCC): Primary | ICD-10-CM

## 2018-08-08 PROCEDURE — 99214 OFFICE O/P EST MOD 30 MIN: CPT | Performed by: INTERNAL MEDICINE

## 2018-08-08 NOTE — PROGRESS NOTES
Cardiology Follow Up    Gutiérrez Col  1940  [id number]  [address] [facility] [phone]    1  Chronic respiratory failure with hypoxia and hypercapnia (HCC)  Echo complete with contrast if indicated   2  COPD, very severe ([address] )     3  Benign essential hypertension     4  Other cardiomyopathy ([address] )     5  Chronic diastolic congestive heart failure (HCC)  Echo complete with contrast if indicated   6  PVD (peripheral vascular disease) ([address] )     7  Hyperlipidemia, unspecified hyperlipidemia type         Interval History:  Non scheduled visit patient continues to have dyspnea class 2-3, she is on oxygen, unfortunately does continue to smoke, I expressed my displaced  She has no orthopnea or PND, denies any chest discomfort she overall is feeling stable  She is undergoing immunotherapy for her lung carcinoma  The patient previous physicians are also very concerned about recent blood work included BMP in the 6000 range  Last time emergency room chest x-ray revealed mild CHF  She was not diet is to be admitted to the hospital she did refuse  She is now on chronic diuretic therapy and her dyspnea appears to be stable      Patient Active Problem List   Diagnosis    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 (HCC)    Asymptomatic carotid artery stenosis    Back pain, lumbosacral    Benign essential hypertension    Bursitis, ischial    Cardiomyopathy ([address] )    Cataract of right eye    Chemotherapy-induced nausea    Chronic respiratory failure with hypoxia and hypercapnia ([address] )    COPD, very severe ([address] )    Congestive heart failure ([address] )    Depression    DMII (diabetes mellitus, type 2) ([address] )    Hyperlipidemia    Metastasis to adrenal gland (HCC)    Multiple thyroid nodules    Osteoporosis    Other chronic pain    Parotid adenoma    Psoriasis    Primary localized osteoarthrosis of the hip    PVD (peripheral vascular disease) ([facility] )    Restless legs syndrome    Sciatica    Seborrheic dermatitis of scalp     Past Medical History:   Diagnosis Date    Abnormal CT of the chest     last assessed: Aug 8, 2016    Arthritis     Asthma     Asymptomatic carotid artery stenosis     last assessed: March 2, 2017    Benign essential hypertension     last assessed: March 2, 2017    Cataract of right eye 3/19/2015    Cataract of right eye     last assessed: March 19, 2015    CHF (congestive heart failure) (HCC)     Common cold     Coronary artery disease     Depression     Diabetes mellitus ([place] [address] )     Fracture of multiple pubic rami ([place] [address] ) 1/6/2015    History of radiation therapy     Hyperlipidemia     Hypertension     Lung cancer ([place] [address] )     Multiple thyroid nodules 7/26/2016    Myocardial infarction ([place] [address] )     Pulmonary emphysema (HCC)     Pulmonary nodule     Shortness of breath      Social History     Social History    Marital status: /Civil Union     Spouse name: N/A    Number of children: N/A    Years of education: N/A     Occupational History    Not on file       Social History Main Topics    Smoking status: Current Some Day Smoker     Packs/day: 0 25     Types: Cigarettes    Smokeless tobacco: Never Used      Comment: smokes 3-4 cigs /day - current every day smoker noted in "allscripts" smoke for less than 1/2 pack per day for 50 years      Alcohol use No    Drug use: No    Sexual activity: Not on file     Other Topics Concern    Not on file     Social History Narrative    Caffeine use - coffee once in a great while and everyday tea drinker           Family History   Problem Relation Age of Onset    Brain cancer Sister     Cancer Sister     Thyroid disease Mother      Past Surgical History:   Procedure Laterality Date    BRONCHOSCOPY N/A 8/10/2016    Procedure: Kiya Balderas;  Surgeon: Uriel Hudson MD;  [facility]; Service:     BYPASS GRAFT      using vein - aortic-bifemoral - last assessed: Aug 22, 2016     SECTION      CHOLECYSTECTOMY      EYE SURGERY      HYSTERECTOMY      MS BRONCHOSCOPY NEEDLE BX TRACHEA MAIN STEM&/BRON N/A 8/10/2016    Procedure: ENDOBRONCHIAL ULTRASOUND (FROZEN SECTION) ; Surgeon: Shelby Bartlett MD;  [facility];  Service: Thoracic    MS INSJ TUNNELED CTR VAD W/SUBQ PORT AGE 5 YR/> Left 2016    Procedure: INSERTION VENOUS PORT (PORT-A-CATH); Surgeon: Shelby Bartlett MD;  [facility];  Service: Thoracic    TONSILLECTOMY         Current Outpatient Prescriptions:     albuterol (2 5 mg/3 mL) 0 083 % nebulizer solution, INHALE CONTENTS OF 1 VIAL IN NEBULIZER FOUR TIMES A DAY IF NEEDED, Disp: 375 mL, Rfl: 1    albuterol (VENTOLIN HFA) 90 mcg/act inhaler, Inhale 2 puffs every 6 (six) hours as needed for wheezing, Disp: 3 Inhaler, Rfl: 3    azithromycin (ZITHROMAX) 250 mg tablet, TAKE 1 TAB EVERY Cleda Sous is on hold till she finishes her other antibiotic then she will resume this , Disp: , Rfl: 3    betamethasone, augmented, (DIPROLENE-AF) 0 05 % cream, APPLY TOPICALLY 2 (TWO) TIMES A DAY, Disp: 50 g, Rfl: 1    fluticasone-salmeterol (ADVAIR) 500-50 mcg/dose inhaler, Inhale 1 puff every 12 (twelve) hours for 90 days, Disp: 13 Inhaler, Rfl: 0    FREESTYLE LITE test strip, Test Blood sugar 3 times daily  Diagnosis: Type 2 Diabetes, Disp: 300 each, Rfl: 0    furosemide (LASIX) 20 mg tablet, Take 1 tablet (20 mg total) by mouth daily, Disp: 90 tablet, Rfl: 2    metoprolol tartrate (LOPRESSOR) 100 mg tablet, Take 100 mg by mouth daily  , Disp: , Rfl:     mometasone (ELOCON) 0 1 % lotion, Apply topically daily, Disp: 60 mL, Rfl: 3    oxyCODONE-acetaminophen (PERCOCET) 5-325 mg per tablet, Take 1 tablet by mouth every 4 (four) hours as needed for moderate pain Max Daily Amount: 6 tablets, Disp: 180 tablet, Rfl: 0    pentoxifylline (TRENtal) 400 mg ER tablet, Take 400 mg by mouth 3 (three) times a day with meals  , Disp: , Rfl:     pregabalin (LYRICA) 100 mg capsule, Take 1 capsule (100 mg total) by mouth daily at bedtime, Disp: 30 capsule, Rfl: 4    simvastatin (ZOCOR) 80 mg tablet, Take 1 tablet (80 mg total) by mouth daily, Disp: 90 tablet, Rfl: 0    Tiotropium Bromide Monohydrate (SPIRIVA RESPIMAT) 2 5 MCG/ACT AERS, Inhale 2 Act (5 mcg total) daily, Disp: 3 Inhaler, Rfl: 3    fluticasone-vilanterol (BREO ELLIPTA) 200-25 MCG/INH inhaler, Inhale 1 puff daily Rinse mouth after use , Disp: 3 Inhaler, Rfl: 3  Allergies   Allergen Reactions    Penicillins Swelling     Legs swell up       Labs:  Hospital Outpatient Visit on 08/02/2018   Component Date Value    WBC 08/02/2018 6 93     RBC 08/02/2018 4 12     Hemoglobin 08/02/2018 12 9     Hematocrit 08/02/2018 41 8     MCV 08/02/2018 102*    MCH 08/02/2018 31 3     MCHC 08/02/2018 30 9*    RDW 08/02/2018 11 7     MPV 08/02/2018 10 7     Platelets 92/01/3940 151     nRBC 08/02/2018 0     Neutrophils Relative 08/02/2018 83*    Immat GRANS % 08/02/2018 0     Lymphocytes Relative 08/02/2018 9*    Monocytes Relative 08/02/2018 7     Eosinophils Relative 08/02/2018 1     Basophils Relative 08/02/2018 0     Neutrophils Absolute 08/02/2018 5 77     Immature Grans Absolute 08/02/2018 0 02     Lymphocytes Absolute 08/02/2018 0 61     Monocytes Absolute 08/02/2018 0 46     Eosinophils Absolute 08/02/2018 0 04     Basophils Absolute 08/02/2018 0 03     Sodium 08/02/2018 142     Potassium 08/02/2018 4 0     Chloride 08/02/2018 100     CO2 08/02/2018 38*    Anion Gap 08/02/2018 4     BUN 08/02/2018 10     Creatinine 08/02/2018 0 64     Glucose 08/02/2018 144*    Calcium 08/02/2018 8 8     AST 08/02/2018 18     ALT 08/02/2018 15     Alkaline Phosphatase 08/02/2018 84     Total Protein 08/02/2018 6 3*    Albumin 08/02/2018 2 9*    Total Bilirubin 08/02/2018 0 40     eGFR 08/02/2018 85     TSH 3RD GENERATON 08/02/2018 4 142[facility]   Component Date Value    pH, Art i-STAT 07/16/2018 7  401     pCO2, Art i-STAT 07/16/2018 72 2*    pO2, ART i-STAT 07/16/2018 72 0*    BE, i-STAT 07/16/2018 16*    HCO3, Art i-STAT 07/16/2018 44 9*    CO2, i-STAT 07/16/2018 >45*    O2 Sat, i-STAT 07/16/2018 93*    POC FIO2 07/16/2018 21     Specimen Type 07/16/2018 ARTERIAL     SITE 07/16/2018 Right Radial     KEVIN TEST 07/16/2018 011    Hospital Outpatient Visit on 07/10/2018   Component Date Value    WBC 07/10/2018 7 61     RBC 07/10/2018 3 97     Hemoglobin 07/10/2018 12 5     Hematocrit 07/10/2018 40 3     MCV 07/10/2018 102*    MCH 07/10/2018 31 5     MCHC 07/10/2018 31 0*    RDW 07/10/2018 11 5*    MPV 07/10/2018 10 5     Platelets 79/38/6534 106*    nRBC 07/10/2018 0     Neutrophils Relative 07/10/2018 85*    Immat GRANS % 07/10/2018 1     Lymphocytes Relative 07/10/2018 7*    Monocytes Relative 07/10/2018 6     Eosinophils Relative 07/10/2018 1     Basophils Relative 07/10/2018 0     Neutrophils Absolute 07/10/2018 6 53     Immature Grans Absolute 07/10/2018 0 04     Lymphocytes Absolute 07/10/2018 0 51*    Monocytes Absolute 07/10/2018 0 43     Eosinophils Absolute 07/10/2018 0 09     Basophils Absolute 07/10/2018 0 01     Sodium 07/10/2018 144     Potassium 07/10/2018 3 2*    Chloride 07/10/2018 99*    CO2 07/10/2018 >45*    Anion Gap 07/10/2018      BUN 07/10/2018 11     Creatinine 07/10/2018 0 76     Glucose 07/10/2018 173*    Calcium 07/10/2018 8 3     AST 07/10/2018 16     ALT 07/10/2018 20     Alkaline Phosphatase 07/10/2018 69     Total Protein 07/10/2018 6 0*    Albumin 07/10/2018 2 9*    Total Bilirubin 07/10/2018 0 72     eGFR 07/10/2018 75     TSH 3RD GENERATON 07/10/2018 4 366*    Free T4 07/10/2018 1 60*   Office Visit on 06/28/2018   Component Date Value     HGB A1C 06/28/2018 6 3    Admission on 06/19/2018, Discharged on 06/19/2018   Component Date Value    WBC 06/19/2018 8 54     RBC 06/19/2018 4 08     Hemoglobin 06/19/2018 12 7     Hematocrit 06/19/2018 41 9     MCV 06/19/2018 103*    MCH 06/19/2018 31 1     MCHC 06/19/2018 30 3*    RDW 06/19/2018 11 3*    MPV 06/19/2018 10 7     Platelets 69/44/5991 191     nRBC 06/19/2018 0     Neutrophils Relative 06/19/2018 82*    Immat GRANS % 06/19/2018 0     Lymphocytes Relative 06/19/2018 9*    Monocytes Relative 06/19/2018 8     Eosinophils Relative 06/19/2018 1     Basophils Relative 06/19/2018 0     Neutrophils Absolute 06/19/2018 6 99     Immature Grans Absolute 06/19/2018 0 02     Lymphocytes Absolute 06/19/2018 0 73     Monocytes Absolute 06/19/2018 0 69     Eosinophils Absolute 06/19/2018 0 08     Basophils Absolute 06/19/2018 0 03     Sodium 06/19/2018 144     Potassium 06/19/2018 4 6     Chloride 06/19/2018 102     CO2 06/19/2018 44*    Anion Gap 06/19/2018 -2*    BUN 06/19/2018 10     Creatinine 06/19/2018 0 60     Glucose 06/19/2018 125     Calcium 06/19/2018 8 8     AST 06/19/2018 14     ALT 06/19/2018 14     Alkaline Phosphatase 06/19/2018 101     Total Protein 06/19/2018 7 0     Albumin 06/19/2018 3 2*    Total Bilirubin 06/19/2018 0 50     eGFR 06/19/2018 87     NT-proBNP 06/19/2018 6061*    Magnesium 06/19/2018 2 2     Troponin I 06/19/2018 0 02    Hospital Outpatient Visit on 06/19/2018   Component Date Value    WBC 06/19/2018 6 82     RBC 06/19/2018 3 84     Hemoglobin 06/19/2018 12 0     Hematocrit 06/19/2018 40 1     MCV 06/19/2018 104*    MCH 06/19/2018 31 3     MCHC 06/19/2018 29 9*    RDW 06/19/2018 11 6     MPV 06/19/2018 10 3     Platelets 16/96/9252 178     Neutrophils Relative 06/19/2018 84*    Immat GRANS % 06/19/2018 0     Lymphocytes Relative 06/19/2018 7*    Monocytes Relative 06/19/2018 8     Eosinophils Relative 06/19/2018 1     Basophils Relative 06/19/2018 0     Neutrophils Absolute 06/19/2018 5 69     Immature Grans Absolute 06/19/2018 0 01     Lymphocytes Absolute 06/19/2018 0 49*    Monocytes Absolute 06/19/2018 0 53     Eosinophils Absolute 06/19/2018 0 08     Basophils Absolute 06/19/2018 0 02     Sodium 06/19/2018 143     Potassium 06/19/2018 3 9     Chloride 06/19/2018 99*    CO2 06/19/2018 42*    Anion Gap 06/19/2018 2*    BUN 06/19/2018 11     Creatinine 06/19/2018 0 60     Glucose 06/19/2018 182*    Calcium 06/19/2018 8 1*    AST 06/19/2018 14     ALT 06/19/2018 16     Alkaline Phosphatase 06/19/2018 90     Total Protein 06/19/2018 6 4     Albumin 06/19/2018 2 9*    Total Bilirubin 06/19/2018 0 50     eGFR 06/19/2018 87     TSH 3RD GENERATON 06/19/2018 5 729*    Free T4 06/19/2018 1 38     Ventricular Rate 06/19/2018 86     Atrial Rate 06/19/2018 86     NE Interval 06/19/2018 136     QRSD Interval 06/19/2018 78     QT Interval 06/19/2018 376     QTC Interval 06/19/2018 449     P Axis 06/19/2018 70     QRS Axis 06/19/2018 91     T Wave Axis 06/19/2018 57      Imaging: No results found  Review of Systems:  Review of Systems   Constitutional: Positive for fatigue  Negative for unexpected weight change  Respiratory: Positive for shortness of breath, wheezing and stridor  Cardiovascular: Negative for chest pain, palpitations and leg swelling  Neurological: Negative for syncope  Physical Exam:  Physical Exam   Constitutional: She appears distressed  Eyes: No scleral icterus  Cardiovascular: Normal rate  Exam reveals no gallop  No murmur heard  Heart sounds very distant   Pulmonary/Chest: She is in respiratory distress  She has wheezes  Neurological: She is alert  Psychiatric: She has a normal mood and affect  Discussion/Summary:  Chronic respiratory failure in the setting of advanced COPD oxygen dependent  She does have concomitant congestive diastolic heart failure    She appears to be euvolemic in the clinical examination today   I did explain the pathophysiology of congestive failure, also told under her dyspnea is mostly pulmonary in nature  But she will need continued diuretic regimen  We will repeat an echocardiogram   The last 1 2 years ago revealed normal left systolic function with diastolic dysfunction and mild mitral and aortic insufficiency  Patient is not a candidate for any aggressive intervention  No history of coronary artery disease  Lung carcinoma  With adrenal metastasis  Currently on immunotherapy  The long-term benefits of smoking cessation were explained to the patient  Including but not limited to decreased mortality and morbidity from cardiovascular causes, respiratory diseases and decrease cancer risk  Strong recommendation for cessation was given to the patient  Pharmacological help was offered as well emotional support was given to the patient    Spent 5 minutes

## 2018-08-10 NOTE — TELEPHONE ENCOUNTER
Spoke with Caroline Workman at [facility] she will refax documentation on patient's pulse ox overnight   Please advise

## 2018-08-14 NOTE — PROGRESS NOTES
Results of overnight pulse ox discussed with Roberthjuan manuelpaulo Owenkezia and her   At this time, she does not want to pursue a sleep study  She wishes to discuss nightly BiPAP further with Dr Alma Saleh at her next appointment in September  All of her questions were answered and she will call our office with further questions or concerns      SAGAR Lang

## 2018-08-17 ENCOUNTER — HOSPITAL ENCOUNTER (OUTPATIENT)
Dept: NON INVASIVE DIAGNOSTICS | Facility: CLINIC | Age: 78
Discharge: HOME | End: 2018-08-17
Payer: MEDICARE

## 2018-08-17 DIAGNOSIS — J96.12 CHRONIC RESPIRATORY FAILURE WITH HYPOXIA AND HYPERCAPNIA (HCC): ICD-10-CM

## 2018-08-17 DIAGNOSIS — J96.11 CHRONIC RESPIRATORY FAILURE WITH HYPOXIA AND HYPERCAPNIA (HCC): ICD-10-CM

## 2018-08-17 DIAGNOSIS — I50.32 CHRONIC DIASTOLIC CONGESTIVE HEART FAILURE (HCC): ICD-10-CM

## 2018-08-17 PROCEDURE — 93306 TTE W/DOPPLER COMPLETE: CPT

## 2018-08-17 PROCEDURE — 93306 TTE W/DOPPLER COMPLETE: CPT | Performed by: INTERNAL MEDICINE

## 2018-08-20 RX ORDER — SODIUM CHLORIDE 9 MG/ML
40 INJECTION, SOLUTION INTRAVENOUS ONCE
Status: COMPLETED | OUTPATIENT
Start: 2018-08-21 | End: 2018-08-21

## 2018-08-20 RX ORDER — SODIUM CHLORIDE 9 MG/ML
40 INJECTION, SOLUTION INTRAVENOUS CONTINUOUS
Status: DISCONTINUED | OUTPATIENT
Start: 2018-08-21 | End: 2018-08-20

## 2018-08-21 ENCOUNTER — HOSPITAL ENCOUNTER (OUTPATIENT)
Dept: INFUSION CENTER | Facility: HOSPITAL | Age: 78
Discharge: HOME | End: 2018-08-21
Payer: MEDICARE

## 2018-08-21 VITALS
SYSTOLIC BLOOD PRESSURE: 147 MMHG | OXYGEN SATURATION: 97 % | HEART RATE: 100 BPM | RESPIRATION RATE: 20 BRPM | TEMPERATURE: 97.2 F | DIASTOLIC BLOOD PRESSURE: 72 MMHG

## 2018-08-21 LAB
ALBUMIN SERPL BCP-MCNC: 3 G/DL (ref 3.5–5)
ALP SERPL-CCNC: 94 U/L (ref 46–116)
ALT SERPL W P-5'-P-CCNC: 15 U/L (ref 12–78)
ANION GAP SERPL CALCULATED.3IONS-SCNC: 2 MMOL/L (ref 4–13)
AST SERPL W P-5'-P-CCNC: 16 U/L (ref 5–45)
BASOPHILS # BLD AUTO: 0.01 THOUSANDS/ΜL (ref 0–0.1)
BASOPHILS NFR BLD AUTO: 0 % (ref 0–1)
BILIRUB SERPL-MCNC: 0.5 MG/DL (ref 0.2–1)
BUN SERPL-MCNC: 12 MG/DL (ref 5–25)
CALCIUM SERPL-MCNC: 8.9 MG/DL (ref 8.3–10.1)
CHLORIDE SERPL-SCNC: 100 MMOL/L (ref 100–108)
CO2 SERPL-SCNC: 39 MMOL/L (ref 21–32)
CREAT SERPL-MCNC: 0.54 MG/DL (ref 0.6–1.3)
EOSINOPHIL # BLD AUTO: 0.1 THOUSAND/ΜL (ref 0–0.61)
EOSINOPHIL NFR BLD AUTO: 2 % (ref 0–6)
ERYTHROCYTE [DISTWIDTH] IN BLOOD BY AUTOMATED COUNT: 12 % (ref 11.6–15.1)
GFR SERPL CREATININE-BSD FRML MDRD: 90 ML/MIN/1.73SQ M
GLUCOSE SERPL-MCNC: 99 MG/DL (ref 65–140)
HCT VFR BLD AUTO: 41.7 % (ref 34.8–46.1)
HGB BLD-MCNC: 13.1 G/DL (ref 11.5–15.4)
IMM GRANULOCYTES # BLD AUTO: 0 THOUSAND/UL (ref 0–0.2)
IMM GRANULOCYTES NFR BLD AUTO: 0 % (ref 0–2)
LYMPHOCYTES # BLD AUTO: 0.69 THOUSANDS/ΜL (ref 0.6–4.47)
LYMPHOCYTES NFR BLD AUTO: 10 % (ref 14–44)
MCH RBC QN AUTO: 31.3 PG (ref 26.8–34.3)
MCHC RBC AUTO-ENTMCNC: 31.4 G/DL (ref 31.4–37.4)
MCV RBC AUTO: 100 FL (ref 82–98)
MONOCYTES # BLD AUTO: 0.74 THOUSAND/ΜL (ref 0.17–1.22)
MONOCYTES NFR BLD AUTO: 11 % (ref 4–12)
NEUTROPHILS # BLD AUTO: 5.12 THOUSANDS/ΜL (ref 1.85–7.62)
NEUTS SEG NFR BLD AUTO: 77 % (ref 43–75)
PLATELET # BLD AUTO: 153 THOUSANDS/UL (ref 149–390)
PMV BLD AUTO: 11 FL (ref 8.9–12.7)
POTASSIUM SERPL-SCNC: 3.9 MMOL/L (ref 3.5–5.3)
PROT SERPL-MCNC: 6.5 G/DL (ref 6.4–8.2)
RBC # BLD AUTO: 4.19 MILLION/UL (ref 3.81–5.12)
SODIUM SERPL-SCNC: 141 MMOL/L (ref 136–145)
T4 FREE SERPL-MCNC: 1.38 NG/DL (ref 0.76–1.46)
TSH SERPL DL<=0.05 MIU/L-ACNC: 4.42 UIU/ML (ref 0.36–3.74)
WBC # BLD AUTO: 6.66 THOUSAND/UL (ref 4.31–10.16)

## 2018-08-21 PROCEDURE — 96413 CHEMO IV INFUSION 1 HR: CPT

## 2018-08-21 PROCEDURE — 84443 ASSAY THYROID STIM HORMONE: CPT | Performed by: INTERNAL MEDICINE

## 2018-08-21 PROCEDURE — 85025 COMPLETE CBC W/AUTO DIFF WBC: CPT | Performed by: INTERNAL MEDICINE

## 2018-08-21 PROCEDURE — 36593 DECLOT VASCULAR DEVICE: CPT

## 2018-08-21 PROCEDURE — 84439 ASSAY OF FREE THYROXINE: CPT | Performed by: INTERNAL MEDICINE

## 2018-08-21 PROCEDURE — 80053 COMPREHEN METABOLIC PANEL: CPT | Performed by: INTERNAL MEDICINE

## 2018-08-21 RX ADMIN — ALTEPLASE 2 MG: 2.2 INJECTION, POWDER, LYOPHILIZED, FOR SOLUTION INTRAVENOUS at 08:46

## 2018-08-21 RX ADMIN — ATEZOLIZUMAB 1200 MG: 1200 INJECTION, SOLUTION INTRAVENOUS at 09:42

## 2018-08-21 RX ADMIN — Medication 300 UNITS: at 10:19

## 2018-08-21 RX ADMIN — SODIUM CHLORIDE 40 ML/HR: 9 INJECTION, SOLUTION INTRAVENOUS at 09:42

## 2018-08-30 ENCOUNTER — OFFICE VISIT (OUTPATIENT)
Dept: FAMILY MEDICINE CLINIC | Facility: CLINIC | Age: 78
End: 2018-08-30
Payer: MEDICARE

## 2018-08-30 VITALS — DIASTOLIC BLOOD PRESSURE: 70 MMHG | TEMPERATURE: 98.1 F | SYSTOLIC BLOOD PRESSURE: 120 MMHG

## 2018-08-30 DIAGNOSIS — J44.1 CHRONIC OBSTRUCTIVE PULMONARY DISEASE WITH ACUTE EXACERBATION (HCC): ICD-10-CM

## 2018-08-30 DIAGNOSIS — G89.29 OTHER CHRONIC PAIN: ICD-10-CM

## 2018-08-30 DIAGNOSIS — L03.116 CELLULITIS OF LEFT FOOT: Primary | ICD-10-CM

## 2018-08-30 PROCEDURE — 99214 OFFICE O/P EST MOD 30 MIN: CPT | Performed by: FAMILY MEDICINE

## 2018-08-30 RX ORDER — OXYCODONE HYDROCHLORIDE AND ACETAMINOPHEN 5; 325 MG/1; MG/1
1 TABLET ORAL EVERY 4 HOURS PRN
Qty: 180 TABLET | Refills: 0 | Status: SHIPPED | OUTPATIENT
Start: 2018-08-30 | End: 2018-09-27 | Stop reason: SDUPTHER

## 2018-08-30 RX ORDER — LEVOFLOXACIN 500 MG/1
500 TABLET, FILM COATED ORAL EVERY 24 HOURS
Qty: 7 TABLET | Refills: 0 | Status: SHIPPED | OUTPATIENT
Start: 2018-08-30 | End: 2018-09-06

## 2018-08-30 NOTE — PROGRESS NOTES
Assessment/Plan:  Cellulitis of left foot  See discussion in the body of this note  The patient is a 62-year-old female with a history of severe COPD, lung carcinoma, peripheral vascular disease who presents today with a red and swollen left ankle and foot  She is afebrile presently  There is no lymphangitis streaking  It is possible that this represents a cellulitis which is localized to this area  We are going to start her on Levaquin 500 mg daily for 7 days  She will have to stop her prophylactic azithromycin while in the Levaquin which we discussed  She has done this before  Other possibilities include flare-up of degenerative arthritis though she has no history of same or gouty arthritis all again she has no history of same  I do not believe this represents peripheral vascular occlusive disease  Her pulse ox in her toes 96 and she does have fairly good capillary refill  She does not have significant pain presently  's asked to call around 10 tomorrow morning with report of her condition  Would not rule out sending her for vascular studies to rule out vascular occlusive disease though again I do not believe this is presently the case  They are in agreement with this course  Diagnoses and all orders for this visit:    Cellulitis of left foot    Chronic obstructive pulmonary disease with acute exacerbation (HCC)  -     levofloxacin (LEVAQUIN) 500 mg tablet; Take 1 tablet (500 mg total) by mouth every 24 hours for 7 days          Subjective:   Chief Complaint   Patient presents with    Foot Swelling     Left ankle swollen and red noticed yesterday   L foot and ankle swollen and red  No history of gout but has PVD  Walked around [employer] for [employer] today  No history gout or DJD  Cap RF OK and pulse ox 96  Noted for a couple of days  No fever, chills, sweats  No change in appetite  Patient ID: Lillian Brice is a 77 y o  female      HPI  The patient is a 62-year-old female who presents today with complaint of swelling of her left ankle and foot  She noted that it was red and swollen yesterday  History is provided by the patient as well as her   She recalls no injury  She has no history of gout or degenerative arthritis though she does have a history of peripheral vascular disease with failed revascularization many years ago  She walked around red nurse for quite a while today and was able to ambulate though  noted that she was favoring this foot and ankle  She has had no fevers or chills or constitutional symptoms  She has had no change in her appetite  She denies significant pain in the foot at rest   The following portions of the patient's history were reviewed and updated as appropriate: allergies, current medications, past medical history, past social history and problem list     Review of Systems   Constitution: Negative for chills, decreased appetite, fever and night sweats  Cardiovascular: Positive for leg swelling  Negative for chest pain and irregular heartbeat  Respiratory: Positive for cough, shortness of breath, sputum production and wheezing  The symptoms are stable due to her severe COPD as well as a history of lung CA   Hematologic/Lymphatic: Negative for adenopathy and bleeding problem  Skin: Positive for flushing and rash  Musculoskeletal: Positive for joint pain  Neurological: Negative for dizziness and headaches  Psychiatric/Behavioral: Negative  Objective:    Physical Exam   Constitutional: She is oriented to person, place, and time  She appears well-developed and well-nourished  No distress  Cardiovascular: Normal rate, regular rhythm and normal heart sounds  Pulmonary/Chest: Effort normal    Distant breath sounds with expiratory wheezing and rhonchi throughout  No crackles appreciated normal respiratory rate  Musculoskeletal: She exhibits edema  She exhibits no tenderness     Neurological: She is alert and oriented to person, place, and time  Skin: There is erythema  Examination of the left ankle and foot shows swelling about the ankle and foot  It does not specifically appear to be a joint effusion and range of motion at the ankle appears normal   There is erythema and warmth with some mild induration  There is some venous stasis changes above but no lymphangitis streaking erythema calf tenderness X cetera  Homans sign is negative  There is no breakdown of the skin  Dorsalis pedis pulses nonpalpable but her oxygen saturation in her great toe is 96 percent and she does have fairly good capillary refill  Psychiatric: She has a normal mood and affect

## 2018-08-31 NOTE — PATIENT INSTRUCTIONS
Please begin Levaquin  Call tomorrow with a report of the progress of this condition  Should there be worsening of the condition such as foot pain, fevers, chills or other evidence of progression of this condition please seek more urgent medical attention

## 2018-09-10 RX ORDER — SODIUM CHLORIDE 9 MG/ML
20 INJECTION, SOLUTION INTRAVENOUS CONTINUOUS
Status: DISPENSED | OUTPATIENT
Start: 2018-09-11 | End: 2018-09-12

## 2018-09-11 ENCOUNTER — HOSPITAL ENCOUNTER (OUTPATIENT)
Dept: INFUSION CENTER | Facility: HOSPITAL | Age: 78
Discharge: HOME | End: 2018-09-11
Payer: MEDICARE

## 2018-09-11 VITALS
RESPIRATION RATE: 18 BRPM | OXYGEN SATURATION: 99 % | HEART RATE: 89 BPM | SYSTOLIC BLOOD PRESSURE: 150 MMHG | DIASTOLIC BLOOD PRESSURE: 68 MMHG | TEMPERATURE: 98 F

## 2018-09-11 LAB
ALBUMIN SERPL BCP-MCNC: 3 G/DL (ref 3.5–5)
ALP SERPL-CCNC: 90 U/L (ref 46–116)
ALT SERPL W P-5'-P-CCNC: 16 U/L (ref 12–78)
ANION GAP SERPL CALCULATED.3IONS-SCNC: 4 MMOL/L (ref 4–13)
AST SERPL W P-5'-P-CCNC: 14 U/L (ref 5–45)
BASOPHILS # BLD AUTO: 0.05 THOUSANDS/ΜL (ref 0–0.1)
BASOPHILS NFR BLD AUTO: 1 % (ref 0–1)
BILIRUB SERPL-MCNC: 0.4 MG/DL (ref 0.2–1)
BUN SERPL-MCNC: 13 MG/DL (ref 5–25)
CALCIUM SERPL-MCNC: 8.8 MG/DL (ref 8.3–10.1)
CHLORIDE SERPL-SCNC: 99 MMOL/L (ref 100–108)
CO2 SERPL-SCNC: 41 MMOL/L (ref 21–32)
CREAT SERPL-MCNC: 0.72 MG/DL (ref 0.6–1.3)
EOSINOPHIL # BLD AUTO: 0.37 THOUSAND/ΜL (ref 0–0.61)
EOSINOPHIL NFR BLD AUTO: 5 % (ref 0–6)
ERYTHROCYTE [DISTWIDTH] IN BLOOD BY AUTOMATED COUNT: 11.4 % (ref 11.6–15.1)
GFR SERPL CREATININE-BSD FRML MDRD: 80 ML/MIN/1.73SQ M
GLUCOSE SERPL-MCNC: 162 MG/DL (ref 65–140)
HCT VFR BLD AUTO: 42 % (ref 34.8–46.1)
HGB BLD-MCNC: 13.1 G/DL (ref 11.5–15.4)
IMM GRANULOCYTES # BLD AUTO: 0.04 THOUSAND/UL (ref 0–0.2)
IMM GRANULOCYTES NFR BLD AUTO: 1 % (ref 0–2)
LYMPHOCYTES # BLD AUTO: 0.81 THOUSANDS/ΜL (ref 0.6–4.47)
LYMPHOCYTES NFR BLD AUTO: 11 % (ref 14–44)
MCH RBC QN AUTO: 31.2 PG (ref 26.8–34.3)
MCHC RBC AUTO-ENTMCNC: 31.2 G/DL (ref 31.4–37.4)
MCV RBC AUTO: 100 FL (ref 82–98)
MONOCYTES # BLD AUTO: 0.68 THOUSAND/ΜL (ref 0.17–1.22)
MONOCYTES NFR BLD AUTO: 9 % (ref 4–12)
NEUTROPHILS # BLD AUTO: 5.66 THOUSANDS/ΜL (ref 1.85–7.62)
NEUTS SEG NFR BLD AUTO: 73 % (ref 43–75)
NRBC BLD AUTO-RTO: 0 /100 WBCS
PLATELET # BLD AUTO: 157 THOUSANDS/UL (ref 149–390)
PMV BLD AUTO: 10.4 FL (ref 8.9–12.7)
POTASSIUM SERPL-SCNC: 3.7 MMOL/L (ref 3.5–5.3)
PROT SERPL-MCNC: 6.4 G/DL (ref 6.4–8.2)
RBC # BLD AUTO: 4.2 MILLION/UL (ref 3.81–5.12)
SODIUM SERPL-SCNC: 144 MMOL/L (ref 136–145)
T4 FREE SERPL-MCNC: 1.7 NG/DL (ref 0.76–1.46)
TSH SERPL DL<=0.05 MIU/L-ACNC: 6.64 UIU/ML (ref 0.36–3.74)
WBC # BLD AUTO: 7.61 THOUSAND/UL (ref 4.31–10.16)

## 2018-09-11 PROCEDURE — 84443 ASSAY THYROID STIM HORMONE: CPT | Performed by: INTERNAL MEDICINE

## 2018-09-11 PROCEDURE — 96413 CHEMO IV INFUSION 1 HR: CPT

## 2018-09-11 PROCEDURE — 80053 COMPREHEN METABOLIC PANEL: CPT | Performed by: INTERNAL MEDICINE

## 2018-09-11 PROCEDURE — 84439 ASSAY OF FREE THYROXINE: CPT | Performed by: INTERNAL MEDICINE

## 2018-09-11 PROCEDURE — 85025 COMPLETE CBC W/AUTO DIFF WBC: CPT | Performed by: INTERNAL MEDICINE

## 2018-09-11 RX ORDER — AZITHROMYCIN 250 MG/1
500 TABLET, FILM COATED ORAL 3 TIMES WEEKLY
COMMUNITY
End: 2018-10-12 | Stop reason: SDUPTHER

## 2018-09-11 RX ADMIN — Medication 300 UNITS: at 09:10

## 2018-09-11 RX ADMIN — ATEZOLIZUMAB 1200 MG: 1200 INJECTION, SOLUTION INTRAVENOUS at 08:30

## 2018-09-11 RX ADMIN — SODIUM CHLORIDE 20 ML/HR: 9 INJECTION, SOLUTION INTRAVENOUS at 08:30

## 2018-09-11 NOTE — PROGRESS NOTES
Pt matthew chemo well, NSS flush done  Port deaccessed after flushing with Heparin, dsd applied  Disch amb to home with spouse, steady gait

## 2018-09-11 NOTE — PROGRESS NOTES
Pt in infusion center today for chemotherapy infusion  VSS  Labs pending at this time  Ok to proceed per MD order

## 2018-09-13 ENCOUNTER — HOSPITAL ENCOUNTER (OUTPATIENT)
Dept: CT IMAGING | Facility: HOSPITAL | Age: 78
Discharge: HOME | End: 2018-09-13
Attending: INTERNAL MEDICINE
Payer: MEDICARE

## 2018-09-13 DIAGNOSIS — C34.10 MALIGNANT NEOPLASM OF UPPER LOBE, UNSPECIFIED BRONCHUS OR LUNG (HCC): ICD-10-CM

## 2018-09-13 PROCEDURE — 71260 CT THORAX DX C+: CPT

## 2018-09-13 PROCEDURE — 74177 CT ABD & PELVIS W/CONTRAST: CPT

## 2018-09-13 RX ADMIN — IOHEXOL 100 ML: 350 INJECTION, SOLUTION INTRAVENOUS at 07:47

## 2018-09-13 RX ADMIN — Medication 300 UNITS: at 07:50

## 2018-09-16 DIAGNOSIS — E78.5 HYPERLIPIDEMIA, UNSPECIFIED HYPERLIPIDEMIA TYPE: ICD-10-CM

## 2018-09-17 ENCOUNTER — OFFICE VISIT (OUTPATIENT)
Dept: HEMATOLOGY ONCOLOGY | Facility: CLINIC | Age: 78
End: 2018-09-17
Payer: MEDICARE

## 2018-09-17 VITALS
TEMPERATURE: 97.9 F | OXYGEN SATURATION: 91 % | HEART RATE: 72 BPM | HEIGHT: 61 IN | WEIGHT: 97.8 LBS | RESPIRATION RATE: 17 BRPM | SYSTOLIC BLOOD PRESSURE: 122 MMHG | BODY MASS INDEX: 18.46 KG/M2 | DIASTOLIC BLOOD PRESSURE: 68 MMHG

## 2018-09-17 DIAGNOSIS — C34.11 SQUAMOUS CELL CARCINOMA OF BRONCHUS IN RIGHT UPPER LOBE (HCC): Primary | ICD-10-CM

## 2018-09-17 PROCEDURE — 99215 OFFICE O/P EST HI 40 MIN: CPT | Performed by: INTERNAL MEDICINE

## 2018-09-17 RX ORDER — SIMVASTATIN 80 MG
TABLET ORAL
Qty: 90 TABLET | Refills: 0 | Status: SHIPPED | OUTPATIENT
Start: 2018-09-17 | End: 2018-12-19 | Stop reason: SDUPTHER

## 2018-09-17 NOTE — PROGRESS NOTES
[facility] HEMATOLOGY ONCOLOGY Kelly Ochoa  [address] [address] [phone]    Angelica Mai, [id number]  09/17/18    Discussion:   In summary, this is a 59-year-old female history of advanced lung cancer as outlined  She has been on Tecentriq  She has been tolerating this relatively well  Increased symptoms of fatigue and jerking movements are likely related to progressive debilitation  This in turn is due to a combination of disease related toxicity, COPD  Immunotherapy toxicity less likely  We reviewed that her disease is gradually progressive  Fortunately she is relatively asymptomatic from her cancer  We reviewed that moving forward 3 main options seem predominant  1-change to another chemotherapy program such as Taxotere 50 mg/m2, Cyramza 10 mg/kg, both given day 1, Q 21 days  Skye Ladd 2-research trial, should this be applicable in available  I am in the process of investigating this logistically  3-palliative/supportive care  We reviewed the above options at some length  I will call them in the next 24 hr regarding the possibility of research trial applicability  I discussed the above with the patient  The patient and her  voiced understanding and agreement   ______________________________________________________________________    Chief Complaint   Patient presents with    Follow-up       HPI:     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 ([address] )    7/30/2016 Initial Diagnosis     CT of the neck for evaluation of the carotid arteries incidentally showed an infiltrating mass in the right upper lobe extending to the hilum  PET-CT June 2016 patient was found to have a thyroid mass on physical examination  Ultrasound showed bilateral thyroid nodules  In July 2016 she underwent CAT scan of the neck for evaluation of the carotid arteries   Incidentally noted, infiltrating mass in the right upper lobe extending to the hilum was noted  4, 2016âPET/CT showed a right upper lobe mass measuring 4 8 cm, SUV 21 5  This extends to the right hilum  Right paratracheal and subcarinal nodes measure up to 12 mm  The left adrenal showed some hypermetabolism with SUV of 3 7, without discrete mass  Uptake in the right parotid gland is noted with SUV of 22 3 and a soft tissue nodule, measuring 11 mm  endoscopy and bronchoscopy showed 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  Lymph node biopsies did not show malignancy  and radiation therapy were not felt to be applicable  Due to background pulmonary dysfunction as well as the potential for left adrenal metastatic disease  Chemotherapy was chosen as primary therapy  Alternatively  6, 2016 started Abraxane 80 mg/m² day 1, 8, 15, carbo AUC-5, day 1 only, every 21 days  2017progressive disease noted  Tecentriq 1200 mg IV every 3 weeks initiated  Current Therapy: May 2017progressive disease noted  Tecentriq 1200 mg IV every 3 weeks initiated  Interval History: Has had back pain started about 4 days ago  Started after some housework, has been getting better with heating pad           9/6/2016 - 11/25/2016 Chemotherapy     Abraxane 80 mg/m2 day 1, 8, 15, carbo AUC-5, day 1 only, Q 21 days  5/3/2017 Progression     Progressive disease  5/16/2017 -  Chemotherapy     Tecentriq 1200 mg IV Q 3 weeks  2/21/2018 - 3/14/2018 Radiation     C1    Plan ID Energy Fractions Dose per Fraction (cGy) Total Dose Delivered (cGy) Elapsed Days   Abdomen 6X 15 / 15 250 3,750 21      Treatment dates:  C1: 2/21/2018 - 3/14/2018             Metastasis to adrenal gland ([facility] [address] )    7/17/2017 Initial Diagnosis     Metastasis to adrenal gland (HCC)A mass in the anterior limb of the left adrenal gland with delayed postcontrast enhancement measuring 18 mm is either new or increasing from as far back as November 2016    The finding is suspicious for adrenal metastatic lesion superimposed upon underlying bilateral adenomatous hyperplasia  Interval History:  Fatigue and tremor have worsened  1 - Symptomatic but completely ambulatory    Review of Systems   Constitutional: Positive for fatigue  Negative for appetite change, diaphoresis and fever  HENT: Negative for sinus pain  Eyes: Negative for discharge  Respiratory: Positive for shortness of breath  Negative for cough  Cardiovascular: Negative for chest pain  Gastrointestinal: Negative for abdominal pain, constipation and diarrhea  Endocrine: Negative for cold intolerance  Genitourinary: Negative for difficulty urinating and hematuria  Musculoskeletal: Negative for joint swelling  Skin: Negative for rash  Allergic/Immunologic: Negative for environmental allergies  Neurological: Negative for dizziness and headaches  Hematological: Negative for adenopathy  Psychiatric/Behavioral: Negative for agitation         Past Medical History:   Diagnosis Date    Abnormal CT of the chest     last assessed: Aug 8, 2016    Arthritis     Asthma     Asymptomatic carotid artery stenosis     last assessed: March 2, 2017    Benign essential hypertension     last assessed: March 2, 2017    Cataract of right eye 3/19/2015    Cataract of right eye     last assessed: March 19, 2015    CHF (congestive heart failure) (HCC)     Common cold     Coronary artery disease     Depression     Diabetes mellitus ([address] )     Fracture of multiple pubic rami ([facility] ) 1/6/2015    History of radiation therapy     Hyperlipidemia     Hypertension     Lung cancer ([address] )     Multiple thyroid nodules 7/26/2016    Myocardial infarction ([address] )     Pulmonary emphysema (HCC)     Pulmonary nodule     Shortness of breath      Patient Active Problem List   Diagnosis    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 ([address] )    Asymptomatic carotid artery stenosis    Back pain, lumbosacral    Benign essential hypertension    Bursitis, ischial    Cardiomyopathy ([facility] )    Cataract of right eye    Chemotherapy-induced nausea    Chronic respiratory failure with hypoxia and hypercapnia (HCC)    COPD, very severe (HCC)    Congestive heart failure (HCC)    Depression    DMII (diabetes mellitus, type 2) (HCC)    Hyperlipidemia    Metastasis to adrenal gland (HCC)    Multiple thyroid nodules    Osteoporosis    Other chronic pain    Parotid adenoma    Psoriasis    Primary localized osteoarthrosis of the hip    PVD (peripheral vascular disease) (HCC)    Restless legs syndrome    Sciatica    Seborrheic dermatitis of scalp    Cellulitis of left foot       Current Outpatient Prescriptions:     albuterol (2 5 mg/3 mL) 0 083 % nebulizer solution, INHALE CONTENTS OF 1 VIAL IN NEBULIZER FOUR TIMES A DAY IF NEEDED, Disp: 375 mL, Rfl: 1    albuterol (VENTOLIN HFA) 90 mcg/act inhaler, Inhale 2 puffs every 6 (six) hours as needed for wheezing, Disp: 3 Inhaler, Rfl: 3    azithromycin (ZITHROMAX) 250 mg tablet, Take 500 mg by mouth 3 (three) times a week, Disp: , Rfl:     betamethasone, augmented, (DIPROLENE-AF) 0 05 % cream, APPLY TOPICALLY 2 (TWO) TIMES A DAY, Disp: 50 g, Rfl: 1    fluticasone-salmeterol (ADVAIR) 500-50 mcg/dose inhaler, Inhale 1 puff every 12 (twelve) hours for 90 days, Disp: 13 Inhaler, Rfl: 0    FREESTYLE LITE test strip, Test Blood sugar 3 times daily  Diagnosis: Type 2 Diabetes, Disp: 300 each, Rfl: 0    furosemide (LASIX) 20 mg tablet, Take 1 tablet (20 mg total) by mouth daily, Disp: 90 tablet, Rfl: 2    metoprolol tartrate (LOPRESSOR) 100 mg tablet, Take 100 mg by mouth daily  , Disp: , Rfl:     oxyCODONE-acetaminophen (PERCOCET) 5-325 mg per tablet, Take 1 tablet by mouth every 4 (four) hours as needed for moderate pain Max Daily Amount: 6 tablets, Disp: 180 tablet, Rfl: 0    pentoxifylline (TRENtal) 400 mg ER tablet, Take 400 mg by mouth 3 (three) times a day with meals  , Disp: , Rfl:     pregabalin (LYRICA) 100 mg capsule, Take 1 capsule (100 mg total) by mouth daily at bedtime, Disp: 30 capsule, Rfl: 4    simvastatin (ZOCOR) 80 mg tablet, TAKE 1 TABLET BY MOUTH EVERY DAY, Disp: 90 tablet, Rfl: 0    Tiotropium Bromide Monohydrate (SPIRIVA RESPIMAT) 2 5 MCG/ACT AERS, Inhale 2 Act (5 mcg total) daily, Disp: 3 Inhaler, Rfl: 3  Allergies   Allergen Reactions    Penicillins Swelling     Legs swell up     Past Surgical History:   Procedure Laterality Date    BRONCHOSCOPY N/A 8/10/2016    Procedure: BRONCHOSCOPY FLEXIBLE;  Surgeon: Dale Solomon MD;  [facility];  Service:    Hemanth Vandana BYPASS GRAFT      using vein - aortic-bifemoral - last assessed: Aug 22, 2016     SECTION      CHOLECYSTECTOMY      EYE SURGERY      HYSTERECTOMY      UT BRONCHOSCOPY NEEDLE BX TRACHEA MAIN STEM&/BRON N/A 8/10/2016    Procedure: ENDOBRONCHIAL ULTRASOUND (FROZEN SECTION) ; Surgeon: Dale Solomon MD;  [facility];  Service: Thoracic    UT INSJ TUNNELED CTR VAD W/SUBQ PORT AGE 5 YR/> Left 2016    Procedure: INSERTION VENOUS PORT (PORT-A-CATH); Surgeon: Dale Solomon MD;  [facility];  Service: Thoracic    TONSILLECTOMY       Social History     Objective:  Vitals:    18 0950   BP: 122/68   BP Location: Left arm   Patient Position: Sitting   Pulse: 72   Resp: 17   Temp: 97 9 °F (36 6 °C)   TempSrc: Tympanic   SpO2: 91%   Weight: 44 4 kg (97 lb 12 8 oz)   Height: 5' 0 5" (1 537 m)     Physical Exam   Constitutional: She is oriented to person, place, and time  She appears well-developed  HENT:   Head: Normocephalic  Eyes: Pupils are equal, round, and reactive to light  Neck: Neck supple  Cardiovascular: Normal rate  No murmur heard  Pulmonary/Chest: No respiratory distress  She has no wheezes  She has no rales  Abdominal: Soft  She exhibits no distension  There is no tenderness  There is no rebound  Musculoskeletal: She exhibits no edema  Lymphadenopathy:     She has no cervical adenopathy  Neurological: She is alert and oriented to person, place, and time  She displays normal reflexes  Skin: Skin is warm  No rash noted  Psychiatric: She has a normal mood and affect  Thought content normal          Labs: I personally reviewed the labs and imaging pertinent to this patient care

## 2018-09-18 ENCOUNTER — OFFICE VISIT (OUTPATIENT)
Dept: PULMONOLOGY | Facility: HOSPITAL | Age: 78
End: 2018-09-18
Payer: MEDICARE

## 2018-09-18 VITALS
OXYGEN SATURATION: 95 % | DIASTOLIC BLOOD PRESSURE: 70 MMHG | TEMPERATURE: 97.6 F | HEART RATE: 83 BPM | BODY MASS INDEX: 19.04 KG/M2 | SYSTOLIC BLOOD PRESSURE: 120 MMHG | WEIGHT: 97 LBS | HEIGHT: 60 IN

## 2018-09-18 DIAGNOSIS — J96.12 CHRONIC RESPIRATORY FAILURE WITH HYPOXIA AND HYPERCAPNIA (HCC): ICD-10-CM

## 2018-09-18 DIAGNOSIS — J96.11 CHRONIC RESPIRATORY FAILURE WITH HYPOXIA AND HYPERCAPNIA (HCC): ICD-10-CM

## 2018-09-18 DIAGNOSIS — J44.9 COPD, VERY SEVERE (HCC): Primary | ICD-10-CM

## 2018-09-18 DIAGNOSIS — J44.9 CHRONIC OBSTRUCTIVE PULMONARY DISEASE, UNSPECIFIED COPD TYPE (HCC): ICD-10-CM

## 2018-09-18 DIAGNOSIS — J43.2 CENTRILOBULAR EMPHYSEMA (HCC): ICD-10-CM

## 2018-09-18 PROCEDURE — 99214 OFFICE O/P EST MOD 30 MIN: CPT | Performed by: INTERNAL MEDICINE

## 2018-09-18 RX ORDER — ALBUTEROL SULFATE 2.5 MG/3ML
2.5 SOLUTION RESPIRATORY (INHALATION)
Qty: 360 VIAL | Refills: 3 | Status: SHIPPED | OUTPATIENT
Start: 2018-09-18 | End: 2018-09-26 | Stop reason: SDUPTHER

## 2018-09-18 NOTE — PROGRESS NOTES
Pulmonary Follow Up Note   Aster Espinoza 77 y o  female MRN: [id number]  9/18/2018      Assessment/Plan:    Chronic respiratory failure with hypoxia and hypercapnia ([facility] )  She will continue with supplemental oxygen at 2 liters/minute continuously  Though she has fairly significant hypercapnia on recent ABG, she does not qualify for BiPAP given absence of desaturation on her baseline oxygen needs  COPD, very severe ([facility] )  She will continue with Advair, Spiriva and albuterol  She will also continue with azithromycin 3 times weekly to reduce her exacerbation rate  She did not tolerate Breo Ellipta  Visit orders:    Diagnoses and all orders for this visit:    COPD, very severe ([facility] )    Chronic respiratory failure with hypoxia and hypercapnia (HCC)    Chronic obstructive pulmonary disease, unspecified COPD type ([facility] )  -     fluticasone-salmeterol (ADVAIR) 500-50 mcg/dose inhaler; Inhale 1 puff every 12 (twelve) hours    Centrilobular emphysema (HCC)  -     albuterol (2 5 mg/3 mL) 0 083 % nebulizer solution; Take 1 vial (2 5 mg total) by nebulization 4 (four) times a day        Return in about 3 months (around 12/18/2018)  History of Present Illness   HPI:  Aster Espinoza is a 77 y o  female who is here today for follow-up regarding chronic respiratory failure and very severe  D  At our last visit, I gave her sample of Breo Ellipta to use in place of Advair  She developed shakiness with this and went back to using Advair  We prescribed the high-dose Advair and she feels that this has been beneficial   She uses her albuterol via nebulizer 4 times daily and albuterol inhaler once or twice per day  She has some dyspnea on exertion which has been stable  Wheezing is slightly improved  No significant cough or sputum production  She will be changing chemotherapy in the coming weeks due to an increase in tumor burden on last CT    She is using oxygen at 2 liters/minute continuously    Overnight pulse ox performed in July did not show evidence of oxygen desaturation on baseline 2 liters/minute  Review of Systems   Constitutional: Positive for unexpected weight change  Negative for chills and fever  HENT: Negative for postnasal drip and sore throat  Eyes: Negative for visual disturbance  Respiratory:        As noted in HPI   Cardiovascular: Negative for chest pain  Gastrointestinal: Negative for abdominal pain, diarrhea and vomiting  Genitourinary: Negative for difficulty urinating  Skin: Negative for rash  Neurological: Negative for headaches  Hematological: Negative for adenopathy  Psychiatric/Behavioral: Negative  All other systems reviewed and are negative          Historical Information   Past Medical History:   Diagnosis Date    Abnormal CT of the chest     last assessed: Aug 8, 2016    Arthritis     Asthma     Asymptomatic carotid artery stenosis     last assessed: 2017    Benign essential hypertension     last assessed: 2017    Cataract of right eye 3/19/2015    Cataract of right eye     last assessed: 2015    CHF (congestive heart failure) (HCC)     Common cold     Coronary artery disease     Depression     Diabetes mellitus ([facility] )     Fracture of multiple pubic rami ([address] ) 2015    History of radiation therapy     Hyperlipidemia     Hypertension     Lung cancer ([facility] )     Multiple thyroid nodules 2016    Myocardial infarction ([facility] )     Pulmonary emphysema (HCC)     Pulmonary nodule     Shortness of breath      Past Surgical History:   Procedure Laterality Date    BRONCHOSCOPY N/A 8/10/2016    Procedure: BRONCHOSCOPY FLEXIBLE;  Surgeon: Anatoliy Gatica MD;  [facility];  Service:    Pavan Miyamoto BYPASS GRAFT      using vein - aortic-bifemoral - last assessed: Aug 22, 2016     SECTION      CHOLECYSTECTOMY      EYE SURGERY      HYSTERECTOMY      IN BRONCHOSCOPY NEEDLE BX TRACHEA MAIN STEM&/BRON N/A 8/10/2016 Procedure: ENDOBRONCHIAL ULTRASOUND (FROZEN SECTION) ; Surgeon: Radha Street MD;  [facility];  Service: Thoracic    AL INSJ TUNNELED CTR VAD W/SUBQ PORT AGE 5 YR/> Left 8/30/2016    Procedure: INSERTION VENOUS PORT (PORT-A-CATH); Surgeon: Radha Street MD;  [facility];  Service: Thoracic    TONSILLECTOMY       Family History   Problem Relation Age of Onset    Brain cancer Sister    Linda Loya Cancer Sister     Thyroid disease Mother        History   Smoking Status    Current Some Day Smoker    Packs/day: 0 25    Types: Cigarettes    Start date: 1946   Smokeless Tobacco    Never Used     Comment: smokes 3-4 cigs /day - current every day smoker noted in "allscripts" smoke for less than 1/2 pack per day for 50 years         Meds/Allergies     Current Outpatient Prescriptions:     albuterol (2 5 mg/3 mL) 0 083 % nebulizer solution, Take 1 vial (2 5 mg total) by nebulization 4 (four) times a day, Disp: 360 vial, Rfl: 3    albuterol (VENTOLIN HFA) 90 mcg/act inhaler, Inhale 2 puffs every 6 (six) hours as needed for wheezing, Disp: 3 Inhaler, Rfl: 3    azithromycin (ZITHROMAX) 250 mg tablet, Take 500 mg by mouth 3 (three) times a week, Disp: , Rfl:     betamethasone, augmented, (DIPROLENE-AF) 0 05 % cream, APPLY TOPICALLY 2 (TWO) TIMES A DAY, Disp: 50 g, Rfl: 1    fluticasone-salmeterol (ADVAIR) 500-50 mcg/dose inhaler, Inhale 1 puff every 12 (twelve) hours, Disp: 3 Inhaler, Rfl: 3    FREESTYLE LITE test strip, Test Blood sugar 3 times daily  Diagnosis: Type 2 Diabetes, Disp: 300 each, Rfl: 0    furosemide (LASIX) 20 mg tablet, Take 1 tablet (20 mg total) by mouth daily, Disp: 90 tablet, Rfl: 2    metoprolol tartrate (LOPRESSOR) 100 mg tablet, Take 100 mg by mouth daily  , Disp: , Rfl:     oxyCODONE-acetaminophen (PERCOCET) 5-325 mg per tablet, Take 1 tablet by mouth every 4 (four) hours as needed for moderate pain Max Daily Amount: 6 tablets, Disp: 180 tablet, Rfl: 0    pentoxifylline (TRENtal) 400 mg ER tablet, Take 400 mg by mouth 3 (three) times a day with meals  , Disp: , Rfl:     pregabalin (LYRICA) 100 mg capsule, Take 1 capsule (100 mg total) by mouth daily at bedtime, Disp: 30 capsule, Rfl: 4    simvastatin (ZOCOR) 80 mg tablet, TAKE 1 TABLET BY MOUTH EVERY DAY, Disp: 90 tablet, Rfl: 0    Tiotropium Bromide Monohydrate (SPIRIVA RESPIMAT) 2 5 MCG/ACT AERS, Inhale 2 Act (5 mcg total) daily, Disp: 3 Inhaler, Rfl: 3  Allergies   Allergen Reactions    Penicillins Swelling     Legs swell up       Vitals: Blood pressure 120/70, pulse 83, temperature 97 6 °F (36 4 °C), temperature source Tympanic, height 5' (1 524 m), weight 44 kg (97 lb), SpO2 95 %  Body mass index is 18 94 kg/m²  Oxygen Therapy  SpO2: 95 %  Oxygen Therapy: Supplemental oxygen  O2 Delivery Method: Nasal cannula  O2 Flow Rate (L/min): 3 L/min    Physical Exam   Physical Exam   Constitutional: She is oriented to person, place, and time  No distress  Frail, cachectic   HENT:   Head: Normocephalic  Mouth/Throat: No oropharyngeal exudate  Eyes: Pupils are equal, round, and reactive to light  No scleral icterus  Neck: Neck supple  No JVD present  Cardiovascular: Normal rate and regular rhythm  Pulmonary/Chest: She has wheezes  She has no rales  Abdominal: Soft  There is no tenderness  Musculoskeletal: She exhibits no edema  Lymphadenopathy:     She has no cervical adenopathy  Neurological: She is alert and oriented to person, place, and time  Skin: Skin is warm and dry  Psychiatric: She has a normal mood and affect  Labs: I have personally reviewed pertinent lab results    Lab Results   Component Value Date    WBC 7 61 09/11/2018    HGB 13 1 09/11/2018    HCT 42 0 09/11/2018     (H) 09/11/2018     09/11/2018     Lab Results   Component Value Date    GLUCOSE 191 (H) 11/20/2015    CALCIUM 8 8 09/11/2018     09/11/2018    K 3 7 09/11/2018    CO2 41 (H) 09/11/2018    CL 99 (L) 09/11/2018    BUN 13 09/11/2018    CREATININE 0 72 09/11/2018     No results found for: IGE  Lab Results   Component Value Date    ALT 16 09/11/2018    AST 14 09/11/2018    ALKPHOS 90 09/11/2018    BILITOT 0 38 11/20/2015    ABG on 7/16/18 shows pH 7 40, pCO2 72, PO2 72    Imaging and other studies: I have personally reviewed pertinent films in PACS Chest CT from 6/12/18 shows right suprahilar mass, minimally larger than prior study with increased postobstructive changes in the right upper lobe  There is a new 4 mm right upper lobe nodule  Right hilar adenopathy is stable  There is very small right-sided pleural effusion  Left adrenal metastasis slightly diminished compared with prior study    CT of the abdomen pelvis from 9/13/18 - Slight interval enlargement of the right suprahilar mass, with progressive associated narrowing of the right upper lobe bronchus  Slightly increased left adrenal metastasis      Pulmonary function testing:  Performed 8/8/16  FEV1/FVC ratio 53%   FEV1 20% predicted  FVC 28% predicted   there is no significant response to bronchodilators   % predicted   % predicted  DLCO corrected for hemoglobin 42 % predicted   this study shows very severe obstruction with reduced vital capacity due to air trapping and severe reduction in diffusion capacity

## 2018-09-18 NOTE — ASSESSMENT & PLAN NOTE
She will continue with supplemental oxygen at 2 liters/minute continuously  Though she has fairly significant hypercapnia on recent ABG, she does not qualify for BiPAP given absence of desaturation on her baseline oxygen needs

## 2018-09-18 NOTE — ASSESSMENT & PLAN NOTE
She will continue with Advair, Spiriva and albuterol  She will also continue with azithromycin 3 times weekly to reduce her exacerbation rate  She did not tolerate Breo Ellipta

## 2018-09-21 ENCOUNTER — TELEPHONE (OUTPATIENT)
Dept: HEMATOLOGY ONCOLOGY | Facility: CLINIC | Age: 78
End: 2018-09-21

## 2018-09-21 RX ORDER — SODIUM CHLORIDE 9 MG/ML
20 INJECTION, SOLUTION INTRAVENOUS CONTINUOUS
Status: DISCONTINUED | OUTPATIENT
Start: 2018-09-26 | End: 2018-09-29 | Stop reason: HOSPADM

## 2018-09-21 NOTE — TELEPHONE ENCOUNTER
He calls about the upcoming schedule  He thought a CT was needed before his next F/U visit  Now it is set up for 2 treatments, and then Dr Brandt Aguilera  Is it to wait until after 3 cycles and then scan and then Anastasiya?

## 2018-09-21 NOTE — TELEPHONE ENCOUNTER
Spoke with patients  - reviewed that CT scan was just done 9/13 - she will be re scanned after 2-3 cycles of chemo  A can is not ordered prior to every visit with Dr Brandt Aguilera  The visits with Dr Brandt Aguilera are to see how the patient is doing on chemotherapy and make any adjustments if needed     verbalized understanding - he will discuss with Dr Brandt Aguilera at next visit exactly when follow up CT scan will be

## 2018-09-25 ENCOUNTER — HOSPITAL ENCOUNTER (OUTPATIENT)
Dept: INFUSION CENTER | Facility: HOSPITAL | Age: 78
Discharge: HOME | End: 2018-09-25
Payer: MEDICARE

## 2018-09-25 DIAGNOSIS — C34.11 SQUAMOUS CELL CARCINOMA OF BRONCHUS IN RIGHT UPPER LOBE (HCC): ICD-10-CM

## 2018-09-25 LAB
ALBUMIN SERPL BCP-MCNC: 3.1 G/DL (ref 3.5–5)
ALP SERPL-CCNC: 89 U/L (ref 46–116)
ALT SERPL W P-5'-P-CCNC: 14 U/L (ref 12–78)
ANION GAP SERPL CALCULATED.3IONS-SCNC: 3 MMOL/L (ref 4–13)
AST SERPL W P-5'-P-CCNC: 15 U/L (ref 5–45)
BASOPHILS # BLD AUTO: 0.04 THOUSANDS/ΜL (ref 0–0.1)
BASOPHILS NFR BLD AUTO: 1 % (ref 0–1)
BILIRUB SERPL-MCNC: 0.5 MG/DL (ref 0.2–1)
BUN SERPL-MCNC: 15 MG/DL (ref 5–25)
CALCIUM SERPL-MCNC: 8.8 MG/DL (ref 8.3–10.1)
CHLORIDE SERPL-SCNC: 100 MMOL/L (ref 100–108)
CO2 SERPL-SCNC: 40 MMOL/L (ref 21–32)
CREAT SERPL-MCNC: 0.6 MG/DL (ref 0.6–1.3)
EOSINOPHIL # BLD AUTO: 0.36 THOUSAND/ΜL (ref 0–0.61)
EOSINOPHIL NFR BLD AUTO: 5 % (ref 0–6)
ERYTHROCYTE [DISTWIDTH] IN BLOOD BY AUTOMATED COUNT: 11.4 % (ref 11.6–15.1)
GFR SERPL CREATININE-BSD FRML MDRD: 87 ML/MIN/1.73SQ M
GLUCOSE SERPL-MCNC: 106 MG/DL (ref 65–140)
HCT VFR BLD AUTO: 42.1 % (ref 34.8–46.1)
HGB BLD-MCNC: 13 G/DL (ref 11.5–15.4)
IMM GRANULOCYTES # BLD AUTO: 0.02 THOUSAND/UL (ref 0–0.2)
IMM GRANULOCYTES NFR BLD AUTO: 0 % (ref 0–2)
LYMPHOCYTES # BLD AUTO: 1.34 THOUSANDS/ΜL (ref 0.6–4.47)
LYMPHOCYTES NFR BLD AUTO: 19 % (ref 14–44)
MCH RBC QN AUTO: 30.9 PG (ref 26.8–34.3)
MCHC RBC AUTO-ENTMCNC: 30.9 G/DL (ref 31.4–37.4)
MCV RBC AUTO: 100 FL (ref 82–98)
MONOCYTES # BLD AUTO: 0.64 THOUSAND/ΜL (ref 0.17–1.22)
MONOCYTES NFR BLD AUTO: 9 % (ref 4–12)
NEUTROPHILS # BLD AUTO: 4.82 THOUSANDS/ΜL (ref 1.85–7.62)
NEUTS SEG NFR BLD AUTO: 66 % (ref 43–75)
NRBC BLD AUTO-RTO: 0 /100 WBCS
PLATELET # BLD AUTO: 133 THOUSANDS/UL (ref 149–390)
PMV BLD AUTO: 11.3 FL (ref 8.9–12.7)
POTASSIUM SERPL-SCNC: 4.1 MMOL/L (ref 3.5–5.3)
PROT SERPL-MCNC: 6.5 G/DL (ref 6.4–8.2)
RBC # BLD AUTO: 4.21 MILLION/UL (ref 3.81–5.12)
SODIUM SERPL-SCNC: 143 MMOL/L (ref 136–145)
WBC # BLD AUTO: 7.22 THOUSAND/UL (ref 4.31–10.16)

## 2018-09-25 PROCEDURE — 85025 COMPLETE CBC W/AUTO DIFF WBC: CPT

## 2018-09-25 PROCEDURE — 80053 COMPREHEN METABOLIC PANEL: CPT

## 2018-09-25 RX ADMIN — Medication 300 UNITS: at 08:33

## 2018-09-25 NOTE — PROGRESS NOTES
Pt arrived amb for port labs  Port accessed, labs obtained, de-accessed after flushing with Heparin  Dsd applied  Disch amb to home, steady gait

## 2018-09-26 ENCOUNTER — HOSPITAL ENCOUNTER (OUTPATIENT)
Dept: INFUSION CENTER | Facility: HOSPITAL | Age: 78
Discharge: HOME | End: 2018-09-26
Payer: MEDICARE

## 2018-09-26 VITALS
HEIGHT: 60 IN | WEIGHT: 96.78 LBS | HEART RATE: 75 BPM | SYSTOLIC BLOOD PRESSURE: 144 MMHG | DIASTOLIC BLOOD PRESSURE: 63 MMHG | TEMPERATURE: 97.2 F | OXYGEN SATURATION: 97 % | RESPIRATION RATE: 20 BRPM | BODY MASS INDEX: 19 KG/M2

## 2018-09-26 DIAGNOSIS — T45.1X5A CHEMOTHERAPY INDUCED NAUSEA AND VOMITING: Primary | ICD-10-CM

## 2018-09-26 DIAGNOSIS — R11.2 CHEMOTHERAPY INDUCED NAUSEA AND VOMITING: Primary | ICD-10-CM

## 2018-09-26 DIAGNOSIS — J43.2 CENTRILOBULAR EMPHYSEMA (HCC): ICD-10-CM

## 2018-09-26 PROCEDURE — 96413 CHEMO IV INFUSION 1 HR: CPT

## 2018-09-26 PROCEDURE — 96417 CHEMO IV INFUS EACH ADDL SEQ: CPT

## 2018-09-26 PROCEDURE — 96367 TX/PROPH/DG ADDL SEQ IV INF: CPT

## 2018-09-26 RX ORDER — ALBUTEROL SULFATE 2.5 MG/3ML
2.5 SOLUTION RESPIRATORY (INHALATION)
Qty: 360 VIAL | Refills: 3 | Status: SHIPPED | OUTPATIENT
Start: 2018-09-26 | End: 2019-01-01 | Stop reason: SDUPTHER

## 2018-09-26 RX ADMIN — SODIUM CHLORIDE 442 MG: 0.9 INJECTION, SOLUTION INTRAVENOUS at 09:15

## 2018-09-26 RX ADMIN — DOCETAXEL 70 MG: 20 INJECTION, SOLUTION, CONCENTRATE INTRAVENOUS at 10:48

## 2018-09-26 RX ADMIN — DIPHENHYDRAMINE HYDROCHLORIDE 25 MG: 50 INJECTION, SOLUTION INTRAMUSCULAR; INTRAVENOUS at 08:22

## 2018-09-26 RX ADMIN — ONDANSETRON HYDROCHLORIDE: 2 INJECTION, SOLUTION INTRAMUSCULAR; INTRAVENOUS at 08:45

## 2018-09-26 RX ADMIN — SODIUM CHLORIDE 20 ML/HR: 0.9 INJECTION, SOLUTION INTRAVENOUS at 08:22

## 2018-09-26 RX ADMIN — Medication 300 UNITS: at 11:49

## 2018-09-26 NOTE — TELEPHONE ENCOUNTER
Da Cuenca had first chemo today  They were expecting a medication to be sent in for nausea, but it was not at the [facility]  Zofran 8 mg qued for Dr Jose Luis Amaya

## 2018-09-26 NOTE — PROGRESS NOTES
Pt arrives on unit for chemotherapy  Labs wnl and vss  Pt offers no c/o at this time  Will continue to monitor

## 2018-09-27 DIAGNOSIS — G89.29 OTHER CHRONIC PAIN: ICD-10-CM

## 2018-09-27 RX ORDER — OXYCODONE HYDROCHLORIDE AND ACETAMINOPHEN 5; 325 MG/1; MG/1
1 TABLET ORAL EVERY 4 HOURS PRN
Qty: 180 TABLET | Refills: 0 | Status: SHIPPED | OUTPATIENT
Start: 2018-09-27 | End: 2018-10-30 | Stop reason: SDUPTHER

## 2018-09-27 RX ORDER — ONDANSETRON HYDROCHLORIDE 8 MG/1
8 TABLET, FILM COATED ORAL EVERY 8 HOURS PRN
Qty: 30 TABLET | Refills: 2 | Status: SHIPPED | OUTPATIENT
Start: 2018-09-27 | End: 2019-01-01 | Stop reason: ALTCHOICE

## 2018-10-09 DIAGNOSIS — I10 HYPERTENSION, UNSPECIFIED TYPE: Primary | ICD-10-CM

## 2018-10-09 RX ORDER — METOPROLOL TARTRATE 100 MG/1
100 TABLET ORAL DAILY
Qty: 90 TABLET | Refills: 0 | Status: SHIPPED | OUTPATIENT
Start: 2018-10-09 | End: 2019-01-09 | Stop reason: SDUPTHER

## 2018-10-12 DIAGNOSIS — J44.9 CHRONIC OBSTRUCTIVE PULMONARY DISEASE, UNSPECIFIED COPD TYPE (HCC): Primary | ICD-10-CM

## 2018-10-12 RX ORDER — AZITHROMYCIN 250 MG/1
500 TABLET, FILM COATED ORAL 3 TIMES WEEKLY
Qty: 30 TABLET | Refills: 3 | Status: SHIPPED | OUTPATIENT
Start: 2018-10-12 | End: 2019-01-01 | Stop reason: SDUPTHER

## 2018-10-12 RX ORDER — SODIUM CHLORIDE 9 MG/ML
20 INJECTION, SOLUTION INTRAVENOUS CONTINUOUS
Status: DISCONTINUED | OUTPATIENT
Start: 2018-10-16 | End: 2018-10-19 | Stop reason: HOSPADM

## 2018-10-15 ENCOUNTER — HOSPITAL ENCOUNTER (OUTPATIENT)
Dept: INFUSION CENTER | Facility: HOSPITAL | Age: 78
Discharge: HOME | End: 2018-10-15
Payer: MEDICARE

## 2018-10-15 DIAGNOSIS — R91.1 SOLITARY PULMONARY NODULE: ICD-10-CM

## 2018-10-15 LAB
ALBUMIN SERPL BCP-MCNC: 2.7 G/DL (ref 3.5–5)
ALP SERPL-CCNC: 80 U/L (ref 46–116)
ALT SERPL W P-5'-P-CCNC: 11 U/L (ref 12–78)
ANION GAP SERPL CALCULATED.3IONS-SCNC: 1 MMOL/L (ref 4–13)
AST SERPL W P-5'-P-CCNC: 15 U/L (ref 5–45)
BASOPHILS # BLD AUTO: 0.04 THOUSANDS/ΜL (ref 0–0.1)
BASOPHILS NFR BLD AUTO: 1 % (ref 0–1)
BILIRUB SERPL-MCNC: 0.4 MG/DL (ref 0.2–1)
BUN SERPL-MCNC: 13 MG/DL (ref 5–25)
CALCIUM SERPL-MCNC: 8.6 MG/DL (ref 8.3–10.1)
CHLORIDE SERPL-SCNC: 102 MMOL/L (ref 100–108)
CO2 SERPL-SCNC: 40 MMOL/L (ref 21–32)
CREAT SERPL-MCNC: 0.68 MG/DL (ref 0.6–1.3)
EOSINOPHIL # BLD AUTO: 0.01 THOUSAND/ΜL (ref 0–0.61)
EOSINOPHIL NFR BLD AUTO: 0 % (ref 0–6)
ERYTHROCYTE [DISTWIDTH] IN BLOOD BY AUTOMATED COUNT: 11.6 % (ref 11.6–15.1)
GFR SERPL CREATININE-BSD FRML MDRD: 83 ML/MIN/1.73SQ M
GLUCOSE P FAST SERPL-MCNC: 146 MG/DL (ref 65–99)
GLUCOSE SERPL-MCNC: 146 MG/DL (ref 65–140)
HCT VFR BLD AUTO: 40.5 % (ref 34.8–46.1)
HGB BLD-MCNC: 12.3 G/DL (ref 11.5–15.4)
IMM GRANULOCYTES # BLD AUTO: 0.02 THOUSAND/UL (ref 0–0.2)
IMM GRANULOCYTES NFR BLD AUTO: 0 % (ref 0–2)
LYMPHOCYTES # BLD AUTO: 0.75 THOUSANDS/ΜL (ref 0.6–4.47)
LYMPHOCYTES NFR BLD AUTO: 12 % (ref 14–44)
MCH RBC QN AUTO: 30.8 PG (ref 26.8–34.3)
MCHC RBC AUTO-ENTMCNC: 30.4 G/DL (ref 31.4–37.4)
MCV RBC AUTO: 102 FL (ref 82–98)
MONOCYTES # BLD AUTO: 0.57 THOUSAND/ΜL (ref 0.17–1.22)
MONOCYTES NFR BLD AUTO: 9 % (ref 4–12)
NEUTROPHILS # BLD AUTO: 4.8 THOUSANDS/ΜL (ref 1.85–7.62)
NEUTS SEG NFR BLD AUTO: 78 % (ref 43–75)
NRBC BLD AUTO-RTO: 0 /100 WBCS
PLATELET # BLD AUTO: 142 THOUSANDS/UL (ref 149–390)
PMV BLD AUTO: 11.7 FL (ref 8.9–12.7)
POTASSIUM SERPL-SCNC: 3.7 MMOL/L (ref 3.5–5.3)
PROT SERPL-MCNC: 6.1 G/DL (ref 6.4–8.2)
RBC # BLD AUTO: 3.99 MILLION/UL (ref 3.81–5.12)
SODIUM SERPL-SCNC: 143 MMOL/L (ref 136–145)
WBC # BLD AUTO: 6.19 THOUSAND/UL (ref 4.31–10.16)

## 2018-10-15 PROCEDURE — 80053 COMPREHEN METABOLIC PANEL: CPT

## 2018-10-15 PROCEDURE — 85025 COMPLETE CBC W/AUTO DIFF WBC: CPT

## 2018-10-15 RX ADMIN — Medication 300 UNITS: at 08:07

## 2018-10-16 ENCOUNTER — HOSPITAL ENCOUNTER (OUTPATIENT)
Dept: INFUSION CENTER | Facility: HOSPITAL | Age: 78
Discharge: HOME | End: 2018-10-16
Payer: MEDICARE

## 2018-10-16 VITALS
TEMPERATURE: 96.3 F | HEIGHT: 60 IN | WEIGHT: 98.55 LBS | DIASTOLIC BLOOD PRESSURE: 83 MMHG | BODY MASS INDEX: 19.35 KG/M2 | HEART RATE: 85 BPM | OXYGEN SATURATION: 96 % | SYSTOLIC BLOOD PRESSURE: 144 MMHG

## 2018-10-16 PROCEDURE — 96367 TX/PROPH/DG ADDL SEQ IV INF: CPT

## 2018-10-16 PROCEDURE — 96417 CHEMO IV INFUS EACH ADDL SEQ: CPT

## 2018-10-16 PROCEDURE — 96413 CHEMO IV INFUSION 1 HR: CPT

## 2018-10-16 RX ADMIN — SODIUM CHLORIDE 20 ML/HR: 9 INJECTION, SOLUTION INTRAVENOUS at 08:20

## 2018-10-16 RX ADMIN — Medication 300 UNITS: at 12:02

## 2018-10-16 RX ADMIN — DEXAMETHASONE SODIUM PHOSPHATE: 10 INJECTION, SOLUTION INTRAMUSCULAR; INTRAVENOUS at 09:02

## 2018-10-16 RX ADMIN — SODIUM CHLORIDE 440 MG: 0.9 INJECTION, SOLUTION INTRAVENOUS at 09:47

## 2018-10-16 RX ADMIN — DIPHENHYDRAMINE HYDROCHLORIDE 25 MG: 50 INJECTION, SOLUTION INTRAMUSCULAR; INTRAVENOUS at 08:19

## 2018-10-16 RX ADMIN — DOCETAXEL 70 MG: 20 INJECTION, SOLUTION, CONCENTRATE INTRAVENOUS at 10:57

## 2018-10-16 NOTE — SOCIAL WORK
Met with pt and  in clinic during treatment  Pt states she is managing fairly well   transports and assists with all needs at home  They have no financial or insurance difficulties at this time  Reminded pt of support services available  MSW will continue to be available to assist as needed

## 2018-10-26 DIAGNOSIS — E11.9 TYPE 2 DIABETES MELLITUS WITHOUT COMPLICATION, WITH LONG-TERM CURRENT USE OF INSULIN (HCC): ICD-10-CM

## 2018-10-26 DIAGNOSIS — Z79.4 TYPE 2 DIABETES MELLITUS WITHOUT COMPLICATION, WITH LONG-TERM CURRENT USE OF INSULIN (HCC): ICD-10-CM

## 2018-10-28 RX ORDER — BLOOD-GLUCOSE METER
KIT MISCELLANEOUS
Qty: 300 EACH | Refills: 1 | Status: SHIPPED | OUTPATIENT
Start: 2018-10-28 | End: 2019-01-01 | Stop reason: SDUPTHER

## 2018-10-30 ENCOUNTER — OFFICE VISIT (OUTPATIENT)
Dept: HEMATOLOGY ONCOLOGY | Facility: CLINIC | Age: 78
End: 2018-10-30
Payer: MEDICARE

## 2018-10-30 VITALS
TEMPERATURE: 97.8 F | HEIGHT: 60 IN | WEIGHT: 96.8 LBS | DIASTOLIC BLOOD PRESSURE: 78 MMHG | RESPIRATION RATE: 17 BRPM | OXYGEN SATURATION: 97 % | SYSTOLIC BLOOD PRESSURE: 144 MMHG | BODY MASS INDEX: 19.01 KG/M2 | HEART RATE: 83 BPM

## 2018-10-30 DIAGNOSIS — G89.29 OTHER CHRONIC PAIN: ICD-10-CM

## 2018-10-30 DIAGNOSIS — C34.11 SQUAMOUS CELL CARCINOMA OF BRONCHUS IN RIGHT UPPER LOBE (HCC): Primary | ICD-10-CM

## 2018-10-30 PROCEDURE — 99215 OFFICE O/P EST HI 40 MIN: CPT | Performed by: INTERNAL MEDICINE

## 2018-10-30 RX ORDER — PREGABALIN 100 MG/1
100 CAPSULE ORAL
Qty: 90 CAPSULE | Refills: 0 | Status: SHIPPED | OUTPATIENT
Start: 2018-10-30 | End: 2019-01-01 | Stop reason: ALTCHOICE

## 2018-10-30 RX ORDER — MOMETASONE FUROATE 1 MG/ML
1 SOLUTION TOPICAL DAILY
Refills: 3 | COMMUNITY
Start: 2018-10-01 | End: 2020-01-01 | Stop reason: HOSPADM

## 2018-10-30 RX ORDER — OXYCODONE HYDROCHLORIDE AND ACETAMINOPHEN 5; 325 MG/1; MG/1
1 TABLET ORAL EVERY 4 HOURS PRN
Qty: 180 TABLET | Refills: 0 | Status: SHIPPED | OUTPATIENT
Start: 2018-10-30 | End: 2018-11-29 | Stop reason: SDUPTHER

## 2018-10-30 NOTE — PROGRESS NOTES
[facility] HEMATOLOGY ONCOLOGY Proctor Jennifer  [address] [address] [phone]    Rohan Alston, [id number]  10/30/18    Discussion:   In summary, this is a 76-year-old female history of advanced lung cancer  She is currently on Taxotere/Cyramza  Clinically she is essentially stable  She has bilateral lower extremity pain which is chronic secondary to vascular insufficiency  She uses Percocet several times a day with benefit  Appetite is somewhat diminished  She has lost about 7 lb in the past 4 months  Performance status fluctuates  When she is more motivated she is able to be up and around more than half of the day  When she is less motivated she tends to be sedentary for better part of the day  CBC and chemistry are essentially normal with the exception of hypoalbuminemia  Oral supplementation was suggested  At length today with her family we reviewed her current status and treatment options  If her disease is stable/improved, a decrease in Taxotere with continuation of [employer] is favored  If her disease worsens transition to supportive care could be considered  Additionally, we do participate in a research trial investigating augmentation of immunotherapy in patients with previously immunotherapy resistant disease  This could be an option, if she meets eligibility criteria  We will re-stage prior to her next visit in a few weeks and proceed as outlined above  I discussed the above with the patient    The patient and her  and daughter voiced understanding and agreement   ______________________________________________________________________    Chief Complaint   Patient presents with    Follow-up       HPI:     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 ([facility] [address] )    7/30/2016 Initial Diagnosis     CT of the neck for evaluation of the carotid arteries incidentally showed an infiltrating mass in the right upper lobe extending to the hilum  PET-CT June 2016 patient was found to have a thyroid mass on physical examination  Ultrasound showed bilateral thyroid nodules  In July 2016 she underwent CAT scan of the neck for evaluation of the carotid arteries  Incidentally noted, infiltrating mass in the right upper lobe extending to the hilum was noted  4, 2016âPET/CT showed a right upper lobe mass measuring 4 8 cm, SUV 21 5  This extends to the right hilum  Right paratracheal and subcarinal nodes measure up to 12 mm  The left adrenal showed some hypermetabolism with SUV of 3 7, without discrete mass  Uptake in the right parotid gland is noted with SUV of 22 3 and a soft tissue nodule, measuring 11 mm  endoscopy and bronchoscopy showed 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  Lymph node biopsies did not show malignancy  and radiation therapy were not felt to be applicable  Due to background pulmonary dysfunction as well as the potential for left adrenal metastatic disease  Chemotherapy was chosen as primary therapy  Alternatively  6, 2016 started Abraxane 80 mg/m² day 1, 8, 15, carbo AUC-5, day 1 only, every 21 days  2017progressive disease noted  Tecentriq 1200 mg IV every 3 weeks initiated  Current Therapy: May 2017progressive disease noted  Tecentriq 1200 mg IV every 3 weeks initiated  Interval History: Has had back pain started about 4 days ago  Started after some housework, has been getting better with heating pad           9/6/2016 - 11/25/2016 Chemotherapy     Abraxane 80 mg/m2 day 1, 8, 15, carbo AUC-5, day 1 only, Q 21 days  5/3/2017 Progression     Progressive disease  5/16/2017 -  Chemotherapy     Tecentriq 1200 mg IV Q 3 weeks           2/21/2018 - 3/14/2018 Radiation     C1    Plan ID Energy Fractions Dose per Fraction (cGy) Total Dose Delivered (cGy) Elapsed Days   Abdomen 6X 15 / 15 250 3,750 21      Treatment dates:  C1: 2/21/2018 - 3/14/2018             Metastasis to adrenal gland ([facility] [address] )    7/17/2017 Initial Diagnosis     Metastasis to adrenal gland (HCC)A mass in the anterior limb of the left adrenal gland with delayed postcontrast enhancement measuring 18 mm is either new or increasing from as far back as November 2016  The finding is suspicious for adrenal metastatic lesion superimposed upon   underlying bilateral adenomatous hyperplasia  Interval History:  Clinically stable  1 - Symptomatic but completely ambulatory    Review of Systems   Constitutional: Negative for appetite change, diaphoresis, fatigue and fever  HENT: Negative for sinus pain  Eyes: Negative for discharge  Respiratory: Negative for cough and shortness of breath  Cardiovascular: Negative for chest pain  Gastrointestinal: Negative for abdominal pain, constipation and diarrhea  Endocrine: Negative for cold intolerance  Genitourinary: Negative for difficulty urinating and hematuria  Musculoskeletal: Negative for joint swelling  Skin: Negative for rash  Allergic/Immunologic: Negative for environmental allergies  Neurological: Negative for dizziness and headaches  Hematological: Negative for adenopathy  Psychiatric/Behavioral: Negative for agitation         Past Medical History:   Diagnosis Date    Abnormal CT of the chest     last assessed: Aug 8, 2016    Arthritis     Asthma     Asymptomatic carotid artery stenosis     last assessed: March 2, 2017    Benign essential hypertension     last assessed: March 2, 2017    Cataract of right eye 3/19/2015    Cataract of right eye     last assessed: March 19, 2015    CHF (congestive heart failure) (HCC)     Common cold     Coronary artery disease     Depression     Diabetes mellitus ([address] )     Fracture of multiple pubic rami ([facility] [address] ) 1/6/2015    History of radiation therapy     Hyperlipidemia     Hypertension     Lung cancer ([facility] [address] )     Multiple thyroid nodules 7/26/2016    Myocardial infarction [facility])     Pulmonary emphysema ([place] [address] )     Pulmonary nodule     Shortness of breath      Patient Active Problem List   Diagnosis    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 (HCC)    Asymptomatic carotid artery stenosis    Back pain, lumbosacral    Benign essential hypertension    Bursitis, ischial    Cardiomyopathy ([place] [address] )    Cataract of right eye    Chemotherapy-induced nausea    Chronic respiratory failure with hypoxia and hypercapnia (HCC)    COPD, very severe (HCC)    Congestive heart failure (HCC)    Depression    DMII (diabetes mellitus, type 2) ([place] [address] )    Hyperlipidemia    Metastasis to adrenal gland (HCC)    Multiple thyroid nodules    Osteoporosis    Other chronic pain    Parotid adenoma    Psoriasis    Primary localized osteoarthrosis of the hip    PVD (peripheral vascular disease) ([place] [address] )    Restless legs syndrome    Sciatica    Seborrheic dermatitis of scalp    Cellulitis of left foot       Current Outpatient Prescriptions:     albuterol (2 5 mg/3 mL) 0 083 % nebulizer solution, Take 1 vial (2 5 mg total) by nebulization 4 (four) times a day, Disp: 360 vial, Rfl: 3    albuterol (VENTOLIN HFA) 90 mcg/act inhaler, Inhale 2 puffs every 6 (six) hours as needed for wheezing, Disp: 3 Inhaler, Rfl: 3    azithromycin (ZITHROMAX) 250 mg tablet, Take 2 tablets (500 mg total) by mouth 3 (three) times a week for 140 days, Disp: 30 tablet, Rfl: 3    betamethasone, augmented, (DIPROLENE-AF) 0 05 % cream, APPLY TOPICALLY 2 (TWO) TIMES A DAY, Disp: 50 g, Rfl: 1    fluticasone-salmeterol (ADVAIR) 500-50 mcg/dose inhaler, Inhale 1 puff every 12 (twelve) hours, Disp: 3 Inhaler, Rfl: 3    FREESTYLE LITE test strip, TEST BLOOD SUGAR 3 TIMES DAILY, Disp: 300 each, Rfl: 1    furosemide (LASIX) 20 mg tablet, Take 1 tablet (20 mg total) by mouth daily, Disp: 90 tablet, Rfl: 2    metoprolol tartrate (LOPRESSOR) 100 mg tablet, Take 1 tablet (100 mg total) by mouth daily, Disp: 90 tablet, Rfl: 0    mometasone (ELOCON) 0 1 % lotion, Apply 1 application topically daily, Disp: , Rfl: 3    oxyCODONE-acetaminophen (PERCOCET) 5-325 mg per tablet, Take 1 tablet by mouth every 4 (four) hours as needed for moderate pain Max Daily Amount: 6 tablets, Disp: 180 tablet, Rfl: 0    pentoxifylline (TRENtal) 400 mg ER tablet, Take 400 mg by mouth 3 (three) times a day with meals  , Disp: , Rfl:     pregabalin (LYRICA) 100 mg capsule, Take 1 capsule (100 mg total) by mouth daily at bedtime, Disp: 30 capsule, Rfl: 4    simvastatin (ZOCOR) 80 mg tablet, TAKE 1 TABLET BY MOUTH EVERY DAY, Disp: 90 tablet, Rfl: 0    Tiotropium Bromide Monohydrate (SPIRIVA RESPIMAT) 2 5 MCG/ACT AERS, Inhale 2 Act (5 mcg total) daily, Disp: 3 Inhaler, Rfl: 3    ondansetron (ZOFRAN) 8 mg tablet, Take 1 tablet (8 mg total) by mouth every 8 (eight) hours as needed for nausea or vomiting (Patient not taking: Reported on 10/30/2018 ), Disp: 30 tablet, Rfl: 2  Allergies   Allergen Reactions    Penicillins Swelling     Legs swell up     Past Surgical History:   Procedure Laterality Date    BRONCHOSCOPY N/A 8/10/2016    Procedure: BRONCHOSCOPY FLEXIBLE;  Surgeon: Pavan Gonsales MD;  [facility]: [facility];  Service:    [facility] BYPASS GRAFT      using vein - aortic-bifemoral - last assessed: Aug 22, 2016     SECTION      CHOLECYSTECTOMY      EYE SURGERY      HYSTERECTOMY      RI BRONCHOSCOPY NEEDLE BX TRACHEA MAIN STEM&/BRON N/A 8/10/2016    Procedure: ENDOBRONCHIAL ULTRASOUND (FROZEN SECTION) ; Surgeon: Pavan Gonsales MD;  Location: [facility];  Service: Thoracic    RI INSJ TUNNELED CTR VAD W/SUBQ PORT AGE 5 YR/> Left 2016    Procedure: INSERTION VENOUS PORT (PORT-A-CATH);   Surgeon: Pavan Gonsales MD;  [facility];  Service: Thoracic    TONSILLECTOMY       Social History     Objective:  Vitals:    10/30/18 0749   BP: 144/78   BP Location: Right arm   Patient Position: Sitting   Pulse: 83   Resp: 17   Temp: 97 8 °F (36 6 °C)   TempSrc: Tympanic   SpO2: 97% Weight: 43 9 kg (96 lb 12 8 oz)   Height: 5' (1 524 m)     Physical Exam   Constitutional: She is oriented to person, place, and time  She appears well-developed  HENT:   Head: Normocephalic  Eyes: Pupils are equal, round, and reactive to light  Neck: Neck supple  Cardiovascular: Normal rate  No murmur heard  Pulmonary/Chest: No respiratory distress  She has no wheezes  She has no rales  Abdominal: Soft  She exhibits no distension  There is no tenderness  There is no rebound  Musculoskeletal: She exhibits no edema  Lymphadenopathy:     She has no cervical adenopathy  Neurological: She is alert and oriented to person, place, and time  She displays normal reflexes  Skin: Skin is warm  No rash noted  Psychiatric: She has a normal mood and affect  Thought content normal          Labs: I personally reviewed the labs and imaging pertinent to this patient care

## 2018-11-04 RX ORDER — SODIUM CHLORIDE 9 MG/ML
40 INJECTION, SOLUTION INTRAVENOUS CONTINUOUS
Status: DISPENSED | OUTPATIENT
Start: 2018-11-06 | End: 2018-11-07

## 2018-11-05 ENCOUNTER — HOSPITAL ENCOUNTER (OUTPATIENT)
Dept: INFUSION CENTER | Facility: HOSPITAL | Age: 78
Discharge: HOME | End: 2018-11-05
Payer: MEDICARE

## 2018-11-05 LAB
ALBUMIN SERPL BCP-MCNC: 2.5 G/DL (ref 3.5–5)
ALP SERPL-CCNC: 101 U/L (ref 46–116)
ALT SERPL W P-5'-P-CCNC: 11 U/L (ref 12–78)
ANION GAP SERPL CALCULATED.3IONS-SCNC: 1 MMOL/L (ref 4–13)
AST SERPL W P-5'-P-CCNC: 15 U/L (ref 5–45)
BASOPHILS # BLD AUTO: 0.05 THOUSANDS/ΜL (ref 0–0.1)
BASOPHILS NFR BLD AUTO: 1 % (ref 0–1)
BILIRUB SERPL-MCNC: 0.3 MG/DL (ref 0.2–1)
BUN SERPL-MCNC: 11 MG/DL (ref 5–25)
CALCIUM SERPL-MCNC: 8.6 MG/DL (ref 8.3–10.1)
CHLORIDE SERPL-SCNC: 100 MMOL/L (ref 100–108)
CO2 SERPL-SCNC: 41 MMOL/L (ref 21–32)
CREAT SERPL-MCNC: 0.78 MG/DL (ref 0.6–1.3)
EOSINOPHIL # BLD AUTO: 0 THOUSAND/ΜL (ref 0–0.61)
EOSINOPHIL NFR BLD AUTO: 0 % (ref 0–6)
ERYTHROCYTE [DISTWIDTH] IN BLOOD BY AUTOMATED COUNT: 12 % (ref 11.6–15.1)
GFR SERPL CREATININE-BSD FRML MDRD: 73 ML/MIN/1.73SQ M
GLUCOSE SERPL-MCNC: 154 MG/DL (ref 65–140)
HCT VFR BLD AUTO: 38.1 % (ref 34.8–46.1)
HGB BLD-MCNC: 11.4 G/DL (ref 11.5–15.4)
IMM GRANULOCYTES # BLD AUTO: 0.06 THOUSAND/UL (ref 0–0.2)
IMM GRANULOCYTES NFR BLD AUTO: 1 % (ref 0–2)
LYMPHOCYTES # BLD AUTO: 0.77 THOUSANDS/ΜL (ref 0.6–4.47)
LYMPHOCYTES NFR BLD AUTO: 7 % (ref 14–44)
MCH RBC QN AUTO: 30.5 PG (ref 26.8–34.3)
MCHC RBC AUTO-ENTMCNC: 29.9 G/DL (ref 31.4–37.4)
MCV RBC AUTO: 102 FL (ref 82–98)
MONOCYTES # BLD AUTO: 0.68 THOUSAND/ΜL (ref 0.17–1.22)
MONOCYTES NFR BLD AUTO: 6 % (ref 4–12)
NEUTROPHILS # BLD AUTO: 9.23 THOUSANDS/ΜL (ref 1.85–7.62)
NEUTS SEG NFR BLD AUTO: 85 % (ref 43–75)
NRBC BLD AUTO-RTO: 0 /100 WBCS
PLATELET # BLD AUTO: 208 THOUSANDS/UL (ref 149–390)
PMV BLD AUTO: 11.3 FL (ref 8.9–12.7)
POTASSIUM SERPL-SCNC: 4 MMOL/L (ref 3.5–5.3)
PROT SERPL-MCNC: 6.2 G/DL (ref 6.4–8.2)
RBC # BLD AUTO: 3.74 MILLION/UL (ref 3.81–5.12)
SODIUM SERPL-SCNC: 142 MMOL/L (ref 136–145)
WBC # BLD AUTO: 10.79 THOUSAND/UL (ref 4.31–10.16)

## 2018-11-05 PROCEDURE — 80053 COMPREHEN METABOLIC PANEL: CPT | Performed by: INTERNAL MEDICINE

## 2018-11-05 PROCEDURE — 85025 COMPLETE CBC W/AUTO DIFF WBC: CPT | Performed by: INTERNAL MEDICINE

## 2018-11-05 RX ADMIN — Medication 300 UNITS: at 08:04

## 2018-11-05 NOTE — PROGRESS NOTES
Pt in infusion center today for central line labs  Pt tolerated port access and deaccess with no adverse reactions

## 2018-11-06 ENCOUNTER — HOSPITAL ENCOUNTER (OUTPATIENT)
Dept: INFUSION CENTER | Facility: HOSPITAL | Age: 78
Discharge: HOME | End: 2018-11-06
Payer: MEDICARE

## 2018-11-06 VITALS
DIASTOLIC BLOOD PRESSURE: 81 MMHG | BODY MASS INDEX: 19.13 KG/M2 | HEIGHT: 60 IN | TEMPERATURE: 97.4 F | WEIGHT: 97.44 LBS | SYSTOLIC BLOOD PRESSURE: 147 MMHG | RESPIRATION RATE: 18 BRPM | OXYGEN SATURATION: 97 % | HEART RATE: 80 BPM

## 2018-11-06 PROCEDURE — 96413 CHEMO IV INFUSION 1 HR: CPT

## 2018-11-06 PROCEDURE — 96417 CHEMO IV INFUS EACH ADDL SEQ: CPT

## 2018-11-06 PROCEDURE — 96367 TX/PROPH/DG ADDL SEQ IV INF: CPT

## 2018-11-06 RX ADMIN — Medication 300 UNITS: at 11:38

## 2018-11-06 RX ADMIN — SODIUM CHLORIDE 440 MG: 0.9 INJECTION, SOLUTION INTRAVENOUS at 09:09

## 2018-11-06 RX ADMIN — ONDANSETRON HYDROCHLORIDE: 2 INJECTION, SOLUTION INTRAMUSCULAR; INTRAVENOUS at 08:39

## 2018-11-06 RX ADMIN — SODIUM CHLORIDE 40 ML/HR: 9 INJECTION, SOLUTION INTRAVENOUS at 08:18

## 2018-11-06 RX ADMIN — DIPHENHYDRAMINE HYDROCHLORIDE 25 MG: 50 INJECTION, SOLUTION INTRAMUSCULAR; INTRAVENOUS at 08:18

## 2018-11-06 RX ADMIN — DOCETAXEL 70 MG: 20 INJECTION, SOLUTION, CONCENTRATE INTRAVENOUS at 10:29

## 2018-11-06 NOTE — PROGRESS NOTES
Pt matthew chemo infusions without c/o  Port deaccessed after flushing with Heparin  Disch via w/c to home with spouse  Instructions reviewed

## 2018-11-14 ENCOUNTER — HOSPITAL ENCOUNTER (OUTPATIENT)
Dept: CT IMAGING | Facility: HOSPITAL | Age: 78
Discharge: HOME | End: 2018-11-14
Attending: INTERNAL MEDICINE
Payer: MEDICARE

## 2018-11-14 DIAGNOSIS — C34.11 SQUAMOUS CELL CARCINOMA OF BRONCHUS IN RIGHT UPPER LOBE (HCC): ICD-10-CM

## 2018-11-14 PROCEDURE — 71260 CT THORAX DX C+: CPT

## 2018-11-14 PROCEDURE — 74177 CT ABD & PELVIS W/CONTRAST: CPT

## 2018-11-14 RX ADMIN — Medication 300 UNITS: at 13:27

## 2018-11-14 RX ADMIN — IOHEXOL 100 ML: 350 INJECTION, SOLUTION INTRAVENOUS at 13:26

## 2018-11-15 ENCOUNTER — TELEPHONE (OUTPATIENT)
Dept: HEMATOLOGY ONCOLOGY | Facility: CLINIC | Age: 78
End: 2018-11-15

## 2018-11-20 ENCOUNTER — OFFICE VISIT (OUTPATIENT)
Dept: HEMATOLOGY ONCOLOGY | Facility: CLINIC | Age: 78
End: 2018-11-20
Payer: MEDICARE

## 2018-11-20 VITALS
HEIGHT: 63 IN | WEIGHT: 96.6 LBS | DIASTOLIC BLOOD PRESSURE: 72 MMHG | TEMPERATURE: 97.2 F | RESPIRATION RATE: 14 BRPM | HEART RATE: 75 BPM | SYSTOLIC BLOOD PRESSURE: 120 MMHG | OXYGEN SATURATION: 95 % | BODY MASS INDEX: 17.12 KG/M2

## 2018-11-20 DIAGNOSIS — C34.11 SQUAMOUS CELL CARCINOMA OF BRONCHUS IN RIGHT UPPER LOBE (HCC): Primary | ICD-10-CM

## 2018-11-20 PROCEDURE — 99215 OFFICE O/P EST HI 40 MIN: CPT | Performed by: INTERNAL MEDICINE

## 2018-11-20 NOTE — PROGRESS NOTES
[facility] HEMATOLOGY ONCOLOGY Barbara Nuñez  [address] [address] [phone]    Rohini Boucher, [id number]  11/20/18    Discussion:   In summary, this is a 67-year-old female history of squamous cell carcinoma of the lung  She is currently on Taxotere and Cyramza  She does have some problems such as fluctuating fatigue, fluctuating appetite, intermittent memory problems, etc   Some of this is coming from Taxotere, no doubt  She also has chronic diarrhea although this can be worse at times  Not necessarily with temporal relationship to chemotherapy, however  Fortunately Imodium is helpful when used on a p r n  basis although she is reluctant to take it  Recent imaging shows decrease in the size of her primary tumor as well as adrenal metastasis  No clear evidence of progressive tumor is noted  We reviewed that this program has shifting therapeutic ratio with increasing potential for cytotoxic toxicity and diminishing likelihood of benefit  Based on this analysis I would be inclined to continue with Taxotere for 1 more cycle and use Cyramza during that cycle and then afterwards as maintenance therapy  Hcinyere Heena is associated with less toxicity than Taxotere and is generally well-tolerated in the long run  Lastly, I reviewed with the patient that she had had an episode of heart failure earlier this year  They were not satisfied with the Cardiology evaluation at that time  We will try to make arrangements to connect them with heart failure cardiologist   I discussed the above with the patient    The patient and her family voiced understanding and agreement   ______________________________________________________________________    Chief Complaint   Patient presents with    Follow-up     3 week follow up       HPI:     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 ([address] )    7/30/2016 Initial Diagnosis     CT of the neck for evaluation of the carotid arteries incidentally showed an infiltrating mass in the right upper lobe extending to the hilum  PET-CT June 2016 patient was found to have a thyroid mass on physical examination  Ultrasound showed bilateral thyroid nodules  In July 2016 she underwent CAT scan of the neck for evaluation of the carotid arteries  Incidentally noted, infiltrating mass in the right upper lobe extending to the hilum was noted  4, 2016âPET/CT showed a right upper lobe mass measuring 4 8 cm, SUV 21 5  This extends to the right hilum  Right paratracheal and subcarinal nodes measure up to 12 mm  The left adrenal showed some hypermetabolism with SUV of 3 7, without discrete mass  Uptake in the right parotid gland is noted with SUV of 22 3 and a soft tissue nodule, measuring 11 mm  endoscopy and bronchoscopy showed 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  Lymph node biopsies did not show malignancy  and radiation therapy were not felt to be applicable  Due to background pulmonary dysfunction as well as the potential for left adrenal metastatic disease  Chemotherapy was chosen as primary therapy  Alternatively  6, 2016 started Abraxane 80 mg/m² day 1, 8, 15, carbo AUC-5, day 1 only, every 21 days  2017progressive disease noted  Tecentriq 1200 mg IV every 3 weeks initiated  Current Therapy: May 2017progressive disease noted  Tecentriq 1200 mg IV every 3 weeks initiated  Interval History: Has had back pain started about 4 days ago  Started after some housework, has been getting better with heating pad           9/6/2016 - 11/25/2016 Chemotherapy     Abraxane 80 mg/m2 day 1, 8, 15, carbo AUC-5, day 1 only, Q 21 days  5/3/2017 Progression     Progressive disease  5/16/2017 - 9/20/2018 Chemotherapy     Tecentriq 1200 mg IV Q 3 weeks           2/21/2018 - 3/14/2018 Radiation     C1    Plan ID Energy Fractions Dose per Fraction (cGy) Total Dose Delivered (cGy) Elapsed Days   Abdomen 6X 15 / 15 250 3,750 21      Treatment dates:  C1: 2/21/2018 - 3/14/2018           9/26/2018 -  Chemotherapy     Taxotere 50 mg/m2, Cyramza 10 mg/kg, both given day 1, Q 21 days  Metastasis to adrenal gland ([address] )    7/17/2017 Initial Diagnosis     Metastasis to adrenal gland (HCC)A mass in the anterior limb of the left adrenal gland with delayed postcontrast enhancement measuring 18 mm is either new or increasing from as far back as November 2016  The finding is suspicious for adrenal metastatic lesion superimposed upon   underlying bilateral adenomatous hyperplasia  Interval History:  Clinically stable  2 - Symptomatic, <50% confined to bed    Review of Systems   Constitutional: Negative for appetite change, diaphoresis, fatigue and fever  HENT: Negative for sinus pain  Eyes: Negative for discharge  Respiratory: Negative for cough and shortness of breath  Cardiovascular: Negative for chest pain  Gastrointestinal: Negative for abdominal pain, constipation and diarrhea  Endocrine: Negative for cold intolerance  Genitourinary: Negative for difficulty urinating and hematuria  Musculoskeletal: Negative for joint swelling  Skin: Negative for rash  Allergic/Immunologic: Negative for environmental allergies  Neurological: Negative for dizziness and headaches  Hematological: Negative for adenopathy  Psychiatric/Behavioral: Negative for agitation         Past Medical History:   Diagnosis Date    Abnormal CT of the chest     last assessed: Aug 8, 2016    Arthritis     Asthma     Asymptomatic carotid artery stenosis     last assessed: March 2, 2017    Benign essential hypertension     last assessed: March 2, 2017    Cataract of right eye 3/19/2015    Cataract of right eye     last assessed: March 19, 2015    CHF (congestive heart failure) (HCC)     Common cold     Coronary artery disease     Depression     Diabetes mellitus ([address] )     Fracture of multiple pubic rami ([address] ) 1/6/2015    History of radiation therapy     Hyperlipidemia     Hypertension     Lung cancer ([facility] )     Multiple thyroid nodules 7/26/2016    Myocardial infarction ([place] [address] )     Pulmonary emphysema (HCC)     Pulmonary nodule     Shortness of breath      Patient Active Problem List   Diagnosis    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    Asymptomatic carotid artery stenosis    Back pain, lumbosacral    Benign essential hypertension    Bursitis, ischial    Cardiomyopathy ([place] [address] )    Cataract of right eye    Chemotherapy-induced nausea    Chronic respiratory failure with hypoxia and hypercapnia (HCC)    COPD, very severe ([place] [address] )    Congestive heart failure ([place] [address] )    Depression    DMII (diabetes mellitus, type 2) ([place] [address] )    Hyperlipidemia    Metastasis to adrenal gland (HCC)    Multiple thyroid nodules    Osteoporosis    Other chronic pain    Parotid adenoma    Psoriasis    Primary localized osteoarthrosis of the hip    PVD (peripheral vascular disease) ([place] [address] )    Restless legs syndrome    Sciatica    Seborrheic dermatitis of scalp    Cellulitis of left foot       Current Outpatient Prescriptions:     albuterol (2 5 mg/3 mL) 0 083 % nebulizer solution, Take 1 vial (2 5 mg total) by nebulization 4 (four) times a day, Disp: 360 vial, Rfl: 3    albuterol (VENTOLIN HFA) 90 mcg/act inhaler, Inhale 2 puffs every 6 (six) hours as needed for wheezing, Disp: 3 Inhaler, Rfl: 3    azithromycin (ZITHROMAX) 250 mg tablet, Take 2 tablets (500 mg total) by mouth 3 (three) times a week for 140 days, Disp: 30 tablet, Rfl: 3    betamethasone, augmented, (DIPROLENE-AF) 0 05 % cream, APPLY TOPICALLY 2 (TWO) TIMES A DAY, Disp: 50 g, Rfl: 1    fluticasone-salmeterol (ADVAIR) 500-50 mcg/dose inhaler, Inhale 1 puff every 12 (twelve) hours, Disp: 3 Inhaler, Rfl: 3    FREESTYLE LITE test strip, TEST BLOOD SUGAR 3 TIMES DAILY, Disp: 300 each, Rfl: 1    furosemide (LASIX) 20 mg tablet, Take 1 tablet (20 mg total) by mouth daily, Disp: 90 tablet, Rfl: 2    metoprolol tartrate (LOPRESSOR) 100 mg tablet, Take 1 tablet (100 mg total) by mouth daily, Disp: 90 tablet, Rfl: 0    mometasone (ELOCON) 0 1 % lotion, Apply 1 application topically daily, Disp: , Rfl: 3    ondansetron (ZOFRAN) 8 mg tablet, Take 1 tablet (8 mg total) by mouth every 8 (eight) hours as needed for nausea or vomiting, Disp: 30 tablet, Rfl: 2    oxyCODONE-acetaminophen (PERCOCET) 5-325 mg per tablet, Take 1 tablet by mouth every 4 (four) hours as needed for moderate pain Earliest Fill Date: 10/30/18 Max Daily Amount: 6 tablets, Disp: 180 tablet, Rfl: 0    pentoxifylline (TRENtal) 400 mg ER tablet, Take 400 mg by mouth 3 (three) times a day with meals  , Disp: , Rfl:     pregabalin (LYRICA) 100 mg capsule, Take 1 capsule (100 mg total) by mouth daily at bedtime, Disp: 90 capsule, Rfl: 0    simvastatin (ZOCOR) 80 mg tablet, TAKE 1 TABLET BY MOUTH EVERY DAY, Disp: 90 tablet, Rfl: 0    Tiotropium Bromide Monohydrate (SPIRIVA RESPIMAT) 2 5 MCG/ACT AERS, Inhale 2 Act (5 mcg total) daily, Disp: 3 Inhaler, Rfl: 3  Allergies   Allergen Reactions    Penicillins Swelling     Legs swell up     Past Surgical History:   Procedure Laterality Date    BRONCHOSCOPY N/A 8/10/2016    Procedure: BRONCHOSCOPY FLEXIBLE;  Surgeon: Robyn Shaw MD;  [facility];  Service:    [facility] BYPASS GRAFT      using vein - aortic-bifemoral - last assessed: Aug 22, 2016     SECTION      CHOLECYSTECTOMY      EYE SURGERY      HYSTERECTOMY      MT BRONCHOSCOPY NEEDLE BX TRACHEA MAIN STEM&/BRON N/A 8/10/2016    Procedure: ENDOBRONCHIAL ULTRASOUND (FROZEN SECTION) ; Surgeon: Robyn Shaw MD;  [facility];  Service: Thoracic    MT INSJ TUNNELED CTR VAD W/SUBQ PORT AGE 5 YR/> Left 2016    Procedure: INSERTION VENOUS PORT (PORT-A-CATH);   Surgeon: Robyn Shaw MD;  [facility];  Service: Thoracic    TONSILLECTOMY       Social History Objective:  Vitals:    11/20/18 1604   BP: 120/72   BP Location: Left arm   Patient Position: Sitting   Cuff Size: Adult   Pulse: 75   Resp: 14   Temp: (!) 97 2 °F (36 2 °C)   TempSrc: Tympanic   SpO2: 95%   Weight: 43 8 kg (96 lb 9 6 oz)   Height: 5' 3" (1 6 m)     Physical Exam   Constitutional: She is oriented to person, place, and time  She appears well-developed  HENT:   Head: Normocephalic  Eyes: Pupils are equal, round, and reactive to light  Neck: Neck supple  Cardiovascular: Normal rate  No murmur heard  Pulmonary/Chest: No respiratory distress  She has no wheezes  She has no rales  Abdominal: Soft  She exhibits no distension  There is no tenderness  There is no rebound  Musculoskeletal: She exhibits no edema  Lymphadenopathy:     She has no cervical adenopathy  Neurological: She is alert and oriented to person, place, and time  She displays normal reflexes  Skin: Skin is warm  No rash noted  Psychiatric: She has a normal mood and affect  Thought content normal          Labs: I personally reviewed the labs and imaging pertinent to this patient care

## 2018-11-21 RX ORDER — SODIUM CHLORIDE 9 MG/ML
20 INJECTION, SOLUTION INTRAVENOUS CONTINUOUS
Status: DISPENSED | OUTPATIENT
Start: 2018-11-27 | End: 2018-11-28

## 2018-11-26 ENCOUNTER — HOSPITAL ENCOUNTER (OUTPATIENT)
Dept: INFUSION CENTER | Facility: HOSPITAL | Age: 78
Discharge: HOME | End: 2018-11-26
Payer: MEDICARE

## 2018-11-26 LAB
ALBUMIN SERPL BCP-MCNC: 2.4 G/DL (ref 3.5–5)
ALP SERPL-CCNC: 98 U/L (ref 46–116)
ALT SERPL W P-5'-P-CCNC: 14 U/L (ref 12–78)
ANION GAP SERPL CALCULATED.3IONS-SCNC: 4 MMOL/L (ref 4–13)
AST SERPL W P-5'-P-CCNC: 17 U/L (ref 5–45)
BASOPHILS # BLD AUTO: 0.05 THOUSANDS/ΜL (ref 0–0.1)
BASOPHILS NFR BLD AUTO: 1 % (ref 0–1)
BILIRUB SERPL-MCNC: 0.4 MG/DL (ref 0.2–1)
BUN SERPL-MCNC: 14 MG/DL (ref 5–25)
CALCIUM SERPL-MCNC: 8.7 MG/DL (ref 8.3–10.1)
CHLORIDE SERPL-SCNC: 102 MMOL/L (ref 100–108)
CO2 SERPL-SCNC: 42 MMOL/L (ref 21–32)
CREAT SERPL-MCNC: 0.74 MG/DL (ref 0.6–1.3)
EOSINOPHIL # BLD AUTO: 0.01 THOUSAND/ΜL (ref 0–0.61)
EOSINOPHIL NFR BLD AUTO: 0 % (ref 0–6)
ERYTHROCYTE [DISTWIDTH] IN BLOOD BY AUTOMATED COUNT: 13.2 % (ref 11.6–15.1)
GFR SERPL CREATININE-BSD FRML MDRD: 77 ML/MIN/1.73SQ M
GLUCOSE SERPL-MCNC: 164 MG/DL (ref 65–140)
HCT VFR BLD AUTO: 37.1 % (ref 34.8–46.1)
HGB BLD-MCNC: 11 G/DL (ref 11.5–15.4)
IMM GRANULOCYTES # BLD AUTO: 0.05 THOUSAND/UL (ref 0–0.2)
IMM GRANULOCYTES NFR BLD AUTO: 1 % (ref 0–2)
LYMPHOCYTES # BLD AUTO: 0.75 THOUSANDS/ΜL (ref 0.6–4.47)
LYMPHOCYTES NFR BLD AUTO: 8 % (ref 14–44)
MCH RBC QN AUTO: 30.1 PG (ref 26.8–34.3)
MCHC RBC AUTO-ENTMCNC: 29.6 G/DL (ref 31.4–37.4)
MCV RBC AUTO: 101 FL (ref 82–98)
MONOCYTES # BLD AUTO: 0.66 THOUSAND/ΜL (ref 0.17–1.22)
MONOCYTES NFR BLD AUTO: 7 % (ref 4–12)
NEUTROPHILS # BLD AUTO: 8.03 THOUSANDS/ΜL (ref 1.85–7.62)
NEUTS SEG NFR BLD AUTO: 83 % (ref 43–75)
NRBC BLD AUTO-RTO: 0 /100 WBCS
PLATELET # BLD AUTO: 155 THOUSANDS/UL (ref 149–390)
PMV BLD AUTO: 11.6 FL (ref 8.9–12.7)
POTASSIUM SERPL-SCNC: 3.9 MMOL/L (ref 3.5–5.3)
PROT SERPL-MCNC: 5.9 G/DL (ref 6.4–8.2)
RBC # BLD AUTO: 3.66 MILLION/UL (ref 3.81–5.12)
SODIUM SERPL-SCNC: 148 MMOL/L (ref 136–145)
WBC # BLD AUTO: 9.55 THOUSAND/UL (ref 4.31–10.16)

## 2018-11-26 PROCEDURE — 85025 COMPLETE CBC W/AUTO DIFF WBC: CPT | Performed by: INTERNAL MEDICINE

## 2018-11-26 PROCEDURE — 80053 COMPREHEN METABOLIC PANEL: CPT | Performed by: INTERNAL MEDICINE

## 2018-11-26 RX ADMIN — Medication 300 UNITS: at 08:22

## 2018-11-27 ENCOUNTER — HOSPITAL ENCOUNTER (OUTPATIENT)
Dept: INFUSION CENTER | Facility: HOSPITAL | Age: 78
Discharge: HOME | End: 2018-11-27
Payer: MEDICARE

## 2018-11-27 VITALS
DIASTOLIC BLOOD PRESSURE: 59 MMHG | HEIGHT: 60 IN | OXYGEN SATURATION: 95 % | WEIGHT: 96.78 LBS | BODY MASS INDEX: 19 KG/M2 | SYSTOLIC BLOOD PRESSURE: 123 MMHG | RESPIRATION RATE: 16 BRPM | TEMPERATURE: 96.4 F | HEART RATE: 67 BPM

## 2018-11-27 PROCEDURE — 96417 CHEMO IV INFUS EACH ADDL SEQ: CPT

## 2018-11-27 PROCEDURE — 96367 TX/PROPH/DG ADDL SEQ IV INF: CPT

## 2018-11-27 PROCEDURE — 96413 CHEMO IV INFUSION 1 HR: CPT

## 2018-11-27 RX ADMIN — Medication 300 UNITS: at 11:29

## 2018-11-27 RX ADMIN — DIPHENHYDRAMINE HYDROCHLORIDE 25 MG: 50 INJECTION, SOLUTION INTRAMUSCULAR; INTRAVENOUS at 08:49

## 2018-11-27 RX ADMIN — SODIUM CHLORIDE 440 MG: 0.9 INJECTION, SOLUTION INTRAVENOUS at 09:16

## 2018-11-27 RX ADMIN — SODIUM CHLORIDE 20 ML/HR: 0.9 INJECTION, SOLUTION INTRAVENOUS at 08:30

## 2018-11-27 RX ADMIN — DOCETAXEL 70 MG: 20 INJECTION, SOLUTION, CONCENTRATE INTRAVENOUS at 10:24

## 2018-11-27 RX ADMIN — DEXAMETHASONE SODIUM PHOSPHATE: 10 INJECTION, SOLUTION INTRAMUSCULAR; INTRAVENOUS at 08:30

## 2018-11-29 DIAGNOSIS — G89.29 OTHER CHRONIC PAIN: ICD-10-CM

## 2018-11-29 RX ORDER — OXYCODONE HYDROCHLORIDE AND ACETAMINOPHEN 5; 325 MG/1; MG/1
1 TABLET ORAL EVERY 4 HOURS PRN
Qty: 180 TABLET | Refills: 0 | Status: SHIPPED | OUTPATIENT
Start: 2018-11-29 | End: 2018-12-31 | Stop reason: SDUPTHER

## 2018-11-30 ENCOUNTER — OFFICE VISIT (OUTPATIENT)
Dept: FAMILY MEDICINE CLINIC | Facility: CLINIC | Age: 78
End: 2018-11-30
Payer: MEDICARE

## 2018-11-30 VITALS
TEMPERATURE: 96.2 F | WEIGHT: 97 LBS | OXYGEN SATURATION: 96 % | DIASTOLIC BLOOD PRESSURE: 60 MMHG | HEART RATE: 75 BPM | SYSTOLIC BLOOD PRESSURE: 122 MMHG | BODY MASS INDEX: 19.04 KG/M2

## 2018-11-30 DIAGNOSIS — I50.32 CHRONIC DIASTOLIC CONGESTIVE HEART FAILURE (HCC): ICD-10-CM

## 2018-11-30 DIAGNOSIS — J44.9 COPD, VERY SEVERE (HCC): Primary | ICD-10-CM

## 2018-11-30 DIAGNOSIS — I73.9 PVD (PERIPHERAL VASCULAR DISEASE) (HCC): ICD-10-CM

## 2018-11-30 PROCEDURE — 99214 OFFICE O/P EST MOD 30 MIN: CPT | Performed by: FAMILY MEDICINE

## 2018-11-30 RX ORDER — FUROSEMIDE 40 MG/1
40 TABLET ORAL DAILY
Qty: 90 TABLET | Refills: 1
Start: 2018-11-30 | End: 2018-12-06 | Stop reason: SDUPTHER

## 2018-11-30 NOTE — ASSESSMENT & PLAN NOTE
Lab Results   Component Value Date    HGBA1C 6 3 06/28/2018    Continue with dietary management  No results for input(s): POCGLU in the last 72 hours      Blood Sugar Average: Last 72 hrs:

## 2018-11-30 NOTE — PROGRESS NOTES
Assessment/Plan:  DMII (diabetes mellitus, type 2) ([place] [address] )  Lab Results   Component Value Date    HGBA1C 6 3 06/28/2018    Continue with dietary management  No results for input(s): POCGLU in the last 72 hours  Blood Sugar Average: Last 72 hrs:      COPD, very severe ([facility] )  Continue with Advair, Spiriva as well as albuterol  Continue with nasal oxygen  Call if any increased sputum fever respiratory distress  Congestive heart failure ([place] [address] )  Going to increase her furosemide to 40 mg daily  Her chronic diastolic CHF is somewhat worse though she has no PND orthopnea weight gain and no significant increased respiratory distress  They are going to call over the next few days if there is any development of the symptoms  She is due for blood work at a infusion treatment for her lung carcinoma next 2-3 weeks at which time we will evaluate her potassium  Patient's  request referral to a new cardiologist   We are going to ask Dr Kayla Sal to see her  The patient is a 23-year-old female with history CHF as well as severe COPD  She does have some mild pedal edema today  She has no PND orthopnea or increased shortness of breath  I see no evidence of lower respiratory infection presently  For now we are going to have her increase her Lasix from 20-40  She will increase sources of potassium in her diet  Her potassium from 3-4 days ago was normal   She has blood work scheduled due to her infusions for her lung carcinoma scheduled in next couple weeks  Will follow up on her potassium and creatinine at that time   is going to call sooner should she develop PND orthopnea progressive edema shortness of breath fevers sputum X cetera  He agrees    We are going to also ask for an appointment for her to be evaluated by Dr Kayla Sal from Cardiology     Diagnoses and all orders for this visit:    COPD, very severe ([facility] )    Chronic diastolic congestive heart failure (HCC)  -     furosemide (LASIX) 40 mg tablet; Take 1 tablet (40 mg total) by mouth daily  -     Ambulatory referral to Cardiology; Future    PVD (peripheral vascular disease) ([facility])          Subjective:   Chief Complaint   Patient presents with    Edema     b/l ankles, R being worse than L  I am all swelled up  More wheezing and chest congestion  No URI or LRI sx  Using nebulizer 3-4 times a day  No PND or orthopnea  No fever  Appetite better  More tired after infusion  Oncologist upset with cardiologist       Patient ID: Jairon Parisi is a 77 y o  female  HPI  The patient is a 26-year-old female who presents with her  today stating that her feet have been more swollen for the past 3-4 days  She has history of chronic diastolic congestive heart failure as well as severe oxygen-dependent COPD  She has been on Lasix 20 mg daily in addition to metoprolol as well as Zocor  She also has albuterol for treatment of her COPD and she has been using her nebulizer 3-4 times daily  She uses Advair and Spiriva as well  She denies any chest pain or increased shortness of breath  She has been compliant with her nasal oxygen  She has had no fevers or chills  No PND or orthopnea  She did have a BNP over the summer which was in excess of 6000  She does continue to smoke an occasional cigarette  This was highly discouraged  Especially in light of the fact that she is using oxygen   states she is more fatigued  Her appetite is good  Her weight is unchanged  The following portions of the patient's history were reviewed and updated as appropriate: allergies, current medications, past family history, past medical history, past social history, past surgical history and problem list     Review of Systems   Constitution: Positive for malaise/fatigue  Negative for chills, decreased appetite, weight gain and weight loss  Weight is unchanged  Cardiovascular: Positive for dyspnea on exertion and leg swelling   Negative for chest pain, orthopnea, palpitations and paroxysmal nocturnal dyspnea  Respiratory: Positive for cough, shortness of breath and wheezing  Negative for hemoptysis and sputum production  Endocrine: Negative for polydipsia, polyphagia and polyuria  Skin: Positive for rash  Musculoskeletal: Negative for joint swelling  Objective:    Physical Exam   Constitutional: She appears well-developed and well-nourished  Sitting in a chair resting comfortably wearing nasal oxygen and no distress   Eyes: Conjunctivae are normal    Neck: JVD present  No thyromegaly present  Cardiovascular: Normal rate and regular rhythm  Heart sounds are distant  She has some mild delay of her capillary refill her feet which is unchanged from previous  No cyanosis presently  Dorsalis pedis pulses are not palpable  Pulmonary/Chest: No respiratory distress  She has wheezes  She has no rales  She does have mildly increased respiratory rate  I see no retractions or significant evidence of increased work of breathing  She is wearing her nasal oxygen  She is speaking in full sentences  Her breath sounds are distant  She has diffuse wheezing and some scattered rhonchi though no crackles  Musculoskeletal: She exhibits edema  She has pedal edema which is pitting  She has trace edema of her ankle and residential up her shin  She has no calf tenderness  Lymphadenopathy:     She has no cervical adenopathy  Neurological: She is alert  Nursing note and vitals reviewed

## 2018-11-30 NOTE — ASSESSMENT & PLAN NOTE
Continue with Advair, Spiriva as well as albuterol  Continue with nasal oxygen  Call if any increased sputum fever respiratory distress

## 2018-11-30 NOTE — ASSESSMENT & PLAN NOTE
Going to increase her furosemide to 40 mg daily  Her chronic diastolic CHF is somewhat worse though she has no PND orthopnea weight gain and no significant increased respiratory distress  They are going to call over the next few days if there is any development of the symptoms  She is due for blood work at a infusion treatment for her lung carcinoma next 2-3 weeks at which time we will evaluate her potassium  Patient's  request referral to a new cardiologist   We are going to ask Dr Elyce Boast to see her

## 2018-12-04 ENCOUNTER — HOSPITAL ENCOUNTER (OUTPATIENT)
Dept: RADIOLOGY | Facility: HOSPITAL | Age: 78
Discharge: HOME | End: 2018-12-04
Attending: INTERNAL MEDICINE
Payer: MEDICARE

## 2018-12-04 ENCOUNTER — OFFICE VISIT (OUTPATIENT)
Dept: PULMONOLOGY | Facility: HOSPITAL | Age: 78
End: 2018-12-04
Payer: MEDICARE

## 2018-12-04 VITALS
BODY MASS INDEX: 19.76 KG/M2 | SYSTOLIC BLOOD PRESSURE: 120 MMHG | HEART RATE: 76 BPM | OXYGEN SATURATION: 97 % | TEMPERATURE: 96.7 F | DIASTOLIC BLOOD PRESSURE: 74 MMHG | HEIGHT: 59 IN | WEIGHT: 98 LBS

## 2018-12-04 DIAGNOSIS — Z23 NEED FOR PNEUMOCOCCAL VACCINATION: ICD-10-CM

## 2018-12-04 DIAGNOSIS — J96.11 CHRONIC RESPIRATORY FAILURE WITH HYPOXIA AND HYPERCAPNIA (HCC): Primary | ICD-10-CM

## 2018-12-04 DIAGNOSIS — J44.1 COPD EXACERBATION (HCC): ICD-10-CM

## 2018-12-04 DIAGNOSIS — J96.12 CHRONIC RESPIRATORY FAILURE WITH HYPOXIA AND HYPERCAPNIA (HCC): Primary | ICD-10-CM

## 2018-12-04 DIAGNOSIS — Z23 NEED FOR INFLUENZA VACCINATION: ICD-10-CM

## 2018-12-04 PROCEDURE — 90670 PCV13 VACCINE IM: CPT

## 2018-12-04 PROCEDURE — 99214 OFFICE O/P EST MOD 30 MIN: CPT | Performed by: INTERNAL MEDICINE

## 2018-12-04 PROCEDURE — G0008 ADMIN INFLUENZA VIRUS VAC: HCPCS

## 2018-12-04 PROCEDURE — 71046 X-RAY EXAM CHEST 2 VIEWS: CPT

## 2018-12-04 PROCEDURE — 90662 IIV NO PRSV INCREASED AG IM: CPT

## 2018-12-04 PROCEDURE — G0009 ADMIN PNEUMOCOCCAL VACCINE: HCPCS

## 2018-12-04 RX ORDER — PREDNISONE 10 MG/1
40 TABLET ORAL DAILY
Qty: 20 TABLET | Refills: 0 | Status: SHIPPED | OUTPATIENT
Start: 2018-12-04 | End: 2018-12-09

## 2018-12-04 NOTE — ASSESSMENT & PLAN NOTE
She will continue with her regimen of Advair, Spiriva and nebulizer therapy  I will give her a 5 day course of prednisone, 40 mg daily  I instructed the family to call if she does not have improvement or if she should require a poor prolonged taper  We will check a chest x-ray today to exclude pulmonary edema given her lower extremity swelling

## 2018-12-04 NOTE — PROGRESS NOTES
Pulmonary Follow Up Note   Regis Latham 77 y o  female MRN: [id number]  12/4/2018      Assessment/Plan:    COPD exacerbation ([address] )  She will continue with her regimen of Advair, Spiriva and nebulizer therapy  I will give her a 5 day course of prednisone, 40 mg daily  I instructed the family to call if she does not have improvement or if she should require a poor prolonged taper  We will check a chest x-ray today to exclude pulmonary edema given her lower extremity swelling  Chronic respiratory failure with hypoxia and hypercapnia (HCC)  Oxygenation is adequate today on 3 liters/minute  She is not qualify for BiPAP despite having hypercapnic respiratory failure as well    Prevnar 13 and influenza vaccines were given today  Visit orders:    Diagnoses and all orders for this visit:    Chronic respiratory failure with hypoxia and hypercapnia (HCC)    COPD exacerbation (HCC)  -     predniSONE 10 mg tablet; Take 4 tablets (40 mg total) by mouth daily for 5 days  -     XR chest pa & lateral; Future    Need for influenza vaccination  -     PREFERRED: influenza vaccine, [id number], high-dose, PF 0 5 mL, for patients 65 yr+ (FLUZONE HIGH-DOSE)    Need for pneumococcal vaccination  -     Pneumococcal Conjugate Vaccine 13-valent IM      Return in about 3 months (around 3/4/2019)  History of Present Illness   HPI:  Regis Latham is a 77 y o  female who is here today for follow-up regarding very severe COPD and chronic hypoxic and hypercapnic respiratory failure  She is also on chemotherapy for stage IV lung cancer  She has a persistent cough without sputum production  She has wheezing which is audible to the family  This has been going on for some time  She also has increasing lower extremity edema and her diuretic dosing was recently increased  She has no fevers or chills  She has dyspnea with even minimal exertion  She has been using Advair twice daily and Spiriva once daily    She is also using the nebulizer 3-4 times per day  Review of Systems    she has lost a few lb since her last visit  She denies chest pain or palpitations  She denies skin rashes  She is hard of hearing  She has lost her hair from the chemotherapy  All other review of systems are negative  Historical Information   Past Medical History:   Diagnosis Date    Abnormal CT of the chest     last assessed: Aug 8, 2016    Arthritis     Asthma     Asymptomatic carotid artery stenosis     last assessed: 2017    Benign essential hypertension     last assessed: 2017    Cataract of right eye 3/19/2015    Cataract of right eye     last assessed: 2015    CHF (congestive heart failure) (HCC)     Common cold     Coronary artery disease     Depression     [facility]     [facility] 2015    History of radiation therapy     Hyperlipidemia     Hypertension     Lung cancer ([facility] )     Multiple thyroid nodules 2016    Myocardial infarction ([facility] )     Pulmonary emphysema (HCC)     Pulmonary nodule     Shortness of breath      Past Surgical History:   Procedure Laterality Date    BRONCHOSCOPY N/A 8/10/2016    Procedure: BRONCHOSCOPY FLEXIBLE;  Surgeon: David Flores MD;  [facility]: [facility];  Service:    Rome Polio BYPASS GRAFT      using vein - aortic-bifemoral - last assessed: Aug 22, 2016     SECTION      CHOLECYSTECTOMY      EYE SURGERY      HYSTERECTOMY      VA BRONCHOSCOPY NEEDLE BX TRACHEA MAIN STEM&/BRON N/A 8/10/2016    Procedure: ENDOBRONCHIAL ULTRASOUND (FROZEN SECTION) ; Surgeon: David Flores MD;  Location: [facility];  Service: Thoracic    VA INSJ TUNNELED CTR VAD W/SUBQ PORT AGE 5 YR/> Left 2016    Procedure: INSERTION VENOUS PORT (PORT-A-CATH);   Surgeon: David Flores MD;  [facility]: [facility];  Service: Thoracic    TONSILLECTOMY       Family History   Problem Relation Age of Onset    Brain cancer Sister    Rome Fu Cancer Sister    Rome Fu Thyroid disease Mother     Rheum arthritis Daughter        History   Smoking Status    Current Some Day Smoker    Packs/day: 0 25    Types: Cigarettes    Start date: 1946   Smokeless Tobacco    Never Used     Comment: smokes 3-4 cigs /day - current every day smoker noted in "allscripts" smoke for less than 1/2 pack per day for 50 years           Meds/Allergies     Current Outpatient Prescriptions:     albuterol (2 5 mg/3 mL) 0 083 % nebulizer solution, Take 1 vial (2 5 mg total) by nebulization 4 (four) times a day, Disp: 360 vial, Rfl: 3    albuterol (VENTOLIN HFA) 90 mcg/act inhaler, Inhale 2 puffs every 6 (six) hours as needed for wheezing, Disp: 3 Inhaler, Rfl: 3    azithromycin (ZITHROMAX) 250 mg tablet, Take 2 tablets (500 mg total) by mouth 3 (three) times a week for 140 days, Disp: 30 tablet, Rfl: 3    betamethasone, augmented, (DIPROLENE-AF) 0 05 % cream, APPLY TOPICALLY 2 (TWO) TIMES A DAY, Disp: 50 g, Rfl: 1    fluticasone-salmeterol (ADVAIR) 500-50 mcg/dose inhaler, Inhale 1 puff every 12 (twelve) hours, Disp: 3 Inhaler, Rfl: 3    FREESTYLE LITE test strip, TEST BLOOD SUGAR 3 TIMES DAILY, Disp: 300 each, Rfl: 1    furosemide (LASIX) 40 mg tablet, Take 1 tablet (40 mg total) by mouth daily, Disp: 90 tablet, Rfl: 1    metoprolol tartrate (LOPRESSOR) 100 mg tablet, Take 1 tablet (100 mg total) by mouth daily, Disp: 90 tablet, Rfl: 0    mometasone (ELOCON) 0 1 % lotion, Apply 1 application topically daily, Disp: , Rfl: 3    oxyCODONE-acetaminophen (PERCOCET) 5-325 mg per tablet, Take 1 tablet by mouth every 4 (four) hours as needed for moderate pain Max Daily Amount: 6 tablets, Disp: 180 tablet, Rfl: 0    pentoxifylline (TRENtal) 400 mg ER tablet, Take 400 mg by mouth 3 (three) times a day with meals  , Disp: , Rfl:     pregabalin (LYRICA) 100 mg capsule, Take 1 capsule (100 mg total) by mouth daily at bedtime, Disp: 90 capsule, Rfl: 0    simvastatin (ZOCOR) 80 mg tablet, TAKE 1 TABLET BY MOUTH EVERY DAY, Disp: 90 tablet, Rfl: 0    Tiotropium Bromide Monohydrate (SPIRIVA RESPIMAT) 2 5 MCG/ACT AERS, Inhale 2 Act (5 mcg total) daily, Disp: 3 Inhaler, Rfl: 3    ondansetron (ZOFRAN) 8 mg tablet, Take 1 tablet (8 mg total) by mouth every 8 (eight) hours as needed for nausea or vomiting (Patient not taking: Reported on 12/4/2018 ), Disp: 30 tablet, Rfl: 2    predniSONE 10 mg tablet, Take 4 tablets (40 mg total) by mouth daily for 5 days, Disp: 20 tablet, Rfl: 0  Allergies   Allergen Reactions    Penicillins Swelling     Legs swell up       Vitals: Blood pressure 120/74, pulse 76, temperature (!) 96 7 °F (35 9 °C), temperature source Tympanic, height 4' 11" (1 499 m), weight 44 5 kg (98 lb), SpO2 97 %  Body mass index is 19 79 kg/m²  Oxygen Therapy  SpO2: 97 %  Oxygen Therapy: Supplemental oxygen  O2 Delivery Method: Nasal cannula  O2 Flow Rate (L/min): 3 L/min    Physical Exam   Physical Exam   Constitutional: She is oriented to person, place, and time  No distress  Frail, cachectic   HENT:   Head: Normocephalic  Mouth/Throat: No oropharyngeal exudate  Eyes: Pupils are equal, round, and reactive to light  No scleral icterus  Neck: Neck supple  No JVD present  Cardiovascular: Normal rate and regular rhythm  Pulmonary/Chest: She has wheezes (Bilateral expiratory)  Scattered rhonchi   Abdominal: Soft  There is no tenderness  Musculoskeletal: She exhibits edema (1+ ankle)  Lymphadenopathy:     She has no cervical adenopathy  Neurological: She is alert and oriented to person, place, and time  Skin: Skin is warm and dry  Psychiatric: She has a normal mood and affect  Labs: I have personally reviewed pertinent lab results    Lab Results   Component Value Date    WBC 9 55 11/26/2018    HGB 11 0 (L) 11/26/2018    HCT 37 1 11/26/2018     (H) 11/26/2018     11/26/2018     Lab Results   Component Value Date    GLUCOSE 191 (H) 11/20/2015    CALCIUM 8 7 11/26/2018    NA 143 11/20/2015    K 3 9 11/26/2018    CO2 42 (H) 11/26/2018     11/26/2018    BUN 14 11/26/2018    CREATININE 0 74 11/26/2018     No results found for: IGE  Lab Results   Component Value Date    ALT 14 11/26/2018    AST 17 11/26/2018    ALKPHOS 98 11/26/2018    BILITOT 0 38 11/20/2015       Imaging and other studies: I have personally reviewed pertinent reports  and I have personally reviewed pertinent films in PACS chest CT from October 2018 shows right perihilar mass, smaller compared with prior study  Surrounding interstitial markings consistent with lymphangitic spread of also improved      Pulmonary function testing:  Performed 8/8/16  FEV1/FVC ratio 53%   FEV1 20% predicted  FVC 28% predicted   there is no significant response to bronchodilators   % predicted   % predicted  DLCO corrected for hemoglobin 42 % predicted   this study shows very severe obstruction with reduced vital capacity due to air trapping and severe reduction in diffusion capacity

## 2018-12-04 NOTE — ASSESSMENT & PLAN NOTE
Oxygenation is adequate today on 3 liters/minute    She is not qualify for BiPAP despite having hypercapnic respiratory failure as well

## 2018-12-06 DIAGNOSIS — I50.32 CHRONIC DIASTOLIC CONGESTIVE HEART FAILURE (HCC): ICD-10-CM

## 2018-12-06 RX ORDER — FUROSEMIDE 40 MG/1
40 TABLET ORAL 2 TIMES DAILY
Qty: 180 TABLET | Refills: 0 | Status: SHIPPED | OUTPATIENT
Start: 2018-12-06 | End: 2019-01-01 | Stop reason: SDUPTHER

## 2018-12-12 RX ORDER — SODIUM CHLORIDE 9 MG/ML
20 INJECTION, SOLUTION INTRAVENOUS CONTINUOUS
Status: DISPENSED | OUTPATIENT
Start: 2018-12-18 | End: 2018-12-18

## 2018-12-13 ENCOUNTER — TELEPHONE (OUTPATIENT)
Dept: HEMATOLOGY ONCOLOGY | Facility: CLINIC | Age: 78
End: 2018-12-13

## 2018-12-13 NOTE — PROGRESS NOTES
TIME OUT FROM OFFICE  Spoke with Meri Segura RN with Dr Liseth Barrios office Cx treatment on 12/18 per patient request notified Jacob Perry in pharmacy

## 2018-12-13 NOTE — TELEPHONE ENCOUNTER
Stated that the pt was having trouble breathing and keeps falling  Per Crystal- pt is to go to the ER  Once I told Beverley Ferrara that, he stated that she wasn't having trouble breathing, she is just on oxygen  Pt wants to know if she can skip her chemo on 12 18 18  Req a call back

## 2018-12-13 NOTE — TELEPHONE ENCOUNTER
Spoke with patients  - Patient would like to defer her next scheduled Cyranza to her scheduled appointment on 1/8/18  Patient is having issues with COPD and CHF  Ok per Dr Hugh New - infusion appointment for labs and chemo canceled for next month  She will get her next Cyramza on 1/8/18

## 2018-12-18 ENCOUNTER — HOSPITAL ENCOUNTER (OUTPATIENT)
Dept: INFUSION CENTER | Facility: HOSPITAL | Age: 78
Discharge: HOME | End: 2018-12-18

## 2018-12-19 DIAGNOSIS — E78.5 HYPERLIPIDEMIA, UNSPECIFIED HYPERLIPIDEMIA TYPE: ICD-10-CM

## 2018-12-19 RX ORDER — SIMVASTATIN 80 MG
80 TABLET ORAL DAILY
Qty: 90 TABLET | Refills: 0 | Status: SHIPPED | OUTPATIENT
Start: 2018-12-19 | End: 2019-01-01 | Stop reason: SDUPTHER

## 2018-12-27 DIAGNOSIS — C34.91 MALIGNANT NEOPLASM OF UNSPECIFIED PART OF RIGHT BRONCHUS OR LUNG (HCC): ICD-10-CM

## 2018-12-31 DIAGNOSIS — G89.29 OTHER CHRONIC PAIN: ICD-10-CM

## 2018-12-31 RX ORDER — OXYCODONE HYDROCHLORIDE AND ACETAMINOPHEN 5; 325 MG/1; MG/1
1 TABLET ORAL EVERY 4 HOURS PRN
Qty: 180 TABLET | Refills: 0 | Status: SHIPPED | OUTPATIENT
Start: 2018-12-31 | End: 2019-01-01 | Stop reason: SDUPTHER

## 2019-01-01 ENCOUNTER — OFFICE VISIT (OUTPATIENT)
Dept: CARDIOLOGY CLINIC | Facility: CLINIC | Age: 79
End: 2019-01-01
Payer: MEDICARE

## 2019-01-01 ENCOUNTER — APPOINTMENT (OUTPATIENT)
Dept: LAB | Facility: HOSPITAL | Age: 79
End: 2019-01-01
Payer: MEDICARE

## 2019-01-01 ENCOUNTER — APPOINTMENT (OUTPATIENT)
Dept: LAB | Facility: HOSPITAL | Age: 79
End: 2019-01-01
Attending: INTERNAL MEDICINE
Payer: MEDICARE

## 2019-01-01 ENCOUNTER — TELEPHONE (OUTPATIENT)
Dept: HEMATOLOGY ONCOLOGY | Facility: CLINIC | Age: 79
End: 2019-01-01

## 2019-01-01 ENCOUNTER — HOSPITAL ENCOUNTER (OUTPATIENT)
Dept: INFUSION CENTER | Facility: HOSPITAL | Age: 79
Discharge: HOME | End: 2019-10-29
Payer: MEDICARE

## 2019-01-01 ENCOUNTER — TELEPHONE (OUTPATIENT)
Dept: CARDIOLOGY CLINIC | Facility: CLINIC | Age: 79
End: 2019-01-01

## 2019-01-01 ENCOUNTER — HOSPITAL ENCOUNTER (OUTPATIENT)
Dept: INFUSION CENTER | Facility: HOSPITAL | Age: 79
Discharge: HOME | End: 2019-07-16
Payer: MEDICARE

## 2019-01-01 ENCOUNTER — HOSPITAL ENCOUNTER (OUTPATIENT)
Dept: CT IMAGING | Facility: HOSPITAL | Age: 79
Discharge: HOME | End: 2019-04-15
Attending: INTERNAL MEDICINE
Payer: MEDICARE

## 2019-01-01 ENCOUNTER — OFFICE VISIT (OUTPATIENT)
Dept: FAMILY MEDICINE CLINIC | Facility: CLINIC | Age: 79
End: 2019-01-01
Payer: MEDICARE

## 2019-01-01 ENCOUNTER — TELEPHONE (OUTPATIENT)
Dept: PULMONOLOGY | Facility: HOSPITAL | Age: 79
End: 2019-01-01

## 2019-01-01 ENCOUNTER — HOSPITAL ENCOUNTER (OUTPATIENT)
Dept: INFUSION CENTER | Facility: HOSPITAL | Age: 79
Discharge: HOME | End: 2019-12-31
Payer: MEDICARE

## 2019-01-01 ENCOUNTER — HOSPITAL ENCOUNTER (OUTPATIENT)
Dept: INFUSION CENTER | Facility: HOSPITAL | Age: 79
Discharge: HOME | End: 2019-11-19
Payer: MEDICARE

## 2019-01-01 ENCOUNTER — OFFICE VISIT (OUTPATIENT)
Dept: PULMONOLOGY | Facility: HOSPITAL | Age: 79
End: 2019-01-01
Payer: MEDICARE

## 2019-01-01 ENCOUNTER — HOSPITAL ENCOUNTER (OUTPATIENT)
Dept: RADIOLOGY | Facility: HOSPITAL | Age: 79
Discharge: HOME | End: 2019-06-04
Attending: INTERNAL MEDICINE
Payer: MEDICARE

## 2019-01-01 ENCOUNTER — OFFICE VISIT (OUTPATIENT)
Dept: HEMATOLOGY ONCOLOGY | Facility: CLINIC | Age: 79
End: 2019-01-01
Payer: MEDICARE

## 2019-01-01 ENCOUNTER — HOSPITAL ENCOUNTER (OUTPATIENT)
Dept: INFUSION CENTER | Facility: HOSPITAL | Age: 79
Discharge: HOME | End: 2019-03-12
Payer: MEDICARE

## 2019-01-01 ENCOUNTER — HOSPITAL ENCOUNTER (OUTPATIENT)
Dept: INFUSION CENTER | Facility: HOSPITAL | Age: 79
Discharge: HOME | End: 2019-09-17
Attending: INTERNAL MEDICINE
Payer: MEDICARE

## 2019-01-01 ENCOUNTER — TELEPHONE (OUTPATIENT)
Dept: PULMONOLOGY | Facility: CLINIC | Age: 79
End: 2019-01-01

## 2019-01-01 ENCOUNTER — HOSPITAL ENCOUNTER (OUTPATIENT)
Dept: INFUSION CENTER | Facility: HOSPITAL | Age: 79
Discharge: HOME | End: 2019-01-29
Payer: MEDICARE

## 2019-01-01 ENCOUNTER — HOSPITAL ENCOUNTER (OUTPATIENT)
Dept: INFUSION CENTER | Facility: HOSPITAL | Age: 79
Discharge: HOME | End: 2019-12-10
Payer: MEDICARE

## 2019-01-01 ENCOUNTER — HOSPITAL ENCOUNTER (OUTPATIENT)
Dept: INFUSION CENTER | Facility: HOSPITAL | Age: 79
Discharge: HOME | End: 2019-04-02
Payer: MEDICARE

## 2019-01-01 ENCOUNTER — HOSPITAL ENCOUNTER (OUTPATIENT)
Dept: INFUSION CENTER | Facility: HOSPITAL | Age: 79
Discharge: HOME | End: 2019-06-04
Payer: MEDICARE

## 2019-01-01 ENCOUNTER — HOSPITAL ENCOUNTER (OUTPATIENT)
Dept: INFUSION CENTER | Facility: HOSPITAL | Age: 79
Discharge: HOME | End: 2019-05-14
Payer: MEDICARE

## 2019-01-01 ENCOUNTER — HOSPITAL ENCOUNTER (OUTPATIENT)
Dept: INFUSION CENTER | Facility: HOSPITAL | Age: 79
Discharge: HOME | End: 2019-04-23
Payer: MEDICARE

## 2019-01-01 ENCOUNTER — HOSPITAL ENCOUNTER (OUTPATIENT)
Dept: CT IMAGING | Facility: HOSPITAL | Age: 79
Discharge: HOME | End: 2019-09-24
Attending: INTERNAL MEDICINE
Payer: MEDICARE

## 2019-01-01 ENCOUNTER — TRANSCRIBE ORDERS (OUTPATIENT)
Dept: ADMINISTRATIVE | Facility: HOSPITAL | Age: 79
End: 2019-01-01

## 2019-01-01 ENCOUNTER — HOSPITAL ENCOUNTER (OUTPATIENT)
Dept: CT IMAGING | Facility: HOSPITAL | Age: 79
Discharge: HOME | End: 2019-06-12
Attending: INTERNAL MEDICINE
Payer: MEDICARE

## 2019-01-01 ENCOUNTER — HOSPITAL ENCOUNTER (OUTPATIENT)
Dept: INFUSION CENTER | Facility: HOSPITAL | Age: 79
Discharge: HOME | End: 2019-06-25
Payer: MEDICARE

## 2019-01-01 ENCOUNTER — HOSPITAL ENCOUNTER (OUTPATIENT)
Dept: INFUSION CENTER | Facility: HOSPITAL | Age: 79
Discharge: HOME | End: 2019-08-06
Attending: INTERNAL MEDICINE
Payer: MEDICARE

## 2019-01-01 ENCOUNTER — HOSPITAL ENCOUNTER (OUTPATIENT)
Dept: CT IMAGING | Facility: HOSPITAL | Age: 79
Discharge: HOME | End: 2019-01-30
Attending: INTERNAL MEDICINE
Payer: MEDICARE

## 2019-01-01 ENCOUNTER — HOSPITAL ENCOUNTER (OUTPATIENT)
Dept: INFUSION CENTER | Facility: HOSPITAL | Age: 79
Discharge: HOME | End: 2019-08-27
Attending: INTERNAL MEDICINE
Payer: MEDICARE

## 2019-01-01 ENCOUNTER — HOSPITAL ENCOUNTER (OUTPATIENT)
Dept: CT IMAGING | Facility: HOSPITAL | Age: 79
Discharge: HOME | End: 2019-12-21
Attending: INTERNAL MEDICINE
Payer: MEDICARE

## 2019-01-01 ENCOUNTER — HOSPITAL ENCOUNTER (OUTPATIENT)
Dept: INFUSION CENTER | Facility: HOSPITAL | Age: 79
Discharge: HOME | End: 2019-02-19
Payer: MEDICARE

## 2019-01-01 ENCOUNTER — HOSPITAL ENCOUNTER (OUTPATIENT)
Dept: INFUSION CENTER | Facility: HOSPITAL | Age: 79
Discharge: HOME | End: 2019-10-08
Payer: MEDICARE

## 2019-01-01 ENCOUNTER — HOSPITAL ENCOUNTER (OUTPATIENT)
Dept: CT IMAGING | Facility: HOSPITAL | Age: 79
Discharge: HOME | End: 2019-02-12
Attending: INTERNAL MEDICINE

## 2019-01-01 VITALS
BODY MASS INDEX: 19.37 KG/M2 | WEIGHT: 95.9 LBS | DIASTOLIC BLOOD PRESSURE: 56 MMHG | RESPIRATION RATE: 22 BRPM | HEART RATE: 64 BPM | SYSTOLIC BLOOD PRESSURE: 133 MMHG | OXYGEN SATURATION: 100 % | TEMPERATURE: 97.1 F

## 2019-01-01 VITALS
WEIGHT: 92 LBS | OXYGEN SATURATION: 97 % | BODY MASS INDEX: 18.55 KG/M2 | SYSTOLIC BLOOD PRESSURE: 112 MMHG | HEIGHT: 59 IN | HEART RATE: 84 BPM | RESPIRATION RATE: 18 BRPM | TEMPERATURE: 96.3 F | DIASTOLIC BLOOD PRESSURE: 68 MMHG

## 2019-01-01 VITALS
WEIGHT: 85.54 LBS | RESPIRATION RATE: 16 BRPM | BODY MASS INDEX: 16.15 KG/M2 | TEMPERATURE: 98.6 F | SYSTOLIC BLOOD PRESSURE: 164 MMHG | HEART RATE: 68 BPM | OXYGEN SATURATION: 97 % | DIASTOLIC BLOOD PRESSURE: 66 MMHG | HEIGHT: 61 IN

## 2019-01-01 VITALS
BODY MASS INDEX: 16.88 KG/M2 | DIASTOLIC BLOOD PRESSURE: 60 MMHG | TEMPERATURE: 97.9 F | HEART RATE: 82 BPM | HEIGHT: 60 IN | OXYGEN SATURATION: 100 % | WEIGHT: 86 LBS | SYSTOLIC BLOOD PRESSURE: 100 MMHG

## 2019-01-01 VITALS
DIASTOLIC BLOOD PRESSURE: 49 MMHG | OXYGEN SATURATION: 96 % | SYSTOLIC BLOOD PRESSURE: 103 MMHG | TEMPERATURE: 97.8 F | HEART RATE: 59 BPM | HEIGHT: 60 IN | RESPIRATION RATE: 18 BRPM | WEIGHT: 85.1 LBS | BODY MASS INDEX: 16.71 KG/M2

## 2019-01-01 VITALS
WEIGHT: 93 LBS | BODY MASS INDEX: 18.26 KG/M2 | SYSTOLIC BLOOD PRESSURE: 130 MMHG | HEART RATE: 76 BPM | HEIGHT: 60 IN | DIASTOLIC BLOOD PRESSURE: 60 MMHG

## 2019-01-01 VITALS
OXYGEN SATURATION: 98 % | TEMPERATURE: 98.2 F | RESPIRATION RATE: 18 BRPM | SYSTOLIC BLOOD PRESSURE: 168 MMHG | HEIGHT: 60 IN | BODY MASS INDEX: 18.14 KG/M2 | HEART RATE: 75 BPM | DIASTOLIC BLOOD PRESSURE: 64 MMHG | WEIGHT: 92.37 LBS

## 2019-01-01 VITALS
WEIGHT: 87.2 LBS | BODY MASS INDEX: 17.12 KG/M2 | TEMPERATURE: 97.9 F | HEIGHT: 60 IN | HEART RATE: 69 BPM | DIASTOLIC BLOOD PRESSURE: 64 MMHG | SYSTOLIC BLOOD PRESSURE: 112 MMHG | OXYGEN SATURATION: 98 %

## 2019-01-01 VITALS
BODY MASS INDEX: 15.51 KG/M2 | TEMPERATURE: 97.6 F | RESPIRATION RATE: 16 BRPM | DIASTOLIC BLOOD PRESSURE: 56 MMHG | HEART RATE: 72 BPM | WEIGHT: 80.25 LBS | SYSTOLIC BLOOD PRESSURE: 137 MMHG | OXYGEN SATURATION: 97 %

## 2019-01-01 VITALS
SYSTOLIC BLOOD PRESSURE: 108 MMHG | HEIGHT: 60 IN | SYSTOLIC BLOOD PRESSURE: 110 MMHG | DIASTOLIC BLOOD PRESSURE: 48 MMHG | HEART RATE: 63 BPM | WEIGHT: 88.4 LBS | WEIGHT: 93 LBS | HEIGHT: 59 IN | HEART RATE: 80 BPM | DIASTOLIC BLOOD PRESSURE: 54 MMHG | BODY MASS INDEX: 18.75 KG/M2 | BODY MASS INDEX: 17.36 KG/M2

## 2019-01-01 VITALS
OXYGEN SATURATION: 96 % | SYSTOLIC BLOOD PRESSURE: 131 MMHG | HEIGHT: 60 IN | DIASTOLIC BLOOD PRESSURE: 49 MMHG | TEMPERATURE: 96.1 F | HEART RATE: 77 BPM | RESPIRATION RATE: 16 BRPM | WEIGHT: 84 LBS | BODY MASS INDEX: 16.49 KG/M2

## 2019-01-01 VITALS
RESPIRATION RATE: 17 BRPM | TEMPERATURE: 98.1 F | HEIGHT: 59 IN | SYSTOLIC BLOOD PRESSURE: 98 MMHG | BODY MASS INDEX: 18.95 KG/M2 | DIASTOLIC BLOOD PRESSURE: 62 MMHG | WEIGHT: 94 LBS | HEART RATE: 87 BPM | OXYGEN SATURATION: 96 %

## 2019-01-01 VITALS
DIASTOLIC BLOOD PRESSURE: 60 MMHG | SYSTOLIC BLOOD PRESSURE: 127 MMHG | TEMPERATURE: 98.4 F | HEART RATE: 58 BPM | RESPIRATION RATE: 18 BRPM | OXYGEN SATURATION: 100 % | BODY MASS INDEX: 18.48 KG/M2 | WEIGHT: 91.49 LBS

## 2019-01-01 VITALS
BODY MASS INDEX: 17.41 KG/M2 | HEART RATE: 75 BPM | TEMPERATURE: 96.2 F | SYSTOLIC BLOOD PRESSURE: 127 MMHG | DIASTOLIC BLOOD PRESSURE: 60 MMHG | RESPIRATION RATE: 18 BRPM | WEIGHT: 91.05 LBS | OXYGEN SATURATION: 96 %

## 2019-01-01 VITALS
BODY MASS INDEX: 18.44 KG/M2 | SYSTOLIC BLOOD PRESSURE: 132 MMHG | OXYGEN SATURATION: 97 % | HEART RATE: 84 BPM | DIASTOLIC BLOOD PRESSURE: 60 MMHG | HEIGHT: 59 IN | WEIGHT: 91.5 LBS

## 2019-01-01 VITALS
TEMPERATURE: 96.3 F | WEIGHT: 85.1 LBS | DIASTOLIC BLOOD PRESSURE: 62 MMHG | OXYGEN SATURATION: 100 % | RESPIRATION RATE: 18 BRPM | SYSTOLIC BLOOD PRESSURE: 135 MMHG | BODY MASS INDEX: 16.62 KG/M2 | HEART RATE: 73 BPM

## 2019-01-01 VITALS
HEART RATE: 74 BPM | OXYGEN SATURATION: 99 % | SYSTOLIC BLOOD PRESSURE: 147 MMHG | HEIGHT: 60 IN | DIASTOLIC BLOOD PRESSURE: 60 MMHG | BODY MASS INDEX: 17.27 KG/M2 | TEMPERATURE: 97.3 F | WEIGHT: 87.96 LBS | RESPIRATION RATE: 18 BRPM

## 2019-01-01 VITALS
RESPIRATION RATE: 17 BRPM | WEIGHT: 86.2 LBS | DIASTOLIC BLOOD PRESSURE: 62 MMHG | OXYGEN SATURATION: 95 % | HEART RATE: 67 BPM | TEMPERATURE: 97.4 F | BODY MASS INDEX: 16.59 KG/M2 | SYSTOLIC BLOOD PRESSURE: 106 MMHG

## 2019-01-01 VITALS
WEIGHT: 89.07 LBS | DIASTOLIC BLOOD PRESSURE: 69 MMHG | RESPIRATION RATE: 18 BRPM | OXYGEN SATURATION: 97 % | SYSTOLIC BLOOD PRESSURE: 160 MMHG | BODY MASS INDEX: 17.49 KG/M2 | TEMPERATURE: 97.4 F | HEART RATE: 71 BPM | HEIGHT: 60 IN

## 2019-01-01 VITALS
BODY MASS INDEX: 16.88 KG/M2 | HEIGHT: 60 IN | DIASTOLIC BLOOD PRESSURE: 58 MMHG | SYSTOLIC BLOOD PRESSURE: 92 MMHG | WEIGHT: 86 LBS

## 2019-01-01 VITALS
RESPIRATION RATE: 18 BRPM | WEIGHT: 93.92 LBS | HEART RATE: 74 BPM | HEIGHT: 61 IN | TEMPERATURE: 96.4 F | SYSTOLIC BLOOD PRESSURE: 133 MMHG | DIASTOLIC BLOOD PRESSURE: 55 MMHG | BODY MASS INDEX: 17.73 KG/M2 | OXYGEN SATURATION: 96 %

## 2019-01-01 VITALS
WEIGHT: 86.8 LBS | HEART RATE: 67 BPM | OXYGEN SATURATION: 98 % | BODY MASS INDEX: 17.04 KG/M2 | HEIGHT: 60 IN | DIASTOLIC BLOOD PRESSURE: 58 MMHG | SYSTOLIC BLOOD PRESSURE: 108 MMHG | TEMPERATURE: 97.7 F

## 2019-01-01 VITALS
WEIGHT: 88.63 LBS | DIASTOLIC BLOOD PRESSURE: 59 MMHG | OXYGEN SATURATION: 99 % | TEMPERATURE: 97.3 F | HEART RATE: 73 BPM | BODY MASS INDEX: 17.4 KG/M2 | RESPIRATION RATE: 18 BRPM | HEIGHT: 60 IN | SYSTOLIC BLOOD PRESSURE: 163 MMHG

## 2019-01-01 VITALS
SYSTOLIC BLOOD PRESSURE: 128 MMHG | WEIGHT: 82 LBS | HEIGHT: 60 IN | TEMPERATURE: 96 F | BODY MASS INDEX: 16.1 KG/M2 | OXYGEN SATURATION: 92 % | DIASTOLIC BLOOD PRESSURE: 62 MMHG | HEART RATE: 70 BPM

## 2019-01-01 VITALS
DIASTOLIC BLOOD PRESSURE: 87 MMHG | TEMPERATURE: 97 F | WEIGHT: 92.81 LBS | RESPIRATION RATE: 18 BRPM | BODY MASS INDEX: 18.75 KG/M2 | HEART RATE: 84 BPM | SYSTOLIC BLOOD PRESSURE: 122 MMHG | OXYGEN SATURATION: 100 %

## 2019-01-01 VITALS
BODY MASS INDEX: 16.88 KG/M2 | HEIGHT: 60 IN | WEIGHT: 86 LBS | OXYGEN SATURATION: 98 % | HEART RATE: 67 BPM | SYSTOLIC BLOOD PRESSURE: 104 MMHG | DIASTOLIC BLOOD PRESSURE: 52 MMHG | TEMPERATURE: 96.6 F

## 2019-01-01 VITALS
SYSTOLIC BLOOD PRESSURE: 130 MMHG | TEMPERATURE: 97.4 F | DIASTOLIC BLOOD PRESSURE: 60 MMHG | HEIGHT: 60 IN | HEART RATE: 75 BPM | WEIGHT: 91 LBS | BODY MASS INDEX: 17.87 KG/M2 | OXYGEN SATURATION: 97 %

## 2019-01-01 VITALS
WEIGHT: 92.81 LBS | BODY MASS INDEX: 18.75 KG/M2 | HEART RATE: 92 BPM | TEMPERATURE: 97.2 F | OXYGEN SATURATION: 95 % | SYSTOLIC BLOOD PRESSURE: 114 MMHG | DIASTOLIC BLOOD PRESSURE: 62 MMHG

## 2019-01-01 VITALS
OXYGEN SATURATION: 99 % | BODY MASS INDEX: 16.69 KG/M2 | HEIGHT: 60 IN | WEIGHT: 85 LBS | RESPIRATION RATE: 18 BRPM | HEART RATE: 60 BPM | TEMPERATURE: 97.9 F | SYSTOLIC BLOOD PRESSURE: 108 MMHG | DIASTOLIC BLOOD PRESSURE: 68 MMHG

## 2019-01-01 VITALS
TEMPERATURE: 96.9 F | BODY MASS INDEX: 17.63 KG/M2 | DIASTOLIC BLOOD PRESSURE: 68 MMHG | OXYGEN SATURATION: 96 % | WEIGHT: 89.8 LBS | SYSTOLIC BLOOD PRESSURE: 124 MMHG | HEIGHT: 60 IN | HEART RATE: 79 BPM

## 2019-01-01 VITALS
TEMPERATURE: 98.4 F | OXYGEN SATURATION: 94 % | WEIGHT: 86.2 LBS | SYSTOLIC BLOOD PRESSURE: 93 MMHG | HEIGHT: 60 IN | BODY MASS INDEX: 16.92 KG/M2 | DIASTOLIC BLOOD PRESSURE: 36 MMHG | HEART RATE: 72 BPM

## 2019-01-01 VITALS
RESPIRATION RATE: 18 BRPM | WEIGHT: 88.4 LBS | TEMPERATURE: 98.4 F | HEART RATE: 83 BPM | OXYGEN SATURATION: 96 % | HEIGHT: 60 IN | SYSTOLIC BLOOD PRESSURE: 132 MMHG | DIASTOLIC BLOOD PRESSURE: 74 MMHG | BODY MASS INDEX: 17.36 KG/M2

## 2019-01-01 VITALS
WEIGHT: 96 LBS | HEIGHT: 59 IN | HEART RATE: 71 BPM | BODY MASS INDEX: 19.35 KG/M2 | DIASTOLIC BLOOD PRESSURE: 68 MMHG | SYSTOLIC BLOOD PRESSURE: 140 MMHG

## 2019-01-01 VITALS
HEART RATE: 86 BPM | WEIGHT: 85.54 LBS | OXYGEN SATURATION: 97 % | RESPIRATION RATE: 18 BRPM | BODY MASS INDEX: 16.79 KG/M2 | SYSTOLIC BLOOD PRESSURE: 119 MMHG | DIASTOLIC BLOOD PRESSURE: 56 MMHG | TEMPERATURE: 97.8 F | HEIGHT: 60 IN

## 2019-01-01 VITALS
BODY MASS INDEX: 19.35 KG/M2 | WEIGHT: 96 LBS | DIASTOLIC BLOOD PRESSURE: 62 MMHG | TEMPERATURE: 96.8 F | HEART RATE: 62 BPM | OXYGEN SATURATION: 97 % | HEIGHT: 59 IN | SYSTOLIC BLOOD PRESSURE: 120 MMHG

## 2019-01-01 VITALS
OXYGEN SATURATION: 100 % | BODY MASS INDEX: 18.65 KG/M2 | SYSTOLIC BLOOD PRESSURE: 120 MMHG | WEIGHT: 92.5 LBS | HEART RATE: 74 BPM | HEIGHT: 59 IN | TEMPERATURE: 96.7 F | DIASTOLIC BLOOD PRESSURE: 70 MMHG

## 2019-01-01 DIAGNOSIS — I50.22 CHRONIC SYSTOLIC CONGESTIVE HEART FAILURE (HCC): Primary | ICD-10-CM

## 2019-01-01 DIAGNOSIS — C34.11 SQUAMOUS CELL CARCINOMA OF BRONCHUS IN RIGHT UPPER LOBE (HCC): Primary | ICD-10-CM

## 2019-01-01 DIAGNOSIS — F34.1 DYSTHYMIA: ICD-10-CM

## 2019-01-01 DIAGNOSIS — C34.91 SQUAMOUS CELL CARCINOMA OF RIGHT LUNG (HCC): Primary | ICD-10-CM

## 2019-01-01 DIAGNOSIS — I50.32 CHRONIC DIASTOLIC CONGESTIVE HEART FAILURE (HCC): ICD-10-CM

## 2019-01-01 DIAGNOSIS — E87.6 HYPOKALEMIA: Primary | ICD-10-CM

## 2019-01-01 DIAGNOSIS — C34.11 SQUAMOUS CELL CARCINOMA OF BRONCHUS IN RIGHT UPPER LOBE (HCC): ICD-10-CM

## 2019-01-01 DIAGNOSIS — G89.29 OTHER CHRONIC PAIN: ICD-10-CM

## 2019-01-01 DIAGNOSIS — C79.70 MALIGNANT NEOPLASM METASTATIC TO ADRENAL GLAND, UNSPECIFIED LATERALITY (HCC): ICD-10-CM

## 2019-01-01 DIAGNOSIS — I10 BENIGN ESSENTIAL HYPERTENSION: Primary | ICD-10-CM

## 2019-01-01 DIAGNOSIS — J96.11 CHRONIC RESPIRATORY FAILURE WITH HYPOXIA AND HYPERCAPNIA (HCC): ICD-10-CM

## 2019-01-01 DIAGNOSIS — I42.0 DILATED CARDIOMYOPATHY (HCC): Primary | ICD-10-CM

## 2019-01-01 DIAGNOSIS — K52.1 CHEMOTHERAPY INDUCED DIARRHEA: ICD-10-CM

## 2019-01-01 DIAGNOSIS — Z23 NEED FOR INFLUENZA VACCINATION: ICD-10-CM

## 2019-01-01 DIAGNOSIS — C34.91 SQUAMOUS CELL CARCINOMA OF RIGHT LUNG (HCC): ICD-10-CM

## 2019-01-01 DIAGNOSIS — Z79.4 TYPE 2 DIABETES MELLITUS WITHOUT COMPLICATION, WITH LONG-TERM CURRENT USE OF INSULIN (HCC): ICD-10-CM

## 2019-01-01 DIAGNOSIS — L40.9 PSORIASIS: ICD-10-CM

## 2019-01-01 DIAGNOSIS — M54.50 ACUTE LEFT-SIDED LOW BACK PAIN WITHOUT SCIATICA: ICD-10-CM

## 2019-01-01 DIAGNOSIS — J44.9 CHRONIC OBSTRUCTIVE PULMONARY DISEASE, UNSPECIFIED COPD TYPE (HCC): ICD-10-CM

## 2019-01-01 DIAGNOSIS — J96.11 CHRONIC RESPIRATORY FAILURE WITH HYPOXIA AND HYPERCAPNIA (HCC): Primary | ICD-10-CM

## 2019-01-01 DIAGNOSIS — T45.1X5A CHEMOTHERAPY INDUCED DIARRHEA: ICD-10-CM

## 2019-01-01 DIAGNOSIS — J44.9 COPD, VERY SEVERE (HCC): Primary | ICD-10-CM

## 2019-01-01 DIAGNOSIS — I73.9 PVD (PERIPHERAL VASCULAR DISEASE) (HCC): ICD-10-CM

## 2019-01-01 DIAGNOSIS — J96.12 CHRONIC RESPIRATORY FAILURE WITH HYPOXIA AND HYPERCAPNIA (HCC): ICD-10-CM

## 2019-01-01 DIAGNOSIS — J30.9 ALLERGIC RHINITIS, UNSPECIFIED SEASONALITY, UNSPECIFIED TRIGGER: Primary | ICD-10-CM

## 2019-01-01 DIAGNOSIS — C34.11 MALIGNANT NEOPLASM OF UPPER LOBE OF RIGHT LUNG (HCC): ICD-10-CM

## 2019-01-01 DIAGNOSIS — E78.5 HYPERLIPIDEMIA, UNSPECIFIED HYPERLIPIDEMIA TYPE: ICD-10-CM

## 2019-01-01 DIAGNOSIS — J43.2 CENTRILOBULAR EMPHYSEMA (HCC): ICD-10-CM

## 2019-01-01 DIAGNOSIS — J44.9 COPD, VERY SEVERE (HCC): ICD-10-CM

## 2019-01-01 DIAGNOSIS — H65.03 BILATERAL ACUTE SEROUS OTITIS MEDIA, RECURRENCE NOT SPECIFIED: Primary | ICD-10-CM

## 2019-01-01 DIAGNOSIS — I10 ESSENTIAL HYPERTENSION: Primary | ICD-10-CM

## 2019-01-01 DIAGNOSIS — R63.0 ANOREXIA: ICD-10-CM

## 2019-01-01 DIAGNOSIS — I10 BENIGN ESSENTIAL HYPERTENSION: ICD-10-CM

## 2019-01-01 DIAGNOSIS — G47.01 INSOMNIA DUE TO MEDICAL CONDITION: ICD-10-CM

## 2019-01-01 DIAGNOSIS — E11.9 TYPE 2 DIABETES MELLITUS WITHOUT COMPLICATION, WITH LONG-TERM CURRENT USE OF INSULIN (HCC): ICD-10-CM

## 2019-01-01 DIAGNOSIS — T45.1X5A CHEMOTHERAPY INDUCED DIARRHEA: Primary | ICD-10-CM

## 2019-01-01 DIAGNOSIS — K52.1 CHEMOTHERAPY INDUCED DIARRHEA: Primary | ICD-10-CM

## 2019-01-01 DIAGNOSIS — J96.12 CHRONIC RESPIRATORY FAILURE WITH HYPOXIA AND HYPERCAPNIA (HCC): Primary | ICD-10-CM

## 2019-01-01 DIAGNOSIS — H65.03 BILATERAL ACUTE SEROUS OTITIS MEDIA, RECURRENCE NOT SPECIFIED: ICD-10-CM

## 2019-01-01 DIAGNOSIS — I10 HYPERTENSION, UNSPECIFIED TYPE: ICD-10-CM

## 2019-01-01 DIAGNOSIS — E11.9 TYPE 2 DIABETES MELLITUS WITHOUT COMPLICATION, WITHOUT LONG-TERM CURRENT USE OF INSULIN (HCC): Primary | ICD-10-CM

## 2019-01-01 DIAGNOSIS — E11.9 TYPE 2 DIABETES MELLITUS WITHOUT COMPLICATION, WITHOUT LONG-TERM CURRENT USE OF INSULIN (HCC): ICD-10-CM

## 2019-01-01 DIAGNOSIS — N39.0 URINARY TRACT INFECTION WITHOUT HEMATURIA, SITE UNSPECIFIED: Primary | ICD-10-CM

## 2019-01-01 DIAGNOSIS — I50.22 CHRONIC SYSTOLIC CONGESTIVE HEART FAILURE (HCC): ICD-10-CM

## 2019-01-01 DIAGNOSIS — R05.9 COUGH: ICD-10-CM

## 2019-01-01 DIAGNOSIS — F34.1 DYSTHYMIA: Primary | ICD-10-CM

## 2019-01-01 DIAGNOSIS — K04.7 DENTAL ABSCESS: Primary | ICD-10-CM

## 2019-01-01 DIAGNOSIS — J44.9 CHRONIC OBSTRUCTIVE PULMONARY DISEASE, UNSPECIFIED COPD TYPE (HCC): Primary | ICD-10-CM

## 2019-01-01 DIAGNOSIS — R05.9 COUGH: Primary | ICD-10-CM

## 2019-01-01 DIAGNOSIS — D11.0 PAROTID ADENOMA: ICD-10-CM

## 2019-01-01 DIAGNOSIS — I10 ESSENTIAL HYPERTENSION: ICD-10-CM

## 2019-01-01 DIAGNOSIS — H61.23 BILATERAL HEARING LOSS DUE TO CERUMEN IMPACTION: ICD-10-CM

## 2019-01-01 DIAGNOSIS — Z23 NEED FOR PNEUMOCOCCAL VACCINATION: ICD-10-CM

## 2019-01-01 LAB
ALBUMIN SERPL BCP-MCNC: 2.7 G/DL (ref 3.5–5)
ALBUMIN SERPL BCP-MCNC: 2.8 G/DL (ref 3.5–5)
ALBUMIN SERPL BCP-MCNC: 2.9 G/DL (ref 3.5–5)
ALBUMIN SERPL BCP-MCNC: 2.9 G/DL (ref 3.5–5)
ALBUMIN SERPL BCP-MCNC: 3 G/DL (ref 3.5–5)
ALBUMIN SERPL BCP-MCNC: 3.1 G/DL (ref 3.5–5)
ALBUMIN SERPL BCP-MCNC: 3.3 G/DL (ref 3.5–5)
ALBUMIN/CREAT UR: 690 MCG/MG CREAT
ALP SERPL-CCNC: 100 U/L (ref 46–116)
ALP SERPL-CCNC: 104 U/L (ref 46–116)
ALP SERPL-CCNC: 104 U/L (ref 46–116)
ALP SERPL-CCNC: 105 U/L (ref 46–116)
ALP SERPL-CCNC: 107 U/L (ref 46–116)
ALP SERPL-CCNC: 109 U/L (ref 46–116)
ALP SERPL-CCNC: 113 U/L (ref 46–116)
ALP SERPL-CCNC: 114 U/L (ref 46–116)
ALP SERPL-CCNC: 124 U/L (ref 46–116)
ALP SERPL-CCNC: 132 U/L (ref 46–116)
ALP SERPL-CCNC: 139 U/L (ref 46–116)
ALP SERPL-CCNC: 99 U/L (ref 46–116)
ALT SERPL W P-5'-P-CCNC: 11 U/L (ref 12–78)
ALT SERPL W P-5'-P-CCNC: 16 U/L (ref 12–78)
ALT SERPL W P-5'-P-CCNC: 17 U/L (ref 12–78)
ALT SERPL W P-5'-P-CCNC: 18 U/L (ref 12–78)
ALT SERPL W P-5'-P-CCNC: 19 U/L (ref 12–78)
ALT SERPL W P-5'-P-CCNC: 19 U/L (ref 12–78)
ALT SERPL W P-5'-P-CCNC: 20 U/L (ref 12–78)
ALT SERPL W P-5'-P-CCNC: 20 U/L (ref 12–78)
ALT SERPL W P-5'-P-CCNC: 22 U/L (ref 12–78)
ALT SERPL W P-5'-P-CCNC: 24 U/L (ref 12–78)
ANION GAP SERPL CALCULATED.3IONS-SCNC: 0 MMOL/L (ref 4–13)
ANION GAP SERPL CALCULATED.3IONS-SCNC: 10 MMOL/L (ref 4–13)
ANION GAP SERPL CALCULATED.3IONS-SCNC: 4 MMOL/L (ref 4–13)
ANION GAP SERPL CALCULATED.3IONS-SCNC: 5 MMOL/L (ref 4–13)
ANION GAP SERPL CALCULATED.3IONS-SCNC: 6 MMOL/L (ref 4–13)
ANION GAP SERPL CALCULATED.3IONS-SCNC: 7 MMOL/L (ref 4–13)
ANION GAP SERPL CALCULATED.3IONS-SCNC: 8 MMOL/L (ref 4–13)
ANION GAP SERPL CALCULATED.3IONS-SCNC: 8 MMOL/L (ref 4–13)
ANION GAP SERPL CALCULATED.3IONS-SCNC: 9 MMOL/L (ref 4–13)
AST SERPL W P-5'-P-CCNC: 16 U/L (ref 5–45)
AST SERPL W P-5'-P-CCNC: 17 U/L (ref 5–45)
AST SERPL W P-5'-P-CCNC: 18 U/L (ref 5–45)
AST SERPL W P-5'-P-CCNC: 20 U/L (ref 5–45)
AST SERPL W P-5'-P-CCNC: 21 U/L (ref 5–45)
AST SERPL W P-5'-P-CCNC: 22 U/L (ref 5–45)
AST SERPL W P-5'-P-CCNC: 23 U/L (ref 5–45)
AST SERPL W P-5'-P-CCNC: 23 U/L (ref 5–45)
AST SERPL W P-5'-P-CCNC: 24 U/L (ref 5–45)
AST SERPL W P-5'-P-CCNC: 25 U/L (ref 5–45)
AST SERPL W P-5'-P-CCNC: 26 U/L (ref 5–45)
AST SERPL W P-5'-P-CCNC: 31 U/L (ref 5–45)
BACTERIA UR QL AUTO: ABNORMAL /HPF
BASOPHILS # BLD AUTO: 0.03 THOUSANDS/ΜL (ref 0–0.1)
BASOPHILS # BLD AUTO: 0.04 THOUSANDS/ΜL (ref 0–0.1)
BASOPHILS # BLD AUTO: 0.06 THOUSANDS/ΜL (ref 0–0.1)
BASOPHILS NFR BLD AUTO: 0 % (ref 0–1)
BASOPHILS NFR BLD AUTO: 1 % (ref 0–1)
BILIRUB SERPL-MCNC: 0.3 MG/DL (ref 0.2–1)
BILIRUB SERPL-MCNC: 0.3 MG/DL (ref 0.2–1)
BILIRUB SERPL-MCNC: 0.4 MG/DL (ref 0.2–1)
BILIRUB SERPL-MCNC: 0.5 MG/DL (ref 0.2–1)
BILIRUB SERPL-MCNC: 0.7 MG/DL (ref 0.2–1)
BILIRUB UR QL STRIP: NEGATIVE
BUN SERPL-MCNC: 14 MG/DL (ref 5–25)
BUN SERPL-MCNC: 16 MG/DL (ref 5–25)
BUN SERPL-MCNC: 17 MG/DL (ref 5–25)
BUN SERPL-MCNC: 17 MG/DL (ref 5–25)
BUN SERPL-MCNC: 18 MG/DL (ref 5–25)
BUN SERPL-MCNC: 20 MG/DL (ref 5–25)
BUN SERPL-MCNC: 21 MG/DL (ref 5–25)
BUN SERPL-MCNC: 22 MG/DL (ref 5–25)
BUN SERPL-MCNC: 22 MG/DL (ref 5–25)
BUN SERPL-MCNC: 23 MG/DL (ref 5–25)
BUN SERPL-MCNC: 26 MG/DL (ref 5–25)
BUN SERPL-MCNC: 28 MG/DL (ref 5–25)
BUN SERPL-MCNC: 32 MG/DL (ref 5–25)
CALCIUM SERPL-MCNC: 8.3 MG/DL (ref 8.3–10.1)
CALCIUM SERPL-MCNC: 8.3 MG/DL (ref 8.3–10.1)
CALCIUM SERPL-MCNC: 8.4 MG/DL (ref 8.3–10.1)
CALCIUM SERPL-MCNC: 8.5 MG/DL (ref 8.3–10.1)
CALCIUM SERPL-MCNC: 8.7 MG/DL (ref 8.3–10.1)
CALCIUM SERPL-MCNC: 8.7 MG/DL (ref 8.3–10.1)
CALCIUM SERPL-MCNC: 8.8 MG/DL (ref 8.3–10.1)
CALCIUM SERPL-MCNC: 8.9 MG/DL (ref 8.3–10.1)
CALCIUM SERPL-MCNC: 9 MG/DL (ref 8.3–10.1)
CALCIUM SERPL-MCNC: 9.2 MG/DL (ref 8.3–10.1)
CALCIUM SERPL-MCNC: 9.3 MG/DL (ref 8.3–10.1)
CHLORIDE SERPL-SCNC: 100 MMOL/L (ref 100–108)
CHLORIDE SERPL-SCNC: 101 MMOL/L (ref 100–108)
CHLORIDE SERPL-SCNC: 102 MMOL/L (ref 100–108)
CHLORIDE SERPL-SCNC: 97 MMOL/L (ref 100–108)
CHLORIDE SERPL-SCNC: 98 MMOL/L (ref 100–108)
CHLORIDE SERPL-SCNC: 99 MMOL/L (ref 100–108)
CHLORIDE SERPL-SCNC: 99 MMOL/L (ref 100–108)
CLARITY UR: CLEAR
CO2 SERPL-SCNC: 29 MMOL/L (ref 21–32)
CO2 SERPL-SCNC: 30 MMOL/L (ref 21–32)
CO2 SERPL-SCNC: 30 MMOL/L (ref 21–32)
CO2 SERPL-SCNC: 31 MMOL/L (ref 21–32)
CO2 SERPL-SCNC: 33 MMOL/L (ref 21–32)
CO2 SERPL-SCNC: 34 MMOL/L (ref 21–32)
CO2 SERPL-SCNC: 35 MMOL/L (ref 21–32)
CO2 SERPL-SCNC: 36 MMOL/L (ref 21–32)
CO2 SERPL-SCNC: 36 MMOL/L (ref 21–32)
CO2 SERPL-SCNC: 37 MMOL/L (ref 21–32)
COLOR UR: YELLOW
CREAT SERPL-MCNC: 0.71 MG/DL (ref 0.6–1.3)
CREAT SERPL-MCNC: 0.72 MG/DL (ref 0.6–1.3)
CREAT SERPL-MCNC: 0.72 MG/DL (ref 0.6–1.3)
CREAT SERPL-MCNC: 0.73 MG/DL (ref 0.6–1.3)
CREAT SERPL-MCNC: 0.74 MG/DL (ref 0.6–1.3)
CREAT SERPL-MCNC: 0.83 MG/DL (ref 0.6–1.3)
CREAT SERPL-MCNC: 0.83 MG/DL (ref 0.6–1.3)
CREAT SERPL-MCNC: 0.85 MG/DL (ref 0.6–1.3)
CREAT SERPL-MCNC: 0.91 MG/DL (ref 0.6–1.3)
CREAT SERPL-MCNC: 0.93 MG/DL (ref 0.6–1.3)
CREAT SERPL-MCNC: 0.98 MG/DL (ref 0.6–1.3)
CREAT SERPL-MCNC: 1.01 MG/DL (ref 0.6–1.3)
CREAT SERPL-MCNC: 1.02 MG/DL (ref 0.6–1.3)
CREAT UR-MCNC: 63 MG/DL (ref 20–275)
EOSINOPHIL # BLD AUTO: 0.1 THOUSAND/ΜL (ref 0–0.61)
EOSINOPHIL # BLD AUTO: 0.12 THOUSAND/ΜL (ref 0–0.61)
EOSINOPHIL # BLD AUTO: 0.12 THOUSAND/ΜL (ref 0–0.61)
EOSINOPHIL # BLD AUTO: 0.13 THOUSAND/ΜL (ref 0–0.61)
EOSINOPHIL # BLD AUTO: 0.15 THOUSAND/ΜL (ref 0–0.61)
EOSINOPHIL # BLD AUTO: 0.18 THOUSAND/ΜL (ref 0–0.61)
EOSINOPHIL # BLD AUTO: 0.2 THOUSAND/ΜL (ref 0–0.61)
EOSINOPHIL # BLD AUTO: 0.2 THOUSAND/ΜL (ref 0–0.61)
EOSINOPHIL # BLD AUTO: 0.23 THOUSAND/ΜL (ref 0–0.61)
EOSINOPHIL # BLD AUTO: 0.24 THOUSAND/ΜL (ref 0–0.61)
EOSINOPHIL # BLD AUTO: 0.3 THOUSAND/ΜL (ref 0–0.61)
EOSINOPHIL NFR BLD AUTO: 1 % (ref 0–6)
EOSINOPHIL NFR BLD AUTO: 1 % (ref 0–6)
EOSINOPHIL NFR BLD AUTO: 2 % (ref 0–6)
EOSINOPHIL NFR BLD AUTO: 3 % (ref 0–6)
EOSINOPHIL NFR BLD AUTO: 3 % (ref 0–6)
EOSINOPHIL NFR BLD AUTO: 4 % (ref 0–6)
ERYTHROCYTE [DISTWIDTH] IN BLOOD BY AUTOMATED COUNT: 11.6 % (ref 11.6–15.1)
ERYTHROCYTE [DISTWIDTH] IN BLOOD BY AUTOMATED COUNT: 11.6 % (ref 11.6–15.1)
ERYTHROCYTE [DISTWIDTH] IN BLOOD BY AUTOMATED COUNT: 11.7 % (ref 11.6–15.1)
ERYTHROCYTE [DISTWIDTH] IN BLOOD BY AUTOMATED COUNT: 11.8 % (ref 11.6–15.1)
ERYTHROCYTE [DISTWIDTH] IN BLOOD BY AUTOMATED COUNT: 11.9 % (ref 11.6–15.1)
ERYTHROCYTE [DISTWIDTH] IN BLOOD BY AUTOMATED COUNT: 11.9 % (ref 11.6–15.1)
ERYTHROCYTE [DISTWIDTH] IN BLOOD BY AUTOMATED COUNT: 12 % (ref 11.6–15.1)
ERYTHROCYTE [DISTWIDTH] IN BLOOD BY AUTOMATED COUNT: 12 % (ref 11.6–15.1)
ERYTHROCYTE [DISTWIDTH] IN BLOOD BY AUTOMATED COUNT: 12.3 % (ref 11.6–15.1)
GFR SERPL CREATININE-BSD FRML MDRD: 52 ML/MIN/1.73SQ M
GFR SERPL CREATININE-BSD FRML MDRD: 53 ML/MIN/1.73SQ M
GFR SERPL CREATININE-BSD FRML MDRD: 55 ML/MIN/1.73SQ M
GFR SERPL CREATININE-BSD FRML MDRD: 58 ML/MIN/1.73SQ M
GFR SERPL CREATININE-BSD FRML MDRD: 60 ML/MIN/1.73SQ M
GFR SERPL CREATININE-BSD FRML MDRD: 65 ML/MIN/1.73SQ M
GFR SERPL CREATININE-BSD FRML MDRD: 67 ML/MIN/1.73SQ M
GFR SERPL CREATININE-BSD FRML MDRD: 67 ML/MIN/1.73SQ M
GFR SERPL CREATININE-BSD FRML MDRD: 77 ML/MIN/1.73SQ M
GFR SERPL CREATININE-BSD FRML MDRD: 78 ML/MIN/1.73SQ M
GFR SERPL CREATININE-BSD FRML MDRD: 79 ML/MIN/1.73SQ M
GFR SERPL CREATININE-BSD FRML MDRD: 80 ML/MIN/1.73SQ M
GFR SERPL CREATININE-BSD FRML MDRD: 81 ML/MIN/1.73SQ M
GLUCOSE P FAST SERPL-MCNC: 122 MG/DL (ref 65–99)
GLUCOSE P FAST SERPL-MCNC: 88 MG/DL (ref 65–99)
GLUCOSE SERPL-MCNC: 105 MG/DL (ref 65–140)
GLUCOSE SERPL-MCNC: 110 MG/DL (ref 65–140)
GLUCOSE SERPL-MCNC: 110 MG/DL (ref 65–140)
GLUCOSE SERPL-MCNC: 112 MG/DL (ref 65–140)
GLUCOSE SERPL-MCNC: 122 MG/DL (ref 65–140)
GLUCOSE SERPL-MCNC: 123 MG/DL (ref 65–140)
GLUCOSE SERPL-MCNC: 136 MG/DL (ref 65–140)
GLUCOSE SERPL-MCNC: 142 MG/DL (ref 65–140)
GLUCOSE SERPL-MCNC: 158 MG/DL (ref 65–140)
GLUCOSE SERPL-MCNC: 88 MG/DL (ref 65–140)
GLUCOSE SERPL-MCNC: 89 MG/DL (ref 65–140)
GLUCOSE SERPL-MCNC: 94 MG/DL (ref 65–140)
GLUCOSE SERPL-MCNC: 96 MG/DL (ref 65–140)
GLUCOSE UR STRIP-MCNC: NEGATIVE MG/DL
HCT VFR BLD AUTO: 36.1 % (ref 34.8–46.1)
HCT VFR BLD AUTO: 38.1 % (ref 34.8–46.1)
HCT VFR BLD AUTO: 39.7 % (ref 34.8–46.1)
HCT VFR BLD AUTO: 39.8 % (ref 34.8–46.1)
HCT VFR BLD AUTO: 40.2 % (ref 34.8–46.1)
HCT VFR BLD AUTO: 41.7 % (ref 34.8–46.1)
HCT VFR BLD AUTO: 41.8 % (ref 34.8–46.1)
HCT VFR BLD AUTO: 42.2 % (ref 34.8–46.1)
HCT VFR BLD AUTO: 42.3 % (ref 34.8–46.1)
HCT VFR BLD AUTO: 42.9 % (ref 34.8–46.1)
HCT VFR BLD AUTO: 44.9 % (ref 34.8–46.1)
HGB BLD-MCNC: 11.8 G/DL (ref 11.5–15.4)
HGB BLD-MCNC: 12.1 G/DL (ref 11.5–15.4)
HGB BLD-MCNC: 12.2 G/DL (ref 11.5–15.4)
HGB BLD-MCNC: 12.7 G/DL (ref 11.5–15.4)
HGB BLD-MCNC: 13 G/DL (ref 11.5–15.4)
HGB BLD-MCNC: 13.3 G/DL (ref 11.5–15.4)
HGB BLD-MCNC: 13.3 G/DL (ref 11.5–15.4)
HGB BLD-MCNC: 13.4 G/DL (ref 11.5–15.4)
HGB BLD-MCNC: 13.8 G/DL (ref 11.5–15.4)
HGB UR QL STRIP.AUTO: ABNORMAL
HGB UR QL STRIP.AUTO: ABNORMAL
HGB UR QL STRIP.AUTO: NEGATIVE
IMM GRANULOCYTES # BLD AUTO: 0.02 THOUSAND/UL (ref 0–0.2)
IMM GRANULOCYTES # BLD AUTO: 0.03 THOUSAND/UL (ref 0–0.2)
IMM GRANULOCYTES # BLD AUTO: 0.03 THOUSAND/UL (ref 0–0.2)
IMM GRANULOCYTES # BLD AUTO: 0.04 THOUSAND/UL (ref 0–0.2)
IMM GRANULOCYTES # BLD AUTO: 0.05 THOUSAND/UL (ref 0–0.2)
IMM GRANULOCYTES # BLD AUTO: 0.05 THOUSAND/UL (ref 0–0.2)
IMM GRANULOCYTES NFR BLD AUTO: 0 % (ref 0–2)
IMM GRANULOCYTES NFR BLD AUTO: 1 % (ref 0–2)
KETONES UR STRIP-MCNC: NEGATIVE MG/DL
LEUKOCYTE ESTERASE UR QL STRIP: ABNORMAL
LEUKOCYTE ESTERASE UR QL STRIP: ABNORMAL
LEUKOCYTE ESTERASE UR QL STRIP: NEGATIVE
LYMPHOCYTES # BLD AUTO: 0.98 THOUSANDS/ΜL (ref 0.6–4.47)
LYMPHOCYTES # BLD AUTO: 1.12 THOUSANDS/ΜL (ref 0.6–4.47)
LYMPHOCYTES # BLD AUTO: 1.28 THOUSANDS/ΜL (ref 0.6–4.47)
LYMPHOCYTES # BLD AUTO: 1.3 THOUSANDS/ΜL (ref 0.6–4.47)
LYMPHOCYTES # BLD AUTO: 1.34 THOUSANDS/ΜL (ref 0.6–4.47)
LYMPHOCYTES # BLD AUTO: 1.4 THOUSANDS/ΜL (ref 0.6–4.47)
LYMPHOCYTES # BLD AUTO: 1.41 THOUSANDS/ΜL (ref 0.6–4.47)
LYMPHOCYTES # BLD AUTO: 1.44 THOUSANDS/ΜL (ref 0.6–4.47)
LYMPHOCYTES # BLD AUTO: 1.57 THOUSANDS/ΜL (ref 0.6–4.47)
LYMPHOCYTES # BLD AUTO: 1.59 THOUSANDS/ΜL (ref 0.6–4.47)
LYMPHOCYTES # BLD AUTO: 1.72 THOUSANDS/ΜL (ref 0.6–4.47)
LYMPHOCYTES NFR BLD AUTO: 10 % (ref 14–44)
LYMPHOCYTES NFR BLD AUTO: 11 % (ref 14–44)
LYMPHOCYTES NFR BLD AUTO: 12 % (ref 14–44)
LYMPHOCYTES NFR BLD AUTO: 14 % (ref 14–44)
LYMPHOCYTES NFR BLD AUTO: 16 % (ref 14–44)
LYMPHOCYTES NFR BLD AUTO: 16 % (ref 14–44)
LYMPHOCYTES NFR BLD AUTO: 17 % (ref 14–44)
LYMPHOCYTES NFR BLD AUTO: 18 % (ref 14–44)
LYMPHOCYTES NFR BLD AUTO: 19 % (ref 14–44)
LYMPHOCYTES NFR BLD AUTO: 19 % (ref 14–44)
LYMPHOCYTES NFR BLD AUTO: 22 % (ref 14–44)
MAGNESIUM SERPL-MCNC: 1.6 MG/DL (ref 1.6–2.6)
MCH RBC QN AUTO: 30.8 PG (ref 26.8–34.3)
MCH RBC QN AUTO: 31.1 PG (ref 26.8–34.3)
MCH RBC QN AUTO: 31.2 PG (ref 26.8–34.3)
MCH RBC QN AUTO: 31.2 PG (ref 26.8–34.3)
MCH RBC QN AUTO: 31.3 PG (ref 26.8–34.3)
MCH RBC QN AUTO: 31.3 PG (ref 26.8–34.3)
MCH RBC QN AUTO: 31.4 PG (ref 26.8–34.3)
MCH RBC QN AUTO: 31.7 PG (ref 26.8–34.3)
MCH RBC QN AUTO: 31.8 PG (ref 26.8–34.3)
MCH RBC QN AUTO: 32.1 PG (ref 26.8–34.3)
MCH RBC QN AUTO: 32.5 PG (ref 26.8–34.3)
MCHC RBC AUTO-ENTMCNC: 30.7 G/DL (ref 31.4–37.4)
MCHC RBC AUTO-ENTMCNC: 31.2 G/DL (ref 31.4–37.4)
MCHC RBC AUTO-ENTMCNC: 31.2 G/DL (ref 31.4–37.4)
MCHC RBC AUTO-ENTMCNC: 31.5 G/DL (ref 31.4–37.4)
MCHC RBC AUTO-ENTMCNC: 31.6 G/DL (ref 31.4–37.4)
MCHC RBC AUTO-ENTMCNC: 31.8 G/DL (ref 31.4–37.4)
MCHC RBC AUTO-ENTMCNC: 31.8 G/DL (ref 31.4–37.4)
MCHC RBC AUTO-ENTMCNC: 32.7 G/DL (ref 31.4–37.4)
MCHC RBC AUTO-ENTMCNC: 32.7 G/DL (ref 31.4–37.4)
MCV RBC AUTO: 100 FL (ref 82–98)
MCV RBC AUTO: 101 FL (ref 82–98)
MCV RBC AUTO: 101 FL (ref 82–98)
MCV RBC AUTO: 102 FL (ref 82–98)
MCV RBC AUTO: 98 FL (ref 82–98)
MCV RBC AUTO: 99 FL (ref 82–98)
MCV RBC AUTO: 99 FL (ref 82–98)
MICROALBUMIN UR-MCNC: 43.5 MG/DL
MONOCYTES # BLD AUTO: 0.55 THOUSAND/ΜL (ref 0.17–1.22)
MONOCYTES # BLD AUTO: 0.7 THOUSAND/ΜL (ref 0.17–1.22)
MONOCYTES # BLD AUTO: 0.73 THOUSAND/ΜL (ref 0.17–1.22)
MONOCYTES # BLD AUTO: 0.74 THOUSAND/ΜL (ref 0.17–1.22)
MONOCYTES # BLD AUTO: 0.78 THOUSAND/ΜL (ref 0.17–1.22)
MONOCYTES # BLD AUTO: 0.8 THOUSAND/ΜL (ref 0.17–1.22)
MONOCYTES # BLD AUTO: 0.81 THOUSAND/ΜL (ref 0.17–1.22)
MONOCYTES # BLD AUTO: 0.82 THOUSAND/ΜL (ref 0.17–1.22)
MONOCYTES # BLD AUTO: 0.82 THOUSAND/ΜL (ref 0.17–1.22)
MONOCYTES # BLD AUTO: 0.84 THOUSAND/ΜL (ref 0.17–1.22)
MONOCYTES # BLD AUTO: 0.86 THOUSAND/ΜL (ref 0.17–1.22)
MONOCYTES NFR BLD AUTO: 10 % (ref 4–12)
MONOCYTES NFR BLD AUTO: 6 % (ref 4–12)
MONOCYTES NFR BLD AUTO: 6 % (ref 4–12)
MONOCYTES NFR BLD AUTO: 8 % (ref 4–12)
MONOCYTES NFR BLD AUTO: 9 % (ref 4–12)
MONOCYTES NFR BLD AUTO: 9 % (ref 4–12)
NEUTROPHILS # BLD AUTO: 10.47 THOUSANDS/ΜL (ref 1.85–7.62)
NEUTROPHILS # BLD AUTO: 4.32 THOUSANDS/ΜL (ref 1.85–7.62)
NEUTROPHILS # BLD AUTO: 5.37 THOUSANDS/ΜL (ref 1.85–7.62)
NEUTROPHILS # BLD AUTO: 5.59 THOUSANDS/ΜL (ref 1.85–7.62)
NEUTROPHILS # BLD AUTO: 5.66 THOUSANDS/ΜL (ref 1.85–7.62)
NEUTROPHILS # BLD AUTO: 5.7 THOUSANDS/ΜL (ref 1.85–7.62)
NEUTROPHILS # BLD AUTO: 5.97 THOUSANDS/ΜL (ref 1.85–7.62)
NEUTROPHILS # BLD AUTO: 6.13 THOUSANDS/ΜL (ref 1.85–7.62)
NEUTROPHILS # BLD AUTO: 6.75 THOUSANDS/ΜL (ref 1.85–7.62)
NEUTROPHILS # BLD AUTO: 7.5 THOUSANDS/ΜL (ref 1.85–7.62)
NEUTROPHILS # BLD AUTO: 9.51 THOUSANDS/ΜL (ref 1.85–7.62)
NEUTS SEG NFR BLD AUTO: 67 % (ref 43–75)
NEUTS SEG NFR BLD AUTO: 68 % (ref 43–75)
NEUTS SEG NFR BLD AUTO: 69 % (ref 43–75)
NEUTS SEG NFR BLD AUTO: 69 % (ref 43–75)
NEUTS SEG NFR BLD AUTO: 70 % (ref 43–75)
NEUTS SEG NFR BLD AUTO: 76 % (ref 43–75)
NEUTS SEG NFR BLD AUTO: 76 % (ref 43–75)
NEUTS SEG NFR BLD AUTO: 81 % (ref 43–75)
NEUTS SEG NFR BLD AUTO: 81 % (ref 43–75)
NITRITE UR QL STRIP: NEGATIVE
NON-SQ EPI CELLS URNS QL MICRO: ABNORMAL /HPF
NRBC BLD AUTO-RTO: 0 /100 WBCS
PH UR STRIP.AUTO: 5.5 [PH]
PH UR STRIP.AUTO: 6 [PH]
PH UR STRIP.AUTO: 6 [PH]
PH UR STRIP.AUTO: 6.5 [PH]
PLATELET # BLD AUTO: 154 THOUSANDS/UL (ref 149–390)
PLATELET # BLD AUTO: 164 THOUSANDS/UL (ref 149–390)
PLATELET # BLD AUTO: 178 THOUSANDS/UL (ref 149–390)
PLATELET # BLD AUTO: 185 THOUSANDS/UL (ref 149–390)
PLATELET # BLD AUTO: 189 THOUSANDS/UL (ref 149–390)
PLATELET # BLD AUTO: 197 THOUSANDS/UL (ref 149–390)
PLATELET # BLD AUTO: 201 THOUSANDS/UL (ref 149–390)
PLATELET # BLD AUTO: 206 THOUSANDS/UL (ref 149–390)
PLATELET # BLD AUTO: 219 THOUSANDS/UL (ref 149–390)
PLATELET # BLD AUTO: 224 THOUSANDS/UL (ref 149–390)
PLATELET # BLD AUTO: 253 THOUSANDS/UL (ref 149–390)
PMV BLD AUTO: 10 FL (ref 8.9–12.7)
PMV BLD AUTO: 10 FL (ref 8.9–12.7)
PMV BLD AUTO: 10.1 FL (ref 8.9–12.7)
PMV BLD AUTO: 10.2 FL (ref 8.9–12.7)
PMV BLD AUTO: 10.2 FL (ref 8.9–12.7)
PMV BLD AUTO: 10.4 FL (ref 8.9–12.7)
PMV BLD AUTO: 11.2 FL (ref 8.9–12.7)
PMV BLD AUTO: 11.5 FL (ref 8.9–12.7)
PMV BLD AUTO: 9.8 FL (ref 8.9–12.7)
PMV BLD AUTO: 9.8 FL (ref 8.9–12.7)
PMV BLD AUTO: 9.9 FL (ref 8.9–12.7)
POTASSIUM SERPL-SCNC: 2.7 MMOL/L (ref 3.5–5.3)
POTASSIUM SERPL-SCNC: 2.8 MMOL/L (ref 3.5–5.3)
POTASSIUM SERPL-SCNC: 2.8 MMOL/L (ref 3.5–5.3)
POTASSIUM SERPL-SCNC: 3.5 MMOL/L (ref 3.5–5.3)
POTASSIUM SERPL-SCNC: 3.6 MMOL/L (ref 3.5–5.3)
POTASSIUM SERPL-SCNC: 3.8 MMOL/L (ref 3.5–5.3)
POTASSIUM SERPL-SCNC: 3.9 MMOL/L (ref 3.5–5.3)
POTASSIUM SERPL-SCNC: 4 MMOL/L (ref 3.5–5.3)
POTASSIUM SERPL-SCNC: 4.1 MMOL/L (ref 3.5–5.3)
POTASSIUM SERPL-SCNC: 4.1 MMOL/L (ref 3.5–5.3)
POTASSIUM SERPL-SCNC: 4.7 MMOL/L (ref 3.5–5.3)
PROT SERPL-MCNC: 6.4 G/DL (ref 6.4–8.2)
PROT SERPL-MCNC: 6.4 G/DL (ref 6.4–8.2)
PROT SERPL-MCNC: 6.7 G/DL (ref 6.4–8.2)
PROT SERPL-MCNC: 6.8 G/DL (ref 6.4–8.2)
PROT SERPL-MCNC: 6.9 G/DL (ref 6.4–8.2)
PROT SERPL-MCNC: 6.9 G/DL (ref 6.4–8.2)
PROT SERPL-MCNC: 7 G/DL (ref 6.4–8.2)
PROT SERPL-MCNC: 7 G/DL (ref 6.4–8.2)
PROT SERPL-MCNC: 7.1 G/DL (ref 6.4–8.2)
PROT SERPL-MCNC: 7.3 G/DL (ref 6.4–8.2)
PROT SERPL-MCNC: 7.4 G/DL (ref 6.4–8.2)
PROT SERPL-MCNC: 7.5 G/DL (ref 6.4–8.2)
PROT UR STRIP-MCNC: ABNORMAL MG/DL
PROT UR STRIP-MCNC: NEGATIVE MG/DL
RBC # BLD AUTO: 3.68 MILLION/UL (ref 3.81–5.12)
RBC # BLD AUTO: 3.81 MILLION/UL (ref 3.81–5.12)
RBC # BLD AUTO: 3.96 MILLION/UL (ref 3.81–5.12)
RBC # BLD AUTO: 4 MILLION/UL (ref 3.81–5.12)
RBC # BLD AUTO: 4.07 MILLION/UL (ref 3.81–5.12)
RBC # BLD AUTO: 4.14 MILLION/UL (ref 3.81–5.12)
RBC # BLD AUTO: 4.16 MILLION/UL (ref 3.81–5.12)
RBC # BLD AUTO: 4.2 MILLION/UL (ref 3.81–5.12)
RBC # BLD AUTO: 4.25 MILLION/UL (ref 3.81–5.12)
RBC # BLD AUTO: 4.3 MILLION/UL (ref 3.81–5.12)
RBC # BLD AUTO: 4.44 MILLION/UL (ref 3.81–5.12)
RBC #/AREA URNS AUTO: ABNORMAL /HPF
SL AMB POCT HEMOGLOBIN AIC: 5.1 (ref ?–6.5)
SL AMB POCT HEMOGLOBIN AIC: 5.5 (ref ?–6.5)
SODIUM SERPL-SCNC: 134 MMOL/L (ref 136–145)
SODIUM SERPL-SCNC: 136 MMOL/L (ref 136–145)
SODIUM SERPL-SCNC: 137 MMOL/L (ref 136–145)
SODIUM SERPL-SCNC: 139 MMOL/L (ref 136–145)
SODIUM SERPL-SCNC: 140 MMOL/L (ref 136–145)
SODIUM SERPL-SCNC: 141 MMOL/L (ref 136–145)
SODIUM SERPL-SCNC: 141 MMOL/L (ref 136–145)
SODIUM SERPL-SCNC: 142 MMOL/L (ref 136–145)
SODIUM SERPL-SCNC: 143 MMOL/L (ref 136–145)
SP GR UR STRIP.AUTO: 1.01 (ref 1–1.03)
SP GR UR STRIP.AUTO: 1.02 (ref 1–1.03)
SP GR UR STRIP.AUTO: <=1.005 (ref 1–1.03)
URATE CRY URNS QL MICRO: ABNORMAL /HPF
UROBILINOGEN UR QL STRIP.AUTO: 0.2 E.U./DL
WBC # BLD AUTO: 11.84 THOUSAND/UL (ref 4.31–10.16)
WBC # BLD AUTO: 12.88 THOUSAND/UL (ref 4.31–10.16)
WBC # BLD AUTO: 6.17 THOUSAND/UL (ref 4.31–10.16)
WBC # BLD AUTO: 7.99 THOUSAND/UL (ref 4.31–10.16)
WBC # BLD AUTO: 8.07 THOUSAND/UL (ref 4.31–10.16)
WBC # BLD AUTO: 8.21 THOUSAND/UL (ref 4.31–10.16)
WBC # BLD AUTO: 8.28 THOUSAND/UL (ref 4.31–10.16)
WBC # BLD AUTO: 8.54 THOUSAND/UL (ref 4.31–10.16)
WBC # BLD AUTO: 8.67 THOUSAND/UL (ref 4.31–10.16)
WBC # BLD AUTO: 8.78 THOUSAND/UL (ref 4.31–10.16)
WBC # BLD AUTO: 9.85 THOUSAND/UL (ref 4.31–10.16)
WBC #/AREA URNS AUTO: ABNORMAL /HPF

## 2019-01-01 PROCEDURE — 96413 CHEMO IV INFUSION 1 HR: CPT

## 2019-01-01 PROCEDURE — 74177 CT ABD & PELVIS W/CONTRAST: CPT

## 2019-01-01 PROCEDURE — 85025 COMPLETE CBC W/AUTO DIFF WBC: CPT

## 2019-01-01 PROCEDURE — 80053 COMPREHEN METABOLIC PANEL: CPT | Performed by: INTERNAL MEDICINE

## 2019-01-01 PROCEDURE — 99213 OFFICE O/P EST LOW 20 MIN: CPT | Performed by: INTERNAL MEDICINE

## 2019-01-01 PROCEDURE — 80053 COMPREHEN METABOLIC PANEL: CPT

## 2019-01-01 PROCEDURE — 36593 DECLOT VASCULAR DEVICE: CPT

## 2019-01-01 PROCEDURE — 81001 URINALYSIS AUTO W/SCOPE: CPT | Performed by: INTERNAL MEDICINE

## 2019-01-01 PROCEDURE — 99214 OFFICE O/P EST MOD 30 MIN: CPT | Performed by: INTERNAL MEDICINE

## 2019-01-01 PROCEDURE — 83036 HEMOGLOBIN GLYCOSYLATED A1C: CPT | Performed by: FAMILY MEDICINE

## 2019-01-01 PROCEDURE — G0008 ADMIN INFLUENZA VIRUS VAC: HCPCS | Performed by: INTERNAL MEDICINE

## 2019-01-01 PROCEDURE — 85025 COMPLETE CBC W/AUTO DIFF WBC: CPT | Performed by: INTERNAL MEDICINE

## 2019-01-01 PROCEDURE — 36415 COLL VENOUS BLD VENIPUNCTURE: CPT

## 2019-01-01 PROCEDURE — 99214 OFFICE O/P EST MOD 30 MIN: CPT | Performed by: FAMILY MEDICINE

## 2019-01-01 PROCEDURE — 70470 CT HEAD/BRAIN W/O & W/DYE: CPT

## 2019-01-01 PROCEDURE — 71046 X-RAY EXAM CHEST 2 VIEWS: CPT

## 2019-01-01 PROCEDURE — 90732 PPSV23 VACC 2 YRS+ SUBQ/IM: CPT | Performed by: INTERNAL MEDICINE

## 2019-01-01 PROCEDURE — 71260 CT THORAX DX C+: CPT

## 2019-01-01 PROCEDURE — 80048 BASIC METABOLIC PNL TOTAL CA: CPT

## 2019-01-01 PROCEDURE — G0009 ADMIN PNEUMOCOCCAL VACCINE: HCPCS | Performed by: INTERNAL MEDICINE

## 2019-01-01 PROCEDURE — 93000 ELECTROCARDIOGRAM COMPLETE: CPT | Performed by: INTERNAL MEDICINE

## 2019-01-01 PROCEDURE — 99215 OFFICE O/P EST HI 40 MIN: CPT | Performed by: PHYSICIAN ASSISTANT

## 2019-01-01 PROCEDURE — 90662 IIV NO PRSV INCREASED AG IM: CPT | Performed by: INTERNAL MEDICINE

## 2019-01-01 PROCEDURE — 99215 OFFICE O/P EST HI 40 MIN: CPT | Performed by: INTERNAL MEDICINE

## 2019-01-01 PROCEDURE — 99213 OFFICE O/P EST LOW 20 MIN: CPT | Performed by: FAMILY MEDICINE

## 2019-01-01 PROCEDURE — 83735 ASSAY OF MAGNESIUM: CPT

## 2019-01-01 RX ORDER — SODIUM CHLORIDE 9 MG/ML
20 INJECTION, SOLUTION INTRAVENOUS ONCE
Status: CANCELLED | OUTPATIENT
Start: 2019-01-01

## 2019-01-01 RX ORDER — SODIUM CHLORIDE 9 MG/ML
20 INJECTION, SOLUTION INTRAVENOUS ONCE
Status: COMPLETED | OUTPATIENT
Start: 2019-01-01 | End: 2019-01-01

## 2019-01-01 RX ORDER — OXYCODONE HYDROCHLORIDE AND ACETAMINOPHEN 5; 325 MG/1; MG/1
1 TABLET ORAL EVERY 4 HOURS PRN
Qty: 180 TABLET | Refills: 0 | Status: SHIPPED | OUTPATIENT
Start: 2019-01-01 | End: 2019-01-01 | Stop reason: SDUPTHER

## 2019-01-01 RX ORDER — SIMVASTATIN 80 MG
80 TABLET ORAL DAILY
Qty: 90 TABLET | Refills: 0 | Status: SHIPPED | OUTPATIENT
Start: 2019-01-01 | End: 2019-01-01 | Stop reason: SDUPTHER

## 2019-01-01 RX ORDER — NORTRIPTYLINE HYDROCHLORIDE 10 MG/1
10 CAPSULE ORAL
Qty: 30 CAPSULE | Refills: 0 | Status: SHIPPED | OUTPATIENT
Start: 2019-01-01 | End: 2019-01-01 | Stop reason: SDUPTHER

## 2019-01-01 RX ORDER — METOPROLOL SUCCINATE 50 MG/1
50 TABLET, EXTENDED RELEASE ORAL DAILY
COMMUNITY
End: 2019-01-01 | Stop reason: SDUPTHER

## 2019-01-01 RX ORDER — METOPROLOL SUCCINATE 100 MG/1
100 TABLET, EXTENDED RELEASE ORAL DAILY
Qty: 90 TABLET | Refills: 1 | Status: SHIPPED | OUTPATIENT
Start: 2019-01-01 | End: 2020-01-01 | Stop reason: HOSPADM

## 2019-01-01 RX ORDER — METOPROLOL TARTRATE 100 MG/1
100 TABLET ORAL DAILY
COMMUNITY
End: 2019-01-01 | Stop reason: ALTCHOICE

## 2019-01-01 RX ORDER — OLMESARTAN MEDOXOMIL 5 MG/1
5 TABLET ORAL DAILY
COMMUNITY
End: 2019-01-01 | Stop reason: SDUPTHER

## 2019-01-01 RX ORDER — OLMESARTAN MEDOXOMIL 5 MG/1
5 TABLET ORAL DAILY
Qty: 30 TABLET | Refills: 5 | Status: SHIPPED | OUTPATIENT
Start: 2019-01-01 | End: 2019-01-01 | Stop reason: SDUPTHER

## 2019-01-01 RX ORDER — FUROSEMIDE 40 MG/1
TABLET ORAL
Qty: 180 TABLET | Refills: 0 | OUTPATIENT
Start: 2019-01-01

## 2019-01-01 RX ORDER — FUROSEMIDE 40 MG/1
TABLET ORAL
Qty: 90 TABLET | Refills: 0 | Status: SHIPPED | OUTPATIENT
Start: 2019-01-01 | End: 2019-01-01 | Stop reason: SDUPTHER

## 2019-01-01 RX ORDER — SIMVASTATIN 80 MG
TABLET ORAL
Qty: 90 TABLET | Refills: 0 | Status: SHIPPED | OUTPATIENT
Start: 2019-01-01 | End: 2020-01-01 | Stop reason: HOSPADM

## 2019-01-01 RX ORDER — OXYCODONE HYDROCHLORIDE AND ACETAMINOPHEN 5; 325 MG/1; MG/1
1 TABLET ORAL EVERY 4 HOURS PRN
Qty: 180 TABLET | Refills: 0 | Status: CANCELLED | OUTPATIENT
Start: 2019-01-01

## 2019-01-01 RX ORDER — SODIUM CHLORIDE 9 MG/ML
40 INJECTION, SOLUTION INTRAVENOUS CONTINUOUS
Status: DISCONTINUED | OUTPATIENT
Start: 2019-01-01 | End: 2019-01-01 | Stop reason: HOSPADM

## 2019-01-01 RX ORDER — VALSARTAN 80 MG/1
80 TABLET ORAL DAILY
Qty: 30 TABLET | Refills: 5 | Status: SHIPPED | OUTPATIENT
Start: 2019-01-01 | End: 2019-01-01 | Stop reason: ALTCHOICE

## 2019-01-01 RX ORDER — POTASSIUM CHLORIDE 20 MEQ/1
TABLET, EXTENDED RELEASE ORAL
Qty: 14 TABLET | Refills: 0 | Status: SHIPPED | OUTPATIENT
Start: 2019-01-01 | End: 2019-01-01 | Stop reason: ALTCHOICE

## 2019-01-01 RX ORDER — DRONABINOL 5 MG/1
5 CAPSULE ORAL
Start: 2019-01-01 | End: 2019-01-01 | Stop reason: ALTCHOICE

## 2019-01-01 RX ORDER — OLMESARTAN MEDOXOMIL 5 MG/1
5 TABLET ORAL DAILY
Qty: 30 TABLET | Refills: 2 | Status: SHIPPED | OUTPATIENT
Start: 2019-01-01 | End: 2019-01-01 | Stop reason: SDUPTHER

## 2019-01-01 RX ORDER — SODIUM CHLORIDE 9 MG/ML
20 INJECTION, SOLUTION INTRAVENOUS CONTINUOUS
Status: DISCONTINUED | OUTPATIENT
Start: 2019-01-01 | End: 2019-01-01 | Stop reason: HOSPADM

## 2019-01-01 RX ORDER — BISOPROLOL FUMARATE 10 MG/1
10 TABLET ORAL DAILY
Qty: 30 TABLET | Refills: 5 | Status: SHIPPED | OUTPATIENT
Start: 2019-01-01 | End: 2019-01-01 | Stop reason: CLARIF

## 2019-01-01 RX ORDER — BETAMETHASONE DIPROPIONATE 0.5 MG/G
CREAM TOPICAL
Qty: 50 G | Refills: 1 | Status: SHIPPED | OUTPATIENT
Start: 2019-01-01 | End: 2020-01-01 | Stop reason: HOSPADM

## 2019-01-01 RX ORDER — BLOOD-GLUCOSE METER
KIT MISCELLANEOUS
Qty: 300 EACH | Refills: 1 | Status: SHIPPED | OUTPATIENT
Start: 2019-01-01 | End: 2019-01-01 | Stop reason: SDUPTHER

## 2019-01-01 RX ORDER — CLINDAMYCIN HYDROCHLORIDE 150 MG/1
150 CAPSULE ORAL EVERY 6 HOURS SCHEDULED
Qty: 40 CAPSULE | Refills: 0 | Status: SHIPPED | OUTPATIENT
Start: 2019-01-01 | End: 2019-01-01

## 2019-01-01 RX ORDER — PENTOXIFYLLINE 400 MG/1
400 TABLET, EXTENDED RELEASE ORAL
Qty: 270 TABLET | Refills: 1 | Status: SHIPPED | OUTPATIENT
Start: 2019-01-01 | End: 2020-01-01 | Stop reason: HOSPADM

## 2019-01-01 RX ORDER — METOPROLOL TARTRATE 100 MG/1
TABLET ORAL
Qty: 90 TABLET | Refills: 0 | OUTPATIENT
Start: 2019-01-01

## 2019-01-01 RX ORDER — MELATONIN 3 MG
LOZENGE ORAL
COMMUNITY
End: 2020-01-01 | Stop reason: HOSPADM

## 2019-01-01 RX ORDER — FUROSEMIDE 40 MG/1
40 TABLET ORAL DAILY
Qty: 90 TABLET | Refills: 0 | Status: SHIPPED | OUTPATIENT
Start: 2019-01-01 | End: 2019-01-01 | Stop reason: SDUPTHER

## 2019-01-01 RX ORDER — METOPROLOL TARTRATE 100 MG/1
TABLET ORAL
Qty: 90 TABLET | Refills: 0 | Status: SHIPPED | OUTPATIENT
Start: 2019-01-01 | End: 2019-01-01

## 2019-01-01 RX ORDER — NORTRIPTYLINE HYDROCHLORIDE 10 MG/1
10 CAPSULE ORAL
Qty: 30 CAPSULE | Refills: 1 | Status: SHIPPED | OUTPATIENT
Start: 2019-01-01 | End: 2020-01-01 | Stop reason: SDUPTHER

## 2019-01-01 RX ORDER — NORTRIPTYLINE HYDROCHLORIDE 10 MG/1
10 CAPSULE ORAL
Qty: 30 CAPSULE | Refills: 0 | OUTPATIENT
Start: 2019-01-01

## 2019-01-01 RX ORDER — METOPROLOL SUCCINATE 50 MG/1
50 TABLET, EXTENDED RELEASE ORAL DAILY
Qty: 90 TABLET | Refills: 0 | Status: SHIPPED | OUTPATIENT
Start: 2019-01-01 | End: 2019-01-01 | Stop reason: ALTCHOICE

## 2019-01-01 RX ORDER — NORTRIPTYLINE HYDROCHLORIDE 10 MG/1
10 CAPSULE ORAL
Qty: 30 CAPSULE | Refills: 1 | Status: SHIPPED | OUTPATIENT
Start: 2019-01-01 | End: 2019-01-01 | Stop reason: SDUPTHER

## 2019-01-01 RX ORDER — OLMESARTAN MEDOXOMIL 5 MG/1
5 TABLET ORAL 2 TIMES DAILY
Qty: 180 TABLET | Refills: 2 | Status: SHIPPED | OUTPATIENT
Start: 2019-01-01 | End: 2019-01-01

## 2019-01-01 RX ORDER — SIMVASTATIN 80 MG
TABLET ORAL
Qty: 90 TABLET | Refills: 0 | Status: SHIPPED | OUTPATIENT
Start: 2019-01-01 | End: 2019-01-01 | Stop reason: SDUPTHER

## 2019-01-01 RX ORDER — LOSARTAN POTASSIUM 25 MG/1
25 TABLET ORAL DAILY
Qty: 30 TABLET | Refills: 5 | Status: SHIPPED | OUTPATIENT
Start: 2019-01-01 | End: 2019-01-01 | Stop reason: ALTCHOICE

## 2019-01-01 RX ORDER — MIRTAZAPINE 15 MG/1
15 TABLET, FILM COATED ORAL
Qty: 30 TABLET | Refills: 3 | Status: SHIPPED | OUTPATIENT
Start: 2019-01-01 | End: 2020-01-01 | Stop reason: HOSPADM

## 2019-01-01 RX ORDER — BETAMETHASONE DIPROPIONATE 0.5 MG/G
CREAM TOPICAL 2 TIMES DAILY
Qty: 50 G | Refills: 1 | Status: SHIPPED | OUTPATIENT
Start: 2019-01-01 | End: 2019-01-01 | Stop reason: SDUPTHER

## 2019-01-01 RX ORDER — OXYCODONE HYDROCHLORIDE AND ACETAMINOPHEN 5; 325 MG/1; MG/1
1 TABLET ORAL EVERY 4 HOURS PRN
Qty: 180 TABLET | Refills: 0 | OUTPATIENT
Start: 2019-01-01

## 2019-01-01 RX ORDER — POTASSIUM CHLORIDE 20 MEQ/1
20 TABLET, EXTENDED RELEASE ORAL 2 TIMES DAILY
Qty: 30 TABLET | Refills: 0 | Status: SHIPPED | OUTPATIENT
Start: 2019-01-01 | End: 2020-01-01 | Stop reason: HOSPADM

## 2019-01-01 RX ORDER — OLMESARTAN MEDOXOMIL 5 MG/1
5 TABLET ORAL DAILY
Qty: 90 TABLET | Refills: 0 | Status: SHIPPED | OUTPATIENT
Start: 2019-01-01 | End: 2019-01-01 | Stop reason: ALTCHOICE

## 2019-01-01 RX ORDER — FUROSEMIDE 40 MG/1
TABLET ORAL
Qty: 90 TABLET | Refills: 0 | Status: SHIPPED | OUTPATIENT
Start: 2019-01-01 | End: 2020-01-01 | Stop reason: HOSPADM

## 2019-01-01 RX ORDER — OXYCODONE HYDROCHLORIDE AND ACETAMINOPHEN 5; 325 MG/1; MG/1
1 TABLET ORAL EVERY 4 HOURS PRN
Qty: 180 TABLET | Refills: 0 | Status: SHIPPED | OUTPATIENT
Start: 2019-01-01 | End: 2020-01-01 | Stop reason: HOSPADM

## 2019-01-01 RX ORDER — FUROSEMIDE 40 MG/1
TABLET ORAL
Qty: 180 TABLET | Refills: 0 | Status: SHIPPED | OUTPATIENT
Start: 2019-01-01 | End: 2019-01-01 | Stop reason: SDUPTHER

## 2019-01-01 RX ORDER — FLUTICASONE PROPIONATE 50 MCG
1 SPRAY, SUSPENSION (ML) NASAL DAILY
Qty: 1 BOTTLE | Refills: 5 | Status: SHIPPED | OUTPATIENT
Start: 2019-01-01 | End: 2020-01-01 | Stop reason: HOSPADM

## 2019-01-01 RX ORDER — BLOOD-GLUCOSE METER
KIT MISCELLANEOUS
Refills: 1 | OUTPATIENT
Start: 2019-01-01

## 2019-01-01 RX ORDER — FUROSEMIDE 40 MG/1
TABLET ORAL
Qty: 180 TABLET | Refills: 0 | Status: SHIPPED | OUTPATIENT
Start: 2019-01-01 | End: 2019-01-01 | Stop reason: ALTCHOICE

## 2019-01-01 RX ORDER — ALBUTEROL SULFATE 2.5 MG/3ML
2.5 SOLUTION RESPIRATORY (INHALATION)
Qty: 360 VIAL | Refills: 3 | Status: SHIPPED | OUTPATIENT
Start: 2019-01-01 | End: 2020-01-01 | Stop reason: HOSPADM

## 2019-01-01 RX ORDER — OLMESARTAN MEDOXOMIL 20 MG/1
10 TABLET ORAL DAILY
Qty: 15 TABLET | Refills: 1
Start: 2019-01-01 | End: 2020-01-01 | Stop reason: HOSPADM

## 2019-01-01 RX ORDER — AZITHROMYCIN 250 MG/1
500 TABLET, FILM COATED ORAL 3 TIMES WEEKLY
Qty: 30 TABLET | Refills: 3 | Status: SHIPPED | OUTPATIENT
Start: 2019-01-01 | End: 2019-01-01

## 2019-01-01 RX ORDER — AZITHROMYCIN 250 MG/1
250 TABLET, FILM COATED ORAL
COMMUNITY
End: 2019-01-01 | Stop reason: ALTCHOICE

## 2019-01-01 RX ORDER — METOPROLOL SUCCINATE 100 MG/1
100 TABLET, EXTENDED RELEASE ORAL DAILY
COMMUNITY
End: 2019-01-01 | Stop reason: SDUPTHER

## 2019-01-01 RX ORDER — VALSARTAN 80 MG/1
80 TABLET ORAL DAILY
COMMUNITY
End: 2019-01-01

## 2019-01-01 RX ORDER — METOPROLOL TARTRATE 100 MG/1
TABLET ORAL
Qty: 90 TABLET | Refills: 0 | Status: SHIPPED | OUTPATIENT
Start: 2019-01-01 | End: 2019-01-01 | Stop reason: ALTCHOICE

## 2019-01-01 RX ORDER — BISOPROLOL FUMARATE 5 MG/1
10 TABLET ORAL DAILY
Qty: 30 TABLET | Refills: 5 | Status: SHIPPED | OUTPATIENT
Start: 2019-01-01 | End: 2019-01-01 | Stop reason: SDUPTHER

## 2019-01-01 RX ORDER — TIOTROPIUM BROMIDE INHALATION SPRAY 3.12 UG/1
SPRAY, METERED RESPIRATORY (INHALATION)
Qty: 1 INHALER | Refills: 6 | Status: SHIPPED | OUTPATIENT
Start: 2019-01-01 | End: 2019-01-01 | Stop reason: SDUPTHER

## 2019-01-01 RX ORDER — AZITHROMYCIN 250 MG/1
250 TABLET, FILM COATED ORAL 3 TIMES WEEKLY
Qty: 36 TABLET | Refills: 3 | Status: SHIPPED | OUTPATIENT
Start: 2019-01-01 | End: 2020-01-01 | Stop reason: HOSPADM

## 2019-01-01 RX ADMIN — SODIUM CHLORIDE 426 MG: 0.9 INJECTION, SOLUTION INTRAVENOUS at 09:11

## 2019-01-01 RX ADMIN — IODIXANOL 95 ML: 320 INJECTION, SOLUTION INTRAVASCULAR at 15:43

## 2019-01-01 RX ADMIN — SODIUM CHLORIDE 20 ML/HR: 9 INJECTION, SOLUTION INTRAVENOUS at 08:23

## 2019-01-01 RX ADMIN — SODIUM CHLORIDE 440 MG: 0.9 INJECTION, SOLUTION INTRAVENOUS at 08:34

## 2019-01-01 RX ADMIN — IOHEXOL 100 ML: 350 INJECTION, SOLUTION INTRAVENOUS at 09:17

## 2019-01-01 RX ADMIN — SODIUM CHLORIDE 20 ML/HR: 9 INJECTION, SOLUTION INTRAVENOUS at 08:31

## 2019-01-01 RX ADMIN — SODIUM CHLORIDE 400 MG: 0.9 INJECTION, SOLUTION INTRAVENOUS at 09:05

## 2019-01-01 RX ADMIN — SODIUM CHLORIDE 400 MG: 0.9 INJECTION, SOLUTION INTRAVENOUS at 09:52

## 2019-01-01 RX ADMIN — SODIUM CHLORIDE 400 MG: 0.9 INJECTION, SOLUTION INTRAVENOUS at 08:44

## 2019-01-01 RX ADMIN — SODIUM CHLORIDE 20 ML/HR: 0.9 INJECTION, SOLUTION INTRAVENOUS at 10:44

## 2019-01-01 RX ADMIN — SODIUM CHLORIDE 400 MG: 0.9 INJECTION, SOLUTION INTRAVENOUS at 08:53

## 2019-01-01 RX ADMIN — SODIUM CHLORIDE 40 ML/HR: 9 INJECTION, SOLUTION INTRAVENOUS at 08:34

## 2019-01-01 RX ADMIN — SODIUM CHLORIDE 440 MG: 0.9 INJECTION, SOLUTION INTRAVENOUS at 08:42

## 2019-01-01 RX ADMIN — SODIUM CHLORIDE 400 MG: 0.9 INJECTION, SOLUTION INTRAVENOUS at 09:33

## 2019-01-01 RX ADMIN — SODIUM CHLORIDE 400 MG: 0.9 INJECTION, SOLUTION INTRAVENOUS at 09:45

## 2019-01-01 RX ADMIN — IOHEXOL 85 ML: 350 INJECTION, SOLUTION INTRAVENOUS at 16:50

## 2019-01-01 RX ADMIN — SODIUM CHLORIDE 20 ML/HR: 9 INJECTION, SOLUTION INTRAVENOUS at 09:19

## 2019-01-01 RX ADMIN — SODIUM CHLORIDE 20 ML/HR: 9 INJECTION, SOLUTION INTRAVENOUS at 08:59

## 2019-01-01 RX ADMIN — SODIUM CHLORIDE 426 MG: 0.9 INJECTION, SOLUTION INTRAVENOUS at 08:45

## 2019-01-01 RX ADMIN — SODIUM CHLORIDE 20 ML/HR: 9 INJECTION, SOLUTION INTRAVENOUS at 09:16

## 2019-01-01 RX ADMIN — SODIUM CHLORIDE 20 ML/HR: 9 INJECTION, SOLUTION INTRAVENOUS at 08:24

## 2019-01-01 RX ADMIN — SODIUM CHLORIDE 20 ML/HR: 0.9 INJECTION, SOLUTION INTRAVENOUS at 08:12

## 2019-01-01 RX ADMIN — SODIUM CHLORIDE 426 MG: 0.9 INJECTION, SOLUTION INTRAVENOUS at 08:52

## 2019-01-01 RX ADMIN — SODIUM CHLORIDE 400 MG: 0.9 INJECTION, SOLUTION INTRAVENOUS at 09:16

## 2019-01-01 RX ADMIN — IOHEXOL 1 ML: 350 INJECTION, SOLUTION INTRAVENOUS at 09:28

## 2019-01-01 RX ADMIN — ALTEPLASE 2 MG: 2.2 INJECTION, POWDER, LYOPHILIZED, FOR SOLUTION INTRAVENOUS at 09:20

## 2019-01-01 RX ADMIN — IOHEXOL 85 ML: 350 INJECTION, SOLUTION INTRAVENOUS at 08:08

## 2019-01-01 RX ADMIN — SODIUM CHLORIDE 426 MG: 0.9 INJECTION, SOLUTION INTRAVENOUS at 08:58

## 2019-01-01 RX ADMIN — SODIUM CHLORIDE 20 ML/HR: 9 INJECTION, SOLUTION INTRAVENOUS at 09:30

## 2019-01-01 RX ADMIN — SODIUM CHLORIDE 400 MG: 0.9 INJECTION, SOLUTION INTRAVENOUS at 09:20

## 2019-01-01 RX ADMIN — IOHEXOL 100 ML: 350 INJECTION, SOLUTION INTRAVENOUS at 09:20

## 2019-01-01 RX ADMIN — SODIUM CHLORIDE 400 MG: 0.9 INJECTION, SOLUTION INTRAVENOUS at 11:47

## 2019-01-01 RX ADMIN — SODIUM CHLORIDE 20 ML/HR: 9 INJECTION, SOLUTION INTRAVENOUS at 08:58

## 2019-01-01 RX ADMIN — SODIUM CHLORIDE 432 MG: 0.9 INJECTION, SOLUTION INTRAVENOUS at 08:54

## 2019-01-01 RX ADMIN — SODIUM CHLORIDE 40 ML/HR: 9 INJECTION, SOLUTION INTRAVENOUS at 08:53

## 2019-01-01 RX ADMIN — SODIUM CHLORIDE 400 MG: 0.9 INJECTION, SOLUTION INTRAVENOUS at 08:35

## 2019-01-01 RX ADMIN — SODIUM CHLORIDE 20 ML/HR: 9 INJECTION, SOLUTION INTRAVENOUS at 08:25

## 2019-01-03 RX ORDER — SODIUM CHLORIDE 9 MG/ML
20 INJECTION, SOLUTION INTRAVENOUS CONTINUOUS
Status: DISPENSED | OUTPATIENT
Start: 2019-01-08 | End: 2019-01-09

## 2019-01-07 ENCOUNTER — OFFICE VISIT (OUTPATIENT)
Dept: HEMATOLOGY ONCOLOGY | Facility: CLINIC | Age: 79
End: 2019-01-07
Payer: MEDICARE

## 2019-01-07 ENCOUNTER — HOSPITAL ENCOUNTER (OUTPATIENT)
Dept: INFUSION CENTER | Facility: HOSPITAL | Age: 79
Discharge: HOME | End: 2019-01-07
Payer: MEDICARE

## 2019-01-07 VITALS
TEMPERATURE: 98 F | BODY MASS INDEX: 19.19 KG/M2 | DIASTOLIC BLOOD PRESSURE: 74 MMHG | SYSTOLIC BLOOD PRESSURE: 118 MMHG | HEART RATE: 71 BPM | RESPIRATION RATE: 18 BRPM | WEIGHT: 95.2 LBS | OXYGEN SATURATION: 91 % | HEIGHT: 59 IN

## 2019-01-07 DIAGNOSIS — C34.11 SQUAMOUS CELL CARCINOMA OF BRONCHUS IN RIGHT UPPER LOBE (HCC): Primary | ICD-10-CM

## 2019-01-07 LAB
ALBUMIN SERPL BCP-MCNC: 2.8 G/DL (ref 3.5–5)
ALP SERPL-CCNC: 89 U/L (ref 46–116)
ALT SERPL W P-5'-P-CCNC: 13 U/L (ref 12–78)
ANION GAP SERPL CALCULATED.3IONS-SCNC: 4 MMOL/L (ref 4–13)
AST SERPL W P-5'-P-CCNC: 16 U/L (ref 5–45)
BASOPHILS # BLD AUTO: 0.03 THOUSANDS/ΜL (ref 0–0.1)
BASOPHILS NFR BLD AUTO: 1 % (ref 0–1)
BILIRUB SERPL-MCNC: 0.6 MG/DL (ref 0.2–1)
BUN SERPL-MCNC: 14 MG/DL (ref 5–25)
CALCIUM SERPL-MCNC: 8.5 MG/DL (ref 8.3–10.1)
CHLORIDE SERPL-SCNC: 101 MMOL/L (ref 100–108)
CO2 SERPL-SCNC: 37 MMOL/L (ref 21–32)
CREAT SERPL-MCNC: 0.77 MG/DL (ref 0.6–1.3)
EOSINOPHIL # BLD AUTO: 0.18 THOUSAND/ΜL (ref 0–0.61)
EOSINOPHIL NFR BLD AUTO: 3 % (ref 0–6)
ERYTHROCYTE [DISTWIDTH] IN BLOOD BY AUTOMATED COUNT: 14.7 % (ref 11.6–15.1)
GFR SERPL CREATININE-BSD FRML MDRD: 74 ML/MIN/1.73SQ M
GLUCOSE SERPL-MCNC: 126 MG/DL (ref 65–140)
HCT VFR BLD AUTO: 38 % (ref 34.8–46.1)
HGB BLD-MCNC: 11.3 G/DL (ref 11.5–15.4)
IMM GRANULOCYTES # BLD AUTO: 0.02 THOUSAND/UL (ref 0–0.2)
IMM GRANULOCYTES NFR BLD AUTO: 0 % (ref 0–2)
LYMPHOCYTES # BLD AUTO: 1.1 THOUSANDS/ΜL (ref 0.6–4.47)
LYMPHOCYTES NFR BLD AUTO: 17 % (ref 14–44)
MCH RBC QN AUTO: 31.1 PG (ref 26.8–34.3)
MCHC RBC AUTO-ENTMCNC: 29.7 G/DL (ref 31.4–37.4)
MCV RBC AUTO: 105 FL (ref 82–98)
MONOCYTES # BLD AUTO: 0.68 THOUSAND/ΜL (ref 0.17–1.22)
MONOCYTES NFR BLD AUTO: 11 % (ref 4–12)
NEUTROPHILS # BLD AUTO: 4.39 THOUSANDS/ΜL (ref 1.85–7.62)
NEUTS SEG NFR BLD AUTO: 68 % (ref 43–75)
NRBC BLD AUTO-RTO: 0 /100 WBCS
PLATELET # BLD AUTO: 132 THOUSANDS/UL (ref 149–390)
PMV BLD AUTO: 11.4 FL (ref 8.9–12.7)
POTASSIUM SERPL-SCNC: 3.9 MMOL/L (ref 3.5–5.3)
PROT SERPL-MCNC: 6.6 G/DL (ref 6.4–8.2)
RBC # BLD AUTO: 3.63 MILLION/UL (ref 3.81–5.12)
SODIUM SERPL-SCNC: 142 MMOL/L (ref 136–145)
WBC # BLD AUTO: 6.4 THOUSAND/UL (ref 4.31–10.16)

## 2019-01-07 PROCEDURE — 85025 COMPLETE CBC W/AUTO DIFF WBC: CPT | Performed by: INTERNAL MEDICINE

## 2019-01-07 PROCEDURE — 99214 OFFICE O/P EST MOD 30 MIN: CPT | Performed by: INTERNAL MEDICINE

## 2019-01-07 PROCEDURE — 80053 COMPREHEN METABOLIC PANEL: CPT | Performed by: INTERNAL MEDICINE

## 2019-01-07 NOTE — PROGRESS NOTES
[facility] HEMATOLOGY ONCOLOGY [place]  [address] [facility] [address] [place] [address] [address] [facility] [address] [phone]    Mason Callow, [id number]  01/07/19    Discussion:   In summary, this is a 50-year-old female history of lung cancer  Her energy level has improved since discontinuation of Taxotere  Appetite has improved slightly as well  She is having some difficulty walking with generalized weakness  She has weakness a little more on the left leg than the right although she is able to walk without a cane or walker at this time  She denies any neuropathy symptoms  Physical exam shows no specific asymmetry and lower extremity strength  Brain MRI is favored  She would feel more comfortable having a CT scan I think that is a reasonable substitution which would give acceptable information  Labs are normal/near normal   I reviewed the above considerations with the patient and her family  They voiced understanding and agreement     ______________________________________________________________________    Chief Complaint   Patient presents with    Follow-up       HPI:     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 ([address] )    7/30/2016 Initial Diagnosis     CT of the neck for evaluation of the carotid arteries incidentally showed an infiltrating mass in the right upper lobe extending to the hilum  PET-CT June 2016 patient was found to have a thyroid mass on physical examination  Ultrasound showed bilateral thyroid nodules  In July 2016 she underwent CAT scan of the neck for evaluation of the carotid arteries  Incidentally noted, infiltrating mass in the right upper lobe extending to the hilum was noted  4, 2016âPET/CT showed a right upper lobe mass measuring 4 8 cm, SUV 21 5  This extends to the right hilum  Right paratracheal and subcarinal nodes measure up to 12 mm  The left adrenal showed some hypermetabolism with SUV of 3 7, without discrete mass   Uptake in the right parotid gland is noted with SUV of 22 3 and a soft tissue nodule, measuring 11 mm  endoscopy and bronchoscopy showed 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  Lymph node biopsies did not show malignancy  and radiation therapy were not felt to be applicable  Due to background pulmonary dysfunction as well as the potential for left adrenal metastatic disease  Chemotherapy was chosen as primary therapy  Alternatively  6, 2016 started Abraxane 80 mg/m² day 1, 8, 15, carbo AUC-5, day 1 only, every 21 days  2017progressive disease noted  Tecentriq 1200 mg IV every 3 weeks initiated  Current Therapy: May 2017progressive disease noted  Tecentriq 1200 mg IV every 3 weeks initiated  Interval History: Has had back pain started about 4 days ago  Started after some housework, has been getting better with heating pad           9/6/2016 - 11/25/2016 Chemotherapy     Abraxane 80 mg/m2 day 1, 8, 15, carbo AUC-5, day 1 only, Q 21 days  5/3/2017 Progression     Progressive disease  5/16/2017 - 9/20/2018 Chemotherapy     Tecentriq 1200 mg IV Q 3 weeks  2/21/2018 - 3/14/2018 Radiation     C1    Plan ID Energy Fractions Dose per Fraction (cGy) Total Dose Delivered (cGy) Elapsed Days   Abdomen 6X 15 / 15 250 3,750 21      Treatment dates:  C1: 2/21/2018 - 3/14/2018           9/26/2018 -  Chemotherapy     Taxotere 50 mg/m2, Cyramza 10 mg/kg, both given day 1, Q 21 days  Metastasis to adrenal gland ([address] )    7/17/2017 Initial Diagnosis     Metastasis to adrenal gland (HCC)A mass in the anterior limb of the left adrenal gland with delayed postcontrast enhancement measuring 18 mm is either new or increasing from as far back as November 2016  The finding is suspicious for adrenal metastatic lesion superimposed upon   underlying bilateral adenomatous hyperplasia  Interval History:  Leg weakness    2 - Symptomatic, <50% confined to bed    Review of Systems   Constitutional: Negative for appetite change, diaphoresis, fatigue and fever  HENT: Negative for sinus pain  Eyes: Negative for discharge  Respiratory: Negative for cough and shortness of breath  Cardiovascular: Negative for chest pain  Gastrointestinal: Negative for abdominal pain, constipation and diarrhea  Endocrine: Negative for cold intolerance  Genitourinary: Negative for difficulty urinating and hematuria  Musculoskeletal: Negative for joint swelling  Skin: Negative for rash  Allergic/Immunologic: Negative for environmental allergies  Neurological: Negative for dizziness and headaches  Hematological: Negative for adenopathy  Psychiatric/Behavioral: Negative for agitation         Past Medical History:   Diagnosis Date    Abnormal CT of the chest     last assessed: Aug 8, 2016    Arthritis     Asthma     Asymptomatic carotid artery stenosis     last assessed: March 2, 2017    Benign essential hypertension     last assessed: March 2, 2017    Cataract of right eye 3/19/2015    Cataract of right eye     last assessed: March 19, 2015    CHF (congestive heart failure) (HCC)     Common cold     Coronary artery disease     Depression     Diabetes mellitus ([address] )     Fracture of multiple pubic rami ([facility] ) 1/6/2015    History of radiation therapy     Hyperlipidemia     Hypertension     Lung cancer ([address] )     Multiple thyroid nodules 7/26/2016    Myocardial infarction ([address] )     Pulmonary emphysema (HCC)     Pulmonary nodule     Shortness of breath      Patient Active Problem List   Diagnosis    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 ([address] )    Asymptomatic carotid artery stenosis    Back pain, lumbosacral    Benign essential hypertension    Bursitis, ischial    Cardiomyopathy ([address] )    Cataract of right eye    Chemotherapy-induced nausea    Chronic respiratory failure with hypoxia and hypercapnia (HCC)    COPD exacerbation (HCC)    Congestive heart failure ([address] )    Depression    DMII (diabetes mellitus, type 2) ([facility] )    Hyperlipidemia    Metastasis to adrenal gland (HCC)    Multiple thyroid nodules    Osteoporosis    Other chronic pain    Parotid adenoma    Psoriasis    Primary localized osteoarthrosis of the hip    PVD (peripheral vascular disease) ([facility])    Restless legs syndrome    Sciatica    Seborrheic dermatitis of scalp    Cellulitis of left foot       Current Outpatient Prescriptions:     albuterol (2 5 mg/3 mL) 0 083 % nebulizer solution, Take 1 vial (2 5 mg total) by nebulization 4 (four) times a day, Disp: 360 vial, Rfl: 3    albuterol (VENTOLIN HFA) 90 mcg/act inhaler, Inhale 2 puffs every 6 (six) hours as needed for wheezing, Disp: 3 Inhaler, Rfl: 3    azithromycin (ZITHROMAX) 250 mg tablet, Take 2 tablets (500 mg total) by mouth 3 (three) times a week for 140 days, Disp: 30 tablet, Rfl: 3    betamethasone, augmented, (DIPROLENE-AF) 0 05 % cream, APPLY TOPICALLY 2 (TWO) TIMES A DAY, Disp: 50 g, Rfl: 1    fluticasone-salmeterol (ADVAIR) 500-50 mcg/dose inhaler, Inhale 1 puff every 12 (twelve) hours, Disp: 3 Inhaler, Rfl: 3    FREESTYLE LITE test strip, TEST BLOOD SUGAR 3 TIMES DAILY, Disp: 300 each, Rfl: 1    furosemide (LASIX) 40 mg tablet, Take 1 tablet (40 mg total) by mouth 2 (two) times a day, Disp: 180 tablet, Rfl: 0    metoprolol tartrate (LOPRESSOR) 100 mg tablet, Take 1 tablet (100 mg total) by mouth daily, Disp: 90 tablet, Rfl: 0    mometasone (ELOCON) 0 1 % lotion, Apply 1 application topically daily, Disp: , Rfl: 3    oxyCODONE-acetaminophen (PERCOCET) 5-325 mg per tablet, Take 1 tablet by mouth every 4 (four) hours as needed for moderate pain Max Daily Amount: 6 tablets, Disp: 180 tablet, Rfl: 0    pentoxifylline (TRENtal) 400 mg ER tablet, Take 400 mg by mouth 3 (three) times a day with meals  , Disp: , Rfl:     pregabalin (LYRICA) 100 mg capsule, Take 1 capsule (100 mg total) by mouth daily at bedtime, Disp: 90 capsule, Rfl: 0    simvastatin (ZOCOR) 80 mg tablet, Take 1 tablet (80 mg total) by mouth daily, Disp: 90 tablet, Rfl: 0    Tiotropium Bromide Monohydrate (SPIRIVA RESPIMAT) 2 5 MCG/ACT AERS, Inhale 2 Act (5 mcg total) daily, Disp: 3 Inhaler, Rfl: 3    ondansetron (ZOFRAN) 8 mg tablet, Take 1 tablet (8 mg total) by mouth every 8 (eight) hours as needed for nausea or vomiting (Patient not taking: Reported on 2018 ), Disp: 30 tablet, Rfl: 2  No current facility-administered medications for this visit  Facility-Administered Medications Ordered in Other Visits:     [START ON 2019] ramucirumab (CYRAMZA) 440 mg in sodium chloride 0 9 % 250 mL IVPB, 440 mg, Intravenous, Once, Pearlene Pinta, DO    [START ON 2019] sodium chloride 0 9 % infusion, 20 mL/hr, Intravenous, Continuous, Pearlene Pinta, DO  Allergies   Allergen Reactions    Penicillins Swelling     Legs swell up     Past Surgical History:   Procedure Laterality Date    BRONCHOSCOPY N/A 8/10/2016    Procedure: BRONCHOSCOPY FLEXIBLE;  Surgeon: Bradford Manuel MD;  [facility];  Service:    [address] [place] [address] BYPASS GRAFT      using vein - aortic-bifemoral - last assessed: Aug 22, 2016     SECTION      CHOLECYSTECTOMY      EYE SURGERY      HYSTERECTOMY      CA BRONCHOSCOPY NEEDLE BX TRACHEA MAIN STEM&/BRON N/A 8/10/2016    Procedure: ENDOBRONCHIAL ULTRASOUND (FROZEN SECTION) ; Surgeon: Bradford Manuel MD;  [facility];  Service: Thoracic    CA INSJ TUNNELED CTR VAD W/SUBQ PORT AGE 5 YR/> Left 2016    Procedure: INSERTION VENOUS PORT (PORT-A-CATH);   Surgeon: Bradford Manuel MD;  [facility];  Service: Thoracic    TONSILLECTOMY       Social History     Objective:  Vitals:    19 1047   BP: 118/74   BP Location: Left arm   Patient Position: Sitting   Pulse: 71   Resp: 18   Temp: 98 °F (36 7 °C)   TempSrc: Tympanic   SpO2: 91%   Weight: 43 2 kg (95 lb 3 2 oz)   Height: 4' 11" (1 499 m)     Physical Exam   Constitutional: She is oriented to person, place, and time  She appears well-developed  HENT:   Head: Normocephalic  Eyes: Pupils are equal, round, and reactive to light  Neck: Neck supple  Cardiovascular: Normal rate  No murmur heard  Pulmonary/Chest: No respiratory distress  She has no wheezes  She has no rales  Abdominal: Soft  She exhibits no distension  There is no tenderness  There is no rebound  Musculoskeletal: She exhibits no edema  Lymphadenopathy:     She has no cervical adenopathy  Neurological: She is alert and oriented to person, place, and time  She displays normal reflexes  Skin: Skin is warm  No rash noted  Psychiatric: She has a normal mood and affect  Thought content normal          Labs: I personally reviewed the labs and imaging pertinent to this patient care

## 2019-01-08 ENCOUNTER — HOSPITAL ENCOUNTER (OUTPATIENT)
Dept: INFUSION CENTER | Facility: HOSPITAL | Age: 79
Discharge: HOME | End: 2019-01-08
Payer: MEDICARE

## 2019-01-08 ENCOUNTER — TELEPHONE (OUTPATIENT)
Dept: HEMATOLOGY ONCOLOGY | Facility: CLINIC | Age: 79
End: 2019-01-08

## 2019-01-08 VITALS
SYSTOLIC BLOOD PRESSURE: 130 MMHG | TEMPERATURE: 96 F | HEART RATE: 84 BPM | DIASTOLIC BLOOD PRESSURE: 51 MMHG | BODY MASS INDEX: 19.06 KG/M2 | WEIGHT: 94.36 LBS | OXYGEN SATURATION: 100 %

## 2019-01-08 PROCEDURE — 96413 CHEMO IV INFUSION 1 HR: CPT

## 2019-01-08 RX ADMIN — SODIUM CHLORIDE 20 ML/HR: 9 INJECTION, SOLUTION INTRAVENOUS at 08:41

## 2019-01-08 RX ADMIN — SODIUM CHLORIDE 440 MG: 0.9 INJECTION, SOLUTION INTRAVENOUS at 08:41

## 2019-01-08 NOTE — PROGRESS NOTES
Pt matthew Cyramza infusion well  Port de-accessed after flushing with NSS  Disch amb with O2 cannula to ome with spouse, steady gait

## 2019-01-08 NOTE — TELEPHONE ENCOUNTER
[facility] to [facility] to you regarding this patiet and asked if you could give her a call        Betzy Paez can be reached at [phone]

## 2019-01-09 DIAGNOSIS — I10 HYPERTENSION, UNSPECIFIED TYPE: ICD-10-CM

## 2019-01-09 RX ORDER — METOPROLOL TARTRATE 100 MG/1
100 TABLET ORAL DAILY
Qty: 90 TABLET | Refills: 0 | Status: SHIPPED | OUTPATIENT
Start: 2019-01-09 | End: 2019-01-01 | Stop reason: SDUPTHER

## 2019-01-28 NOTE — PROGRESS NOTES
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 (HCC)    Asymptomatic carotid artery stenosis    Back pain, lumbosacral    Benign essential hypertension    Bursitis, ischial    Cardiomyopathy ([facility] )    Cataract of right eye    Chemotherapy-induced nausea    Chronic respiratory failure with hypoxia and hypercapnia (HCC)    COPD exacerbation ([facility] )    Overview Signed 2/15/2018 11:03 AM by Ismael Jang MD     Transitioned From: COPD with acute exacerbation         Congestive heart failure ([facility] )    Depression    DMII (diabetes mellitus, type 2) ([facility] )    Lab Results   Component Value Date    HGBA1C 6 3 06/28/2018       No results for input(s): POCGLU in the last 72 hours      Blood Sugar Average: Last 72 hrs:          Hyperlipidemia    Metastasis to adrenal gland (HCC)    Multiple thyroid nodules    Osteoporosis    Other chronic pain    Parotid adenoma    Psoriasis    Primary localized osteoarthrosis of the hip    PVD (peripheral vascular disease) (HCC)    Restless legs syndrome    Sciatica    Seborrheic dermatitis of scalp    Cellulitis of left foot        Past Medical History:   Diagnosis Date    Abnormal CT of the chest     last assessed: Aug 8, 2016    Arthritis     Asthma     Asymptomatic carotid artery stenosis     last assessed: March 2, 2017    Benign essential hypertension     last assessed: March 2, 2017    Cataract of right eye 3/19/2015    Cataract of right eye     last assessed: 2015    CHF (congestive heart failure) (HCC)     Common cold     Coronary artery disease     Depression     Diabetes mellitus ([facility] )     Fracture of multiple pubic rami ([facility] ) 2015    History of radiation therapy     Hyperlipidemia     Hypertension     Lung cancer ([facility] )     Multiple thyroid nodules 2016    Myocardial infarction ([place] [address] )     Pulmonary emphysema (HCC)     Pulmonary nodule     Shortness of breath      Social History     Social History    Marital status: /Civil Union     Spouse name: N/A    Number of children: N/A    Years of education: N/A     Occupational History    Not on file  Social History Main Topics    Smoking status: Current Some Day Smoker     Packs/day: 0 25     Types: Cigarettes     Start date:     Smokeless tobacco: Never Used      Comment: smokes 3-4 cigs /day - current every day smoker noted in "allscripts" smoke for less than 1/2 pack per day for 50 years      Alcohol use No    Drug use: No    Sexual activity: Not on file     Other Topics Concern    Not on file     Social History Narrative    Caffeine use - coffee once in a great while and everyday tea drinker           Family History   Problem Relation Age of Onset    Brain cancer Sister     Cancer Sister     Thyroid disease Mother     Rheum arthritis Daughter      Past Surgical History:   Procedure Laterality Date    BRONCHOSCOPY N/A 8/10/2016    Procedure: BRONCHOSCOPY FLEXIBLE;  Surgeon: Ramakrishna Rojas MD;  [facility];  Service:    [facility] BYPASS GRAFT      using vein - aortic-bifemoral - last assessed: Aug 22, 2016     SECTION      CHOLECYSTECTOMY      EYE SURGERY      HYSTERECTOMY      UT BRONCHOSCOPY NEEDLE BX TRACHEA MAIN STEM&/BRON N/A 8/10/2016    Procedure: ENDOBRONCHIAL ULTRASOUND (FROZEN SECTION) ;   Surgeon: Ramakrishna Rojas MD;  [facility];  Service: Thoracic    UT INSJ TUNNELED CTR VAD W/SUBQ PORT AGE 5 YR/> Left 2016 Procedure: INSERTION VENOUS PORT (PORT-A-CATH); Surgeon: Dexter Reed MD;  [facility];  Service: Thoracic    TONSILLECTOMY         Current Outpatient Prescriptions:     albuterol (2 5 mg/3 mL) 0 083 % nebulizer solution, Take 1 vial (2 5 mg total) by nebulization 4 (four) times a day, Disp: 360 vial, Rfl: 3    albuterol (VENTOLIN HFA) 90 mcg/act inhaler, Inhale 2 puffs every 6 (six) hours as needed for wheezing, Disp: 3 Inhaler, Rfl: 3    azithromycin (ZITHROMAX) 250 mg tablet, Take 2 tablets (500 mg total) by mouth 3 (three) times a week for 140 days, Disp: 30 tablet, Rfl: 3    betamethasone, augmented, (DIPROLENE-AF) 0 05 % cream, APPLY TOPICALLY 2 (TWO) TIMES A DAY, Disp: 50 g, Rfl: 1    fluticasone-salmeterol (ADVAIR) 500-50 mcg/dose inhaler, Inhale 1 puff every 12 (twelve) hours, Disp: 3 Inhaler, Rfl: 3    FREESTYLE LITE test strip, TEST BLOOD SUGAR 3 TIMES DAILY, Disp: 300 each, Rfl: 1    furosemide (LASIX) 40 mg tablet, Take 1 tablet (40 mg total) by mouth 2 (two) times a day, Disp: 180 tablet, Rfl: 0    metoprolol tartrate (LOPRESSOR) 100 mg tablet, Take 1 tablet (100 mg total) by mouth daily, Disp: 90 tablet, Rfl: 0    mometasone (ELOCON) 0 1 % lotion, Apply 1 application topically daily, Disp: , Rfl: 3    oxyCODONE-acetaminophen (PERCOCET) 5-325 mg per tablet, Take 1 tablet by mouth every 4 (four) hours as needed for moderate pain Max Daily Amount: 6 tablets, Disp: 180 tablet, Rfl: 0    pentoxifylline (TRENtal) 400 mg ER tablet, Take 400 mg by mouth 3 (three) times a day with meals  , Disp: , Rfl:     pregabalin (LYRICA) 100 mg capsule, Take 1 capsule (100 mg total) by mouth daily at bedtime, Disp: 90 capsule, Rfl: 0    simvastatin (ZOCOR) 80 mg tablet, Take 1 tablet (80 mg total) by mouth daily, Disp: 90 tablet, Rfl: 0    Tiotropium Bromide Monohydrate (SPIRIVA RESPIMAT) 2 5 MCG/ACT AERS, Inhale 2 Act (5 mcg total) daily, Disp: 3 Inhaler, Rfl: 3    ondansetron (ZOFRAN) 8 mg tablet, Take 1 tablet (8 mg total) by mouth every 8 (eight) hours as needed for nausea or vomiting (Patient not taking: Reported on 1/28/2019 ), Disp: 30 tablet, Rfl: 2  No current facility-administered medications for this visit  Facility-Administered Medications Ordered in Other Visits:     [START ON 1/29/2019] alteplase (CATHFLO) injection 2 mg, 2 mg, Intracatheter, PRN, Adore Awkward, DO    [START ON 1/29/2019] ramucirumab (CYRAMZA) 432 mg in sodium chloride 0 9 % 250 mL IVPB, 432 mg, Intravenous, Once, Adore Awkward, DO    [START ON 1/29/2019] sodium chloride 0 9 % infusion, 20 mL/hr, Intravenous, Continuous, Adore Awkward, DO  Allergies   Allergen Reactions    Penicillins Swelling     Legs swell up       Labs:     Chemistry        Component Value Date/Time     11/20/2015 1648    K 3 9 01/07/2019 0804    K 4 0 11/20/2015 1648     01/07/2019 0804     11/20/2015 1648    CO2 37 (H) 01/07/2019 0804    CO2 >45 (H) 07/16/2018 1014    BUN 14 01/07/2019 0804    BUN 13 11/20/2015 1648    CREATININE 0 77 01/07/2019 0804    CREATININE 0 77 11/20/2015 1648        Component Value Date/Time    CALCIUM 8 5 01/07/2019 0804    CALCIUM 9 1 11/20/2015 1648    ALKPHOS 89 01/07/2019 0804    ALKPHOS 95 11/20/2015 1648    AST 16 01/07/2019 0804    AST 13 11/20/2015 1648    ALT 13 01/07/2019 0804    ALT 18 11/20/2015 1648    BILITOT 0 38 11/20/2015 1648            Lab Results   Component Value Date    CHOL 173 11/20/2015    CHOL 197 01/15/2013     Lab Results   Component Value Date    HDL 65 (H) 03/06/2017    HDL 55 11/20/2015    HDL 53 01/15/2013     Lab Results   Component Value Date    LDLCALC 95 03/06/2017    LDLCALC 54 11/20/2015     Lab Results   Component Value Date    TRIG 146 03/06/2017    TRIG 319 11/20/2015    TRIG 235 (H) 01/15/2013     No results found for: CHOLHDL    Imaging: No results found  Review of Systems   Constitution: Positive for weakness  HENT: Negative  Eyes: Negative  Cardiovascular: Negative  Respiratory: Positive for shortness of breath  Endocrine: Negative  Hematologic/Lymphatic: Negative  Skin: Negative  Musculoskeletal: Negative  Gastrointestinal: Negative  Genitourinary: Negative  Psychiatric/Behavioral: Negative  Allergic/Immunologic: Negative  Vitals:    01/28/19 1522   BP: 140/68   Pulse: 71           Physical Exam   Constitutional: She is oriented to person, place, and time  No distress  HENT:   Mouth/Throat: No oropharyngeal exudate  Eyes: No scleral icterus  Neck: No JVD present  Cardiovascular: Normal rate and regular rhythm  Murmur heard  Pulmonary/Chest: Effort normal  She has wheezes  Abdominal: Soft  Bowel sounds are normal  She exhibits no distension  There is no tenderness  There is no rebound  Musculoskeletal: She exhibits no edema  Neurological: She is alert and oriented to person, place, and time  Skin: Skin is warm and dry  She is not diaphoretic  Psychiatric: She has a normal mood and affect  Her behavior is normal        Discussion/Summary:    Chronic Systolic CHF; LVEF 84%: Her volume status appears stable today  She does have diffuse wheezing in her lung fields  I will add losartan as an ARB for medical therapy for cardiomyopathy  Her dyspnea is predominately due to severe COPD  The patient was counseled regarding diagnostic results, instructions for management, risk factor reductions, impressions  total time of encounter was 25 minutes and 15 minutes was spent counseling

## 2019-01-29 NOTE — PROGRESS NOTES
Pt arrives on unit for Tyshawnza  Pt's  and daughter made mention that the pt fell  2 days ago and has back pain  The pt denies dizziness  She stated she feels like she just lost her balance  Rates her back pain 5/10 constant and 8/10 with movement  Notified Emi Russo RN  As per [employer], Pt is okay to receive treatment today and is recommended to be evaluated  Crystal will speak to daughter  Will continue to monitor

## 2019-01-31 PROBLEM — M54.50 ACUTE LEFT-SIDED LOW BACK PAIN WITHOUT SCIATICA: Status: ACTIVE | Noted: 2019-01-01

## 2019-01-31 NOTE — PROGRESS NOTES
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  This could potentially be contributing to her falls though I see nothing obvious on her neurologic examination to explain this  I suggested the  that we could try balance training but she would not agree to this  Will continue to observe  They do not see continued improvement in her back pain over the next several days to resolution that she call for re-evaluation  Also should she develop any weakness of her lower extremities and cons of bowel bladder X cetera  They    Spent 30 minutes in direct contact with the patient and  reviewing her studies, her history and review of her examination  Diagnoses and all orders for this visit:    Type 2 diabetes mellitus without complication, without long-term current use of insulin (HCC)  -     POCT hemoglobin A1c    Acute left-sided low back pain without sciatica          Subjective:   Chief Complaint   Patient presents with    Fall     pt lost balance on sunday an fell and is c/o low back pain  Patient ID: Regis Latham is a 77 y o  female  HPI  The patient is a 78-year-old female with multiple medical problems who was company to the office by her  for evaluation complaints of low back pain after fall 4 days ago  He states that she was standing behind a chair folding some clothes when she seemed to lose her balance and fall over backwards   and daughter were present try to get her but unfortunately were not able to catch her  She landed on her gluteal region and lower back    She complained of pain afterwards but declined evaluation in the emergency room  She was seen early in the week for her oncology infusion related to her lung cancer  They told staff of her fall and her oncologist, Dr Ga Rae ordered CT of the head, chest abdomen and pelvis  This was performed yesterday and they have not received results  Patient states that she has persistent lower back pain  She does no incontinence of bowel or bladder and she has no worsening of weakness of her lower extremities though this is a chronic problem for her  She has no headache diplopia or focal neurologic symptom  She does have some confusion which is unchanged from baseline according to   He does state that this is her 4th fall in the past several months  There was no loss of consciousness  Patient has had no headaches  She denies chest pain rapid heartbeat dizziness orthostasis X cetera  The following portions of the patient's history were reviewed and updated as appropriate: allergies, current medications, past family history, past medical history, past social history, past surgical history and problem list     Review of Systems   Constitution: Positive for weakness  Negative for chills, decreased appetite, fever, malaise/fatigue, weight gain and weight loss  Cardiovascular: Negative for chest pain, leg swelling, orthopnea, palpitations and paroxysmal nocturnal dyspnea  Respiratory: Positive for shortness of breath and wheezing  Patient has oxygen dependent COPD  She has no change in baseline status presently  Musculoskeletal: Positive for back pain, falls and stiffness  Negative for neck pain  Gastrointestinal: Negative for bowel incontinence, constipation and diarrhea  Genitourinary: Negative for bladder incontinence  Neurological: Negative for dizziness, focal weakness, headaches, light-headedness and paresthesias  Objective:    Physical Exam   Constitutional: She appears well-developed and well-nourished   No distress  She is wearing oxygen  She is in no respiratory distress  Her pulse ox is 99   Neck: No JVD present  Cardiovascular: Normal rate and regular rhythm  Exam reveals no gallop  No murmur heard  Pulmonary/Chest: Effort normal    She has distant breath sounds  She does have expiratory phase delay  She has no crackle rhonchi or wheeze presently   Musculoskeletal: She exhibits tenderness  She exhibits no edema  She has some tenderness of the left lower paralumbar region  There is no step-off or deformity of her spine  There is no SI joint tenderness  Hip squeeze is negative   logroll of hips is negative  Sensation of her volar surfaces of feet and toes is normal    Neurological: She is alert  She displays normal reflexes  No cranial nerve deficit  She exhibits normal muscle tone  She is oriented to person and place  Romberg is negative  Psychiatric: She has a normal mood and affect  Nursing note and vitals reviewed

## 2019-01-31 NOTE — ASSESSMENT & PLAN NOTE
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

## 2019-02-19 NOTE — PROGRESS NOTES
Pt arrived via w/c for chemo  Using O2 @ 2L via nc  Made comfortable  Port accessed, brisk blood return, NSS to kvo, Cyramza infusing    WCTM

## 2019-02-19 NOTE — PLAN OF CARE
Problem: Potential for Falls  Goal: Patient will remain free of falls  Description  INTERVENTIONS:  - Assess patient frequently for physical needs  -  Identify cognitive and physical deficits and behaviors that affect risk of falls  -  Helix fall precautions as indicated by assessment   - Educate patient/family on patient safety including physical limitations  - Instruct patient to call for assistance with activity based on assessment  - Modify environment to reduce risk of injury  - Consider OT/PT consult to assist with strengthening/mobility  Outcome: Progressing     Problem: Knowledge Deficit  Goal: Patient/family/caregiver demonstrates understanding of disease process, treatment plan, medications, and discharge instructions  Description  Complete learning assessment and assess knowledge base    Interventions:  - Provide teaching at level of understanding  - Provide teaching via preferred learning methods  Outcome: Progressing

## 2019-02-21 NOTE — PATIENT INSTRUCTIONS
Sandra Velázquez was on two chemotherapy drugs startin in septemeber 2018:  Taxotere and Cyramza  However due to fatigue, we stopped the use of Taxotere  Sandra Velázquez now will continue on Cyramza alone

## 2019-02-21 NOTE — PROGRESS NOTES
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 [facility])  Patient has metastatic disease secondary to Mets to the adrenal gland  Patient previously had been on immunotherapy and then progressed  Patient was then started on Cyramza plus Taxotere  Taxotere caused excessive fatigue and was dropped from the regimen  Patient now is on single agent Cyramza with recent CT scans demonstrating stability to very mild improvement  CT scan of the brain was completed secondary to functional deficits including difficulty walking and generalized weakness  CT scan did not demonstrate metastatic disease  At this time advised that the patient continue with her current regimen  The patient and her  are in agreement  Regimen:  Cyramza 10 milligrams/kilogram IV, day 1  Cycle length = 21 days  Blood work to be drawn during infusion appointment  Results are not necessary prior to administering therapy  - CBC and differential; Standing  - Comprehensive metabolic panel; Standing  - CBC and differential  - Comprehensive metabolic panel    2  Multiple other comorbidities including COPD, CHF  Patient has other disease processes that contribute to her fatigue and lack of stability  Patient is on oxygen 24 hours a day  Patient continues to smoke  Patient follows with other specialty offices for management of these comorbidities  The patient is scheduled for follow-up in approximately 6 weeks with Dr Nathanael Quinonez  Patient and her  voiced agreement and understanding to the above  Patient knows to call the Hematology/Oncology office with any questions and concerns regarding the above  Carefully review your medication list in verify the list is accurate and up-to-date    Please call the hematologic/Oncology office if there medications missing from the less, medications on the list that your not currently taking or if there is a dosage or instruction that is different from higher taking medication  I have spent 30 minutes with Patient and family today in which greater than 50% of this time was spent in counseling/coordination of care regarding Diagnostic results, Prognosis, Intructions for management and Impressions  Goals and Barriers:    Current Goal:  Prolong Survival from Cancer  Barriers:  Hard of hearing  Patient's Capacity to Self Care:  Patient able to self care   -------------------------------------------------------------------------------------------------------    Chief Complaint   Patient presents with    Follow-up     History of present illness/Cancer History:      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 ([facility] [address] )    7/30/2016 Initial Diagnosis     CT of the neck for evaluation of the carotid arteries incidentally showed an infiltrating mass in the right upper lobe extending to the hilum  PET-CT June 2016 patient was found to have a thyroid mass on physical examination  Ultrasound showed bilateral thyroid nodules  In July 2016 she underwent CAT scan of the neck for evaluation of the carotid arteries  Incidentally noted, infiltrating mass in the right upper lobe mass measuring 4 8 cm, SUV 21 5  This extends to the right hilum  Right paratracheal and subcarinal nodes measure up to 12 mm  The left adrenal showed some hypermetabolism with SUV of 3 7, without discrete mass  Uptake in the right parotid gland is noted with SUV of 22 3 and a soft tissue nodule, measuring 11 mm          9/6/2016 - 11/25/2016 Chemotherapy     Abraxane 80 mg/m2 day 1, 8, 15, carbo AUC-5, day 1 only, Q 21 days  5/3/2017 Progression     Progressive disease  5/16/2017 - 9/20/2018 Chemotherapy     Tecentriq 1200 mg IV Q 3 weeks           2/21/2018 - 3/14/2018 Radiation     C1    Plan ID Energy Fractions Dose per Fraction (cGy) Total Dose Delivered (cGy) Elapsed Days   Abdomen 6X 15 / 15 250 3,750 21      Treatment dates:  C1: 2018 - 3/14/2018           2018 - 2018 Chemotherapy     Taxotere 50 mg/m2, Cyramza 10 mg/kg, both given day 1, Q 21 days  Taxotere was discontinued secondary to          2019 -  Chemotherapy     Cyramza 10 mg/kg, Day 1  Cycle length =  21 days             Cancer Staging  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 (HCC)  Staging form: Lung, AJCC 8th Edition  - Clinical: Stage IV (pM1) - Unsigned    ECO - Symptomatic, <50% confined to bed    Interval history:  Patient notes that overall she is feeling better on several times the lung  Patient still has underlying issues with COPD and CHF  Patient recently transition to a new cardiologist   Patient states that she feels okay  Patient's  notes that her energy level is much better off of the Taxotere  Patient and  denies in walking and unsteadiness  Patient uses a cane and her  to help stabilize her  No neurological symptoms  Review of Systems   Constitutional: Negative for appetite change, fatigue, fever and unexpected weight change  HENT: Negative for nosebleeds  Respiratory: Negative for cough, choking and shortness of breath  Negative hemoptysis  Cardiovascular: Negative for chest pain, palpitations and leg swelling  Gastrointestinal: Negative  Negative for abdominal distention, abdominal pain, anal bleeding, blood in stool, constipation, diarrhea, nausea and vomiting  Endocrine: Negative  Negative for cold intolerance  Genitourinary: Negative  Negative for hematuria, menstrual problem, vaginal bleeding, vaginal discharge and vaginal pain  Musculoskeletal: Negative  Negative for arthralgias, myalgias, neck pain and neck stiffness  Skin: Negative  Negative for color change, pallor and rash  Allergic/Immunologic: Negative  Negative for immunocompromised state  Neurological: Negative  Negative for weakness and headaches  Hematological: Negative for adenopathy  Does not bruise/bleed easily  All other systems reviewed and are negative  Current Outpatient Medications:     albuterol (2 5 mg/3 mL) 0 083 % nebulizer solution, Take 1 vial (2 5 mg total) by nebulization 4 (four) times a day, Disp: 360 vial, Rfl: 3    albuterol (VENTOLIN HFA) 90 mcg/act inhaler, Inhale 2 puffs every 6 (six) hours as needed for wheezing, Disp: 3 Inhaler, Rfl: 3    azithromycin (ZITHROMAX) 250 mg tablet, Take 2 tablets (500 mg total) by mouth 3 (three) times a week for 140 days, Disp: 30 tablet, Rfl: 3    betamethasone, augmented, (DIPROLENE-AF) 0 05 % cream, APPLY TOPICALLY 2 (TWO) TIMES A DAY, Disp: 50 g, Rfl: 1    fluticasone-salmeterol (ADVAIR) 500-50 mcg/dose inhaler, Inhale 1 puff every 12 (twelve) hours, Disp: 3 Inhaler, Rfl: 3    FREESTYLE LITE test strip, TEST BLOOD SUGAR 3 TIMES DAILY, Disp: 300 each, Rfl: 1    furosemide (LASIX) 40 mg tablet, Take 1 tablet (40 mg total) by mouth 2 (two) times a day, Disp: 180 tablet, Rfl: 0    losartan (COZAAR) 25 mg tablet, Take 1 tablet (25 mg total) by mouth daily, Disp: 30 tablet, Rfl: 5    metoprolol tartrate (LOPRESSOR) 100 mg tablet, Take 1 tablet (100 mg total) by mouth daily, Disp: 90 tablet, Rfl: 0    mometasone (ELOCON) 0 1 % lotion, Apply 1 application topically daily, Disp: , Rfl: 3    oxyCODONE-acetaminophen (PERCOCET) 5-325 mg per tablet, Take 1 tablet by mouth every 4 (four) hours as needed for moderate pain Max Daily Amount: 6 tablets, Disp: 180 tablet, Rfl: 0    pentoxifylline (TRENtal) 400 mg ER tablet, Take 400 mg by mouth 3 (three) times a day with meals  , Disp: , Rfl:     pregabalin (LYRICA) 100 mg capsule, Take 1 capsule (100 mg total) by mouth daily at bedtime, Disp: 90 capsule, Rfl: 0    simvastatin (ZOCOR) 80 mg tablet, Take 1 tablet (80 mg total) by mouth daily, Disp: 90 tablet, Rfl: 0    Tiotropium Bromide Monohydrate (SPIRIVA RESPIMAT) 2 5 MCG/ACT AERS, Inhale 2 Act (5 mcg total) daily, Disp: 3 Inhaler, Rfl: 3  No current facility-administered medications for this visit  Facility-Administered Medications Ordered in Other Visits:     alteplase (CATHFLO) injection 2 mg, 2 mg, Intracatheter, FELICITAN, Portia Shaikh,     Allergies   Allergen Reactions    Penicillins Swelling     Legs swell up     Objective:   /68 (BP Location: Left arm, Cuff Size: Standard)   Pulse 84   Temp (!) 96 3 °F (35 7 °C) (Tympanic)   Resp 18   Ht 4' 11" (1 499 m)   Wt 41 7 kg (92 lb)   SpO2 97%   BMI 18 58 kg/m²   Wt Readings from Last 6 Encounters:   02/21/19 41 7 kg (92 lb)   02/19/19 41 5 kg (91 lb 7 9 oz)   01/31/19 43 5 kg (96 lb)   01/29/19 43 5 kg (95 lb 14 4 oz)   01/28/19 43 5 kg (96 lb)   01/08/19 42 8 kg (94 lb 5 7 oz)     Physical Exam   Constitutional: She is oriented to person, place, and time  She appears well-developed and well-nourished  No distress  Nasal cannula in place  HENT:   Head: Normocephalic and atraumatic  Mouth/Throat: Oropharynx is clear and moist  No oropharyngeal exudate  Eyes: Pupils are equal, round, and reactive to light  EOM are normal  No scleral icterus  Neck: Normal range of motion  Cardiovascular: Normal rate and regular rhythm  No murmur heard  Pulmonary/Chest: Effort normal  No respiratory distress  She has wheezes  She has rhonchi  Left chest wall port   Abdominal: Soft  Bowel sounds are normal  She exhibits no distension  There is no tenderness  Musculoskeletal: Normal range of motion  She exhibits no edema  Lymphadenopathy:     She has no cervical adenopathy  Neurological: She is alert and oriented to person, place, and time  No cranial nerve deficit  Skin: Skin is warm  No rash noted  No pallor  Psychiatric: She has a normal mood and affect   Thought content normal        Pertinent Laboratory Results and Imaging Review:  No visits with results within 3 Week(s) from this visit  Latest known visit with results is:   Office Visit on 2019   Component Date Value Ref Range Status    Hemoglobin A1C 2019 5 1  6 5 Final         The following historical data was reviewed  Past Medical History:   Diagnosis Date    Abnormal CT of the chest     last assessed: Aug 8, 2016    Arthritis     Asthma     Asymptomatic carotid artery stenosis     last assessed: 2017    Benign essential hypertension     last assessed: 2017    Cataract of right eye 3/19/2015    Cataract of right eye     last assessed: 2015    CHF (congestive heart failure) (HCC)     Common cold     Coronary artery disease     Depression     Diabetes mellitus ([facility] [address] )     [facility]    History of radiation therapy     Hyperlipidemia     Hypertension     Lung cancer ([facility] )     Multiple thyroid nodules 2016    Myocardial infarction ([facility] )     Pulmonary emphysema (HCC)     Pulmonary nodule     Shortness of breath        Past Surgical History:   Procedure Laterality Date    BRONCHOSCOPY N/A 8/10/2016    Procedure: BRONCHOSCOPY FLEXIBLE;  Surgeon: Carole Jesus MD;  [facility];  Service:    Devon Pert BYPASS GRAFT      using vein - aortic-bifemoral - last assessed: Aug 22, 2016     SECTION      CHOLECYSTECTOMY      EYE SURGERY      HYSTERECTOMY      [place] BRONCHOSCOPY NEEDLE BX TRACHEA MAIN STEM&/BRON N/A 8/10/2016    Procedure: ENDOBRONCHIAL ULTRASOUND (FROZEN SECTION) ; Surgeon: Carole Jesus MD;  Location: [facility];  Service: Thoracic    [place] INSJ TUNNELED CTR VAD W/SUBQ PORT AGE 5 YR/> Left 2016    Procedure: INSERTION VENOUS PORT (PORT-A-CATH);   Surgeon: Carole Jesus MD;  [facility];  Service: Thoracic    TONSILLECTOMY         Social History     Socioeconomic History    Marital status: /Civil Union     Spouse name: Not on file    Number of children: Not on file    Years of education: Not on file    Highest education level: Not on file   Occupational History    Not on file   Social Needs    Financial resource strain: Not on file    Food insecurity:     Worry: Not on file     Inability: Not on file    Transportation needs:     Medical: Not on file     Non-medical: Not on file   Tobacco Use    Smoking status: Current Some Day Smoker     Packs/day: 0 25     Types: Cigarettes     Start date: 1946    Smokeless tobacco: Never Used    Tobacco comment: smokes 3-4 cigs /day - current every day smoker noted in "allscripts" smoke for less than 1/2 pack per day for 50 years     Substance and Sexual Activity    Alcohol use: No    Drug use: No    Sexual activity: Not on file   Lifestyle    Physical activity:     Days per week: Not on file     Minutes per session: Not on file    Stress: Not on file   Relationships    Social connections:     Talks on phone: Not on file     Gets together: Not on file     Attends Voodoo service: Not on file     Active member of club or organization: Not on file     Attends meetings of clubs or organizations: Not on file     Relationship status: Not on file    Intimate partner violence:     Fear of current or ex partner: Not on file     Emotionally abused: Not on file     Physically abused: Not on file     Forced sexual activity: Not on file   Other Topics Concern    Not on file   Social History Narrative    Caffeine use - coffee once in a great while and everyday tea drinker        Family History   Problem Relation Age of Onset    Brain cancer Sister     Cancer Sister     Thyroid disease Mother     Rheum arthritis Daughter        Please note: This report has been generated by a voice recognition software system  Therefore there may be syntax, spelling, and/or grammatical errors  Please call if you have any questions

## 2019-02-25 NOTE — TELEPHONE ENCOUNTER
called states since taking Losartan on 1/28 pt has had cough, decreased -111/50-77  HR ,  not sleeping possibly due increased urination the last 3 days, stable wt last several weeks  Will have BMP done tomorrow          Taking lasix 40 mg daily              Losartan 25 mg daily              Lopressor 100 mg daily      Please advise

## 2019-02-28 NOTE — TELEPHONE ENCOUNTER
Spoke with , advised  Verbally understood, however they both feel it is the medication causing her problems  Pt will continue to use  Will call back if further problems

## 2019-03-04 NOTE — TELEPHONE ENCOUNTER
Pt's spouse called and indicated they met with [place] this last visit and usually pt gets a CT every so many weeks but nothing was scheduled and wondering if Dr Asenath Claude is going to want another CT prior to her visit which is 4/8  If you can check with Dr Asenath Claude and then call them back

## 2019-03-05 NOTE — TELEPHONE ENCOUNTER
called ,states pt stopped Losartan 25 mg daily over the weekend because of how she was feeling per the last task  /80 HR 86-92  Cough has improved, pt just overall feels better  Did you want her to substitute with different medication?       Taking: Lopressor 100 mg daily               Lasix 40 mg daily        Please advise

## 2019-03-05 NOTE — TELEPHONE ENCOUNTER
SPOKE TO RITA AND TOLD HIM DR Cindy Hoskins WOULD LIKE A CT C/A/P W CONTRAST BEFORE SHE COMES BACK IN APRIL  WE MOVED THE APPT TO 4/25 AND CT WILL BE DONE ABOUT A WEEK BEFORE  HE VOICED UNDERSTANDING

## 2019-03-06 PROBLEM — G47.01 INSOMNIA DUE TO MEDICAL CONDITION: Status: ACTIVE | Noted: 2019-01-01

## 2019-03-06 PROBLEM — L03.116 CELLULITIS OF LEFT FOOT: Status: RESOLVED | Noted: 2018-08-30 | Resolved: 2019-01-01

## 2019-03-06 NOTE — ASSESSMENT & PLAN NOTE
Lab Results   Component Value Date    HGBA1C 5 1 01/31/2019     Continue with dietary management  No results for input(s): POCGLU in the last 72 hours      Blood Sugar Average: Last 72 hrs:

## 2019-03-06 NOTE — PROGRESS NOTES
Assessment/Plan:  Insomnia due to medical condition  Patient is suffering for insomnia for 2 weeks or greater  I believe this is multifactorial but certainly somewhat related to dysphoria in addition to her multiple medical problems including COPD, lung cancer and undergoing chemotherapy presently  We discussed and considered different options  They have tried melatonin with some minimal effect  Initially we were considering jamey air arm but based on her agitation and and mood disruption we decided to try a small dose of nortriptyline  We are going to have the patient try this for week or so   is going to call with report of effectiveness  We did discuss possible anticholinergic side effects  If she experiences any significant anticholinergic side effects she is going to discontinue  Again they will call with a report in 1 week  They agree  DMII (diabetes mellitus, type 2) ([place] [address] )  Lab Results   Component Value Date    HGBA1C 5 1 01/31/2019     Continue with dietary management  No results for input(s): POCGLU in the last 72 hours  Blood Sugar Average: Last 72 hrs:      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 ([place] [address] )  Continue with treatment through medical Oncology  Chronic respiratory failure with hypoxia and hypercapnia (HCC)  Continue with follow-up Pulmonary  Continue oxygen, Spiriva as well as Advair and albuterol  PVD (peripheral vascular disease) ([place] [address] )  Continue with Trental     Congestive heart failure (HCC)  Continue furosemide  Cardiomyopathy [facility])  Continue follow-up with Cardiology  Diagnoses and all orders for this visit:    Dysthymia  -     nortriptyline (PAMELOR) 10 mg capsule;  Take 1 capsule (10 mg total) by mouth daily at bedtime    Insomnia due to medical condition    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, unspecified laterality (HCC)    PVD (peripheral vascular disease) ([place] [address] )    Chronic respiratory failure with hypoxia and hypercapnia ([facility] )    Type 2 diabetes mellitus without complication, without long-term current use of insulin (HCC)    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 (HCC)    Chronic diastolic congestive heart failure (HCC)          Subjective:   Chief Complaint   Patient presents with    Insomnia   For several days  Cant fall asleep easily  2 weeks or so  Blamed on a new BP pill initially but d/c'ed a week ago without change  Naps in middle of day  Sleeps better with nap since chemo  Seems more agitated  " Cant sit still "  Not depressed, interest down, watching TV, energy worse, concentration and mentation slowly worse, appetite improved   Last chemo 3 weeks ago, due Tuesday  No change in respiratory status  Wears O2 24 / 7  Patient ID: Sarah Olmedo is a 77 y o  female  HPI  The patient is a 48-year-old female with a history of oxygen-dependent COPD with chronic respiratory failure and history of hypercapnia in addition to lung carcinoma for which she is currently undergoing chemotherapy, type 2 diabetes in addition to cardiomyopathy with CHF, hypertension and other  She presents today accompanied by  stating that she feels that she has not been able sleep for the past 2 weeks  This is a new issue for her  Initially it was blamed on a new blood pressure pill but when this was discontinued her sleep habits did not change  She has has taken naps in the middle of the day for a long period of time   states that she seems better when she does this especially since beginning chemotherapy  Over the past 2 weeks not only has her nighttime sleep been difficult her ability a nap has also suffered  She has difficult time falling sleep but when she does she can sleep for a period of time she is compliant with her oxygen 24/7  She has had no change in respiratory status without sputum, increased shortness of breath, fever chest pain PND orthopnea edema X cetera    She states that she does not feel depressed though on further questioning her  states that she has been agitated during that same period of time  He states that she can't sit still  She admits to less interest in going out and doing things though she continues to watch TV shows that she enjoys  Energy level is diminished and her concentration and mentation has also been slowly worsening  Her appetite has improved though  She is not suicidal   She had her last chemotherapy treatment 3 weeks ago and is scheduled for next Tuesday  The following portions of the patient's history were reviewed and updated as appropriate: allergies, current medications, past family history, past medical history, past social history, past surgical history and problem list     Review of Systems   Constitution: Negative for chills, decreased appetite, fever and malaise/fatigue  Cardiovascular: Negative for chest pain, irregular heartbeat and leg swelling  Respiratory: Positive for shortness of breath and wheezing  Negative for cough and sputum production  Gastrointestinal: Negative for bowel incontinence  Genitourinary: Negative for bladder incontinence  Neurological: Negative for headaches  Psychiatric/Behavioral: Negative for depression and substance abuse  The patient has insomnia  The patient is not nervous/anxious  Agitation         Objective:    Physical Exam   Constitutional: She is oriented to person, place, and time  She appears well-developed and well-nourished  No distress  Eyes: Conjunctivae are normal  Right eye exhibits no discharge  Left eye exhibits no discharge  No scleral icterus  Neck: Neck supple  No JVD present  Cardiovascular: Normal rate and regular rhythm  Occasional premature noted   Pulmonary/Chest: Effort normal    She has moderately distant breath sounds with significant expiratory phase delay  No active wheezing, rhonchi or crackles noted presently   Musculoskeletal: She exhibits no edema  Lymphadenopathy:     She has no cervical adenopathy  Neurological: She is alert and oriented to person, place, and time  Psychiatric: She has a normal mood and affect  Thought content normal    Nursing note and vitals reviewed

## 2019-03-06 NOTE — ASSESSMENT & PLAN NOTE
Patient is suffering for insomnia for 2 weeks or greater  I believe this is multifactorial but certainly somewhat related to dysphoria in addition to her multiple medical problems including COPD, lung cancer and undergoing chemotherapy presently  We discussed and considered different options  They have tried melatonin with some minimal effect  Initially we were considering jamey air arm but based on her agitation and and mood disruption we decided to try a small dose of nortriptyline  We are going to have the patient try this for week or so   is going to call with report of effectiveness  We did discuss possible anticholinergic side effects  If she experiences any significant anticholinergic side effects she is going to discontinue  Again they will call with a report in 1 week  They agree

## 2019-03-12 NOTE — PLAN OF CARE
Problem: Potential for Falls  Goal: Patient will remain free of falls  Description  INTERVENTIONS:  - Assess patient frequently for physical needs  -  Identify cognitive and physical deficits and behaviors that affect risk of falls    -  [place] fall precautions as indicated by assessment   - Educate patient/family on patient safety including physical limitations  - Instruct patient to call for assistance with activity based on assessment  - Modify environment to reduce risk of injury  - Consider OT/PT consult to assist with strengthening/mobility  Outcome: Progressing

## 2019-03-27 NOTE — ASSESSMENT & PLAN NOTE
She will continue on her current regimen of Advair, Spiriva and albuterol    Refills were sent to her pharmacy today

## 2019-03-27 NOTE — PROGRESS NOTES
Pulmonary Follow Up Note   Nikia Harry [de-identified] y o  female MRN: [id number]  3/27/2019      Assessment/Plan:     COPD, very severe ([facility] )  She will continue on her current regimen of Advair, Spiriva and albuterol  Refills were sent to her pharmacy today    Chronic respiratory failure with hypoxia and hypercapnia ([address] )  She will continue on supplemental oxygen at 2-3 L per minute  Oxygenation adequate today  Visit orders:    Diagnoses and all orders for this visit:    Chronic respiratory failure with hypoxia and hypercapnia (HCC)    Chronic obstructive pulmonary disease, unspecified COPD type (HCC)  -     tiotropium (SPIRIVA RESPIMAT) 2 5 MCG/ACT AERS inhaler; Inhale 2 puffs daily    COPD, very severe (HCC)    Other orders  -     Melatonin 1 MG/4ML LIQD; Take by mouth        Return in about 4 months (around 7/27/2019)  History of Present Illness   HPI:  Nikia Harry is a [de-identified] y o  female who is here today for follow-up regarding chronic hypoxemic and hypercapnic respiratory failure and very severe COPD  She has been stable from pulmonary perspective  She is compliant with Advair and Spiriva  She uses albuterol via nebulizer 3-4 times per day  She wears oxygen at 2 liters/minute at home and 3 liters/minute when on her concentrator  She has minimal cough and no significant sputum production  She has episodic wheezing which the  notes is more notable at night  She currently denies shortness of breath at rest   Her dyspnea on exertion is stable  She is on maintenance chemotherapy and tolerating it relatively well  She has a repeat CT scheduled for later this month  Review of Systems   Constitutional: Positive for unexpected weight change (6 lb weight loss since last visit in December)  Negative for chills and fever  HENT: Negative for postnasal drip and sore throat  Eyes: Negative for visual disturbance  Respiratory:        As noted in HPI   Cardiovascular: Negative for chest pain  Gastrointestinal: Negative for abdominal pain, diarrhea and vomiting  Genitourinary: Negative for difficulty urinating  Musculoskeletal: Positive for gait problem  Skin: Negative for rash  Neurological: Negative for headaches  Hematological: Negative for adenopathy  Psychiatric/Behavioral: Negative  All other systems reviewed and are negative  Historical Information   Past Medical History:   Diagnosis Date    Abnormal CT of the chest     last assessed: Aug 8, 2016    Arthritis     Asthma     Asymptomatic carotid artery stenosis     last assessed: 2017    Benign essential hypertension     last assessed: 2017    Cataract of right eye 3/19/2015    Cataract of right eye     last assessed: 2015    CHF (congestive heart failure) (HCC)     Common cold     Coronary artery disease     Depression     Diabetes mellitus ([facility] )     Fracture of multiple pubic rami ([facility] ) 2015    History of radiation therapy     Hyperlipidemia     Hypertension     Lung cancer ([facility] )     Multiple thyroid nodules 2016    Myocardial infarction ([facility] )     Pulmonary emphysema (HCC)     Pulmonary nodule     Shortness of breath      Past Surgical History:   Procedure Laterality Date    BRONCHOSCOPY N/A 8/10/2016    Procedure: BRONCHOSCOPY FLEXIBLE;  Surgeon: Jane Altamirano MD;  Location: [facility];  Service:    [facility] BYPASS GRAFT      using vein - aortic-bifemoral - last assessed: Aug 22, 2016     SECTION      CHOLECYSTECTOMY      EYE SURGERY      HYSTERECTOMY      MN BRONCHOSCOPY NEEDLE BX TRACHEA MAIN STEM&/BRON N/A 8/10/2016    Procedure: ENDOBRONCHIAL ULTRASOUND (FROZEN SECTION) ; Surgeon: Jane Altamirano MD;  Location: [facility];  Service: Thoracic    MN INSJ TUNNELED CTR VAD W/SUBQ PORT AGE 5 YR/> Left 2016    Procedure: INSERTION VENOUS PORT (PORT-A-CATH);   Surgeon: Jane Altamirano MD;  [facility];  Service: Thoracic    TONSILLECTOMY       Family History   Problem Relation Age of Onset    Brain cancer Sister     Cancer Sister     Thyroid disease Mother     Rheum arthritis Daughter        Social History     Tobacco Use   Smoking Status Current Some Day Smoker    Packs/day: 0 25    Types: Cigarettes    Start date: 1946   Smokeless Tobacco Never Used   Tobacco Comment    smokes 3-4 cigs /day - current every day smoker noted in "allscripts" smoke for less than 1/2 pack per day for 50 years           Meds/Allergies     Current Outpatient Medications:     albuterol (2 5 mg/3 mL) 0 083 % nebulizer solution, Take 1 vial (2 5 mg total) by nebulization 4 (four) times a day, Disp: 360 vial, Rfl: 3    albuterol (VENTOLIN HFA) 90 mcg/act inhaler, Inhale 2 puffs every 6 (six) hours as needed for wheezing, Disp: 3 Inhaler, Rfl: 3    betamethasone, augmented, (DIPROLENE-AF) 0 05 % cream, Apply topically 2 (two) times a day, Disp: 50 g, Rfl: 1    fluticasone-salmeterol (ADVAIR) 500-50 mcg/dose inhaler, Inhale 1 puff every 12 (twelve) hours, Disp: 3 Inhaler, Rfl: 3    FREESTYLE LITE test strip, TEST BLOOD SUGAR 3 TIMES DAILY, Disp: 300 each, Rfl: 1    furosemide (LASIX) 40 mg tablet, TAKE 1 TABLET BY MOUTH TWICE A DAY (Patient taking differently: TAKE 1 TABLET BY MOUTH ONCE DAILY), Disp: 180 tablet, Rfl: 0    Melatonin 1 MG/4ML LIQD, Take by mouth, Disp: , Rfl:     metoprolol tartrate (LOPRESSOR) 100 mg tablet, Take 1 tablet (100 mg total) by mouth daily, Disp: 90 tablet, Rfl: 0    mometasone (ELOCON) 0 1 % lotion, Apply 1 application topically daily, Disp: , Rfl: 3    nortriptyline (PAMELOR) 10 mg capsule, Take 1 capsule (10 mg total) by mouth daily at bedtime, Disp: 30 capsule, Rfl: 1    olmesartan (BENICAR) 5 mg tablet, Take 1 tablet (5 mg total) by mouth daily, Disp: 30 tablet, Rfl: 5    oxyCODONE-acetaminophen (PERCOCET) 5-325 mg per tablet, Take 1 tablet by mouth every 4 (four) hours as needed for moderate painMax Daily Amount: 6 tablets, Disp: 180 tablet, Rfl: 0    pentoxifylline (TRENtal) 400 mg ER tablet, Take 400 mg by mouth 3 (three) times a day with meals  , Disp: , Rfl:     pregabalin (LYRICA) 100 mg capsule, Take 1 capsule (100 mg total) by mouth daily at bedtime, Disp: 90 capsule, Rfl: 0    simvastatin (ZOCOR) 80 mg tablet, Take 1 tablet (80 mg total) by mouth daily, Disp: 90 tablet, Rfl: 0    tiotropium (SPIRIVA RESPIMAT) 2 5 MCG/ACT AERS inhaler, Inhale 2 puffs daily, Disp: 3 Inhaler, Rfl: 3  Allergies   Allergen Reactions    Penicillins Swelling     Legs swell up       Vitals: Blood pressure 120/70, pulse 74, temperature (!) 96 7 °F (35 9 °C), temperature source Tympanic, height 4' 11" (1 499 m), weight 42 kg (92 lb 8 oz), SpO2 100 %  Body mass index is 18 68 kg/m²  Oxygen Therapy  SpO2: 100 %  Oxygen Therapy: Supplemental oxygen  O2 Flow Rate (L/min): 3 L/min    Physical Exam   Physical Exam   Constitutional: She is oriented to person, place, and time  No distress  HENT:   Head: Normocephalic  Mouth/Throat: No oropharyngeal exudate  Eyes: Pupils are equal, round, and reactive to light  No scleral icterus  Neck: Neck supple  No JVD present  Cardiovascular: Normal rate and regular rhythm  Pulmonary/Chest: She has no wheezes  Bilateral rhonchi   Abdominal: Soft  There is no tenderness  Musculoskeletal: She exhibits no edema  Lymphadenopathy:     She has no cervical adenopathy  Neurological: She is alert and oriented to person, place, and time  Skin: Skin is warm and dry  Psychiatric: She has a normal mood and affect  Labs: I have personally reviewed pertinent lab results    Lab Results   Component Value Date    WBC 6 17 03/12/2019    HGB 13 0 03/12/2019    HCT 42 3 03/12/2019     (H) 03/12/2019     03/12/2019     Lab Results   Component Value Date    GLUCOSE 191 (H) 11/20/2015    CALCIUM 8 7 03/12/2019     11/20/2015    K 3 5 03/12/2019    CO2 35 (H) 03/12/2019     03/12/2019    BUN 14 03/12/2019    CREATININE 0 72 03/12/2019     No results found for: IGE  Lab Results   Component Value Date    ALT 11 (L) 03/12/2019    AST 16 03/12/2019    ALKPHOS 105 03/12/2019    BILITOT 0 38 11/20/2015

## 2019-04-02 NOTE — PLAN OF CARE
Problem: Potential for Falls  Goal: Patient will remain free of falls  Description  INTERVENTIONS:  - Assess patient frequently for physical needs  -  Identify cognitive and physical deficits and behaviors that affect risk of falls    -  [place] fall precautions as indicated by assessment   - Educate patient/family on patient safety including physical limitations  - Instruct patient to call for assistance with activity based on assessment  - Modify environment to reduce risk of injury  - Consider OT/PT consult to assist with strengthening/mobility  Outcome: Progressing

## 2019-04-23 NOTE — PROGRESS NOTES
Pt in infusion center today for central line labs and chemotherapy infusion  Order on chart ok to process with chemotherapy without labs

## 2019-04-23 NOTE — PROGRESS NOTES
Pt tolerated chemotherapy infusion with no adverse reactions   Pt then d/cd home ambulatory with steady gait

## 2019-05-14 NOTE — PATIENT INSTRUCTIONS
May 2019      Shankar Monday Tuesday Wednesday Thursday Friday Saturday                  1     2     3     4                5     6     7     8     9     10     11                12     13     14    INF CHEMO-INFUSION 1 HR   8:00 AM   (150 min )   [address] 15     16     17     18         Cycle 11, Day 1       19     20     21     22     23     24     25                26     27     28     29     30     31                          Treatment Details       5/14/2019 - Cycle 11, Day 1      Supportive Care: [facility] PROVIDER COMMUNICATION3      Chemotherapy: ONCBCN PROVIDER [id number], RAMUCIRUMAB IVPB        June 2019 Sunday Monday Tuesday Wednesday Thursday Friday Saturday                                 1                2     3     4    INF CHEMO-INFUSION 1 HR   8:00 AM   (150 min )   Qu Inf Chair 4   [address] [place] [address] 5     6     7     8         Cycle 12, Day 1       9     10     11     12     13     14     15                16     17    CT CHEST ABDOMEN PELVIS W CON   9:00 AM   (30 min )   Qu Ct 1   [facility] CAT Scan 18     19     20     21     22                23     24    FOLLOW UP PG   9:15 AM   (30 min )   Madhu Funk,    [facility] [place] 25    INF CHEMO-INFUSION 1 HR   8:00 AM   (150 min )   Qu Inf Chair [address] 26     27     28     29         Cycle 13, Day 1       30                                                   Treatment Details       6/4/2019 - Cycle 12, Day 1      Supportive Care: [facility] PROVIDER COMMUNICATION3      Chemotherapy: ONCBCN PROVIDER [id number], RAMUCIRUMAB IVPB    6/25/2019 - Cycle 13, Day 1      Supportive Care: [facility] PROVIDER COMMUNICATION3      Chemotherapy: ONCBCN PROVIDER [id number], RAMUCIRUMAB IVPB

## 2019-05-14 NOTE — PROGRESS NOTES
Pt arrives on unit for Cyramza  Labs drawn and sent as ordered  Pt offers no c/o at this time  Will cont  To monitor

## 2019-06-25 PROBLEM — T45.1X5A CHEMOTHERAPY INDUCED DIARRHEA: Status: ACTIVE | Noted: 2019-01-01

## 2019-06-25 PROBLEM — K52.1 CHEMOTHERAPY INDUCED DIARRHEA: Status: ACTIVE | Noted: 2019-01-01

## 2019-06-25 PROBLEM — R63.0 ANOREXIA: Status: ACTIVE | Noted: 2019-01-01

## 2019-06-25 NOTE — PROGRESS NOTES
Patient tolerated treatment today with no adverse reactions  Pt educated to notify MD with s/s of reactions including SOB, Rash, and hives Patient gave verbal understanding

## 2019-07-02 PROBLEM — K04.7 DENTAL ABSCESS: Status: ACTIVE | Noted: 2019-01-01

## 2019-07-02 NOTE — PROGRESS NOTES
Assessment/Plan:  Dental abscess  She had a very swollen tender left mandibular area yesterday which improved significantly today  I think the most likely explanation for this is a dental abscess which may have drained  Also possibility of a salivary gland infection/inflammation with potential for stone passing  I feel no adenopathy in the area  No definitive mass  This needs to be kept in mind due to her history of metastatic squamous cell carcinoma  Presently we are going to treat her with clindamycin 150 q i d  For 10 days  She is penicillin allergic  She will push fluids and rest   If her symptoms are not completely resolved in 10 days patient and  are advised to call  Will call sooner or seek more urgent medical attention should her condition deteriorate  They agree  Chemotherapy induced diarrhea  Improved with Imodium  Parotid adenoma  Noted on PET scan in 2017  Not clinically palpable presently     PVD (peripheral vascular disease) (HCC)  Refill Trental   Condition stable  Diagnoses and all orders for this visit:    Dental abscess  -     clindamycin (CLEOCIN) 150 mg capsule; Take 1 capsule (150 mg total) by mouth every 6 (six) hours for 10 days    PVD (peripheral vascular disease) (HCC)  -     pentoxifylline (TRENtal) 400 mg ER tablet; Take 1 tablet (400 mg total) by mouth 3 (three) times a day with meals    Chemotherapy induced diarrhea    Parotid adenoma          Subjective:   Chief Complaint   Patient presents with    Check sore left side of mouth        Patient ID: Patrick Taveras is a [de-identified] y o  female  HPI  The patient is an 79-year-old female with squamous cell carcinoma of the lung known to be metastatic who presents today a complaint of swelling on the left side of her mouth  She states that yesterday it was significantly swollen and tender   states that you could not even touch it  Today it seems to be significantly improved    She has had no fevers or chills nausea or vomiting  She has history of parotid adenoma on the right  She has no history of significant dental disorder  She has no otalgia  No difficulty swallowing  The following portions of the patient's history were reviewed and updated as appropriate: allergies, current medications, past medical history, past social history, past surgical history and problem list     Review of Systems   Constitution: Positive for malaise/fatigue and weight loss  Negative for chills, fever and night sweats  HENT: Negative for sore throat  Cardiovascular: Negative for chest pain  Respiratory: Positive for shortness of breath  Negative for cough, sputum production and wheezing  Endocrine: Negative for polydipsia, polyphagia and polyuria  Hematologic/Lymphatic: Negative for adenopathy and bleeding problem  Skin: Negative for rash  Gastrointestinal: Positive for diarrhea  Objective:    Physical Exam   Constitutional: She appears well-developed  Somewhat cachectic and chronically ill-appearing   HENT:   Mouth/Throat: Oropharynx is clear and moist    She has a right mandibular molar which appears to be somewhat carious  The mandible adjacent to this molar is tender  There is minimal swelling present  Does no erythema or induration  There is no submandibular swelling, parotid swelling or cervical adenopathy present  Percussion of the tooth is plus-minus tender   Neck: Neck supple  No JVD present  No thyromegaly present  Cardiovascular: Normal rate and regular rhythm  Pulmonary/Chest: Effort normal    Lymphadenopathy:     She has no cervical adenopathy  Nursing note and vitals reviewed

## 2019-07-02 NOTE — ASSESSMENT & PLAN NOTE
She had a very swollen tender left mandibular area yesterday which improved significantly today  I think the most likely explanation for this is a dental abscess which may have drained  Also possibility of a salivary gland infection/inflammation with potential for stone passing  I feel no adenopathy in the area  No definitive mass  This needs to be kept in mind due to her history of metastatic squamous cell carcinoma  Presently we are going to treat her with clindamycin 150 q i d  For 10 days  She is penicillin allergic  She will push fluids and rest   If her symptoms are not completely resolved in 10 days patient and  are advised to call  Will call sooner or seek more urgent medical attention should her condition deteriorate  They agree

## 2019-07-08 NOTE — PROGRESS NOTES
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)    Asymptomatic carotid artery stenosis    Back pain, lumbosacral    Benign essential hypertension    Bursitis, ischial    Cardiomyopathy ([address] )    Cataract of right eye    Chemotherapy-induced nausea    Chronic respiratory failure with hypoxia and hypercapnia (HCC)    COPD, very severe ([address] )    Overview Addendum 3/27/2019  1:20 PM by Loretta Lockett DO     FEV1 24% pred (2018)         Congestive heart failure (HCC)    Wt Readings from Last 3 Encounters:   07/08/19 40 1 kg (88 lb 6 4 oz)   07/02/19 39 kg (86 lb)   06/25/19 39 1 kg (86 lb 3 2 oz)                 Depression    DMII (diabetes mellitus, type 2) ([facility] )    Lab Results   Component Value Date    HGBA1C 5 5 06/25/2019       No results for input(s): POCGLU in the last 72 hours      Blood Sugar Average: Last 72 hrs:          Hyperlipidemia    Metastasis to adrenal gland (HCC)    Multiple thyroid nodules    Osteoporosis    Other chronic pain    Parotid adenoma    Psoriasis    Primary localized osteoarthrosis of the hip    PVD (peripheral vascular disease) (HCC)    Restless legs syndrome    Sciatica    Seborrheic dermatitis of scalp    Acute left-sided low back pain without sciatica    Insomnia due to medical condition    Chemotherapy induced diarrhea    Anorexia    Dental abscess        Past Medical History:   Diagnosis Date    Abnormal CT of the chest     last assessed: Aug 8, 2016    Arthritis     Asthma     Asymptomatic carotid artery stenosis     last assessed: March 2, 2017    Benign essential hypertension     last assessed: March 2, 2017    Cataract of right eye 3/19/2015    Cataract of right eye     last assessed: March 19, 2015    CHF (congestive heart failure) (HCC)     Common cold     Coronary artery disease     Depression     Diabetes mellitus ([place] [address] )     Fracture of multiple pubic rami ([facility] ) 1/6/2015    History of radiation therapy     Hyperlipidemia     Hypertension     Lung cancer ([place] [address] )     Multiple thyroid nodules 7/26/2016    Myocardial infarction ([place] [address] )     Pulmonary emphysema (HCC)     Pulmonary nodule     Shortness of breath      Social History     Socioeconomic History    Marital status: /Civil Union     Spouse name: Not on file    Number of children: Not on file    Years of education: Not on file    Highest education level: Not on file   Occupational History    Not on file   Social Needs    Financial resource strain: Not on file    Food insecurity:     Worry: Not on file     Inability: Not on file    Transportation needs:     Medical: Not on file     Non-medical: Not on file   Tobacco Use    Smoking status: Current Some Day Smoker     Packs/day: 0 25     Types: Cigarettes     Start date: 1946    Smokeless tobacco: Never Used    Tobacco comment: smokes 3-4 cigs /day - current every day smoker noted in "[employer]scripts" smoke for less than 1/2 pack per day for 50 years     Substance and Sexual Activity    Alcohol use: No    Drug use: No    Sexual activity: Not on file   Lifestyle    Physical activity:     Days per week: Not on file     Minutes per session: Not on file    Stress: Not on file   Relationships    Social connections:     Talks on phone: Not on file     Gets together: Not on file     Attends [employer] service: Not on file     Active member of club or organization: Not on file     Attends meetings of clubs or organizations: Not on file     Relationship status: Not on file    Intimate partner violence:     Fear of current or ex partner: Not on file     Emotionally abused: Not on file     Physically abused: Not on file     Forced sexual activity: Not on file   Other Topics Concern    Not on file   Social History Narrative    Caffeine use - coffee once in a great while and everyday tea drinker       Family History   Problem Relation Age of Onset    Brain cancer Sister     Cancer Sister     Thyroid disease Mother     Rheum arthritis Daughter      Past Surgical History:   Procedure Laterality Date    BRONCHOSCOPY N/A 8/10/2016    Procedure: BRONCHOSCOPY FLEXIBLE;  Surgeon: Zelda Morocho MD;  Location: [facility];  Service:    [place] BYPASS GRAFT      using vein - aortic-bifemoral - last assessed: Aug 22, 2016     SECTION      CHOLECYSTECTOMY      EYE SURGERY      HYSTERECTOMY      OK BRONCHOSCOPY NEEDLE BX TRACHEA MAIN STEM&/BRON N/A 8/10/2016    Procedure: ENDOBRONCHIAL ULTRASOUND (FROZEN SECTION) ; Surgeon: Zelda Morocho MD;  Location: [facility];  Service: Thoracic    OK INSJ TUNNELED CTR VAD W/SUBQ PORT AGE 5 YR/> Left 2016    Procedure: INSERTION VENOUS PORT (PORT-A-CATH);   Surgeon: Zelda Morocho MD;  [facility]: [facility];  Service: Thoracic    TONSILLECTOMY         Current Outpatient Medications:     albuterol (2 5 mg/3 mL) 0 083 % nebulizer solution, Take 1 vial (2 5 mg total) by nebulization 4 (four) times a day, Disp: 360 vial, Rfl: 3    azithromycin (ZITHROMAX) 250 mg tablet, Take 250 mg by mouth Three times a week, Disp: , Rfl:     betamethasone, augmented, (DIPROLENE-AF) 0 05 % cream, Apply topically 2 (two) times a day, Disp: 50 g, Rfl: 1    clindamycin (CLEOCIN) 150 mg capsule, Take 1 capsule (150 mg total) by mouth every 6 (six) hours for 10 days, Disp: 40 capsule, Rfl: 0    fluticasone-salmeterol (ADVAIR) 500-50 mcg/dose inhaler, Inhale 1 puff every 12 (twelve) hours, Disp: 3 Inhaler, Rfl: 3    FREESTYLE LITE test strip, TEST BLOOD SUGAR 3 TIMES DAILY, Disp: 300 each, Rfl: 1    furosemide (LASIX) 40 mg tablet, Take 1 tablet (40 mg total) by mouth daily, Disp: 90 tablet, Rfl: 0    Melatonin 1 MG/4ML LIQD, Take by mouth, Disp: , Rfl:     metoprolol tartrate (LOPRESSOR) 100 mg tablet, Take 100 mg by mouth daily, Disp: , Rfl:     mirtazapine (REMERON) 15 mg tablet, Take 1 tablet (15 mg total) by mouth daily at bedtime, Disp: 30 tablet, Rfl: 3    mometasone (ELOCON) 0 1 % lotion, Apply 1 application topically daily, Disp: , Rfl: 3    nortriptyline (PAMELOR) 10 mg capsule, TAKE 1 CAPSULE (10 MG TOTAL) BY MOUTH DAILY AT BEDTIME, Disp: 30 capsule, Rfl: 0    oxyCODONE-acetaminophen (PERCOCET) 5-325 mg per tablet, Take 1 tablet by mouth every 4 (four) hours as needed for moderate painMax Daily Amount: 6 tablets, Disp: 180 tablet, Rfl: 0    pentoxifylline (TRENtal) 400 mg ER tablet, Take 1 tablet (400 mg total) by mouth 3 (three) times a day with meals, Disp: 270 tablet, Rfl: 1    pregabalin (LYRICA) 100 mg capsule, Take 1 capsule (100 mg total) by mouth daily at bedtime, Disp: 90 capsule, Rfl: 0    simvastatin (ZOCOR) 80 mg tablet, TAKE 1 TABLET BY MOUTH EVERY DAY, Disp: 90 tablet, Rfl: 0    tiotropium (SPIRIVA RESPIMAT) 2 5 MCG/ACT AERS inhaler, Inhale 2 puffs daily, Disp: 3 Inhaler, Rfl: 3    valsartan (DIOVAN) 80 mg tablet, Take 1 tablet (80 mg total) by mouth daily, Disp: 30 tablet, Rfl: 5    VENTOLIN  (90 Base) MCG/ACT inhaler, TAKE 2 PUFFS BY MOUTH EVERY 6 HOURS AS NEEDED FOR WHEEZE, Disp: 54 Inhaler, Rfl: 3    dronabinol (MARINOL) 5 MG capsule, Take 1 capsule (5 mg total) by mouth 2 (two) times a day before meals, Disp: , Rfl:   Allergies   Allergen Reactions    Penicillins Swelling     Legs swell up       Labs:     Chemistry        Component Value Date/Time     11/20/2015 1648    K 3 5 06/25/2019 0804    K 4 0 11/20/2015 1648     06/25/2019 0804     11/20/2015 1648    CO2 30 06/25/2019 0804    CO2 >45 (H) 07/16/2018 1014    BUN 22 06/25/2019 0804    BUN 13 11/20/2015 1648    CREATININE 0 85 06/25/2019 0804    CREATININE 0 77 11/20/2015 1648        Component Value Date/Time    CALCIUM 8 3 06/25/2019 0804    CALCIUM 9 1 11/20/2015 1648    ALKPHOS 100 06/25/2019 0804    ALKPHOS 95 11/20/2015 1648    AST 25 06/25/2019 0804    AST 13 11/20/2015 1648    ALT 24 06/25/2019 0804    ALT 18 11/20/2015 1648    BILITOT 0 38 11/20/2015 1648            Lab Results   Component Value Date    CHOL 173 11/20/2015    CHOL 197 01/15/2013     Lab Results   Component Value Date    HDL 65 (H) 03/06/2017    HDL 55 11/20/2015    HDL 53 01/15/2013     Lab Results   Component Value Date    LDLCALC 95 03/06/2017    LDLCALC 54 11/20/2015     Lab Results   Component Value Date    TRIG 146 03/06/2017    TRIG 319 11/20/2015    TRIG 235 (H) 01/15/2013     No results found for: CHOLHDL    Imaging: Ct Chest Abdomen Pelvis W Contrast    Result Date: 6/19/2019  Narrative: CT CHEST, ABDOMEN AND PELVIS WITH IV CONTRAST INDICATION:   C34 11: Malignant neoplasm of upper lobe, right bronchus or lung  26-year-old female with squamous cell carcinoma of the lung, status post chemoradiation  Follow-up  COMPARISON:  CT chest abdomen pelvis dated April 15, 2019  TECHNIQUE: CT examination of the chest, abdomen and pelvis was performed  Axial, sagittal, and coronal 2D reformatted images were created from the source data and submitted for interpretation  Radiation dose length product (DLP) for this visit:  364 43 mGy-cm   This examination, like all CT scans performed in the [facility], was performed utilizing techniques to minimize radiation dose exposure, including the use of iterative  reconstruction and automated exposure control  IV Contrast:  95 mL of iodixanol (VISIPAQUE) Enteric Contrast: Enteric contrast was administered  FINDINGS: FINDINGS: CHEST LUNGS/PLEURA: The central airways are patent  Mild upper lobe predominant centrilobular emphysema    There is a stable right perihilar mass measuring 2 3 x 1 2 cm (series 3 image 41)  There is mild peribronchial and axial interstitial thickening in the right upper lobe, unchanged from the prior exam   Also noted are a few scattered subcentimeter right upper lobe nodules, unchanged from the prior exam   Examples include: A 3 mm nodule (series 3 image 33), 4 mm nodule (series 3 image 40, and a 4 mm nodule series 3 image 43)  No new or enlarging pulmonary nodule  No pleural effusion or pneumothorax  HEART/GREAT VESSELS:  Atherosclerotic changes are noted in thoracic aorta and coronary arteries  MEDIASTINUM AND CARLO:  Stable right hilar lymph node measuring 1 4 x 1 0 cm (series 2 image 22)  No enlarged mediastinal lymph nodes by imaging size criteria  CHEST WALL AND LOWER NECK:   Left IJ chest port with its tip at the cavoatrial junction  Stable left posterolateral chest wall intramuscular lipoma, benign  Stable thyroid nodules  ABDOMEN LIVER/BILIARY TREE:  Stable 3 mm hypoattenuating lesion in the left hepatic lobe (series 2 image 46), too small to characterize  No new suspicious liver mass  Stable mild extrahepatic and central intrahepatic biliary ductal dilation with the CBD measuring 13 mm, likely on the basis of postcholecystectomy state  GALLBLADDER:  Status post cholecystectomy  SPLEEN:  Normal size  There is a small splenic lesion measuring 0 6 x 0 6 x 0 5 cm in the inferior aspect of the spleen (series 2 image 60, series 602 image 97)  This was in retrospect present on the prior exam at which time it measured 0 4 x 0 4 cm  This appears to have progressively increased in size since January 3, 2017 at which time it measured 0 2 x 0 3 cm  This is indeterminate for metastasis and continued attention on follow-up is recommended  PANCREAS:  Unremarkable for patient's age  ADRENAL GLANDS:  Stable left adrenal nodule in the lateral limb measuring 1 8 x 1 6 cm (series 2 image 54)    There is associated diffuse thickening of the left adrenal gland, unchanged from the prior exam   Mild thickening of the right adrenal gland is also unchanged from the prior exam  KIDNEYS/URETERS:  No hydronephrosis  No suspicious mass  Bilateral renal cysts, unchanged  STOMACH AND BOWEL:  There is colonic diverticulosis without evidence of acute diverticulitis  APPENDIX:  No findings to suggest appendicitis  ABDOMINOPELVIC CAVITY:  No ascites or free intraperitoneal air  No lymphadenopathy  VESSELS:  Postsurgical changes from prior aortobifemoral bypass which is suboptimally evaluated due to streak artifacts  Unchanged collateral vessels in the retroperitoneum  Diffuse atherosclerotic calcifications  PELVIS REPRODUCTIVE ORGANS:  No suspicious adnexal mass  Despite the provided history of hysterectomy, there appears to be a uterine tissue in the pelvis (series 601 image 79, series 2 image 95)  Did the patient have hysterectomy or was it really a supracervical hysterectomy? Kash Nichols URINARY BLADDER:  Unremarkable  ABDOMINAL WALL/INGUINAL REGIONS:  Unremarkable  OSSEOUS STRUCTURES:  No acute fracture or destructive osseous lesion  Old healed fractures in the bony pelvis  Degenerative changes of the spine  Impression: 1  Stable 2 3 cm right perihilar mass with unchanged mild right upper lobe peribronchial and axial interstitial thickening  Small subcentimeter right upper lobe nodules are also unchanged from the prior exam  2   Stable 1 8 cm nodule with unchanged diffuse nodular thickening of the gland  3   Indeterminate 6 mm splenic lesion, demonstrating mild progressive interval increase in size when compared with multiple prior exams dating back to January 2017  Continued attention on follow-up is recommended  Workstation performed: [id number]       EKG: NSR  IVCD  Review of Systems   Constitution: Positive for malaise/fatigue  HENT: Negative  Eyes: Negative  Cardiovascular: Negative  Respiratory: Positive for shortness of breath  Endocrine: Negative  Hematologic/Lymphatic: Negative  Skin: Negative  Musculoskeletal: Negative  Gastrointestinal: Positive for diarrhea  Genitourinary: Negative  Neurological: Negative  Psychiatric/Behavioral: Negative  Allergic/Immunologic: Negative  Vitals:    07/08/19 0755   BP: (!) 108/48   Pulse: 63           Physical Exam   Constitutional: She is oriented to person, place, and time  No distress  HENT:   Mouth/Throat: No oropharyngeal exudate  Eyes: No scleral icterus  Neck: No JVD present  Cardiovascular: Normal rate and regular rhythm  Pulmonary/Chest: Effort normal  She has wheezes  Abdominal: Soft  Bowel sounds are normal    Musculoskeletal: She exhibits no edema  Neurological: She is alert and oriented to person, place, and time  Skin: Skin is warm and dry  She is not diaphoretic  Psychiatric: She has a normal mood and affect  Her behavior is normal        Discussion/Summary:    Chronic Systolic CHF; LVEF 01%: Her volume status is stable  She is in NSR on her EKG  Conservative management  Labs from June 25th reviewed  The patient was counseled regarding diagnostic results, instructions for management, risk factor reductions, impressions  total time of encounter was 25 minutes and 15 minutes was spent counseling

## 2019-07-09 NOTE — ASSESSMENT & PLAN NOTE
She will continue with oxygen at 2 liters/minute, increasing to 3 liters/minute with a portable concentrator

## 2019-07-09 NOTE — ASSESSMENT & PLAN NOTE
Patient has advanced COPD and is on maximal inhaler therapy  She would like transitioning back to brand name Advair, as she found it more beneficial than the generic  She will continue with Spiriva once daily  She will also continue with albuterol via nebulizer 3-4 times per day

## 2019-07-09 NOTE — PROGRESS NOTES
Pulmonary Follow Up Note   Bang Hernandez [de-identified] y o  female MRN: [id number]  7/9/2019      Assessment/Plan:     COPD, very severe [facility]  Patient has advanced COPD and is on maximal inhaler therapy  She would like transitioning back to brand name Advair, as she found it more beneficial than the generic  She will continue with Spiriva once daily  She will also continue with albuterol via nebulizer 3-4 times per day  Chronic respiratory failure with hypoxia and hypercapnia (HCC)  She will continue with oxygen at 2 liters/minute, increasing to 3 liters/minute with a portable concentrator  Visit orders:    Diagnoses and all orders for this visit:    COPD, very severe ([address] )  -     fluticasone-salmeterol (ADVAIR DISKUS) 500-50 mcg/dose inhaler; Inhale 1 puff 2 (two) times a day BRAND NECESSARY    Chronic respiratory failure with hypoxia and hypercapnia (HCC)    Chronic obstructive pulmonary disease, unspecified COPD type ([address] )        Return in about 3 months (around 10/9/2019)  History of Present Illness   HPI:  Bang Hernandez is a [de-identified] y o  female who is here today for chronic hypoxic and hypercapnic respiratory failure due to severe COPD  Due to cost reasons, the patient's Advair was transitioned to the generic formulation  Unfortunately, with this change, she has increased cough, sputum production and shortness of breath  She would like to try going back to the brand name Advair  She is also using Spiriva once daily and albuterol via nebulizer 3-4 times per day as a standing dose  She has not required her rescue inhaler  She denies fevers or chills  She is currently on antibiotics for concerns possible dental infection  She denies chest pain  She complains of fatigue  Her appetite is poor  She is losing weight  She remains on chemotherapy per Dr Skye Larson    She is using oxygen at 2 liters/minute while at home, increasing to 3 L per minute on her portable concentrator    Review of Systems Constitutional: Positive for fatigue and unexpected weight change  Negative for chills and fever  HENT: Positive for dental problem  Negative for postnasal drip and sore throat  Eyes: Negative for visual disturbance  Respiratory:        As noted in HPI   Cardiovascular: Negative for chest pain and leg swelling  Gastrointestinal: Negative for abdominal pain, diarrhea and vomiting  Genitourinary: Negative for difficulty urinating  Skin: Negative for rash  Neurological: Negative for headaches  Hematological: Negative for adenopathy  Psychiatric/Behavioral: Negative  All other systems reviewed and are negative  Historical Information   Past Medical History:   Diagnosis Date    Abnormal CT of the chest     last assessed: Aug 8, 2016    Arthritis     Asthma     Asymptomatic carotid artery stenosis     last assessed: 2017    Benign essential hypertension     last assessed: 2017    Cataract of right eye 3/19/2015    Cataract of right eye     last assessed: 2015    CHF (congestive heart failure) (HCC)     Common cold     Coronary artery disease     Depression     Diabetes mellitus [address] )     Fracture of multiple pubic rami ([facility] ) 2015    History of radiation therapy     Hyperlipidemia     Hypertension     Lung cancer ([facility] )     Multiple thyroid nodules 2016    Myocardial infarction ([facility] )     Pulmonary emphysema (HCC)     Pulmonary nodule     Shortness of breath      Past Surgical History:   Procedure Laterality Date    BRONCHOSCOPY N/A 8/10/2016    Procedure: BRONCHOSCOPY FLEXIBLE;  Surgeon: Heraclio Arechiga MD;  [facility];  Service:    Rosette Seals BYPASS GRAFT      using vein - aortic-bifemoral - last assessed: Aug 22, 2016     SECTION      CHOLECYSTECTOMY      EYE SURGERY      HYSTERECTOMY      [place] BRONCHOSCOPY NEEDLE BX TRACHEA MAIN STEM&/BRON N/A 8/10/2016    Procedure: ENDOBRONCHIAL ULTRASOUND (FROZEN SECTION) ;   Surgeon: Ernie Sprague MD;  Location: [facility];  Service: Thoracic    [place] INSJ TUNNELED CTR VAD W/SUBQ PORT AGE 5 YR/> Left 8/30/2016    Procedure: INSERTION VENOUS PORT (PORT-A-CATH);   Surgeon: Ernie Sprague MD;  [facility]: [facility];  Service: Thoracic    TONSILLECTOMY       Family History   Problem Relation Age of Onset    Brain cancer Sister    Porfirio Alexis Cancer Sister     Thyroid disease Mother     Rheum arthritis Daughter        Social History     Tobacco Use   Smoking Status Current Some Day Smoker    Packs/day: 0 25    Types: Cigarettes    Start date: 1947   Smokeless Tobacco Never Used   Tobacco Comment    smokes 3-4 cigs /day - current every day smoker noted in "allscripts" smoke for less than 1/2 pack per day for 50 years           Meds/Allergies     Current Outpatient Medications:     albuterol (2 5 mg/3 mL) 0 083 % nebulizer solution, Take 1 vial (2 5 mg total) by nebulization 4 (four) times a day, Disp: 360 vial, Rfl: 3    azithromycin (ZITHROMAX) 250 mg tablet, Take 250 mg by mouth Three times a week, Disp: , Rfl:     betamethasone, augmented, (DIPROLENE-AF) 0 05 % cream, Apply topically 2 (two) times a day, Disp: 50 g, Rfl: 1    clindamycin (CLEOCIN) 150 mg capsule, Take 1 capsule (150 mg total) by mouth every 6 (six) hours for 10 days, Disp: 40 capsule, Rfl: 0    dronabinol (MARINOL) 5 MG capsule, Take 1 capsule (5 mg total) by mouth 2 (two) times a day before meals, Disp: , Rfl:     FREESTYLE LITE test strip, TEST BLOOD SUGAR 3 TIMES DAILY, Disp: 300 each, Rfl: 1    furosemide (LASIX) 40 mg tablet, Take 1 tablet (40 mg total) by mouth daily, Disp: 90 tablet, Rfl: 0    Melatonin 1 MG/4ML LIQD, Take by mouth, Disp: , Rfl:     metoprolol tartrate (LOPRESSOR) 100 mg tablet, Take 100 mg by mouth daily, Disp: , Rfl:     mirtazapine (REMERON) 15 mg tablet, Take 1 tablet (15 mg total) by mouth daily at bedtime, Disp: 30 tablet, Rfl: 3    mometasone (ELOCON) 0 1 % lotion, Apply 1 application topically daily, Disp: , Rfl: 3    nortriptyline (PAMELOR) 10 mg capsule, TAKE 1 CAPSULE (10 MG TOTAL) BY MOUTH DAILY AT BEDTIME, Disp: 30 capsule, Rfl: 0    oxyCODONE-acetaminophen (PERCOCET) 5-325 mg per tablet, Take 1 tablet by mouth every 4 (four) hours as needed for moderate painMax Daily Amount: 6 tablets, Disp: 180 tablet, Rfl: 0    pentoxifylline (TRENtal) 400 mg ER tablet, Take 1 tablet (400 mg total) by mouth 3 (three) times a day with meals, Disp: 270 tablet, Rfl: 1    pregabalin (LYRICA) 100 mg capsule, Take 1 capsule (100 mg total) by mouth daily at bedtime, Disp: 90 capsule, Rfl: 0    simvastatin (ZOCOR) 80 mg tablet, TAKE 1 TABLET BY MOUTH EVERY DAY, Disp: 90 tablet, Rfl: 0    tiotropium (SPIRIVA RESPIMAT) 2 5 MCG/ACT AERS inhaler, Inhale 2 puffs daily, Disp: 3 Inhaler, Rfl: 3    valsartan (DIOVAN) 80 mg tablet, Take 1 tablet (80 mg total) by mouth daily, Disp: 30 tablet, Rfl: 5    VENTOLIN  (90 Base) MCG/ACT inhaler, TAKE 2 PUFFS BY MOUTH EVERY 6 HOURS AS NEEDED FOR WHEEZE, Disp: 54 Inhaler, Rfl: 3    fluticasone-salmeterol (ADVAIR DISKUS) 500-50 mcg/dose inhaler, Inhale 1 puff 2 (two) times a day BRAND NECESSARY, Disp: 3 Inhaler, Rfl: 3  Allergies   Allergen Reactions    Penicillins Swelling     Legs swell up       Vitals: Blood pressure 108/58, pulse 67, temperature 97 7 °F (36 5 °C), temperature source Tympanic, height 5' (1 524 m), weight 39 4 kg (86 lb 12 8 oz), SpO2 98 %  Body mass index is 16 95 kg/m²  Oxygen Therapy  SpO2: 98 %  Oxygen Therapy: Supplemental oxygen  O2 Delivery Method: Nasal cannula  O2 Flow Rate (L/min): 3 L/min      Physical Exam   Constitutional: She is oriented to person, place, and time  No distress  Cachectic   HENT:   Head: Normocephalic  Mouth/Throat: No oropharyngeal exudate  Eyes: Pupils are equal, round, and reactive to light  No scleral icterus  Neck: Neck supple  No JVD present  Cardiovascular: Normal rate and regular rhythm     Pulmonary/Chest: She has wheezes  She has no rales  Abdominal: Soft  There is no tenderness  Musculoskeletal: She exhibits no edema  Lymphadenopathy:     She has no cervical adenopathy  Neurological: She is alert and oriented to person, place, and time  Skin: Skin is warm and dry  Psychiatric: She has a normal mood and affect  Labs: I have personally reviewed pertinent lab results  Lab Results   Component Value Date    WBC 8 78 06/25/2019    HGB 11 8 06/25/2019    HCT 36 1 06/25/2019    MCV 98 06/25/2019     06/25/2019     Lab Results   Component Value Date    GLUCOSE 191 (H) 11/20/2015    CALCIUM 8 3 06/25/2019     11/20/2015    K 3 5 06/25/2019    CO2 30 06/25/2019     06/25/2019    BUN 22 06/25/2019    CREATININE 0 85 06/25/2019     No results found for: IGE  Lab Results   Component Value Date    ALT 24 06/25/2019    AST 25 06/25/2019    ALKPHOS 100 06/25/2019    BILITOT 0 38 11/20/2015       Imaging and other studies: I have personally reviewed pertinent reports  and I have personally reviewed pertinent films in PACS CT of the chest from 6/12/19 shows a 2 3 cm right perihilar mass, stable from prior imaging  Pulmonary function testing:  Performed August 2016  FEV1/FVC ratio 53%   FEV1 20% predicted  FVC 28% predicted  No response to bronchodilators   % predicted   % predicted  DLCO corrected for hemoglobin 42 % predicted

## 2019-07-16 NOTE — PLAN OF CARE
Problem: Potential for Falls  Goal: Patient will remain free of falls  Description  INTERVENTIONS:  - Assess patient frequently for physical needs  -  Identify cognitive and physical deficits and behaviors that affect risk of falls  -  [place] fall precautions as indicated by assessment   - Educate patient/family on patient safety including physical limitations  - Instruct patient to call for assistance with activity based on assessment  - Modify environment to reduce risk of injury  - Consider OT/PT consult to assist with strengthening/mobility  Outcome: Progressing     Problem: Potential for Falls  Goal: Patient will remain free of falls  Description  INTERVENTIONS:  - Assess patient frequently for physical needs  -  Identify cognitive and physical deficits and behaviors that affect risk of falls  -  [place] fall precautions as indicated by assessment   - Educate patient/family on patient safety including physical limitations  - Instruct patient to call for assistance with activity based on assessment  - Modify environment to reduce risk of injury  - Consider OT/PT consult to assist with strengthening/mobility  Outcome: Progressing     Problem: Knowledge Deficit  Goal: Patient/family/caregiver demonstrates understanding of disease process, treatment plan, medications, and discharge instructions  Description  Complete learning assessment and assess knowledge base    Interventions:  - Provide teaching at level of understanding  - Provide teaching via preferred learning methods  Outcome: Progressing

## 2019-08-06 NOTE — PROGRESS NOTES
Patient tolerated treatment today with no adverse reactions  Pt educated to notify MD with s/s of reactions including SOB, Rash, and hives Patient gave verbal understanding  Patient then d/cd home ambulatory with steady gait

## 2019-08-15 NOTE — TELEPHONE ENCOUNTER
Patient was instructed to call when she was done taking the potassium pills that Misti Gallego prescribed  She is still having diarrhea and would like further instructions  Please call her back to discuss  Thank you

## 2019-08-15 NOTE — TELEPHONE ENCOUNTER
Have pt go for labs CMP and CBC, call the office for results tomorrow    May need to continue dose of Potassium

## 2019-08-15 NOTE — TELEPHONE ENCOUNTER
Call and spoke with pt they verbalized understanding they will go for lab work today or tomorrow and I will call tomorrow to follow up

## 2019-08-27 NOTE — PLAN OF CARE
Problem: Potential for Falls  Goal: Patient will remain free of falls  Description  INTERVENTIONS:  - Assess patient frequently for physical needs  -  Identify cognitive and physical deficits and behaviors that affect risk of falls  -  [place] fall precautions as indicated by assessment   - Educate patient/family on patient safety including physical limitations  - Instruct patient to call for assistance with activity based on assessment  - Modify environment to reduce risk of injury  - Consider OT/PT consult to assist with strengthening/mobility  Outcome: Progressing     Problem: Potential for Falls  Goal: Patient will remain free of falls  Description  INTERVENTIONS:  - Assess patient frequently for physical needs  -  Identify cognitive and physical deficits and behaviors that affect risk of falls  -  [place] fall precautions as indicated by assessment   - Educate patient/family on patient safety including physical limitations  - Instruct patient to call for assistance with activity based on assessment  - Modify environment to reduce risk of injury  - Consider OT/PT consult to assist with strengthening/mobility  Outcome: Progressing     Problem: Knowledge Deficit  Goal: Patient/family/caregiver demonstrates understanding of disease process, treatment plan, medications, and discharge instructions  Description  Complete learning assessment and assess knowledge base    Interventions:  - Provide teaching at level of understanding  - Provide teaching via preferred learning methods  Outcome: Progressing

## 2019-08-30 NOTE — TELEPHONE ENCOUNTER
Pt's  called for refill on Olmesartan 5mg, however it appeared Valsartan had been subbed d/t backorder  Called  to verify meds - has Olmesartan, not Valsartan   stated she isn't eating/drinking well and her BPs have been running low  usually 69-277F Systolic but as low as 57/21 Weds  Of note, pt takes:  Olmesartan 5mg daily  Lopressor 100mg daily  Furosemide 40mg daily    Did ask  to notify Oncology of poor intake today  Please advise re: BP and meds, thank you

## 2019-08-30 NOTE — TELEPHONE ENCOUNTER
Called pt's  - message relayed as given  Will monitor BPs and give an update next week, end of week

## 2019-09-09 PROBLEM — H61.23 BILATERAL HEARING LOSS DUE TO CERUMEN IMPACTION: Status: ACTIVE | Noted: 2019-01-01

## 2019-09-09 PROBLEM — M54.50 ACUTE LEFT-SIDED LOW BACK PAIN WITHOUT SCIATICA: Status: RESOLVED | Noted: 2019-01-01 | Resolved: 2019-01-01

## 2019-09-09 NOTE — ASSESSMENT & PLAN NOTE
Patient is going to use Debrox or murine for earwax softening  If they cannot irrigate her ears and remove the impaction she will return for follow-up  They agree

## 2019-09-09 NOTE — PROGRESS NOTES
Assessment/Plan:  Benign essential hypertension  Blood pressure this morning on home cuff was in the 106 systolic  Her blood pressure presently is in the 302 systolic and her home cuff agrees with office reading  She may be having early a m  Hypertension  At the time she had no headache or other physical complaints   is going to continue to observe  If she continues to have systolic elevation in the morning will probably have her resume her Diovan at half the dose  He is going to call with report over the next several days  He agrees  Bilateral hearing loss due to cerumen impaction  Patient is going to use Debrox or murine for earwax softening  If they cannot irrigate her ears and remove the impaction she will return for follow-up  They agree  Diagnoses and all orders for this visit:    Benign essential hypertension    Bilateral hearing loss due to cerumen impaction          Subjective:   Chief Complaint   Patient presents with    Blood Pressure Check     home machine saying bp was elevated  Patient ID: Jessica Reveles is a [de-identified] y o  female  HPI  The patient is an 40-year-old female with a history squamous cell carcinoma of the lung as well as severe COPD with chronic respiratory failure which is oxygen dependent in addition to essential hypertension as well as other who presents today with her  stating that her blood pressure was running 218+ systolic this morning  She was in no pain  She had no chest pain rapid heartbeat increased shortness of breath headache diplopia focal neurologic symptom X cetera  He states that Cardiology discontinued her Diovan because her blood pressure was running low  Additionally patient's  states that she is becoming severely hard of hearing    The following portions of the patient's history were reviewed and updated as appropriate: allergies, current medications, past medical history, past social history, past surgical history and problem list     Review of Systems   Constitution: Negative for chills and decreased appetite  HENT: Positive for hearing loss  Negative for congestion, ear discharge and ear pain  Cardiovascular: Negative for chest pain, dyspnea on exertion, irregular heartbeat, leg swelling, orthopnea and palpitations  Respiratory: Positive for shortness of breath and wheezing  Negative for cough, hemoptysis and sputum production  Endocrine: Negative for polydipsia, polyphagia and polyuria  Neurological: Negative for dizziness and headaches  Objective:    Physical Exam   Constitutional: She appears well-developed  Underweight appearing chronically ill   HENT:   Ears have cerumen impacted bilaterally  Attempts to remove with curette and bayonet forceps produce some cerumen removal but not enough to improve hearing  Neck: No JVD present  Cardiovascular: Normal rate and regular rhythm  Pulmonary/Chest: Effort normal    Wearing nasal oxygen  Increased AP diameter  Distant breath sounds with increased expiratory phase and expiratory wheeze throughout  Musculoskeletal: She exhibits no edema  Lymphadenopathy:     She has no cervical adenopathy  Neurological: She is alert  Psychiatric: She has a normal mood and affect  Nursing note and vitals reviewed  BMI Counseling: Body mass index is 15 85 kg/m²  Discussed the patient's BMI with her  The BMI is below average  BMI counseling and education was provided to the patient  Patient was advised to gain weight  Dietary education for weight gain was provided to the patient today

## 2019-09-09 NOTE — ASSESSMENT & PLAN NOTE
Blood pressure this morning on home cuff was in the 402 systolic  Her blood pressure presently is in the 479 systolic and her home cuff agrees with office reading  She may be having early a m  Hypertension  At the time she had no headache or other physical complaints   is going to continue to observe  If she continues to have systolic elevation in the morning will probably have her resume her Diovan at half the dose  He is going to call with report over the next several days  He agrees

## 2019-09-18 NOTE — TELEPHONE ENCOUNTER
called, pt stopped Olmesartan 5 mg qd on 8/30/19 for hypotension  Now her BP is 160-170/60-70        Taking : Lopressor 100 mg qd                Lasix 40 mg qd      Please advise

## 2019-09-25 PROBLEM — C79.70 METASTASIS TO ADRENAL GLAND (HCC): Status: RESOLVED | Noted: 2017-07-17 | Resolved: 2019-01-01

## 2019-09-25 NOTE — ASSESSMENT & PLAN NOTE
Patient has advanced COPD with pulmonary cachexia  Her control is relatively reasonable on Advair, Spiriva and albuterol  I offered palliative care as another resource for the patient to assist in her overall health care needs  The patient and her  state that they would not like to enlist that service for now, but understand it is available

## 2019-09-25 NOTE — PROGRESS NOTES
Pulmonary Follow Up Note   Silverio Turner [de-identified] y o  female MRN: [id number]  9/25/2019      Assessment/Plan:     COPD, very severe [facility]  Patient has advanced COPD with pulmonary cachexia  Her control is relatively reasonable on Advair, Spiriva and albuterol  I offered palliative care as another resource for the patient to assist in her overall health care needs  The patient and her  state that they would not like to enlist that service for now, but understand it is available  Chronic respiratory failure with hypoxia and hypercapnia (HCC)  She will continue with oxygen at 2 liters/minute continuously  Based on prior testing, she does not require BiPAP therapy  Visit orders:    Diagnoses and all orders for this visit:    COPD, very severe ([address] )    Centrilobular emphysema ([address] )  -     albuterol (2 5 mg/3 mL) 0 083 % nebulizer solution; Take 1 vial (2 5 mg total) by nebulization 4 (four) times a day    Chronic obstructive pulmonary disease, unspecified COPD type (HCC)  -     tiotropium (SPIRIVA RESPIMAT) 2 5 MCG/ACT AERS inhaler; Inhale 2 puffs daily    Chronic respiratory failure with hypoxia and hypercapnia (HCC)        Return in about 3 months (around 12/25/2019)  History of Present Illness   HPI:  Silverio Turner is a [de-identified] y o  female who is here today for follow-up regarding chronic hypoxic and hypercapnic respiratory failure and very severe COPD  She is maintained on Advair and Spiriva  She also use the nebulizer once or twice per day  Overall, her pulmonary status has remained stable  She continues on chemotherapy per Dr Lowery Shallow  She had lost a few lb due to poor appetite, but is slowly gaining it back  She has no significant cough, wheeze or sputum production  She has dyspnea on exertion  She is using oxygen at 2 liters/minute while in the home and 3 liters/minute when on her POC  Review of Systems   Constitutional: Positive for unexpected weight change   Negative for chills and fever    HENT: Negative for postnasal drip and sore throat  Eyes: Negative for visual disturbance  Respiratory:        As noted in HPI   Cardiovascular: Negative for chest pain  Gastrointestinal: Negative for abdominal pain, diarrhea and vomiting  Genitourinary: Negative for difficulty urinating  Skin: Negative for rash  Neurological: Negative for headaches  Hematological: Negative for adenopathy  Psychiatric/Behavioral: Negative  All other systems reviewed and are negative  Historical Information   Past Medical History:   Diagnosis Date    Abnormal CT of the chest     last assessed: Aug 8, 2016    Acute left-sided low back pain without sciatica 2019    Arthritis     Asthma     Asymptomatic carotid artery stenosis     last assessed: 2017    Benign essential hypertension     last assessed: 2017    Cataract of right eye 3/19/2015    Cataract of right eye     last assessed: 2015    CHF (congestive heart failure) (HCC)     Common cold     Coronary artery disease     Depression     Diabetes mellitus [facility]     Fracture of multiple pubic rami ([facility] ) 2015    History of radiation therapy     Hyperlipidemia     Hypertension     Lung cancer ([facility]     Multiple thyroid nodules 2016    Myocardial infarction ([facility] )     Pulmonary emphysema ([facility] )     Pulmonary nodule     Shortness of breath      Past Surgical History:   Procedure Laterality Date    BRONCHOSCOPY N/A 8/10/2016    Procedure: BRONCHOSCOPY FLEXIBLE;  Surgeon: Caroline Parks MD;  [facility];  Service:    Pena BYPASS GRAFT      using vein - aortic-bifemoral - last assessed: Aug 22, 2016     SECTION      CHOLECYSTECTOMY      EYE SURGERY      HYSTERECTOMY      OH BRONCHOSCOPY NEEDLE BX TRACHEA MAIN STEM&/BRON N/A 8/10/2016    Procedure: ENDOBRONCHIAL ULTRASOUND (FROZEN SECTION) ;   Surgeon: Caroline Parks MD;  [facility];  Service: Thoracic    OH INSJ TUNNELED CTR VAD W/SUBQ PORT AGE 5 YR/> Left 8/30/2016    Procedure: INSERTION VENOUS PORT (PORT-A-CATH);   Surgeon: Vic Pike MD;  [facility];  Service: Thoracic    TONSILLECTOMY       Family History   Problem Relation Age of Onset    Brain cancer Sister    Learta January Cancer Sister     Thyroid disease Mother     Rheum arthritis Daughter        Social History     Tobacco Use   Smoking Status Current Some Day Smoker    Packs/day: 0 25    Types: Cigarettes    Start date: 1947   Smokeless Tobacco Never Used   Tobacco Comment    smokes 3-4 cigs /day - current every day smoker noted in "allscripts" smoke for less than 1/2 pack per day for 50 years           Meds/Allergies     Current Outpatient Medications:     albuterol (2 5 mg/3 mL) 0 083 % nebulizer solution, Take 1 vial (2 5 mg total) by nebulization 4 (four) times a day, Disp: 360 vial, Rfl: 3    azithromycin (ZITHROMAX) 250 mg tablet, Take 2 tablets (500 mg total) by mouth 3 (three) times a week for 140 days, Disp: 30 tablet, Rfl: 3    betamethasone, augmented, (DIPROLENE-AF) 0 05 % cream, APPLY TO AFFECTED AREA TWICE A DAY, Disp: 50 g, Rfl: 1    dronabinol (MARINOL) 5 MG capsule, Take 1 capsule (5 mg total) by mouth 2 (two) times a day before meals (Patient taking differently: Take 5 mg by mouth as needed ), Disp: , Rfl:     fluticasone-salmeterol (ADVAIR DISKUS) 500-50 mcg/dose inhaler, Inhale 1 puff 2 (two) times a day BRAND NECESSARY, Disp: 3 Inhaler, Rfl: 3    furosemide (LASIX) 40 mg tablet, TAKE 1 TABLET BY MOUTH EVERY DAY, Disp: 90 tablet, Rfl: 0    glucose blood (FREESTYLE LITE) test strip, Test blood sugar 3 times daily or as needed, DX E11 9, Disp: 300 each, Rfl: 1    Melatonin 1 MG/4ML LIQD, Take by mouth, Disp: , Rfl:     metoprolol succinate (TOPROL-XL) 50 mg 24 hr tablet, Take 50 mg by mouth daily, Disp: , Rfl:     mirtazapine (REMERON) 15 mg tablet, Take 1 tablet (15 mg total) by mouth daily at bedtime, Disp: 30 tablet, Rfl: 3   mometasone (ELOCON) 0 1 % lotion, Apply 1 application topically daily, Disp: , Rfl: 3    nortriptyline (PAMELOR) 10 mg capsule, TAKE 1 CAPSULE (10 MG TOTAL) BY MOUTH DAILY AT BEDTIME, Disp: 30 capsule, Rfl: 0    olmesartan (BENICAR) 5 mg tablet, Take 5 mg by mouth daily, Disp: , Rfl:     oxyCODONE-acetaminophen (PERCOCET) 5-325 mg per tablet, Take 1 tablet by mouth every 4 (four) hours as needed for moderate painMax Daily Amount: 6 tablets, Disp: 180 tablet, Rfl: 0    pentoxifylline (TRENtal) 400 mg ER tablet, Take 1 tablet (400 mg total) by mouth 3 (three) times a day with meals, Disp: 270 tablet, Rfl: 1    simvastatin (ZOCOR) 80 mg tablet, TAKE 1 TABLET BY MOUTH EVERY DAY, Disp: 90 tablet, Rfl: 0    tiotropium (SPIRIVA RESPIMAT) 2 5 MCG/ACT AERS inhaler, Inhale 2 puffs daily, Disp: 3 Inhaler, Rfl: 3    VENTOLIN  (90 Base) MCG/ACT inhaler, TAKE 2 PUFFS BY MOUTH EVERY 6 HOURS AS NEEDED FOR WHEEZE, Disp: 54 Inhaler, Rfl: 3    valsartan (DIOVAN) 80 mg tablet, Take 1 tablet (80 mg total) by mouth daily (Patient not taking: Reported on 9/9/2019), Disp: 30 tablet, Rfl: 5  No current facility-administered medications for this visit  Allergies   Allergen Reactions    Penicillins Swelling     Legs swell up       Vitals: Blood pressure 112/64, pulse 69, temperature 97 9 °F (36 6 °C), temperature source Tympanic, height 5' (1 524 m), weight 39 6 kg (87 lb 3 2 oz), SpO2 98 %  Body mass index is 17 03 kg/m²  Oxygen Therapy  SpO2: 98 %  Oxygen Therapy: Supplemental oxygen  O2 Delivery Method: Nasal cannula  O2 Flow Rate (L/min): 3 L/min    Physical Exam   Constitutional: She is oriented to person, place, and time  No distress  HENT:   Head: Normocephalic  Mouth/Throat: No oropharyngeal exudate  Eyes: Pupils are equal, round, and reactive to light  No scleral icterus  Neck: Neck supple  No JVD present  Cardiovascular: Normal rate and regular rhythm  Abdominal: Soft  There is no tenderness  Musculoskeletal: She exhibits no edema  Lymphadenopathy:     She has no cervical adenopathy  Neurological: She is alert and oriented to person, place, and time  Skin: Skin is warm and dry  Psychiatric: She has a normal mood and affect  Labs: I have personally reviewed pertinent lab results    Lab Results   Component Value Date    WBC 12 88 (H) 08/27/2019    HGB 13 3 08/27/2019    HCT 41 8 08/27/2019     (H) 08/27/2019     08/27/2019     Lab Results   Component Value Date    GLUCOSE 191 (H) 11/20/2015    CALCIUM 9 2 08/27/2019     11/20/2015    K 3 5 08/27/2019    CO2 30 08/27/2019     08/27/2019    BUN 28 (H) 08/27/2019    CREATININE 0 98 08/27/2019     No results found for: IGE  Lab Results   Component Value Date    ALT 16 08/27/2019    AST 21 08/27/2019    ALKPHOS 113 08/27/2019    BILITOT 0 38 11/20/2015

## 2019-09-25 NOTE — ASSESSMENT & PLAN NOTE
She will continue with oxygen at 2 liters/minute continuously  Based on prior testing, she does not require BiPAP therapy

## 2019-10-01 PROBLEM — H65.03 BILATERAL ACUTE SEROUS OTITIS MEDIA: Status: ACTIVE | Noted: 2019-01-01

## 2019-10-01 PROBLEM — J30.9 ALLERGIC RHINITIS: Status: ACTIVE | Noted: 2019-01-01

## 2019-10-01 NOTE — PROGRESS NOTES
Assessment/Plan:  Allergic rhinitis  We are going to add Flonase to her regimen  She or her  will call in 1 month if she does not see improvement in her symptoms or sooner as needed  They agree  Bilateral acute serous otitis media  This appears to be the source of her hearing difficulty  If her symptoms have not resolved in the next 4-6 weeks they are going to call for re-evaluation  We are going to treat her allergic rhinitis with fluticasone nasal spray  Depression  She continues with Remeron  Mood seems to be acceptably controlled  Insomnia due to medical condition  Nortriptyline is somewhat effective and she is going to continue with same  Other chronic pain  She continues with chronic Percocet  The goal of her medical care is palliation of her multiple medical problems presently  Diagnoses and all orders for this visit:    Allergic rhinitis, unspecified seasonality, unspecified trigger  -     fluticasone (FLONASE) 50 mcg/act nasal spray; 1 spray into each nostril daily    Bilateral acute serous otitis media, recurrence not specified    Dysthymia    Insomnia due to medical condition    Other chronic pain          Subjective:   Chief Complaint   Patient presents with    Cerumen Impaction     b/l ear cleaning        Patient ID: Anastasio Landau is a [de-identified] y o  female  HPI  The patient is an 26-year-old female who presents today accompanied by her  with complaint of bilateral diminished hearing  She has been using Debrox since her last visit  They have been able to get some wax extracted from her ear canals with flushing  She has had no otalgia or drainage  She does have some nasal congestion and sneezing    The following portions of the patient's history were reviewed and updated as appropriate: allergies, current medications, past medical history, past social history, past surgical history and problem list     Review of Systems   Constitution: Negative for chills and fever    HENT: Positive for congestion  Negative for tinnitus  As noted in the HPI   Cardiovascular: Negative for chest pain and leg swelling  Respiratory: Positive for cough and shortness of breath  Negative for sputum production and wheezing  Neurological: Positive for difficulty with concentration  Psychiatric/Behavioral: Negative for depression  The patient does not have insomnia and is not nervous/anxious  Chronic use of pain medication         Objective:    Physical Exam   Constitutional: She appears well-developed  Cachectic appearance wearing nasal O2 but in no distress   HENT:   Ear canal on the left is clear of any cerumen  There is serous otitis media and minimal retraction  On the right there is some minimal cerumen in the canal   This is removed with a combination of plastic curette and alligator forceps  Serous otitis media is noted on the right as well  She does have boggy nasal turbinates with watery nasal discharge  She also has allergic shiners  Patient's shoes and socks removed

## 2019-10-01 NOTE — PROGRESS NOTES
Tobacco Cessation Counseling: Tobacco cessation counseling was provided  The patient is sincerely urged to quit consumption of tobacco  She is not ready to quit tobacco  Patient refused medication

## 2019-10-02 NOTE — ASSESSMENT & PLAN NOTE
She continues with chronic Percocet  The goal of her medical care is palliation of her multiple medical problems presently

## 2019-10-02 NOTE — ASSESSMENT & PLAN NOTE
We are going to add Flonase to her regimen  She or her  will call in 1 month if she does not see improvement in her symptoms or sooner as needed  They agree

## 2019-10-02 NOTE — ASSESSMENT & PLAN NOTE
This appears to be the source of her hearing difficulty  If her symptoms have not resolved in the next 4-6 weeks they are going to call for re-evaluation  We are going to treat her allergic rhinitis with fluticasone nasal spray

## 2019-10-07 NOTE — PROGRESS NOTES
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, stage IV, as outlined  She continues on Cyramza Q 3 weeks  She tolerates this relatively well  Appetite and weight have recently improved after initiation of Remeron a few weeks ago  Recent CBC shows borderline leukocytosis, normal differential, clinically insignificant, observation favored  Recent CT scan shows no change compared to the prior study of 3 months earlier with stable right hilar lesion and adrenal abnormality  Other findings are benign  I reviewed the above findings and their significance with The patient and her family  Continuation of current treatment is recommended   ______________________________________________________________________    Chief Complaint   Patient presents with    Follow-up       HPI:     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 ([address] )    7/30/2016 Initial Diagnosis     CT of the neck for evaluation of the carotid arteries incidentally showed an infiltrating mass in the right upper lobe extending to the hilum  PET-CT June 2016 patient was found to have a thyroid mass on physical examination  Ultrasound showed bilateral thyroid nodules  In July 2016 she underwent CAT scan of the neck for evaluation of the carotid arteries  Incidentally noted, infiltrating mass in the right upper lobe mass measuring 4 8 cm, SUV 21 5  This extends to the right hilum  Right paratracheal and subcarinal nodes measure up to 12 mm  The left adrenal showed some hypermetabolism with SUV of 3 7, without discrete mass   Uptake in the right parotid gland is noted with SUV of 22 3 and a soft tissue nodule, measuring 11 mm       9/6/2016 - 11/25/2016 Chemotherapy     Abraxane 80 mg/m2 day 1, 8, 15, carbo AUC-5, day 1 only, Q 21 days  5/3/2017 Progression     Progressive disease  5/16/2017 - 9/20/2018 Chemotherapy     Tecentriq 1200 mg IV Q 3 weeks  2/21/2018 - 3/14/2018 Radiation     C1    Plan ID Energy Fractions Dose per Fraction (cGy) Total Dose Delivered (cGy) Elapsed Days   Abdomen 6X 15 / 15 250 3,750 21      Treatment dates:  C1: 2/21/2018 - 3/14/2018        9/25/2018 -  Chemotherapy     ramucirumab (CYRAMZA) 426 mg in sodium chloride 0 9 % 250 mL IVPB, 10 mg/kg, Intravenous, Once, 17 of 22 cycles  Administration: 426 mg (5/14/2019), 426 mg (6/4/2019), 426 mg (6/25/2019), 426 mg (7/16/2019), 400 mg (8/6/2019), 400 mg (8/27/2019), 400 mg (9/17/2019)  DOCEtaxel (TAXOTERE) 102 mg in sodium chloride 0 9 % 250 mL chemo infusion, 75 mg/m2, Intravenous, Once, 4 of 4 cycles      9/26/2018 - 11/27/2018 Chemotherapy     Taxotere 50 mg/m2, Cyramza 10 mg/kg, both given day 1, Q 21 days  Taxotere was discontinued secondary to       1/8/2019 -  Chemotherapy     Cyramza 10 mg/kg, Day 1  Cycle length =  21 days         Interval History:  Clinically stable  Improved appetite  ECOG-  2 - Symptomatic, <50% confined to bed    Review of Systems   Constitutional: Negative for appetite change, diaphoresis, fatigue and fever  HENT: Negative for sinus pain  Eyes: Negative for discharge  Respiratory: Negative for cough and shortness of breath  Cardiovascular: Negative for chest pain  Gastrointestinal: Negative for abdominal pain, constipation and diarrhea  Endocrine: Negative for cold intolerance  Genitourinary: Negative for difficulty urinating and hematuria  Musculoskeletal: Negative for joint swelling  Skin: Negative for rash  Allergic/Immunologic: Negative for environmental allergies  Neurological: Negative for dizziness and headaches  Hematological: Negative for adenopathy  Psychiatric/Behavioral: Negative for agitation         Past Medical History:   Diagnosis Date    Abnormal CT of the chest     last assessed: Aug 8, 2016    Acute left-sided low back pain without sciatica 1/31/2019    Arthritis     Asthma     Asymptomatic carotid artery stenosis     last assessed: March 2, 2017    Benign essential hypertension     last assessed: March 2, 2017    Cataract of right eye 3/19/2015    Cataract of right eye     last assessed: March 19, 2015    CHF (congestive heart failure) (HCC)     Common cold     Coronary artery disease     Depression     Diabetes mellitus ([address] )     Fracture of multiple pubic rami ([address] ) 1/6/2015    History of radiation therapy     Hyperlipidemia     Hypertension     Lung cancer ([address] )     Multiple thyroid nodules 7/26/2016    Myocardial infarction ([address] )     Pulmonary emphysema (HCC)     Pulmonary nodule     Shortness of breath      Patient Active Problem List   Diagnosis    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 (HCC)    Asymptomatic carotid artery stenosis    Back pain, lumbosacral    Benign essential hypertension    Bursitis, ischial    Cardiomyopathy ([address] )    Cataract of right eye    Chemotherapy-induced nausea    Chronic respiratory failure with hypoxia and hypercapnia (HCC)    COPD, very severe (HCC)    Congestive heart failure ([address] )    Depression    DMII (diabetes mellitus, type 2) ([address] )    Hyperlipidemia    Multiple thyroid nodules    Osteoporosis    Other chronic pain    Parotid adenoma    Psoriasis    Primary localized osteoarthrosis of the hip    [facility]) [facility])    Restless legs syndrome    Sciatica    Seborrheic dermatitis of scalp    Insomnia due to medical condition    Chemotherapy induced diarrhea    Anorexia    Dental abscess    Bilateral hearing loss due to cerumen impaction    Allergic rhinitis    Bilateral acute serous otitis media       Current Outpatient Medications:     albuterol (2 5 mg/3 mL) 0 083 % nebulizer solution, Take 1 vial (2 5 mg total) by nebulization 4 (four) times a day, Disp: 360 vial, Rfl: 3    azithromycin (ZITHROMAX) 250 mg tablet, Take 2 tablets (500 mg total) by mouth 3 (three) times a week for 140 days, Disp: 30 tablet, Rfl: 3    betamethasone, augmented, (DIPROLENE-AF) 0 05 % cream, APPLY TO AFFECTED AREA TWICE A DAY, Disp: 50 g, Rfl: 1    dronabinol (MARINOL) 5 MG capsule, Take 1 capsule (5 mg total) by mouth 2 (two) times a day before meals (Patient taking differently: Take 5 mg by mouth as needed ), Disp: , Rfl:     fluticasone (FLONASE) 50 mcg/act nasal spray, 1 spray into each nostril daily, Disp: 1 Bottle, Rfl: 5    fluticasone-salmeterol (ADVAIR DISKUS) 500-50 mcg/dose inhaler, Inhale 1 puff 2 (two) times a day BRAND NECESSARY, Disp: 3 Inhaler, Rfl: 3    furosemide (LASIX) 40 mg tablet, TAKE 1 TABLET BY MOUTH EVERY DAY, Disp: 90 tablet, Rfl: 0    glucose blood (FREESTYLE LITE) test strip, Test blood sugar 3 times daily or as needed, DX E11 9, Disp: 300 each, Rfl: 1    Melatonin 1 MG/4ML LIQD, Take by mouth, Disp: , Rfl:     metoprolol succinate (TOPROL-XL) 50 mg 24 hr tablet, Take 50 mg by mouth daily, Disp: , Rfl:     mirtazapine (REMERON) 15 mg tablet, Take 1 tablet (15 mg total) by mouth daily at bedtime, Disp: 30 tablet, Rfl: 3    mometasone (ELOCON) 0 1 % lotion, Apply 1 application topically daily, Disp: , Rfl: 3    nortriptyline (PAMELOR) 10 mg capsule, TAKE 1 CAPSULE (10 MG TOTAL) BY MOUTH DAILY AT BEDTIME, Disp: 30 capsule, Rfl: 0    olmesartan (BENICAR) 5 mg tablet, Take 5 mg by mouth daily, Disp: , Rfl:     oxyCODONE-acetaminophen (PERCOCET) 5-325 mg per tablet, Take 1 tablet by mouth every 4 (four) hours as needed for moderate painMax Daily Amount: 6 tablets, Disp: 180 tablet, Rfl: 0    pentoxifylline (TRENtal) 400 mg ER tablet, Take 1 tablet (400 mg total) by mouth 3 (three) times a day with meals, Disp: 270 tablet, Rfl: 1    simvastatin (ZOCOR) 80 mg tablet, TAKE 1 TABLET BY MOUTH EVERY DAY, Disp: 90 tablet, Rfl: 0    tiotropium (SPIRIVA RESPIMAT) 2 5 MCG/ACT AERS inhaler, Inhale 2 puffs daily, Disp: 3 Inhaler, Rfl: 3    VENTOLIN  (90 Base) MCG/ACT inhaler, TAKE 2 PUFFS BY MOUTH EVERY 6 HOURS AS NEEDED FOR WHEEZE, Disp: 54 Inhaler, Rfl: 3  Allergies   Allergen Reactions    Penicillins Swelling     Legs swell up     Past Surgical History:   Procedure Laterality Date    BRONCHOSCOPY N/A 8/10/2016    Procedure: BRONCHOSCOPY FLEXIBLE;  Surgeon: Lady Nenita MD;  [facility];  Service:    Roanna Kugel BYPASS GRAFT      using vein - aortic-bifemoral - last assessed: Aug 22, 2016     SECTION      CHOLECYSTECTOMY      EYE SURGERY      HYSTERECTOMY      MO BRONCHOSCOPY NEEDLE BX TRACHEA MAIN STEM&/BRON N/A 8/10/2016    Procedure: ENDOBRONCHIAL ULTRASOUND (FROZEN SECTION) ; Surgeon: Lady Nenita MD;  [facility];  Service: Thoracic    MO INSJ TUNNELED CTR VAD W/SUBQ PORT AGE 5 YR/> Left 2016    Procedure: INSERTION VENOUS PORT (PORT-A-CATH); Surgeon: Lady Nenita MD;  [facility];  Service: Thoracic    TONSILLECTOMY       Social History     Objective:  Vitals:    10/07/19 0807   BP: 132/74   BP Location: Left arm   Patient Position: Sitting   Pulse: 83   Resp: 18   Temp: 98 4 °F (36 9 °C)   TempSrc: Tympanic   SpO2: 96%   Weight: 40 1 kg (88 lb 6 4 oz)   Height: 5' (1 524 m)     Physical Exam   Constitutional: She is oriented to person, place, and time  She appears well-developed  HENT:   Head: Normocephalic  Eyes: Pupils are equal, round, and reactive to light  Neck: Neck supple  Cardiovascular: Normal rate  No murmur heard  Pulmonary/Chest: No respiratory distress  She has no wheezes  She has no rales  Abdominal: Soft  She exhibits no distension  There is no tenderness  There is no rebound  Musculoskeletal: She exhibits no edema  Lymphadenopathy:     She has no cervical adenopathy  Neurological: She is alert and oriented to person, place, and time   She displays normal reflexes  Skin: Skin is warm  No rash noted  Psychiatric: She has a normal mood and affect  Thought content normal          Labs: I personally reviewed the labs and imaging pertinent to this patient care

## 2019-10-08 NOTE — PROGRESS NOTES
Infusion tolerated well, pt offered no complaints  Port deaccessed per protocol, pt then d/c'd to home with steady gait

## 2019-10-08 NOTE — PROGRESS NOTES
Pt here for chemo  No blood return obtained using 20g and then also tried 19g  CathFlo administered, WCTM

## 2019-10-08 NOTE — PROGRESS NOTES
Pts /67, then 163/59, Meri Segura RN notified  Also, order for U/A required and requested  Awaiting decision to treat or not from Dr Mere Paige to call back

## 2019-10-08 NOTE — PLAN OF CARE
Problem: Potential for Falls  Goal: Patient will remain free of falls  Description  INTERVENTIONS:  - Assess patient frequently for physical needs  -  Identify cognitive and physical deficits and behaviors that affect risk of falls    -  [place] fall precautions as indicated by assessment   - Educate patient/family on patient safety including physical limitations  - Instruct patient to call for assistance with activity based on assessment  - Modify environment to reduce risk of injury  - Consider OT/PT consult to assist with strengthening/mobility  Outcome: Progressing

## 2019-10-22 NOTE — TELEPHONE ENCOUNTER
Nj Garces called and wanted to make further appt for infusion  Lucas Walker has a f/u appt on 12/30  I stated that they would make them at the time and he would like to make further appt for when they are available  Please reach out to him at [phone]

## 2019-10-23 NOTE — TELEPHONE ENCOUNTER
Patient scheduled for 1 additional appointment at infusion center 1/21/19 - additional appointments after that will be scheduled at her next follow up appointment

## 2019-10-24 NOTE — ASSESSMENT & PLAN NOTE
Patient is serous otitis media right greater than left  She also has significant subjective worsening of hearing  She is going to continue with her fluticasone nasal spray  We are going to ask Otolaryngology to evaluate

## 2019-10-24 NOTE — PROGRESS NOTES
Assessment/Plan:  Bilateral acute serous otitis media  Patient is serous otitis media right greater than left  She also has significant subjective worsening of hearing  She is going to continue with her fluticasone nasal spray  We are going to ask Otolaryngology to evaluate  Diagnoses and all orders for this visit:    Bilateral acute serous otitis media, recurrence not specified  -     Ambulatory Referral to Otolaryngology; Future          Subjective:   Chief Complaint   Patient presents with    Follow-up     recheck of ears      states that she cant hear at all, not even a little better  Patient ID: Jesi Saab is a [de-identified] y o  female  HPI  The patient is an 58-year-old female who presents today for follow-up of serous otitis media with loss of hearing   states that she has seen no improvement since her last visit  She has been compliant with her fluticasone nasal spray  She has no otalgia and no drainage  She does have persistent nasal congestion  She is compliant with her nasal oxygen  She has had no fevers chills constitutional symptoms  The following portions of the patient's history were reviewed and updated as appropriate: allergies, current medications, past medical history, past social history, past surgical history and problem list     ROS    Limited pertinent review of systems is per the HPI  Objective:    Physical Exam   Constitutional: She is oriented to person, place, and time  She appears well-developed and well-nourished  HENT:   She has thick serous otitis media on the right with some retraction of the ear drum  She has serous otitis media on the left though there is no evidence of retraction  There are some air-fluid level inferiorly  Boggy and bluish nasal turbinates  Pulmonary/Chest: Effort normal    Somewhat distant breath sounds  Expiratory wheeze  Occasional rhonchi  Neurological: She is alert and oriented to person, place, and time  Psychiatric: She has a normal mood and affect  Nursing note and vitals reviewed

## 2019-10-29 NOTE — PROGRESS NOTES
Notified Sasha Mendieta RN with Dr Peter Hamman of pt's BP being outside of the parameters  As per Juvenal Sakai Dr Peter Hamman is okay to proceed with treatment with elevated BP  Pt should follow up with her PCP for her elevated BP  Informed pt and her daughter to follow up with her Dr on her BP  Pt and daughter verbalize understanding of same  Will continue to monitor

## 2019-10-31 NOTE — PROGRESS NOTES
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)    Asymptomatic carotid artery stenosis    Back pain, lumbosacral    Benign essential hypertension    Bursitis, ischial    Cardiomyopathy ([facility] )    Cataract of right eye    Chemotherapy-induced nausea    Chronic respiratory failure with hypoxia and hypercapnia ([facility])    Overview Signed 7/9/2019  1:34 PM by Erin Rodriguez DO     2L/min         COPD, very severe ([facility] )    Overview Addendum 3/27/2019  1:20 PM by Erin Rodriguez, DO     FEV1 24% pred (2018)         Congestive heart failure ([facility])    Wt Readings from Last 3 Encounters:   10/31/19 42 2 kg (93 lb)   10/29/19 41 9 kg (92 lb 6 oz)   10/24/19 41 3 kg (91 lb)                 Depression    DMII (diabetes mellitus, type 2) ([facility] [address] )      Lab Results   Component Value Date    HGBA1C 5 5 06/25/2019         Hyperlipidemia    Multiple thyroid nodules    Osteoporosis    Other chronic pain    Parotid adenoma    Psoriasis    Primary localized osteoarthrosis of the hip    PVD (peripheral vascular disease) ([facility])    Restless legs syndrome    Sciatica    Seborrheic dermatitis of scalp    Insomnia due to medical condition    Chemotherapy induced diarrhea    Anorexia    Dental abscess    Bilateral hearing loss due to cerumen impaction    Allergic rhinitis    Bilateral acute serous otitis media        Past Medical History:   Diagnosis Date    Abnormal CT of the chest     last assessed: Aug 8, 2016    Acute left-sided low back pain without sciatica 1/31/2019    Arthritis     Asthma     Asymptomatic carotid artery stenosis     last assessed: March 2, 2017    Benign essential hypertension     last assessed: March 2, 2017    Cataract of right eye 3/19/2015    Cataract of right eye     last assessed: March 19, 2015    CH[facility]est[facility]     Common cold     Coronary artery disease     Depression     Diabetes mellitus ([place] [address] )     Fracture of multiple pubic rami ([place] [address] ) 1/6/2015    History of radiation therapy     Hyperlipidemia     Hypertension     Lung cancer ([place] [address] )     Multiple thyroid nodules 7/26/2016    Myocardial infarction ([place] [address] )     Pulmonary emphysema (HCC)     Pulmonary nodule     Shortness of breath      Social History     Socioeconomic History    Marital status: /Civil Union     Spouse name: Not on file    Number of children: Not on file    Years of education: Not on file    Highest education level: Not on file   Occupational History    Not on file   Social Needs    Financial resource strain: Not on file    Food insecurity:     Worry: Not on file     Inability: Not on file    Transportation needs:     Medical: Not on file     Non-medical: Not on file   Tobacco Use    Smoking status: Current Some Day Smoker     Packs/day: 0 25     Types: Cigarettes     Start date: 1946    Smokeless tobacco: Never Used    Tobacco comment: smokes 3-4 cigs /day - current every day smoker noted in "allscripts" smoke for less than 1/2 pack per day for 50 years     Substance and Sexual Activity    Alcohol use: No    Drug use: No    Sexual activity: Not on file   Lifestyle    Physical activity:     Days per week: Not on file     Minutes per session: Not on file    Stress: Not on file   Relationships    Social connections:     Talks on phone: Not on file     Gets together: Not on file     Attends Rastafari service: Not on file     Active member of club or organization: Not on file     Attends meetings of clubs or organizations: Not on file     Relationship status: Not on file    Intimate partner violence:     Fear of current or ex partner: Not on file     Emotionally abused: Not on file     Physically abused: Not on file     Forced sexual activity: Not on file   Other Topics Concern    Not on file   Social History Narrative    Caffeine use - coffee once in a great while and everyday tea drinker       Family History   Problem Relation Age of Onset    Brain cancer Sister     Cancer Sister     Thyroid disease Mother     Rheum arthritis Daughter      Past Surgical History:   Procedure Laterality Date    BRONCHOSCOPY N/A 8/10/2016    Procedure: BRONCHOSCOPY FLEXIBLE;  Surgeon: Silas Velez MD;  Location: [facility];  Service:    Desmond Flower BYPASS GRAFT      using vein - aortic-bifemoral - last assessed: Aug 22, 2016     SECTION      CHOLECYSTECTOMY      EYE SURGERY      HYSTERECTOMY      MO BRONCHOSCOPY NEEDLE BX TRACHEA MAIN STEM&/BRON N/A 8/10/2016    Procedure: ENDOBRONCHIAL ULTRASOUND (FROZEN SECTION) ; Surgeon: Silas Velez MD;  Location: [facility];  Service: Thoracic    MO INSJ TUNNELED CTR VAD W/SUBQ PORT AGE 5 YR/> Left 2016    Procedure: INSERTION VENOUS PORT (PORT-A-CATH);   Surgeon: Silas Velez MD;  [facility]: [facility];  Service: Thoracic    TONSILLECTOMY         Current Outpatient Medications:     albuterol (2 5 mg/3 mL) 0 083 % nebulizer solution, Take 1 vial (2 5 mg total) by nebulization 4 (four) times a day, Disp: 360 vial, Rfl: 3    azithromycin (ZITHROMAX) 250 mg tablet, Take 2 tablets (500 mg total) by mouth 3 (three) times a week for 140 days, Disp: 30 tablet, Rfl: 3    betamethasone, augmented, (DIPROLENE-AF) 0 05 % cream, APPLY TO AFFECTED AREA TWICE A DAY, Disp: 50 g, Rfl: 1    dronabinol (MARINOL) 5 MG capsule, Take 1 capsule (5 mg total) by mouth 2 (two) times a day before meals (Patient taking differently: Take 5 mg by mouth 2 (two) times a day ), Disp: , Rfl:    fluticasone-salmeterol (ADVAIR DISKUS) 500-50 mcg/dose inhaler, Inhale 1 puff 2 (two) times a day BRAND NECESSARY, Disp: 3 Inhaler, Rfl: 3    furosemide (LASIX) 40 mg tablet, TAKE 1 TABLET BY MOUTH EVERY DAY, Disp: 90 tablet, Rfl: 0    glucose blood (FREESTYLE LITE) test strip, Test blood sugar 3 times daily or as needed, DX E11 9, Disp: 300 each, Rfl: 1    Melatonin 1 MG/4ML LIQD, Take by mouth, Disp: , Rfl:     metoprolol succinate (TOPROL-XL) 50 mg 24 hr tablet, Take 1 tablet (50 mg total) by mouth daily (Patient taking differently: Take 100 mg by mouth daily ), Disp: 90 tablet, Rfl: 0    mometasone (ELOCON) 0 1 % lotion, Apply 1 application topically daily, Disp: , Rfl: 3    nortriptyline (PAMELOR) 10 mg capsule, Take 1 capsule (10 mg total) by mouth daily at bedtime, Disp: 30 capsule, Rfl: 0    oxyCODONE-acetaminophen (PERCOCET) 5-325 mg per tablet, Take 1 tablet by mouth every 4 (four) hours as needed for moderate painMax Daily Amount: 6 tablets, Disp: 180 tablet, Rfl: 0    pentoxifylline (TRENtal) 400 mg ER tablet, Take 1 tablet (400 mg total) by mouth 3 (three) times a day with meals, Disp: 270 tablet, Rfl: 1    simvastatin (ZOCOR) 80 mg tablet, TAKE 1 TABLET BY MOUTH EVERY DAY, Disp: 90 tablet, Rfl: 0    tiotropium (SPIRIVA RESPIMAT) 2 5 MCG/ACT AERS inhaler, Inhale 2 puffs daily, Disp: 3 Inhaler, Rfl: 3    valsartan (DIOVAN) 80 mg tablet, Take 80 mg by mouth daily, Disp: , Rfl:     VENTOLIN  (90 Base) MCG/ACT inhaler, TAKE 2 PUFFS BY MOUTH EVERY 6 HOURS AS NEEDED FOR WHEEZE, Disp: 54 Inhaler, Rfl: 3    fluticasone (FLONASE) 50 mcg/act nasal spray, 1 spray into each nostril daily (Patient not taking: Reported on 10/31/2019), Disp: 1 Bottle, Rfl: 5    mirtazapine (REMERON) 15 mg tablet, Take 1 tablet (15 mg total) by mouth daily at bedtime (Patient not taking: Reported on 10/31/2019), Disp: 30 tablet, Rfl: 3    olmesartan (BENICAR) 5 mg tablet, Take 5 mg by mouth daily, Disp: , Rfl: Allergies   Allergen Reactions    Penicillins Swelling     Legs swell up       Labs:     Chemistry        Component Value Date/Time     11/20/2015 1648    K 3 8 10/08/2019 1022    K 4 0 11/20/2015 1648     10/08/2019 1022     11/20/2015 1648    CO2 35 (H) 10/08/2019 1022    CO2 >45 (H) 07/16/2018 1014    BUN 21 10/08/2019 1022    BUN 13 11/20/2015 1648    CREATININE 1 01 10/08/2019 1022    CREATININE 0 77 11/20/2015 1648        Component Value Date/Time    CALCIUM 9 2 10/08/2019 1022    CALCIUM 9 1 11/20/2015 1648    ALKPHOS 124 (H) 10/08/2019 1022    ALKPHOS 95 11/20/2015 1648    AST 31 10/08/2019 1022    AST 13 11/20/2015 1648    ALT 18 10/08/2019 1022    ALT 18 11/20/2015 1648    BILITOT 0 38 11/20/2015 1648            Lab Results   Component Value Date    CHOL 173 11/20/2015    CHOL 197 01/15/2013     Lab Results   Component Value Date    HDL 65 (H) 03/06/2017    HDL 55 11/20/2015    HDL 53 01/15/2013     Lab Results   Component Value Date    LDLCALC 95 03/06/2017    LDLCALC 54 11/20/2015     Lab Results   Component Value Date    TRIG 146 03/06/2017    TRIG 319 11/20/2015    TRIG 235 (H) 01/15/2013     No results found for: CHOLHDL    Imaging: No results found  Review of Systems   Constitution: Negative  HENT: Negative  Eyes: Negative  Cardiovascular: Positive for dyspnea on exertion  Respiratory: Positive for shortness of breath  Endocrine: Negative  Hematologic/Lymphatic: Negative  Skin: Negative  Musculoskeletal: Negative  Gastrointestinal: Negative  Genitourinary: Negative  Neurological: Negative  Psychiatric/Behavioral: Negative  Allergic/Immunologic: Negative  Vitals:    10/31/19 1409   BP: 130/60   Pulse: 76           Physical Exam   Constitutional: She is oriented to person, place, and time  No distress  HENT:   Mouth/Throat: No oropharyngeal exudate  Eyes: No scleral icterus  Neck: No JVD present     Cardiovascular: Normal rate and regular rhythm  Exam reveals no friction rub  No murmur heard  Pulmonary/Chest: Effort normal  She has wheezes  Abdominal: Soft  Bowel sounds are normal  She exhibits no distension  There is no tenderness  There is no guarding  Musculoskeletal: She exhibits no edema  Neurological: She is alert and oriented to person, place, and time  Skin: Skin is warm and dry  She is not diaphoretic  Psychiatric: She has a normal mood and affect  Her behavior is normal        Discussion/Summary:    Chronic Systolic CHF: LVEF 95%: She appears to be relatively stable  Will need to reconcile her medications since diovan and benicar are listed  Continue other med rx  She is on chemotherapy for stage 4 lung cancer  The patient was counseled regarding diagnostic results, instructions for management, risk factor reductions, impressions  total time of encounter was 25 minutes and 15 minutes was spent counseling

## 2019-10-31 NOTE — TELEPHONE ENCOUNTER
Pt seen today   called when he returned home, letting you know she is no longer taking diovan, but is taking olmesartan 5 mg daily  I updated the med list accordingly

## 2019-11-19 NOTE — PLAN OF CARE
Problem: Potential for Falls  Goal: Patient will remain free of falls  Description  INTERVENTIONS:  - Assess patient frequently for physical needs  -  Identify cognitive and physical deficits and behaviors that affect risk of falls  -  [place] fall precautions as indicated by assessment   - Educate patient/family on patient safety including physical limitations  - Instruct patient to call for assistance with activity based on assessment  - Modify environment to reduce risk of injury  - Consider OT/PT consult to assist with strengthening/mobility  Outcome: Progressing     Problem: Knowledge Deficit  Goal: Patient/family/caregiver demonstrates understanding of disease process, treatment plan, medications, and discharge instructions  Description  Complete learning assessment and assess knowledge base    Interventions:  - Provide teaching at level of understanding  - Provide teaching via preferred learning methods  Outcome: Progressing

## 2019-11-19 NOTE — PROGRESS NOTES
Notified Meri Segura RN of Pt's BP of 160/69  Okay to proceed with treatment today per [employer]

## 2019-12-04 NOTE — ASSESSMENT & PLAN NOTE
She will continue with Advair, Spiriva and albuterol via nebulizer 4 times daily  All of her prescriptions are up to date  She will be given flu vaccine and Pneumovax today

## 2019-12-04 NOTE — PROGRESS NOTES
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 [facility])  She will continue with chemotherapy at the direction of Oncology  She is scheduled for repeat imaging in the coming weeks  We will review those when available  Visit orders:    Diagnoses and all orders for this visit:    COPD, very severe ([address] )    Chronic respiratory failure with hypoxia and hypercapnia (HCC)    Need for pneumococcal vaccination  -     Pneumococcal polysaccharide vaccine 23-valent greater than or equal to 1yo subcutaneous/IM    Need for influenza vaccination  -     influenza vaccine, [id number], high-dose, PF 0 5 mL (FLUZONE HIGH-DOSE)    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 ([facility] [address] )      Return in about 3 months (around 3/4/2020)  History of Present Illness   HPI:  Eugene Chase is a [de-identified] y o  female who is here today for follow-up regarding chronic respiratory failure and severe COPD  Her pulmonary status has stable  She becomes dyspneic with exertion  She is compliant with Advair, Spiriva and albuterol nebulizer 4 times daily  She has an occasional cough, no significant sputum production  She uses oxygen at 2 liters/minute continuously  She has generalized weakness and fatigue  She is still undergoing chemotherapy  She is very hard of hearing and is seeing ENT tomorrow  Review of Systems   Constitutional: Negative for chills, fever and unexpected weight change  HENT: Negative for postnasal drip and sore throat  Eyes: Negative for visual disturbance     Respiratory:        As noted in HPI   Cardiovascular: Negative for chest pain  Gastrointestinal: Negative for abdominal pain, diarrhea and vomiting  Genitourinary: Negative for difficulty urinating  Skin: Negative for rash  Neurological: Negative for headaches  Hematological: Negative for adenopathy  Psychiatric/Behavioral: Negative  All other systems reviewed and are negative  Historical Information   Past Medical History:   Diagnosis Date    Abnormal CT of the chest     last assessed: Aug 8, 2016    Acute left-sided low back pain without sciatica 2019    Arthritis     Asthma     Asymptomatic carotid artery stenosis     last assessed: 2017    Benign essential hypertension     last assessed: 2017    Cataract of right eye 3/19/2015    Cataract of right eye     last assessed: 2015    CHF (congestive heart failure) (HCC)     Common cold     Coronary artery disease     Depression     Diabetes mellitus ([facility] [address] )     Fracture of multiple pubic rami ([facility] [address] ) 2015    History of radiation therapy     Hyperlipidemia     Hypertension     Lung cancer ([facility] [address] )     Multiple thyroid nodules 2016    Myocardial infarction ([facility] [address] )     Pulmonary emphysema ([facility] [address] [facility] )     Pulmonary nodule     Shortness of breath      Past Surgical History:   Procedure Laterality Date    BRONCHOSCOPY N/A 8/10/2016    Procedure: BRONCHOSCOPY FLEXIBLE;  Surgeon: Paola Diamond MD;  [facility];  Service:    [facility] BYPASS GRAFT      using vein - aortic-bifemoral - last assessed: Aug 22, 2016     SECTION      CHOLECYSTECTOMY      EYE SURGERY      HYSTERECTOMY      MA BRONCHOSCOPY NEEDLE BX TRACHEA MAIN STEM&/BRON N/A 8/10/2016    Procedure: ENDOBRONCHIAL ULTRASOUND (FROZEN SECTION) ; Surgeon: Paola Diamond MD;  Location: [facility] MAIN OR;  Service: Thoracic    MA INSJ TUNNELED CTR VAD W/SUBQ PORT AGE 5 YR/> Left 2016    Procedure: INSERTION VENOUS PORT (PORT-A-CATH);   Surgeon: Paola Diamond MD; Location: [facility] MAIN OR;  Service: Thoracic    TONSILLECTOMY       Family History   Problem Relation Age of Onset    Brain cancer Sister     Cancer Sister     Thyroid disease Mother     Rheum arthritis Daughter        Social History     Tobacco Use   Smoking Status Current Some Day Smoker    Packs/day: 0 25    Types: Cigarettes    Start date: 1947   Smokeless Tobacco Never Used   Tobacco Comment    smokes 3-4 cigs /day - current every day smoker noted in "allscripts" smoke for less than 1/2 pack per day for 50 years         Meds/Allergies     Current Outpatient Medications:     albuterol (2 5 mg/3 mL) 0 083 % nebulizer solution, Take 1 vial (2 5 mg total) by nebulization 4 (four) times a day, Disp: 360 vial, Rfl: 3    betamethasone, augmented, (DIPROLENE-AF) 0 05 % cream, APPLY TO AFFECTED AREA TWICE A DAY, Disp: 50 g, Rfl: 1    fluticasone-salmeterol (ADVAIR DISKUS) 500-50 mcg/dose inhaler, Inhale 1 puff 2 (two) times a day BRAND NECESSARY, Disp: 3 Inhaler, Rfl: 3    furosemide (LASIX) 40 mg tablet, TAKE 1 TABLET BY MOUTH EVERY DAY, Disp: 90 tablet, Rfl: 0    glucose blood (FREESTYLE LITE) test strip, Test blood sugar 3 times daily or as needed, DX E11 9, Disp: 300 each, Rfl: 1    Melatonin 1 MG/4ML LIQD, Take by mouth, Disp: , Rfl:     metoprolol succinate (TOPROL-XL) 100 mg 24 hr tablet, Take 100 mg by mouth daily, Disp: , Rfl:     mometasone (ELOCON) 0 1 % lotion, Apply 1 application topically daily, Disp: , Rfl: 3    nortriptyline (PAMELOR) 10 mg capsule, Take 1 capsule (10 mg total) by mouth daily at bedtime, Disp: 30 capsule, Rfl: 1    olmesartan (BENICAR) 5 mg tablet, Take 5 mg by mouth daily, Disp: , Rfl:     oxyCODONE-acetaminophen (PERCOCET) 5-325 mg per tablet, Take 1 tablet by mouth every 4 (four) hours as needed for moderate painMax Daily Amount: 6 tablets, Disp: 180 tablet, Rfl: 0    pentoxifylline (TRENtal) 400 mg ER tablet, Take 1 tablet (400 mg total) by mouth 3 (three) times a day with meals, Disp: 270 tablet, Rfl: 1    simvastatin (ZOCOR) 80 mg tablet, TAKE 1 TABLET BY MOUTH EVERY DAY, Disp: 90 tablet, Rfl: 0    tiotropium (SPIRIVA RESPIMAT) 2 5 MCG/ACT AERS inhaler, Inhale 2 puffs daily, Disp: 3 Inhaler, Rfl: 3    VENTOLIN  (90 Base) MCG/ACT inhaler, TAKE 2 PUFFS BY MOUTH EVERY 6 HOURS AS NEEDED FOR WHEEZE, Disp: 54 Inhaler, Rfl: 3    fluticasone (FLONASE) 50 mcg/act nasal spray, 1 spray into each nostril daily (Patient not taking: Reported on 10/31/2019), Disp: 1 Bottle, Rfl: 5    mirtazapine (REMERON) 15 mg tablet, Take 1 tablet (15 mg total) by mouth daily at bedtime (Patient not taking: Reported on 10/31/2019), Disp: 30 tablet, Rfl: 3  Allergies   Allergen Reactions    Penicillins Swelling     Legs swell up       Vitals: Blood pressure 124/68, pulse 79, temperature (!) 96 9 °F (36 1 °C), temperature source Tympanic, height 5' (1 524 m), weight 40 7 kg (89 lb 12 8 oz), SpO2 96 %  Body mass index is 17 54 kg/m²  Oxygen Therapy  SpO2: 96 %  Oxygen Therapy: Supplemental oxygen  O2 Delivery Method: Nasal cannula  O2 Flow Rate (L/min): 3 L/min  Physical Exam   Constitutional: She is oriented to person, place, and time  No distress  HENT:   Head: Normocephalic  Mouth/Throat: No oropharyngeal exudate  Eyes: Pupils are equal, round, and reactive to light  No scleral icterus  Neck: Neck supple  No JVD present  Cardiovascular: Normal rate and regular rhythm  Pulmonary/Chest: No respiratory distress  She has wheezes  She has no rales  Abdominal: Soft  There is no tenderness  Musculoskeletal: She exhibits no edema  Lymphadenopathy:     She has no cervical adenopathy  Neurological: She is alert and oriented to person, place, and time  Skin: Skin is warm and dry  Psychiatric: She has a normal mood and affect  Labs: I have personally reviewed pertinent lab results    Lab Results   Component Value Date    WBC 7 99 11/19/2019    HGB 12 2 11/19/2019    HCT 39 7 11/19/2019     (H) 11/19/2019     11/19/2019     Lab Results   Component Value Date    GLUCOSE 191 (H) 11/20/2015    CALCIUM 8 5 11/19/2019     11/20/2015    K 2 8 (L) 11/19/2019    CO2 36 (H) 11/19/2019     11/19/2019    BUN 23 11/19/2019    CREATININE 0 83 11/19/2019     No results found for: IGE  Lab Results   Component Value Date    ALT 19 11/19/2019    AST 22 11/19/2019    ALKPHOS 139 (H) 11/19/2019    BILITOT 0 38 11/20/2015       Pulmonary function testing:  Performed August 2016  FEV1/FVC ratio 53%   FEV1 20% predicted  FVC 28% predicted    No response to bronchodilators   % predicted   % predicted  DLCO corrected for hemoglobin 42 % predicted

## 2019-12-10 NOTE — PLAN OF CARE
Problem: Potential for Falls  Goal: Patient will remain free of falls  Description  INTERVENTIONS:  - Assess patient frequently for physical needs  -  Identify cognitive and physical deficits and behaviors that affect risk of falls    -  [place] fall precautions as indicated by assessment   - Educate patient/family on patient safety including physical limitations  - Instruct patient to call for assistance with activity based on assessment  - Modify environment to reduce risk of injury  - Consider OT/PT consult to assist with strengthening/mobility  Outcome: Progressing

## 2019-12-30 NOTE — PROGRESS NOTES
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, stage IV  She is on Cyramza  She has been on this for about a year  Recent imaging shows essentially stable disease compared to her prior study of 02/03 months earlier  Her main symptoms at this time include fatigue, daytime somnolence, anorexia  Etiology of these problems is not clear  It is certainly possible that this relates to some of her medications, however  Nortriptyline, melatonin, mirtazapine are all discontinued  I asked her family to call us in 3 days to let us know how she is doing so that we could make further decisions about adding these or other medications for these problems  It is additionally possible that evolving dementia may be contributing  Further hearing loss is certainly a contributor as well as underlying depression, reactive  I reviewed the above considerations at length with the patient and her family  They voiced understanding above discussion will be proceeding as outlined     ______________________________________________________________________    Chief Complaint   Patient presents with    Follow-up       HPI:     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 ([address] )    7/30/2016 Initial Diagnosis     CT of the neck for evaluation of the carotid arteries incidentally showed an infiltrating mass in the right upper lobe extending to the hilum  PET-CT June 2016 patient was found to have a thyroid mass on physical examination  Ultrasound showed bilateral thyroid nodules  In July 2016 she underwent CAT scan of the neck for evaluation of the carotid arteries  Incidentally noted, infiltrating mass in the right upper lobe mass measuring 4 8 cm, SUV 21 5   This extends to the right hilum  Right paratracheal and subcarinal nodes measure up to 12 mm  The left adrenal showed some hypermetabolism with SUV of 3 7, without discrete mass  Uptake in the right parotid gland is noted with SUV of 22 3 and a soft tissue nodule, measuring 11 mm       9/6/2016 - 11/25/2016 Chemotherapy     Abraxane 80 mg/m2 day 1, 8, 15, carbo AUC-5, day 1 only, Q 21 days  5/3/2017 Progression     Progressive disease  5/16/2017 - 9/20/2018 Chemotherapy     Tecentriq 1200 mg IV Q 3 weeks  2/21/2018 - 3/14/2018 Radiation     C1    Plan ID Energy Fractions Dose per Fraction (cGy) Total Dose Delivered (cGy) Elapsed Days   Abdomen 6X 15 / 15 250 3,750 21      Treatment dates:  C1: 2/21/2018 - 3/14/2018        9/26/2018 - 11/27/2018 Chemotherapy     Taxotere 50 mg/m2, Cyramza 10 mg/kg, both given day 1, Q 21 days  Taxotere was discontinued secondary to       9/26/2018 -  Chemotherapy     ramucirumab (CYRAMZA) 426 mg in sodium chloride 0 9 % 250 mL IVPB, 10 mg/kg, Intravenous, Once, 21 of 23 cycles  Administration: 426 mg (5/14/2019), 426 mg (6/4/2019), 426 mg (6/25/2019), 426 mg (7/16/2019), 400 mg (8/6/2019), 400 mg (8/27/2019), 400 mg (9/17/2019), 400 mg (10/8/2019), 400 mg (10/29/2019), 400 mg (11/19/2019), 400 mg (12/10/2019)  DOCEtaxel (TAXOTERE) 102 mg in sodium chloride 0 9 % 250 mL chemo infusion, 75 mg/m2, Intravenous, Once, 4 of 4 cycles      1/8/2019 -  Chemotherapy     Cyramza 10 mg/kg, Day 1  Cycle length =  21 days         Interval History:  Clinically stable  ECOG-  3 -Greater than 50% of the day sedentary  Review of Systems   Constitutional: Negative for appetite change, diaphoresis, fatigue and fever  HENT: Negative for sinus pain  Eyes: Negative for discharge  Respiratory: Negative for cough and shortness of breath  Cardiovascular: Negative for chest pain  Gastrointestinal: Negative for abdominal pain, constipation and diarrhea  Endocrine: Negative for cold intolerance  Genitourinary: Negative for difficulty urinating and hematuria  Musculoskeletal: Negative for joint swelling  Skin: Negative for rash  Allergic/Immunologic: Negative for environmental allergies  Neurological: Negative for dizziness and headaches  Hematological: Negative for adenopathy  Psychiatric/Behavioral: Negative for agitation         Past Medical History:   Diagnosis Date    Abnormal CT of the chest     last assessed: Aug 8, 2016    Acute left-sided low back pain without sciatica 1/31/2019    Arthritis     Asthma     Asymptomatic carotid artery stenosis     last assessed: March 2, 2017    Benign essential hypertension     last assessed: March 2, 2017    Cataract of right eye 3/19/2015    Cataract of right eye     last assessed: March 19, 2015    CHF (congestive heart failure) (HCC)     Common cold     Coronary artery disease     Depression     Diabetes mellitus ([address] )     Fracture of multiple pubic rami ([address] ) 1/6/2015    History of radiation therapy     Hyperlipidemia     Hypertension     Lung cancer ([address] )     Multiple thyroid nodules 7/26/2016    Myocardial infarction ([address] )     Pulmonary emphysema (HCC)     Pulmonary nodule     Shortness of breath      Patient Active Problem List   Diagnosis    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 (HCC)    Asymptomatic carotid artery stenosis    Back pain, lumbosacral    Benign essential hypertension    Bursitis, ischial    Cardiomyopathy ([address] )    Cataract of right eye    Chemotherapy-induced nausea    Chronic respiratory failure with hypoxia and hypercapnia (HCC)    COPD, very severe (HCC)    Congestive heart failure ([address] )    Depression    DMII (diabetes mellitus, type 2) ([address] )    Hyperlipidemia    Multiple thyroid nodules    Osteoporosis    Other chronic pain    Parotid adenoma    Psoriasis    Primary localized osteoarthrosis of the hip    PVD (peripheral vascular disease) (HCC)    Restless legs syndrome    Sciatica    Seborrheic dermatitis of scalp    Insomnia due to medical condition    Chemotherapy induced diarrhea    Anorexia    Dental abscess    Bilateral hearing loss due to cerumen impaction    Allergic rhinitis    Bilateral acute serous otitis media       Current Outpatient Medications:     albuterol (2 5 mg/3 mL) 0 083 % nebulizer solution, Take 1 vial (2 5 mg total) by nebulization 4 (four) times a day, Disp: 360 vial, Rfl: 3    azithromycin (ZITHROMAX) 250 mg tablet, Take 1 tablet (250 mg total) by mouth 3 (three) times a week, Disp: 36 tablet, Rfl: 3    betamethasone, augmented, (DIPROLENE-AF) 0 05 % cream, APPLY TO AFFECTED AREA TWICE A DAY, Disp: 50 g, Rfl: 1    fluticasone (FLONASE) 50 mcg/act nasal spray, 1 spray into each nostril daily, Disp: 1 Bottle, Rfl: 5    fluticasone-salmeterol (ADVAIR DISKUS) 500-50 mcg/dose inhaler, Inhale 1 puff 2 (two) times a day BRAND NECESSARY, Disp: 3 Inhaler, Rfl: 3    furosemide (LASIX) 40 mg tablet, TAKE 1 TABLET BY MOUTH EVERY DAY, Disp: 90 tablet, Rfl: 0    glucose blood (FREESTYLE LITE) test strip, Test blood sugar 3 times daily or as needed, DX E11 9, Disp: 300 each, Rfl: 1    Melatonin 1 MG/4ML LIQD, Take by mouth, Disp: , Rfl:     metoprolol succinate (TOPROL-XL) 100 mg 24 hr tablet, Take 1 tablet (100 mg total) by mouth daily, Disp: 90 tablet, Rfl: 1    mirtazapine (REMERON) 15 mg tablet, Take 1 tablet (15 mg total) by mouth daily at bedtime, Disp: 30 tablet, Rfl: 3    mometasone (ELOCON) 0 1 % lotion, Apply 1 application topically daily, Disp: , Rfl: 3    nortriptyline (PAMELOR) 10 mg capsule, Take 1 capsule (10 mg total) by mouth daily at bedtime, Disp: 30 capsule, Rfl: 1    olmesartan (BENICAR) 20 mg tablet, Take 0 5 tablets (10 mg total) by mouth daily, Disp: 15 tablet, Rfl: 1    oxyCODONE-acetaminophen (PERCOCET) 5-325 mg per tablet, Take 1 tablet by mouth every 4 (four) hours as needed for moderate painMax Daily Amount: 6 tablets, Disp: 180 tablet, Rfl: 0    pentoxifylline (TRENtal) 400 mg ER tablet, Take 1 tablet (400 mg total) by mouth 3 (three) times a day with meals, Disp: 270 tablet, Rfl: 1    simvastatin (ZOCOR) 80 mg tablet, TAKE 1 TABLET BY MOUTH EVERY DAY, Disp: 90 tablet, Rfl: 0    tiotropium (SPIRIVA RESPIMAT) 2 5 MCG/ACT AERS inhaler, Inhale 2 puffs daily, Disp: 3 Inhaler, Rfl: 3    VENTOLIN  (90 Base) MCG/ACT inhaler, TAKE 2 PUFFS BY MOUTH EVERY 6 HOURS AS NEEDED FOR WHEEZE, Disp: 54 Inhaler, Rfl: 3  Allergies   Allergen Reactions    Penicillins Swelling     Legs swell up     Past Surgical History:   Procedure Laterality Date    BRONCHOSCOPY N/A 8/10/2016    Procedure: BRONCHOSCOPY FLEXIBLE;  Surgeon: Arleth Chou MD;  [facility];  Service:    [facility] BYPASS GRAFT      using vein - aortic-bifemoral - last assessed: Aug 22, 2016     SECTION      CHOLECYSTECTOMY      EYE SURGERY      HYSTERECTOMY      RI BRONCHOSCOPY NEEDLE BX TRACHEA MAIN STEM&/BRON N/A 8/10/2016    Procedure: ENDOBRONCHIAL ULTRASOUND (FROZEN SECTION) ; Surgeon: Arleth Chou MD;  Location: [facility];  Service: Thoracic    RI INSJ TUNNELED CTR VAD W/SUBQ PORT AGE 5 YR/> Left 2016    Procedure: INSERTION VENOUS PORT (PORT-A-CATH); Surgeon: Arleth Chou MD;  Location: [facility];  Service: Thoracic    TONSILLECTOMY       Social History     Objective:  Vitals:    19 0924   BP: 108/68   BP Location: Left arm   Patient Position: Sitting   Pulse: 60   Resp: 18   Temp: 97 9 °F (36 6 °C)   TempSrc: Tympanic   SpO2: 99%   Weight: 38 6 kg (85 lb)   Height: 5' (1 524 m)     Physical Exam   Constitutional: She is oriented to person, place, and time  She appears well-developed  HENT:   Head: Normocephalic  Eyes: Pupils are equal, round, and reactive to light  Neck: Neck supple  Cardiovascular: Normal rate  No murmur heard  Pulmonary/Chest: No respiratory distress  She has no wheezes  She has no rales  Abdominal: Soft  She exhibits no distension  There is no tenderness  There is no rebound  Musculoskeletal: She exhibits no edema  Lymphadenopathy:     She has no cervical adenopathy  Neurological: She is alert and oriented to person, place, and time  She displays normal reflexes  Skin: Skin is warm  No rash noted  Psychiatric: She has a normal mood and affect  Thought content normal          Labs: I personally reviewed the labs and imaging pertinent to this patient care

## 2019-12-31 NOTE — PROGRESS NOTES
Spoke to Marianne Salinas RN regarding patient's K+ = 2 8 from 11/19 lab draw  Labs drawn today as ordered, Meri will follow

## 2019-12-31 NOTE — TELEPHONE ENCOUNTER
Called pt to schedule a 6 min walk test for 02 recertification  PT's  says they will call back to make appt

## 2019-12-31 NOTE — TELEPHONE ENCOUNTER
Patients potassium = 2 7  Discussed with Dr Eric Koehler - patient to begin PO potassium 20meq BID  Re check lab work in one week  Reviewed instructions with patients     Script sent to pharmacy

## 2019-12-31 NOTE — PLAN OF CARE
Problem: Potential for Falls  Goal: Patient will remain free of falls  Description  INTERVENTIONS:  - Assess patient frequently for physical needs  -  Identify cognitive and physical deficits and behaviors that affect risk of falls    -  [place] fall precautions as indicated by assessment   - Educate patient/family on patient safety including physical limitations  - Instruct patient to call for assistance with activity based on assessment  - Modify environment to reduce risk of injury  - Consider OT/PT consult to assist with strengthening/mobility  Outcome: Progressing

## 2019-12-31 NOTE — TELEPHONE ENCOUNTER
B from Pharmacy called looking for a script for potassium, please call back at [phone] and ask for B

## 2019-12-31 NOTE — PROGRESS NOTES
Received critical value call from lab, patient's potassium level from today's blood draw is 2 7  Dusty Haley, RN notified  Patient completed chemotherapy infusion with no adverse reactions, refused AVS  Patient left unit ambulatory with steady gait, accompanied by  and daughter

## 2020-01-01 ENCOUNTER — APPOINTMENT (INPATIENT)
Dept: CT IMAGING | Facility: HOSPITAL | Age: 80
DRG: 393 | End: 2020-01-01
Payer: MEDICARE

## 2020-01-01 ENCOUNTER — APPOINTMENT (INPATIENT)
Dept: RADIOLOGY | Facility: HOSPITAL | Age: 80
DRG: 393 | End: 2020-01-01
Payer: MEDICARE

## 2020-01-01 ENCOUNTER — TELEPHONE (OUTPATIENT)
Dept: CARDIOLOGY CLINIC | Facility: CLINIC | Age: 80
End: 2020-01-01

## 2020-01-01 ENCOUNTER — TELEPHONE (OUTPATIENT)
Dept: PULMONOLOGY | Facility: CLINIC | Age: 80
End: 2020-01-01

## 2020-01-01 ENCOUNTER — APPOINTMENT (INPATIENT)
Dept: NON INVASIVE DIAGNOSTICS | Facility: HOSPITAL | Age: 80
DRG: 393 | End: 2020-01-01
Payer: MEDICARE

## 2020-01-01 ENCOUNTER — TELEPHONE (OUTPATIENT)
Dept: HEMATOLOGY ONCOLOGY | Facility: CLINIC | Age: 80
End: 2020-01-01

## 2020-01-01 ENCOUNTER — APPOINTMENT (EMERGENCY)
Dept: CT IMAGING | Facility: HOSPITAL | Age: 80
DRG: 393 | End: 2020-01-01
Payer: MEDICARE

## 2020-01-01 ENCOUNTER — HOSPITAL ENCOUNTER (INPATIENT)
Facility: HOSPITAL | Age: 80
LOS: 1 days | DRG: 393 | End: 2020-01-17
Attending: EMERGENCY MEDICINE | Admitting: INTERNAL MEDICINE
Payer: MEDICARE

## 2020-01-01 VITALS
HEIGHT: 64 IN | OXYGEN SATURATION: 52 % | SYSTOLIC BLOOD PRESSURE: 63 MMHG | BODY MASS INDEX: 14.57 KG/M2 | TEMPERATURE: 95.9 F | WEIGHT: 85.32 LBS | DIASTOLIC BLOOD PRESSURE: 44 MMHG

## 2020-01-01 DIAGNOSIS — J96.12 CHRONIC RESPIRATORY FAILURE WITH HYPOXIA AND HYPERCAPNIA (HCC): Primary | ICD-10-CM

## 2020-01-01 DIAGNOSIS — C34.11 SQUAMOUS CELL CARCINOMA OF BRONCHUS IN RIGHT UPPER LOBE (HCC): Primary | ICD-10-CM

## 2020-01-01 DIAGNOSIS — J96.11 CHRONIC RESPIRATORY FAILURE WITH HYPOXIA AND HYPERCAPNIA (HCC): Primary | ICD-10-CM

## 2020-01-01 DIAGNOSIS — E87.2 METABOLIC ACIDOSIS, INCREASED ANION GAP: ICD-10-CM

## 2020-01-01 DIAGNOSIS — R57.9 SHOCK (HCC): ICD-10-CM

## 2020-01-01 DIAGNOSIS — F34.1 DYSTHYMIA: ICD-10-CM

## 2020-01-01 DIAGNOSIS — R10.9 ABDOMINAL PAIN, UNSPECIFIED ABDOMINAL LOCATION: ICD-10-CM

## 2020-01-01 DIAGNOSIS — I10 BENIGN ESSENTIAL HYPERTENSION: Primary | ICD-10-CM

## 2020-01-01 DIAGNOSIS — R53.83 FATIGUE: ICD-10-CM

## 2020-01-01 DIAGNOSIS — J44.9 COPD, VERY SEVERE (HCC): ICD-10-CM

## 2020-01-01 DIAGNOSIS — R10.9 ABDOMINAL PAIN: ICD-10-CM

## 2020-01-01 LAB
ALBUMIN SERPL BCP-MCNC: 2.2 G/DL (ref 3.5–5)
ALBUMIN SERPL BCP-MCNC: 2.8 G/DL (ref 3.5–5)
ALP SERPL-CCNC: 108 U/L (ref 46–116)
ALP SERPL-CCNC: 127 U/L (ref 46–116)
ALT SERPL W P-5'-P-CCNC: 17 U/L (ref 12–78)
ALT SERPL W P-5'-P-CCNC: 211 U/L (ref 12–78)
ANION GAP SERPL CALCULATED.3IONS-SCNC: 10 MMOL/L (ref 4–13)
ANION GAP SERPL CALCULATED.3IONS-SCNC: 11 MMOL/L (ref 4–13)
ANION GAP SERPL CALCULATED.3IONS-SCNC: 14 MMOL/L (ref 4–13)
ANION GAP SERPL CALCULATED.3IONS-SCNC: 19 MMOL/L (ref 4–13)
ANISOCYTOSIS BLD QL SMEAR: PRESENT
APTT PPP: 30 SECONDS (ref 23–37)
ARTERIAL PATENCY WRIST A: ABNORMAL
AST SERPL W P-5'-P-CCNC: 297 U/L (ref 5–45)
AST SERPL W P-5'-P-CCNC: 31 U/L (ref 5–45)
ATRIAL RATE: 277 BPM
ATRIAL RATE: 76 BPM
BACTERIA UR QL AUTO: ABNORMAL /HPF
BASE EXCESS BLDA CALC-SCNC: -11 MMOL/L (ref -2–3)
BASE EXCESS BLDA CALC-SCNC: -12 MMOL/L (ref -2–3)
BASE EXCESS BLDA CALC-SCNC: -9 MMOL/L (ref -2–3)
BASOPHILS # BLD MANUAL: 0 THOUSAND/UL (ref 0–0.1)
BASOPHILS # BLD MANUAL: 0 THOUSAND/UL (ref 0–0.1)
BASOPHILS NFR MAR MANUAL: 0 % (ref 0–1)
BASOPHILS NFR MAR MANUAL: 0 % (ref 0–1)
BILIRUB SERPL-MCNC: 0.5 MG/DL (ref 0.2–1)
BILIRUB SERPL-MCNC: 0.6 MG/DL (ref 0.2–1)
BILIRUB UR QL STRIP: NEGATIVE
BUN SERPL-MCNC: 50 MG/DL (ref 5–25)
BUN SERPL-MCNC: 52 MG/DL (ref 5–25)
BUN SERPL-MCNC: 54 MG/DL (ref 5–25)
BUN SERPL-MCNC: 54 MG/DL (ref 5–25)
CALCIUM SERPL-MCNC: 8.6 MG/DL (ref 8.3–10.1)
CALCIUM SERPL-MCNC: 8.9 MG/DL (ref 8.3–10.1)
CALCIUM SERPL-MCNC: 8.9 MG/DL (ref 8.3–10.1)
CALCIUM SERPL-MCNC: 9.3 MG/DL (ref 8.3–10.1)
CHLORIDE SERPL-SCNC: 102 MMOL/L (ref 100–108)
CHLORIDE SERPL-SCNC: 107 MMOL/L (ref 100–108)
CHLORIDE SERPL-SCNC: 107 MMOL/L (ref 100–108)
CHLORIDE SERPL-SCNC: 109 MMOL/L (ref 100–108)
CLARITY UR: CLEAR
CO2 SERPL-SCNC: 20 MMOL/L (ref 21–32)
CO2 SERPL-SCNC: 21 MMOL/L (ref 21–32)
CO2 SERPL-SCNC: 22 MMOL/L (ref 21–32)
CO2 SERPL-SCNC: 27 MMOL/L (ref 21–32)
COLOR UR: YELLOW
CREAT SERPL-MCNC: 1.19 MG/DL (ref 0.6–1.3)
CREAT SERPL-MCNC: 1.27 MG/DL (ref 0.6–1.3)
CREAT SERPL-MCNC: 1.72 MG/DL (ref 0.6–1.3)
CREAT SERPL-MCNC: 1.8 MG/DL (ref 0.6–1.3)
EOSINOPHIL # BLD MANUAL: 0 THOUSAND/UL (ref 0–0.4)
EOSINOPHIL # BLD MANUAL: 0 THOUSAND/UL (ref 0–0.4)
EOSINOPHIL NFR BLD MANUAL: 0 % (ref 0–6)
EOSINOPHIL NFR BLD MANUAL: 0 % (ref 0–6)
ERYTHROCYTE [DISTWIDTH] IN BLOOD BY AUTOMATED COUNT: 12.6 % (ref 11.6–15.1)
ERYTHROCYTE [DISTWIDTH] IN BLOOD BY AUTOMATED COUNT: 12.7 % (ref 11.6–15.1)
FIO2 GAS DIL.REBREATH: 100 L
FIO2 GAS DIL.REBREATH: 100 L
FIO2 GAS DIL.REBREATH: 80 L
GFR SERPL CREATININE-BSD FRML MDRD: 26 ML/MIN/1.73SQ M
GFR SERPL CREATININE-BSD FRML MDRD: 28 ML/MIN/1.73SQ M
GFR SERPL CREATININE-BSD FRML MDRD: 40 ML/MIN/1.73SQ M
GFR SERPL CREATININE-BSD FRML MDRD: 43 ML/MIN/1.73SQ M
GLUCOSE SERPL-MCNC: 101 MG/DL (ref 65–140)
GLUCOSE SERPL-MCNC: 105 MG/DL (ref 65–140)
GLUCOSE SERPL-MCNC: 115 MG/DL (ref 65–140)
GLUCOSE SERPL-MCNC: 136 MG/DL (ref 65–140)
GLUCOSE SERPL-MCNC: 290 MG/DL (ref 65–140)
GLUCOSE SERPL-MCNC: 312 MG/DL (ref 65–140)
GLUCOSE SERPL-MCNC: 313 MG/DL (ref 65–140)
GLUCOSE SERPL-MCNC: 37 MG/DL (ref 65–140)
GLUCOSE UR STRIP-MCNC: NEGATIVE MG/DL
HCO3 BLDA-SCNC: 17.2 MMOL/L (ref 22–28)
HCO3 BLDA-SCNC: 18.9 MMOL/L (ref 22–28)
HCO3 BLDA-SCNC: 20.3 MMOL/L (ref 22–28)
HCT VFR BLD AUTO: 40.9 % (ref 34.8–46.1)
HCT VFR BLD AUTO: 46.5 % (ref 34.8–46.1)
HGB BLD-MCNC: 12.4 G/DL (ref 11.5–15.4)
HGB BLD-MCNC: 14.6 G/DL (ref 11.5–15.4)
HGB UR QL STRIP.AUTO: NEGATIVE
HYALINE CASTS #/AREA URNS LPF: ABNORMAL /LPF
INR PPP: 0.97 (ref 0.84–1.19)
KETONES UR STRIP-MCNC: NEGATIVE MG/DL
LACTATE SERPL-SCNC: 4.4 MMOL/L (ref 0.5–2)
LACTATE SERPL-SCNC: 9.2 MMOL/L (ref 0.5–2)
LEUKOCYTE ESTERASE UR QL STRIP: NEGATIVE
LIPASE SERPL-CCNC: 24 U/L (ref 73–393)
LYMPHOCYTES # BLD AUTO: 0.65 THOUSAND/UL (ref 0.6–4.47)
LYMPHOCYTES # BLD AUTO: 1.32 THOUSAND/UL (ref 0.6–4.47)
LYMPHOCYTES # BLD AUTO: 4 % (ref 14–44)
LYMPHOCYTES # BLD AUTO: 8 % (ref 14–44)
MACROCYTES BLD QL AUTO: PRESENT
MCH RBC QN AUTO: 30.9 PG (ref 26.8–34.3)
MCH RBC QN AUTO: 31.1 PG (ref 26.8–34.3)
MCHC RBC AUTO-ENTMCNC: 30.3 G/DL (ref 31.4–37.4)
MCHC RBC AUTO-ENTMCNC: 31.4 G/DL (ref 31.4–37.4)
MCV RBC AUTO: 103 FL (ref 82–98)
MCV RBC AUTO: 98 FL (ref 82–98)
MONOCYTES # BLD AUTO: 0.49 THOUSAND/UL (ref 0–1.22)
MONOCYTES # BLD AUTO: 0.97 THOUSAND/UL (ref 0–1.22)
MONOCYTES NFR BLD: 3 % (ref 4–12)
MONOCYTES NFR BLD: 6 % (ref 4–12)
NEUTROPHILS # BLD MANUAL: 13.83 THOUSAND/UL (ref 1.85–7.62)
NEUTROPHILS # BLD MANUAL: 14.62 THOUSAND/UL (ref 1.85–7.62)
NEUTS BAND NFR BLD MANUAL: 15 % (ref 0–8)
NEUTS SEG NFR BLD AUTO: 75 % (ref 43–75)
NEUTS SEG NFR BLD AUTO: 84 % (ref 43–75)
NITRITE UR QL STRIP: NEGATIVE
NON-SQ EPI CELLS URNS QL MICRO: ABNORMAL /HPF
NRBC BLD AUTO-RTO: 0 /100 WBCS
NRBC BLD AUTO-RTO: 0 /100 WBCS
NT-PROBNP SERPL-MCNC: 4071 PG/ML
P AXIS: 139 DEGREES
P AXIS: 74 DEGREES
PCO2 BLD: 19 MMOL/L (ref 21–32)
PCO2 BLD: 21 MMOL/L (ref 21–32)
PCO2 BLD: 23 MMOL/L (ref 21–32)
PCO2 BLD: 47.5 MM HG (ref 36–44)
PCO2 BLD: 53.4 MM HG (ref 36–44)
PCO2 BLD: 87.2 MM HG (ref 36–44)
PH BLD: 6.97 [PH] (ref 7.35–7.45)
PH BLD: 7.16 [PH] (ref 7.35–7.45)
PH BLD: 7.17 [PH] (ref 7.35–7.45)
PH UR STRIP.AUTO: 5.5 [PH]
PLATELET # BLD AUTO: 188 THOUSANDS/UL (ref 149–390)
PLATELET # BLD AUTO: 269 THOUSANDS/UL (ref 149–390)
PLATELET BLD QL SMEAR: ADEQUATE
PLATELET BLD QL SMEAR: ADEQUATE
PMV BLD AUTO: 10.5 FL (ref 8.9–12.7)
PMV BLD AUTO: 10.7 FL (ref 8.9–12.7)
PO2 BLD: 322 MM HG (ref 75–129)
PO2 BLD: 58 MM HG (ref 75–129)
PO2 BLD: >400 MM HG (ref 75–129)
POLYCHROMASIA BLD QL SMEAR: PRESENT
POTASSIUM SERPL-SCNC: 4.3 MMOL/L (ref 3.5–5.3)
POTASSIUM SERPL-SCNC: 6.5 MMOL/L (ref 3.5–5.3)
POTASSIUM SERPL-SCNC: 6.6 MMOL/L (ref 3.5–5.3)
POTASSIUM SERPL-SCNC: 6.7 MMOL/L (ref 3.5–5.3)
PR INTERVAL: 144 MS
PR INTERVAL: 174 MS
PROT SERPL-MCNC: 5.8 G/DL (ref 6.4–8.2)
PROT SERPL-MCNC: 7.7 G/DL (ref 6.4–8.2)
PROT UR STRIP-MCNC: ABNORMAL MG/DL
PROTHROMBIN TIME: 12.6 SECONDS (ref 11.6–14.5)
QRS AXIS: 82 DEGREES
QRS AXIS: 85 DEGREES
QRSD INTERVAL: 86 MS
QRSD INTERVAL: 96 MS
QT INTERVAL: 406 MS
QT INTERVAL: 412 MS
QTC INTERVAL: 456 MS
QTC INTERVAL: 472 MS
RBC # BLD AUTO: 3.99 MILLION/UL (ref 3.81–5.12)
RBC # BLD AUTO: 4.73 MILLION/UL (ref 3.81–5.12)
RBC #/AREA URNS AUTO: ABNORMAL /HPF
RBC MORPH BLD: NORMAL
RBC MORPH BLD: PRESENT
SAMPLE SITE: ABNORMAL
SAMPLE SITE: ABNORMAL
SAO2 % BLD FROM PO2: 100 % (ref 60–85)
SAO2 % BLD FROM PO2: 71 % (ref 60–85)
SODIUM SERPL-SCNC: 137 MMOL/L (ref 136–145)
SODIUM SERPL-SCNC: 141 MMOL/L (ref 136–145)
SODIUM SERPL-SCNC: 145 MMOL/L (ref 136–145)
SODIUM SERPL-SCNC: 146 MMOL/L (ref 136–145)
SP GR UR STRIP.AUTO: 1.01 (ref 1–1.03)
SPECIMEN SOURCE: ABNORMAL
T WAVE AXIS: 77 DEGREES
T WAVE AXIS: 88 DEGREES
TOTAL CELLS COUNTED SPEC: 100
TOTAL CELLS COUNTED SPEC: 100
TROPONIN I SERPL-MCNC: 0.07 NG/ML
TROPONIN I SERPL-MCNC: 0.14 NG/ML
UROBILINOGEN UR QL STRIP.AUTO: 0.2 E.U./DL
VARIANT LYMPHS # BLD AUTO: 5 %
VENTRICULAR RATE: 76 BPM
VENTRICULAR RATE: 79 BPM
WBC # BLD AUTO: 16.24 THOUSAND/UL (ref 4.31–10.16)
WBC # BLD AUTO: 16.46 THOUSAND/UL (ref 4.31–10.16)
WBC #/AREA URNS AUTO: ABNORMAL /HPF
WBC TOXIC VACUOLES BLD QL SMEAR: PRESENT

## 2020-01-01 PROCEDURE — 82948 REAGENT STRIP/BLOOD GLUCOSE: CPT

## 2020-01-01 PROCEDURE — 5A1935Z RESPIRATORY VENTILATION, LESS THAN 24 CONSECUTIVE HOURS: ICD-10-PCS | Performed by: INTERNAL MEDICINE

## 2020-01-01 PROCEDURE — 94002 VENT MGMT INPAT INIT DAY: CPT

## 2020-01-01 PROCEDURE — 96375 TX/PRO/DX INJ NEW DRUG ADDON: CPT

## 2020-01-01 PROCEDURE — 81001 URINALYSIS AUTO W/SCOPE: CPT | Performed by: NURSE PRACTITIONER

## 2020-01-01 PROCEDURE — 71045 X-RAY EXAM CHEST 1 VIEW: CPT

## 2020-01-01 PROCEDURE — 94660 CPAP INITIATION&MGMT: CPT

## 2020-01-01 PROCEDURE — 80048 BASIC METABOLIC PNL TOTAL CA: CPT | Performed by: NURSE PRACTITIONER

## 2020-01-01 PROCEDURE — NC001 PR NO CHARGE: Performed by: NURSE PRACTITIONER

## 2020-01-01 PROCEDURE — C9113 INJ PANTOPRAZOLE SODIUM, VIA: HCPCS | Performed by: EMERGENCY MEDICINE

## 2020-01-01 PROCEDURE — 85610 PROTHROMBIN TIME: CPT | Performed by: EMERGENCY MEDICINE

## 2020-01-01 PROCEDURE — 99222 1ST HOSP IP/OBS MODERATE 55: CPT | Performed by: INTERNAL MEDICINE

## 2020-01-01 PROCEDURE — 03HY32Z INSERTION OF MONITORING DEVICE INTO UPPER ARTERY, PERCUTANEOUS APPROACH: ICD-10-PCS | Performed by: INTERNAL MEDICINE

## 2020-01-01 PROCEDURE — 85007 BL SMEAR W/DIFF WBC COUNT: CPT | Performed by: EMERGENCY MEDICINE

## 2020-01-01 PROCEDURE — 93005 ELECTROCARDIOGRAM TRACING: CPT

## 2020-01-01 PROCEDURE — 99223 1ST HOSP IP/OBS HIGH 75: CPT | Performed by: SURGERY

## 2020-01-01 PROCEDURE — 85007 BL SMEAR W/DIFF WBC COUNT: CPT | Performed by: NURSE PRACTITIONER

## 2020-01-01 PROCEDURE — 94150 VITAL CAPACITY TEST: CPT

## 2020-01-01 PROCEDURE — 94640 AIRWAY INHALATION TREATMENT: CPT

## 2020-01-01 PROCEDURE — 85730 THROMBOPLASTIN TIME PARTIAL: CPT | Performed by: EMERGENCY MEDICINE

## 2020-01-01 PROCEDURE — 85027 COMPLETE CBC AUTOMATED: CPT | Performed by: EMERGENCY MEDICINE

## 2020-01-01 PROCEDURE — 4A133J1 MONITORING OF ARTERIAL PULSE, PERIPHERAL, PERCUTANEOUS APPROACH: ICD-10-PCS | Performed by: INTERNAL MEDICINE

## 2020-01-01 PROCEDURE — 36415 COLL VENOUS BLD VENIPUNCTURE: CPT | Performed by: EMERGENCY MEDICINE

## 2020-01-01 PROCEDURE — 83605 ASSAY OF LACTIC ACID: CPT | Performed by: NURSE PRACTITIONER

## 2020-01-01 PROCEDURE — 94664 DEMO&/EVAL PT USE INHALER: CPT

## 2020-01-01 PROCEDURE — 82803 BLOOD GASES ANY COMBINATION: CPT

## 2020-01-01 PROCEDURE — 96360 HYDRATION IV INFUSION INIT: CPT

## 2020-01-01 PROCEDURE — 87040 BLOOD CULTURE FOR BACTERIA: CPT | Performed by: NURSE PRACTITIONER

## 2020-01-01 PROCEDURE — 31500 INSERT EMERGENCY AIRWAY: CPT | Performed by: NURSE PRACTITIONER

## 2020-01-01 PROCEDURE — 74176 CT ABD & PELVIS W/O CONTRAST: CPT

## 2020-01-01 PROCEDURE — 83690 ASSAY OF LIPASE: CPT | Performed by: EMERGENCY MEDICINE

## 2020-01-01 PROCEDURE — 36556 INSERT NON-TUNNEL CV CATH: CPT | Performed by: NURSE PRACTITIONER

## 2020-01-01 PROCEDURE — 99291 CRITICAL CARE FIRST HOUR: CPT | Performed by: NURSE PRACTITIONER

## 2020-01-01 PROCEDURE — 84484 ASSAY OF TROPONIN QUANT: CPT | Performed by: NURSE PRACTITIONER

## 2020-01-01 PROCEDURE — 36600 WITHDRAWAL OF ARTERIAL BLOOD: CPT

## 2020-01-01 PROCEDURE — 83880 ASSAY OF NATRIURETIC PEPTIDE: CPT | Performed by: NURSE PRACTITIONER

## 2020-01-01 PROCEDURE — 94760 N-INVAS EAR/PLS OXIMETRY 1: CPT

## 2020-01-01 PROCEDURE — 71260 CT THORAX DX C+: CPT

## 2020-01-01 PROCEDURE — 80053 COMPREHEN METABOLIC PANEL: CPT | Performed by: NURSE PRACTITIONER

## 2020-01-01 PROCEDURE — 70450 CT HEAD/BRAIN W/O DYE: CPT

## 2020-01-01 PROCEDURE — 0BH17EZ INSERTION OF ENDOTRACHEAL AIRWAY INTO TRACHEA, VIA NATURAL OR ARTIFICIAL OPENING: ICD-10-PCS | Performed by: INTERNAL MEDICINE

## 2020-01-01 PROCEDURE — 96374 THER/PROPH/DIAG INJ IV PUSH: CPT

## 2020-01-01 PROCEDURE — 93010 ELECTROCARDIOGRAM REPORT: CPT | Performed by: INTERNAL MEDICINE

## 2020-01-01 PROCEDURE — 4A133B1 MONITORING OF ARTERIAL PRESSURE, PERIPHERAL, PERCUTANEOUS APPROACH: ICD-10-PCS | Performed by: INTERNAL MEDICINE

## 2020-01-01 PROCEDURE — 80048 BASIC METABOLIC PNL TOTAL CA: CPT | Performed by: EMERGENCY MEDICINE

## 2020-01-01 PROCEDURE — 74177 CT ABD & PELVIS W/CONTRAST: CPT

## 2020-01-01 PROCEDURE — 85027 COMPLETE CBC AUTOMATED: CPT | Performed by: NURSE PRACTITIONER

## 2020-01-01 PROCEDURE — 99285 EMERGENCY DEPT VISIT HI MDM: CPT | Performed by: EMERGENCY MEDICINE

## 2020-01-01 PROCEDURE — 80053 COMPREHEN METABOLIC PANEL: CPT | Performed by: EMERGENCY MEDICINE

## 2020-01-01 PROCEDURE — 05HY33Z INSERTION OF INFUSION DEVICE INTO UPPER VEIN, PERCUTANEOUS APPROACH: ICD-10-PCS | Performed by: INTERNAL MEDICINE

## 2020-01-01 PROCEDURE — 99285 EMERGENCY DEPT VISIT HI MDM: CPT

## 2020-01-01 PROCEDURE — 36620 INSERTION CATHETER ARTERY: CPT | Performed by: NURSE PRACTITIONER

## 2020-01-01 RX ORDER — OXYCODONE HYDROCHLORIDE AND ACETAMINOPHEN 5; 325 MG/1; MG/1
1 TABLET ORAL EVERY 4 HOURS PRN
Status: DISCONTINUED | OUTPATIENT
Start: 2020-01-01 | End: 2020-01-01

## 2020-01-01 RX ORDER — CALCIUM GLUCONATE 20 MG/ML
1 INJECTION, SOLUTION INTRAVENOUS ONCE
Status: DISCONTINUED | OUTPATIENT
Start: 2020-01-01 | End: 2020-01-01

## 2020-01-01 RX ORDER — NORTRIPTYLINE HYDROCHLORIDE 10 MG/1
10 CAPSULE ORAL
Status: DISCONTINUED | OUTPATIENT
Start: 2020-01-01 | End: 2020-01-01

## 2020-01-01 RX ORDER — LEVOFLOXACIN 5 MG/ML
750 INJECTION, SOLUTION INTRAVENOUS EVERY 24 HOURS
Status: COMPLETED | OUTPATIENT
Start: 2020-01-01 | End: 2020-01-01

## 2020-01-01 RX ORDER — MIRTAZAPINE 15 MG/1
15 TABLET, FILM COATED ORAL
Status: DISCONTINUED | OUTPATIENT
Start: 2020-01-01 | End: 2020-01-01

## 2020-01-01 RX ORDER — MORPHINE SULFATE 4 MG/ML
4 INJECTION, SOLUTION INTRAMUSCULAR; INTRAVENOUS ONCE
Status: COMPLETED | OUTPATIENT
Start: 2020-01-01 | End: 2020-01-01

## 2020-01-01 RX ORDER — SODIUM CHLORIDE, SODIUM GLUCONATE, SODIUM ACETATE, POTASSIUM CHLORIDE, MAGNESIUM CHLORIDE, SODIUM PHOSPHATE, DIBASIC, AND POTASSIUM PHOSPHATE .53; .5; .37; .037; .03; .012; .00082 G/100ML; G/100ML; G/100ML; G/100ML; G/100ML; G/100ML; G/100ML
1000 INJECTION, SOLUTION INTRAVENOUS ONCE
Status: COMPLETED | OUTPATIENT
Start: 2020-01-01 | End: 2020-01-01

## 2020-01-01 RX ORDER — LORAZEPAM 2 MG/ML
0.5 INJECTION INTRAMUSCULAR
Status: DISCONTINUED | OUTPATIENT
Start: 2020-01-01 | End: 2020-01-01 | Stop reason: HOSPADM

## 2020-01-01 RX ORDER — FUROSEMIDE 40 MG/1
40 TABLET ORAL DAILY
Status: DISCONTINUED | OUTPATIENT
Start: 2020-01-01 | End: 2020-01-01

## 2020-01-01 RX ORDER — PENTOXIFYLLINE 400 MG/1
400 TABLET, EXTENDED RELEASE ORAL
Status: DISCONTINUED | OUTPATIENT
Start: 2020-01-01 | End: 2020-01-01

## 2020-01-01 RX ORDER — BISACODYL 10 MG
10 SUPPOSITORY, RECTAL RECTAL DAILY
Status: DISCONTINUED | OUTPATIENT
Start: 2020-01-01 | End: 2020-01-01

## 2020-01-01 RX ORDER — CHLORHEXIDINE GLUCONATE 0.12 MG/ML
15 RINSE ORAL EVERY 12 HOURS SCHEDULED
Status: DISCONTINUED | OUTPATIENT
Start: 2020-01-01 | End: 2020-01-01

## 2020-01-01 RX ORDER — FENTANYL CITRATE 50 UG/ML
50 INJECTION, SOLUTION INTRAMUSCULAR; INTRAVENOUS
Status: DISCONTINUED | OUTPATIENT
Start: 2020-01-01 | End: 2020-01-01 | Stop reason: HOSPADM

## 2020-01-01 RX ORDER — ALBUTEROL SULFATE 2.5 MG/3ML
2.5 SOLUTION RESPIRATORY (INHALATION) EVERY 4 HOURS PRN
Status: DISCONTINUED | OUTPATIENT
Start: 2020-01-01 | End: 2020-01-01

## 2020-01-01 RX ORDER — CEFEPIME HYDROCHLORIDE 1 G/50ML
1000 INJECTION, SOLUTION INTRAVENOUS EVERY 24 HOURS
Status: DISCONTINUED | OUTPATIENT
Start: 2020-01-01 | End: 2020-01-01

## 2020-01-01 RX ORDER — GLYCOPYRROLATE 0.2 MG/ML
0.2 INJECTION INTRAMUSCULAR; INTRAVENOUS
Status: DISCONTINUED | OUTPATIENT
Start: 2020-01-01 | End: 2020-01-01

## 2020-01-01 RX ORDER — FENTANYL CITRATE 50 UG/ML
50 INJECTION, SOLUTION INTRAMUSCULAR; INTRAVENOUS AS NEEDED
Status: DISCONTINUED | OUTPATIENT
Start: 2020-01-01 | End: 2020-01-01

## 2020-01-01 RX ORDER — ONDANSETRON 2 MG/ML
4 INJECTION INTRAMUSCULAR; INTRAVENOUS EVERY 6 HOURS PRN
Status: DISCONTINUED | OUTPATIENT
Start: 2020-01-01 | End: 2020-01-01

## 2020-01-01 RX ORDER — FENTANYL CITRATE-0.9 % NACL/PF 10 MCG/ML
50 PLASTIC BAG, INJECTION (ML) INTRAVENOUS CONTINUOUS
Status: DISCONTINUED | OUTPATIENT
Start: 2020-01-01 | End: 2020-01-01 | Stop reason: HOSPADM

## 2020-01-01 RX ORDER — PROPOFOL 10 MG/ML
5-50 INJECTION, EMULSION INTRAVENOUS
Status: DISCONTINUED | OUTPATIENT
Start: 2020-01-01 | End: 2020-01-01

## 2020-01-01 RX ORDER — ALBUTEROL SULFATE 2.5 MG/3ML
2.5 SOLUTION RESPIRATORY (INHALATION)
Status: DISCONTINUED | OUTPATIENT
Start: 2020-01-01 | End: 2020-01-01

## 2020-01-01 RX ORDER — METOPROLOL SUCCINATE 50 MG/1
100 TABLET, EXTENDED RELEASE ORAL DAILY
Status: DISCONTINUED | OUTPATIENT
Start: 2020-01-01 | End: 2020-01-01

## 2020-01-01 RX ORDER — BISACODYL 10 MG
10 SUPPOSITORY, RECTAL RECTAL ONCE
Status: COMPLETED | OUTPATIENT
Start: 2020-01-01 | End: 2020-01-01

## 2020-01-01 RX ORDER — FENTANYL CITRATE 50 UG/ML
25 INJECTION, SOLUTION INTRAMUSCULAR; INTRAVENOUS ONCE
Status: DISCONTINUED | OUTPATIENT
Start: 2020-01-01 | End: 2020-01-01

## 2020-01-01 RX ORDER — MIDAZOLAM HYDROCHLORIDE 2 MG/2ML
2 INJECTION, SOLUTION INTRAMUSCULAR; INTRAVENOUS ONCE
Status: DISCONTINUED | OUTPATIENT
Start: 2020-01-01 | End: 2020-01-01

## 2020-01-01 RX ORDER — DEXTROSE MONOHYDRATE 25 G/50ML
25 INJECTION, SOLUTION INTRAVENOUS ONCE
Status: COMPLETED | OUTPATIENT
Start: 2020-01-01 | End: 2020-01-01

## 2020-01-01 RX ORDER — BUDESONIDE 0.5 MG/2ML
0.5 INHALANT ORAL
Status: DISCONTINUED | OUTPATIENT
Start: 2020-01-01 | End: 2020-01-01

## 2020-01-01 RX ORDER — MORPHINE SULFATE 4 MG/ML
4 INJECTION, SOLUTION INTRAMUSCULAR; INTRAVENOUS EVERY 4 HOURS PRN
Status: DISCONTINUED | OUTPATIENT
Start: 2020-01-01 | End: 2020-01-01

## 2020-01-01 RX ORDER — NORTRIPTYLINE HYDROCHLORIDE 10 MG/1
10 CAPSULE ORAL
Qty: 90 CAPSULE | Refills: 0 | Status: SHIPPED | OUTPATIENT
Start: 2020-01-01 | End: 2020-01-01 | Stop reason: HOSPADM

## 2020-01-01 RX ORDER — FORMOTEROL FUMARATE 20 UG/2ML
20 SOLUTION RESPIRATORY (INHALATION)
Status: DISCONTINUED | OUTPATIENT
Start: 2020-01-01 | End: 2020-01-01

## 2020-01-01 RX ORDER — FENTANYL CITRATE-0.9 % NACL/PF 10 MCG/ML
50 PLASTIC BAG, INJECTION (ML) INTRAVENOUS CONTINUOUS
Status: DISCONTINUED | OUTPATIENT
Start: 2020-01-01 | End: 2020-01-01

## 2020-01-01 RX ORDER — POTASSIUM CHLORIDE 20 MEQ/1
20 TABLET, EXTENDED RELEASE ORAL 2 TIMES DAILY
Status: DISCONTINUED | OUTPATIENT
Start: 2020-01-01 | End: 2020-01-01

## 2020-01-01 RX ORDER — LEVALBUTEROL 1.25 MG/.5ML
1.25 SOLUTION, CONCENTRATE RESPIRATORY (INHALATION)
Status: DISCONTINUED | OUTPATIENT
Start: 2020-01-01 | End: 2020-01-01

## 2020-01-01 RX ORDER — DICYCLOMINE HYDROCHLORIDE 10 MG/1
20 CAPSULE ORAL 3 TIMES DAILY
Status: DISCONTINUED | OUTPATIENT
Start: 2020-01-01 | End: 2020-01-01

## 2020-01-01 RX ORDER — ALBUTEROL SULFATE 2.5 MG/3ML
2.5 SOLUTION RESPIRATORY (INHALATION) EVERY 6 HOURS PRN
Status: DISCONTINUED | OUTPATIENT
Start: 2020-01-01 | End: 2020-01-01 | Stop reason: SDUPTHER

## 2020-01-01 RX ORDER — METHYLPREDNISOLONE SODIUM SUCCINATE 125 MG/2ML
125 INJECTION, POWDER, LYOPHILIZED, FOR SOLUTION INTRAMUSCULAR; INTRAVENOUS ONCE
Status: DISCONTINUED | OUTPATIENT
Start: 2020-01-01 | End: 2020-01-01

## 2020-01-01 RX ORDER — METHYLPREDNISOLONE SODIUM SUCCINATE 125 MG/2ML
60 INJECTION, POWDER, LYOPHILIZED, FOR SOLUTION INTRAMUSCULAR; INTRAVENOUS EVERY 6 HOURS SCHEDULED
Status: DISCONTINUED | OUTPATIENT
Start: 2020-01-01 | End: 2020-01-01

## 2020-01-01 RX ORDER — DEXTROSE MONOHYDRATE 25 G/50ML
25 INJECTION, SOLUTION INTRAVENOUS ONCE
Status: DISCONTINUED | OUTPATIENT
Start: 2020-01-01 | End: 2020-01-01

## 2020-01-01 RX ORDER — DEXTROSE MONOHYDRATE 25 G/50ML
50 INJECTION, SOLUTION INTRAVENOUS ONCE
Status: COMPLETED | OUTPATIENT
Start: 2020-01-01 | End: 2020-01-01

## 2020-01-01 RX ORDER — AZITHROMYCIN 250 MG/1
250 TABLET, FILM COATED ORAL 3 TIMES WEEKLY
Status: DISCONTINUED | OUTPATIENT
Start: 2020-01-01 | End: 2020-01-01

## 2020-01-01 RX ORDER — SODIUM CHLORIDE 9 MG/ML
125 INJECTION, SOLUTION INTRAVENOUS CONTINUOUS
Status: DISCONTINUED | OUTPATIENT
Start: 2020-01-01 | End: 2020-01-01

## 2020-01-01 RX ORDER — FLUTICASONE FUROATE AND VILANTEROL 200; 25 UG/1; UG/1
1 POWDER RESPIRATORY (INHALATION) DAILY
Status: DISCONTINUED | OUTPATIENT
Start: 2020-01-01 | End: 2020-01-01

## 2020-01-01 RX ORDER — ALBUTEROL SULFATE 2.5 MG/3ML
5 SOLUTION RESPIRATORY (INHALATION) ONCE
Status: COMPLETED | OUTPATIENT
Start: 2020-01-01 | End: 2020-01-01

## 2020-01-01 RX ORDER — FLUTICASONE PROPIONATE 50 MCG
1 SPRAY, SUSPENSION (ML) NASAL DAILY
Status: DISCONTINUED | OUTPATIENT
Start: 2020-01-01 | End: 2020-01-01

## 2020-01-01 RX ADMIN — DEXTROSE 50 % IN WATER (D50W) INTRAVENOUS SYRINGE 25 ML: at 05:52

## 2020-01-01 RX ADMIN — SODIUM CHLORIDE 1000 ML: 0.9 INJECTION, SOLUTION INTRAVENOUS at 16:10

## 2020-01-01 RX ADMIN — FENTANYL CITRATE 50 MCG: 50 INJECTION, SOLUTION INTRAMUSCULAR; INTRAVENOUS at 07:25

## 2020-01-01 RX ADMIN — MIRTAZAPINE 15 MG: 15 TABLET, FILM COATED ORAL at 22:04

## 2020-01-01 RX ADMIN — DICYCLOMINE HYDROCHLORIDE 20 MG: 10 CAPSULE ORAL at 20:48

## 2020-01-01 RX ADMIN — IODIXANOL 85 ML: 320 INJECTION, SOLUTION INTRAVASCULAR at 16:48

## 2020-01-01 RX ADMIN — SODIUM CHLORIDE, SODIUM GLUCONATE, SODIUM ACETATE, POTASSIUM CHLORIDE, MAGNESIUM CHLORIDE, SODIUM PHOSPHATE, DIBASIC, AND POTASSIUM PHOSPHATE 1000 ML: .53; .5; .37; .037; .03; .012; .00082 INJECTION, SOLUTION INTRAVENOUS at 04:15

## 2020-01-01 RX ADMIN — IPRATROPIUM BROMIDE 0.5 MG: 0.5 SOLUTION RESPIRATORY (INHALATION) at 08:35

## 2020-01-01 RX ADMIN — SODIUM CHLORIDE 80 MG: 9 INJECTION, SOLUTION INTRAVENOUS at 18:23

## 2020-01-01 RX ADMIN — ALBUTEROL SULFATE 10 MG: 2.5 SOLUTION RESPIRATORY (INHALATION) at 08:35

## 2020-01-01 RX ADMIN — FENTANYL CITRATE 25 MCG: 50 INJECTION, SOLUTION INTRAMUSCULAR; INTRAVENOUS at 07:00

## 2020-01-01 RX ADMIN — MORPHINE SULFATE 4 MG: 4 INJECTION INTRAVENOUS at 01:36

## 2020-01-01 RX ADMIN — SODIUM BICARBONATE 50 MEQ: 84 INJECTION, SOLUTION INTRAVENOUS at 16:55

## 2020-01-01 RX ADMIN — INSULIN HUMAN 5 UNITS: 100 INJECTION, SOLUTION PARENTERAL at 16:56

## 2020-01-01 RX ADMIN — LEVALBUTEROL 1.25 MG: 1.25 SOLUTION, CONCENTRATE RESPIRATORY (INHALATION) at 03:19

## 2020-01-01 RX ADMIN — INSULIN HUMAN 10 UNITS: 100 INJECTION, SOLUTION PARENTERAL at 05:52

## 2020-01-01 RX ADMIN — ALBUTEROL SULFATE 7.5 MG: 2.5 SOLUTION RESPIRATORY (INHALATION) at 03:52

## 2020-01-01 RX ADMIN — SODIUM CHLORIDE, SODIUM GLUCONATE, SODIUM ACETATE, POTASSIUM CHLORIDE, MAGNESIUM CHLORIDE, SODIUM PHOSPHATE, DIBASIC, AND POTASSIUM PHOSPHATE 1000 ML: .53; .5; .37; .037; .03; .012; .00082 INJECTION, SOLUTION INTRAVENOUS at 06:30

## 2020-01-01 RX ADMIN — SODIUM CHLORIDE 125 ML/HR: 0.9 INJECTION, SOLUTION INTRAVENOUS at 20:35

## 2020-01-01 RX ADMIN — ALBUTEROL SULFATE 5 MG: 2.5 SOLUTION RESPIRATORY (INHALATION) at 16:55

## 2020-01-01 RX ADMIN — SODIUM BICARBONATE 50 MEQ: 84 INJECTION, SOLUTION INTRAVENOUS at 07:19

## 2020-01-01 RX ADMIN — BISACODYL 10 MG: 10 SUPPOSITORY RECTAL at 20:48

## 2020-01-01 RX ADMIN — NORTRIPTYLINE HYDROCHLORIDE 10 MG: 10 CAPSULE ORAL at 22:02

## 2020-01-01 RX ADMIN — IPRATROPIUM BROMIDE 0.5 MG: 0.5 SOLUTION RESPIRATORY (INHALATION) at 03:19

## 2020-01-01 RX ADMIN — NOREPINEPHRINE BITARTRATE 20 MCG/MIN: 1 INJECTION INTRAVENOUS at 05:00

## 2020-01-01 RX ADMIN — SODIUM CHLORIDE, SODIUM GLUCONATE, SODIUM ACETATE, POTASSIUM CHLORIDE, MAGNESIUM CHLORIDE, SODIUM PHOSPHATE, DIBASIC, AND POTASSIUM PHOSPHATE 1000 ML: .53; .5; .37; .037; .03; .012; .00082 INJECTION, SOLUTION INTRAVENOUS at 08:00

## 2020-01-01 RX ADMIN — METRONIDAZOLE 500 MG: 500 INJECTION, SOLUTION INTRAVENOUS at 20:49

## 2020-01-01 RX ADMIN — PROPOFOL 5 MCG/KG/MIN: 10 INJECTION, EMULSION INTRAVENOUS at 07:00

## 2020-01-01 RX ADMIN — LEVOFLOXACIN 750 MG: 5 INJECTION, SOLUTION INTRAVENOUS at 20:53

## 2020-01-01 RX ADMIN — MORPHINE SULFATE 4 MG: 4 INJECTION INTRAVENOUS at 20:45

## 2020-01-01 RX ADMIN — FENTANYL CITRATE 50 MCG: 50 INJECTION, SOLUTION INTRAMUSCULAR; INTRAVENOUS at 09:06

## 2020-01-01 RX ADMIN — FENTANYL CITRATE 50 MCG/HR: 50 INJECTION INTRAVENOUS at 09:02

## 2020-01-01 RX ADMIN — SODIUM BICARBONATE 50 MEQ: 84 INJECTION PARENTERAL at 04:55

## 2020-01-01 RX ADMIN — FORMOTEROL FUMARATE DIHYDRATE 20 MCG: 20 SOLUTION RESPIRATORY (INHALATION) at 08:35

## 2020-01-01 RX ADMIN — ALBUTEROL SULFATE 2.5 MG: 2.5 SOLUTION RESPIRATORY (INHALATION) at 21:33

## 2020-01-01 RX ADMIN — ALBUTEROL SULFATE 2.5 MG: 2.5 SOLUTION RESPIRATORY (INHALATION) at 03:52

## 2020-01-01 RX ADMIN — BUDESONIDE 0.5 MG: 0.5 INHALANT RESPIRATORY (INHALATION) at 08:35

## 2020-01-01 RX ADMIN — MORPHINE SULFATE 4 MG: 4 INJECTION INTRAVENOUS at 17:34

## 2020-01-01 RX ADMIN — DEXTROSE 50 % IN WATER (D50W) INTRAVENOUS SYRINGE 50 ML: at 02:06

## 2020-01-01 RX ADMIN — FENTANYL CITRATE 25 MCG: 50 INJECTION, SOLUTION INTRAMUSCULAR; INTRAVENOUS at 06:45

## 2020-01-03 NOTE — TELEPHONE ENCOUNTER
Called in to report on Amarilis Blanchard Stephanie stopped for one night from taking nortriptyline and she did not have a good night  S didn't sleep at all  She wants to keep taking that and the melatonin  He also wanted Dr Jerardo De Leon to know that he never took the mirtazapine, which was given to her a long time ago   Any questions please call him at [phone]

## 2020-01-03 NOTE — TELEPHONE ENCOUNTER
Spoke with  and let him know that his wife is to take a half of the Nortriptyline and give us a call Monday with an an update  She may also take the melatonin if need be as well   voiced understanding

## 2020-01-03 NOTE — TELEPHONE ENCOUNTER
Per Dr Langford pt is to open 10 mg Nortriptyline capsule and dump it into pudding or applesauce  Stir very well, eat half and put the other half in the fridge  The next day get the other half out of fridge, stir very well and eat that  I asked pt to try this and call us monday with an update on how she is feeling  Pts  is aware of these instructions and voiced understanding

## 2020-01-03 NOTE — TELEPHONE ENCOUNTER
Patient's  called back stating that they cannot cut this pill in half due to it being a capsule  I notified  that someone will be giving him a call back with further information  He voiced understanding

## 2020-01-06 NOTE — TELEPHONE ENCOUNTER
Spoke with  and let him know that he needs to contact PCP, Chloe Caraballo and he needs to make the decision as to what she is to do with her medication in regards to not sleeping  He voiced understanding

## 2020-01-06 NOTE — TELEPHONE ENCOUNTER
Wanted to let us know that the pt is feeling no different than how she felt on Monday  He also wanted to us to let him know why we are trying to get her off of the Nortriptyline  Req that we call him back

## 2020-01-14 NOTE — TELEPHONE ENCOUNTER
Yves Easton is concerned about Justin Reyes because she is not eating, not even getting up  He feels she has declined drastically in the last week or two   Please call him at [phone]

## 2020-01-15 NOTE — TELEPHONE ENCOUNTER
Pt's  Adam called stating he wanted pt to come in for an appt as soon as possible  Pt has had Trouble walking - no strength in legs, fatigued, SOB, can only stay awake for 2 hours, diarrhea and not eating for a few days  Pt's  was asked if he contacted PCP  He stated PCP was not in office this week due to his daughter having a baby  Pt also stated he spoke with Oncologist who stated symptoms not from cancer or chemo  Pt on chemo for 3 years  Pt's  also stated he spoke to a cardiologist in the morning who told him that symptoms were heart related          Please advise

## 2020-01-16 PROBLEM — R10.84 GENERALIZED ABDOMINAL PAIN: Chronic | Status: ACTIVE | Noted: 2020-01-01

## 2020-01-16 PROBLEM — R19.7 DIARRHEA: Status: ACTIVE | Noted: 2020-01-01

## 2020-01-16 NOTE — TELEPHONE ENCOUNTER
Patients  calling saying Tanner Valentin is having problems breathing  She is wiped out, can't navigate and not sleeping   [phone]

## 2020-01-16 NOTE — ED PROVIDER NOTES
History  Chief Complaint   Patient presents with    Weakness - Generalized     To ED from home for evaluation of worsening diarrhea, weakness, abd  pain and poor appetite for several weeks  Patient's  states that she has refused to be evaluated  Denies any fever or chills   Diarrhea    Abdominal Pain     This is an 50-year-old female with a history of inoperable lung cancer who presents with a 3-4 week history of generalized weakness diarrhea upper abdominal pain poor oral intake  She is on chronic antibiotic suppressive therapy and gets chemotherapy infusion every 3 weeks  Patient is chronically on azithromycin      History provided by:  Patient  Medical Problem   Location:  Generalized  Quality:  Weakness fatigue decreased p o  Intake  Severity:  Severe  Onset quality:  Gradual  Timing:  Constant  Progression:  Worsening  Chronicity:  New  Context:  Diarrhea decreased p o  Intake generalized weakness  Worsened by:  Diarrhea and poor p o  Intake  Associated symptoms: abdominal pain, diarrhea and fatigue        Prior to Admission Medications   Prescriptions Last Dose Informant Patient Reported? Taking?    Melatonin 1 MG/4ML LIQD  Spouse/Significant Other Yes No   Sig: Take by mouth   VENTOLIN  (90 Base) MCG/ACT inhaler  Spouse/Significant Other No No   Sig: TAKE 2 PUFFS BY MOUTH EVERY 6 HOURS AS NEEDED FOR WHEEZE   albuterol (2 5 mg/3 mL) 0 083 % nebulizer solution  Spouse/Significant Other No No   Sig: Take 1 vial (2 5 mg total) by nebulization 4 (four) times a day   azithromycin (ZITHROMAX) 250 mg tablet  Spouse/Significant Other No No   Sig: Take 1 tablet (250 mg total) by mouth 3 (three) times a week   betamethasone, augmented, (DIPROLENE-AF) 0 05 % cream  Spouse/Significant Other No No   Sig: APPLY TO AFFECTED AREA TWICE A DAY   fluticasone (FLONASE) 50 mcg/act nasal spray  Spouse/Significant Other No No   Si spray into each nostril daily   fluticasone-salmeterol (ADVAIR DISKUS) 500-50 mcg/dose inhaler  Spouse/Significant Other No No   Sig: Inhale 1 puff 2 (two) times a day BRAND NECESSARY   furosemide (LASIX) 40 mg tablet  Spouse/Significant Other No No   Sig: TAKE 1 TABLET BY MOUTH EVERY DAY   glucose blood (FREESTYLE LITE) test strip  Spouse/Significant Other No No   Sig: Test blood sugar 3 times daily or as needed, DX E11 9   metoprolol succinate (TOPROL-XL) 100 mg 24 hr tablet  Spouse/Significant Other No No   Sig: Take 1 tablet (100 mg total) by mouth daily   mirtazapine (REMERON) 15 mg tablet  Spouse/Significant Other No No   Sig: Take 1 tablet (15 mg total) by mouth daily at bedtime   mometasone (ELOCON) 0 1 % lotion  Spouse/Significant Other Yes No   Sig: Apply 1 application topically daily   nortriptyline (PAMELOR) 10 mg capsule   No No   Sig: Take 1 capsule (10 mg total) by mouth daily at bedtime   olmesartan (BENICAR) 20 mg tablet  Spouse/Significant Other No No   Sig: Take 0 5 tablets (10 mg total) by mouth daily   oxyCODONE-acetaminophen (PERCOCET) 5-325 mg per tablet  Spouse/Significant Other No No   Sig: Take 1 tablet by mouth every 4 (four) hours as needed for moderate painMax Daily Amount: 6 tablets   pentoxifylline (TRENtal) 400 mg ER tablet  Spouse/Significant Other No No   Sig: Take 1 tablet (400 mg total) by mouth 3 (three) times a day with meals   potassium chloride (K-DUR,KLOR-CON) 20 mEq tablet   No No   Sig: Take 1 tablet (20 mEq total) by mouth 2 (two) times a day   simvastatin (ZOCOR) 80 mg tablet  Spouse/Significant Other No No   Sig: TAKE 1 TABLET BY MOUTH EVERY DAY   tiotropium (SPIRIVA RESPIMAT) 2 5 MCG/ACT AERS inhaler  Spouse/Significant Other No No   Sig: Inhale 2 puffs daily      Facility-Administered Medications: None       Past Medical History:   Diagnosis Date    Abnormal CT of the chest     last assessed: Aug 8, 2016    Acute left-sided low back pain without sciatica 1/31/2019    Arthritis     Asthma     Asymptomatic carotid artery stenosis     last assessed: 2017    Benign essential hypertension     last assessed: 2017    Cataract of right eye 3/19/2015    Cataract of right eye     last assessed: 2015    CHF (congestive heart failure) (HCC)     Common cold     Coronary artery disease     Depression     Diabetes [facility] )     [facility]    History of radiation therapy     Hyperlipidemia     Hypertension     Lung cancer ([facility] )     Multiple thyroid nodules 2016    Myocardial infarction ([facility] )     Pulmonary emphysema (HCC)     Pulmonary nodule     Shortness of breath        Past Surgical History:   Procedure Laterality Date    BRONCHOSCOPY N/A 8/10/2016    Procedure: BRONCHOSCOPY FLEXIBLE;  Surgeon: Fredy Natarajan MD;  [facility];  Service:    [place] Bones BYPASS GRAFT      using vein - aortic-bifemoral - last assessed: Aug 22, 2016     SECTION      CHOLECYSTECTOMY      EYE SURGERY      HYSTERECTOMY      PA BRONCHOSCOPY NEEDLE BX TRACHEA MAIN STEM&/BRON N/A 8/10/2016    Procedure: ENDOBRONCHIAL ULTRASOUND (FROZEN SECTION) ; Surgeon: Fredy Natarajan MD;  [facility];  Service: Thoracic    PA INSJ TUNNELED CTR VAD W/SUBQ PORT AGE 5 YR/> Left 2016    Procedure: INSERTION VENOUS PORT (PORT-A-CATH); Surgeon: Fredy Natarajan MD;  [facility];  Service: Thoracic    TONSILLECTOMY         Family History   Problem Relation Age of Onset    Brain cancer Sister     Cancer Sister     Thyroid disease Mother     Rheum arthritis Daughter      I have reviewed and agree with the history as documented      Social History     Tobacco Use    Smoking status: Current Some Day Smoker     Packs/day: 0 25     Types: Cigarettes     Start date:     Smokeless tobacco: Never Used    Tobacco comment: smokes 3-4 cigs /day - current every day smoker noted in "allscripts" smoke for less than 1/2 pack per day for 50 years     Substance Use Topics    Alcohol use: No    Drug use: No        Review of Systems   Constitutional: Positive for fatigue  Gastrointestinal: Positive for abdominal pain and diarrhea  All other systems reviewed and are negative  Physical Exam  Physical Exam   Constitutional: She is oriented to person, place, and time  Non-toxic appearance  No distress  Frail and cachectic   HENT:   Head: Normocephalic and atraumatic  Right Ear: External ear normal    Left Ear: External ear normal    Nose: Nose normal    Oral thrush   Eyes: Pupils are equal, round, and reactive to light  EOM are normal  Right eye exhibits no discharge  Left eye exhibits no discharge  No scleral icterus  Neck: Neck supple  No JVD present  No tracheal deviation present  Cardiovascular: Normal rate, regular rhythm and intact distal pulses  Exam reveals no gallop and no friction rub  No murmur heard  Pulmonary/Chest: No respiratory distress  She has wheezes  She has no rales  Bilateral rhonchi and wheeze   Abdominal: Soft  Bowel sounds are normal  She exhibits no distension  There is tenderness (Epigastric)  There is no rebound and no guarding  Musculoskeletal: Normal range of motion  She exhibits deformity ( arthritic)  She exhibits no edema or tenderness  Neurological: She is alert and oriented to person, place, and time  No cranial nerve deficit or sensory deficit  She exhibits normal muscle tone  Hard of hearing   Skin: Skin is warm and dry  No rash noted  Psychiatric: She has a normal mood and affect  Her behavior is normal  Thought content normal    Nursing note and vitals reviewed        Vital Signs  ED Triage Vitals [01/16/20 1538]   Temperature Pulse Respirations Blood Pressure SpO2   97 8 °F (36 6 °C) 66 20 157/67 90 %      Temp Source Heart Rate Source Patient Position - Orthostatic VS BP Location FiO2 (%)   Tympanic Monitor Sitting Right arm --      Pain Score       Worst Possible Pain           Vitals:    01/16/20 1545 01/16/20 1700 01/16/20 1800 01/16/20 1815   BP: 157/67 (!) 193/72  (!) 171/111   Pulse: 92 78 94 (!) 106   Patient Position - Orthostatic VS: Lying Lying  Lying         Visual Acuity      ED Medications  Medications   iohexol (OMNIPAQUE) 350 MG/ML injection (MULTI-DOSE) 100 mL (0 mL Intravenous Not Given 1/16/20 1645)   sodium chloride 0 9 % bolus 1,000 mL (0 mL Intravenous Stopped 1/16/20 1703)   sodium bicarbonate 8 4 % injection 50 mEq (50 mEq Intravenous Given 1/16/20 1655)   insulin regular (HumuLIN R,NovoLIN R) injection 5 Units (5 Units Intravenous Given 1/16/20 1656)   albuterol inhalation solution 5 mg (5 mg Nebulization Given 1/16/20 1655)   iodixanol (VISIPAQUE) 320 MG/ML injection 85 mL (85 mL Intravenous Given 1/16/20 1648)   morphine (PF) 4 mg/mL injection 4 mg (4 mg Intravenous Given 1/16/20 1734)   pantoprazole (PROTONIX) 80 mg in sodium chloride 0 9 % 100 mL IVPB (0 mg Intravenous Stopped 1/16/20 1838)       Diagnostic Studies  Results Reviewed     Procedure Component Value Units Date/Time    Basic metabolic panel [[id number]]  (Abnormal) Collected:  01/16/20 1734    Lab Status:  Final result Specimen:  Blood from Arm, Right Updated:  01/16/20 1759     Sodium 145 mmol/L      Potassium 4 3 mmol/L      Chloride 107 mmol/L      CO2 27 mmol/L      ANION GAP 11 mmol/L      BUN 52 mg/dL      Creatinine 1 19 mg/dL      Glucose 105 mg/dL      Calcium 8 6 mg/dL      eGFR 43 ml/min/1 73sq m     Narrative:       [place] guidelines for Chronic Kidney Disease (CKD):     Stage 1 with normal or high GFR (GFR > 90 mL/min/1 73 square meters)    Stage 2 Mild CKD (GFR = 60-89 mL/min/1 73 square meters)    Stage 3A Moderate CKD (GFR = 45-59 mL/min/1 73 square meters)    Stage 3B Moderate CKD (GFR = 30-44 mL/min/1 73 square meters)    Stage 4 Severe CKD (GFR = 15-29 mL/min/1 73 square meters)    Stage 5 End Stage CKD (GFR <15 mL/min/1 73 square meters)  Note: GFR calculation is accurate only with a steady state creatinine CBC and differential [[id number]]  (Abnormal) Collected:  01/16/20 1602    Lab Status:  Final result Specimen:  Blood from Arm, Right Updated:  01/16/20 1658     WBC 16 46 Thousand/uL      RBC 4 73 Million/uL      Hemoglobin 14 6 g/dL      Hematocrit 46 5 %      MCV 98 fL      MCH 30 9 pg      MCHC 31 4 g/dL      RDW 12 6 %      MPV 10 5 fL      Platelets 052 Thousands/uL      nRBC 0 /100 WBCs     Narrative: This is an appended report  These results have been appended to a previously verified report      Comprehensive metabolic panel [[id number]]  (Abnormal) Collected:  01/16/20 1602    Lab Status:  Final result Specimen:  Blood from Arm, Right Updated:  01/16/20 1634     Sodium 137 mmol/L      Potassium 6 6 mmol/L      Chloride 102 mmol/L      CO2 21 mmol/L      ANION GAP 14 mmol/L      BUN 54 mg/dL      Creatinine 1 27 mg/dL      Glucose 312 mg/dL      Calcium 9 3 mg/dL      AST 31 U/L      ALT 17 U/L      Alkaline Phosphatase 127 U/L      Total Protein 7 7 g/dL      Albumin 2 8 g/dL      Total Bilirubin 0 50 mg/dL      eGFR 40 ml/min/1 73sq m     Narrative:       [place] guidelines for Chronic Kidney Disease (CKD):     Stage 1 with normal or high GFR (GFR > 90 mL/min/1 73 square meters)    Stage 2 Mild CKD (GFR = 60-89 mL/min/1 73 square meters)    Stage 3A Moderate CKD (GFR = 45-59 mL/min/1 73 square meters)    Stage 3B Moderate CKD (GFR = 30-44 mL/min/1 73 square meters)    Stage 4 Severe CKD (GFR = 15-29 mL/min/1 73 square meters)    Stage 5 End Stage CKD (GFR <15 mL/min/1 73 square meters)  Note: GFR calculation is accurate only with a steady state creatinine    Protime-INR [[id number]]  (Normal) Collected:  01/16/20 1610    Lab Status:  Final result Specimen:  Blood from Arm, Right Updated:  01/16/20 1630     Protime 12 6 seconds      INR 0 97    APTT [[id number]]  (Normal) Collected:  01/16/20 1610    Lab Status:  Final result Specimen:  Blood from Arm, Right Updated: 01/16/20 1630     PTT 30 seconds     Lipase [[id number]]  (Abnormal) Collected:  01/16/20 1602    Lab Status:  Final result Specimen:  Blood from Arm, Right Updated:  01/16/20 1628     Lipase 24 u/L     Stool Enteric Bacterial Panel by PCR [[id number]]     Lab Status:  No result Specimen:  Stool from Rectum     Clostridium difficile toxin by PCR with EIA [[id number]]     Lab Status:  No result Specimen:  Stool from Per Rectum     UA w Reflex to Microscopic w Reflex to Culture [[id number]]     Lab Status:  No result Specimen:  Urine                  CT chest abdomen pelvis w contrast   Final Result by Марина Wang MD (01/16 1717)      Grossly stable appearance of right perihilar lung mass  Interval decrease in size of left adrenal lesion  Scattered tree-in-bud opacities most consistent with an infectious process, likely an atypical organism  Large volume of colonic stool  Large volume of material in stomach which does not appear to be secondary to obstruction  Workstation performed: [id number]                    Procedures  ECG 12 Lead Documentation Only  Date/Time: 1/16/2020 5:04 PM  Performed by: Nirav Clark DO  Authorized by: Nirav Clark DO     ECG reviewed by me, the ED Provider: yes    Patient location:  ED  Rate:     ECG rate:  86  Rhythm:     Rhythm: sinus rhythm    Ectopy:     Ectopy: PAC    Conduction:     Conduction: normal    T waves:     T waves: normal               ED Course                               MDM  Number of Diagnoses or Management Options  Diagnosis management comments: Generalized weakness diarrhea decreased p o   Intake will check electrolytes to rule out renal failure severe electrolyte abnormality also abdominal pain will check CT scan rule out acute intra-abdominal pathology patient has a history of cholecystectomy and hysterectomy       Amount and/or Complexity of Data Reviewed  Clinical lab tests: ordered  Tests in the radiology section of CPT®: ordered          Disposition  Final diagnoses:   Abdominal pain     Time reflects when diagnosis was documented in both MDM as applicable and the Disposition within this note     Time User Action Codes Description Comment    1/16/2020  6:37 PM Dee Medina Add [R10 9] Abdominal pain       ED Disposition     ED Disposition Condition Date/Time Comment    Admit Stable Thu Jan 16, 2020  6:37 PM Case was discussed with **Dr Geovany Garcia* and the patient's admission status was agreed to be Admission Status: inpatient status to the service of Dr Geovany Garcia   Follow-up Information    None         Patient's Medications   Discharge Prescriptions    No medications on file     No discharge procedures on file      ED Provider  Electronically Signed by           Faith Vázquez DO  01/16/20 8819

## 2020-01-16 NOTE — TELEPHONE ENCOUNTER
Will relay message  Next available is after scheduled appt which is 2/18      Spoke with pt's  "Adam" who stated [facility] to get CXR for pulmonology and labs for Dr Yovana Beverly  He will try to get pt to Urgent Care if he feels she needs  If not she has an appt with PCP next week

## 2020-01-16 NOTE — TELEPHONE ENCOUNTER
Spoke with patients  on 1/14/20  Discussed with him that Dr Chaitanya Ferrara was aware of some of the symptoms she is experiencing and does not believe this is related to her IV treatments or her cancer  Recent imaging showed stable disease  I encouraged the patient to be evaluated by PCP and also offered to refer Tanner Valentin to palliative care  I talked with  about patient going to the ER for evaluation  Patient is refusing at this time  Myriam Swanson said he will call me back to let me know if palliative care referral should be made  I called back today to follow up with patients   He explained that Tanner Valentin is not feeling any better  Extreme fatigue, not eating and increase in SOB  Reinforced that she should go to the ER for evaluation  He will be talking to her about this and knows that if she gets any worse he will not have a choice but to take her  Per  she will be having labs drawn today for cardiology and he will be reaching out to the PCP again  He will call me if anything else is needed

## 2020-01-16 NOTE — TELEPHONE ENCOUNTER
Spoke with   He says Luis Meraz has been sick for about 4-5 days  She has a non productive cough and some chest tightness  She has SOB all the time, but worse on exertion  She is wearing 2L of oxygen with a pulse ox of 98-99%  She is not eating and she is wiped out  Denies wheezing, fevers or chills  She is using her inhalers and nebulizer  He said he called us as a last resort  He thought it was heart related  He called her cardiologist and waited 2 days for a response he said and all they did was tell her to get blood work  He also called her Oncologist   Her PCP is away for this week  He is not sure what to do  She does not want to go to the ER, but he is afraid she will end up there  Please advise

## 2020-01-17 PROBLEM — J96.01 ACUTE RESPIRATORY FAILURE WITH HYPOXIA AND HYPERCAPNIA (HCC): Status: ACTIVE | Noted: 2020-01-01

## 2020-01-17 PROBLEM — K55.9 ISCHEMIC BOWEL DISEASE (HCC): Status: ACTIVE | Noted: 2020-01-01

## 2020-01-17 PROBLEM — E87.2 LACTIC ACIDOSIS: Status: ACTIVE | Noted: 2020-01-01

## 2020-01-17 PROBLEM — E87.5 HYPERKALEMIA: Status: ACTIVE | Noted: 2020-01-01

## 2020-01-17 PROBLEM — K63.89 PNEUMATOSIS INTESTINALIS: Status: ACTIVE | Noted: 2020-01-01

## 2020-01-17 PROBLEM — J96.02 ACUTE RESPIRATORY FAILURE WITH HYPOXIA AND HYPERCAPNIA (HCC): Status: ACTIVE | Noted: 2020-01-01

## 2020-01-17 PROBLEM — E87.2 METABOLIC ACIDOSIS, INCREASED ANION GAP: Status: ACTIVE | Noted: 2020-01-01

## 2020-01-17 PROBLEM — R57.9 SHOCK (HCC): Status: ACTIVE | Noted: 2020-01-01

## 2020-01-17 NOTE — DISCHARGE SUMMARY
Discharge Summary - Jesi Saab [de-identified] y o  female MRN: [id number]    Unit/Bed#: -01 Encounter: [id number] PCP: Jerman Anand MD    Admission Date:   Admission Orders (From admission, onward)     Ordered        01/16/20 1838  Inpatient Admission (expected length of stay for this patient Order details is greater than two midnights)  Once                     Admitting Diagnosis: Loss of appetite [R63 0]  Abdominal pain [R10 9]    HPI:  78-year-old female admitted with abdominal pain/diarrhea with progressive decompensation requiring intubation with extensive bowel pneumatosis and was transition to comfort measures only    Procedures Performed:   Orders Placed This Encounter   Procedures    ED ECG Documentation Only    Intubation    Temporary HD Catheter       Summary of Hospital Course:  78-year-old female with past medical history of stage IV squamous cell lung carcinoma on Cyramza, COPD, chronic oxygen, hypertension, hyperlipidemia, systolic CHF, pad status post bypass who presented on 01/16 with fatigue/diarrhea/abdominal pain with poor oral intake over the last week  CT of the abdomen and pelvis with contrast revealed stable right lung mass  Decreased size of left adrenal lesion  Scattered tree-in-bud opacities consistent with infectious process likely atypical   Large volume call stool  Large volume of material in the stomach which does not appear secondary to obstruction  Labs revealed a potassium of 6 6 in which patient received treatment for/WBC count of 16  Patient was placed on Levaquin/Flagyl  Overnight patient became hypoxic and rapid response was called  She required emergent intubation  Labs revealed a metabolic acidosis/lactic acidosis  She was pancultured and required initiation of vasopressors  Lactic acid continued to worsen and surgery was notified given concern for bowel ischemia    CT of the abdomen and pelvis without contrast was performed revealing extensive pneumatosis due to bowel ischemia extending from the proximal jejunum to the splenic flexure with mesenteric venous gas and extensive portal venous air  Renal infarctions involving both kidneys  Relative hypodensity in the posterior right hepatic lobe nonspecific possible hepatic infarction to be considered  Calcified plaque throughout the arterial vasculature specially abdominal aorta and superior mesenteric artery  Surgery reviewed films and given extensive pneumatosis throughout all the bowel was said to be inoperable  Patient was transition to comfort measures only   with family at bedside at 10:00 a m       Significant Findings, Care, Treatment and Services Provided:    CT of the chest abdomen and pelvis with contrast-grossly stable appearance of right perihilar lung mass  Interval decrease in size of left adrenal lesion  Scattered tree-in-bud opacities most consistent with infectious process likely an atypical organism  Large volume of colonic stool  Large volume of material in the stomach which does not appear to be secondary to obstruction   CT of the abdomen and pelvis without contrast extensive pneumatosis due to bowel ischemia extending from the proximal jejunum to the splenic flexure with mesenteric venous gas an extensive portal venous air present  Acute renal failure as evidenced by retention of contrast in the renal cortices  Renal infarctions involving the entire mid and upper right kidney and segmental area of the upper pole of the left kidney  Relative hypodensity in the posterior right hepatic lobe, nonspecific may represent hepatic infarction  Re identification of extensive densely calcified atherosclerotic pack throughout the arterial vasculature especially abdominal aorta and superior mesenteric artery       blood cultures pending      Complications:      Disposition:      Final Diagnosis:  Shock secondary to bowel ischemia    Resolved Problems  Date Reviewed: 1/16/2020    None          Condition at Time of Death:  Comfort measures    Date, Time and Cause of Death    Date of Death:  1/17/20  Time of Death:  10:00 AM  Preliminary Cause of Death:  [facility]  Entered by:  luz Cutelr[SS1 1]     Attribution     SS1 1 SAGAR No 01/17/20 10:09

## 2020-01-17 NOTE — PROCEDURES
Temporary HD Catheter  Date/Time: 1/17/2020 9:12 AM  Performed by: Chapis Borden by: SAGAR Jimenez     Patient location:  Bedside  Other Assisting Provider: Yes (comment) (curtis gerardo)    Consent:     Consent obtained:  Emergent situation (dr Piper Olvera aware)  Universal protocol:     Immediately prior to procedure, a time out was called: yes      Patient identity confirmed:  Arm band  Pre-procedure details:     Hand hygiene: Hand hygiene performed prior to insertion      Sterile barrier technique: All elements of maximal sterile technique followed      Skin preparation:  2% chlorhexidine  Indications:     Central line indications: medications requiring central line, hemodynamic monitoring and dialysis    Anesthesia (see MAR for exact dosages): Anesthesia method:  Local infiltration    Local anesthetic:  Lidocaine 1% w/o epi  Procedure details:     Location:  Right internal jugular    Vessel type: vein      Laterality:  Right    Approach: percutaneous technique used      Patient position:  Flat    Catheter type:  Triple lumen    Catheter size:  12 5 Fr    Catheter length:  16 cm    Landmarks identified: yes      Ultrasound guidance: yes      Sterile ultrasound techniques: Sterile gel and sterile probe covers were used      Number of attempts:  1    Successful placement: yes      Vessel of catheter tip end:  1cm out  Post-procedure details:     Post-procedure:  Dressing applied and line sutured    Post-procedure assessment: no xray withdrawal to comfort care     Post-procedure complications: none      Patient tolerance of procedure:   Tolerated well, no immediate complications

## 2020-01-17 NOTE — CONSULTS
Consultation - General Surgery   Bird Andrade [de-identified] y o  female MRN: [id number]  Unit/Bed#: -01 Encounter: [id number]    Assessment/Plan   Abnormal CT scan with findings of severe bowel ischemia and renal infarcts likely not compatible with life  -bowel ischemia extending from the proximal jejunum to the splenic flecture with mesenteric venous gas and extensive portal venous air present  -also CT evidence of renal infarcts with contrast from 15 hours earlier remaining in the kidneys  -CT reviewed with Radiology and Dr Vandana Mackay  Feel that patient would not survive surgical intervention at this point  Discussed in detail with the patient's family  Patient also with multiple medical comorbidities including extensive vascular disease and stage IV lung cancer  The patient's family has elected to make patient a comfort care/DNR status at this time   -no further interventions planned      History of Present Illness     HPI:  Bird Andrade is a [de-identified] y o  female who presented to the emergency department yesterday with complaints of severe abdominal pain  CT on admission red with large volume of colonic stool and large volume of material in the stomach without apparent obstruction  Patient was admitted and monitored  She was a rapid response this morning with respiratory distress and hypotension  She required intubation and initiation of pressure support  We were consulted for emergent evaluation  Repeat CT scan was ordered with above findings  Patient with history of severe atherosclerotic disease as well as stage IV lung cancer currently being treated with chemotherapy      Consults    Review of Systems  Unable to be obtained as patient is intubated and sedated    Historical Information   Past Medical History:   Diagnosis Date    Abnormal CT of the chest     last assessed: Aug 8, 2016    Acute left-sided low back pain without sciatica 1/31/2019    Arthritis     Asthma     Asymptomatic carotid artery stenosis     last assessed: 2017    Benign essential hypertension     last assessed: 2017    Cataract of right eye 3/19/2015    Cataract of right eye     last assessed: 2015    CHF (congestive heart failure) (HCC)     Common cold     Coronary artery disease     Depression     [facility]     [facility] 2015    History of radiation therapy     Hyperlipidemia     Hypertension     [facility]     Multiple thyroid nodules 2016    [facility] infarction [facility]     Pulmonary emphysema [facility]     Pulmonary nodule     Shortness of breath      Past Surgical History:   Procedure Laterality Date    BRONCHOSCOPY N/A 8/10/2016    Procedure: BRONCHOSCOPY FLEXIBLE;  Surgeon: Harriet Pulliam MD;  [facility];  Service:    [facility] BYPASS GRAFT      using vein - aortic-bifemoral - last assessed: Aug 22, 2016     SECTION      CHOLECYSTECTOMY      EYE SURGERY      HYSTERECTOMY      [place] BRONCHOSCOPY NEEDLE BX TRACHEA MAIN STEM&/BRON N/A 8/10/2016    Procedure: ENDOBRONCHIAL ULTRASOUND (FROZEN SECTION) ; Surgeon: Harriet Pulliam MD;  Location: [facility];  Service: Thoracic    SD INSJ TUNNELED CTR VAD W/SUBQ PORT AGE 5 YR/> Left 2016    Procedure: INSERTION VENOUS PORT (PORT-A-CATH);   Surgeon: Harriet Pulliam MD;  Location[facility];  Service: Thoracic    TONSILLECTOMY       Social History   Social History     Substance and Sexual Activity   Alcohol Use No     Social History     Substance and Sexual Activity   Drug Use No     Social History     Tobacco Use   Smoking Status Current Some Day Smoker    Packs/day: 0 25    Types: Cigarettes    Start date:    Smokeless Tobacco Never Used   Tobacco Comment    smokes 3-4 cigs /day - current every day smoker noted in "allscripts" smoke for less than 1/2 pack per day for 50 years       Family History: non-contributory    Meds/Allergies   all current active meds have been reviewed  Allergies   Allergen Reactions    Penicillins Swelling     Legs swell up       Objective   First Vitals:   Blood Pressure: 157/67 (01/16/20 1538)  Pulse: 66 (01/16/20 1538)  Temperature: 97 8 °F (36 6 °C) (01/16/20 1538)  Temp Source: Tympanic (01/16/20 1538)  Respirations: 20 (01/16/20 1538)  Height: 5' 4" (162 6 cm) (01/17/20 0300)  Weight - Scale: 38 6 kg (85 lb) (01/16/20 1538)  SpO2: 90 % (01/16/20 1538)    Current Vitals:   Blood Pressure: (!) 63/44 (01/17/20 0900)  Pulse: (!) 0 (01/17/20 1000)  Temperature: (!) 95 9 °F (35 5 °C) (01/17/20 0530)  Temp Source: Rectal (01/17/20 0530)  Respirations: (!) 0 (01/17/20 1000)  Height: 5' 4" (162 6 cm) (01/17/20 0300)  Weight - Scale: 38 7 kg (85 lb 5 1 oz) (01/17/20 0600)  SpO2: (!) 52 % (01/17/20 0945)      Intake/Output Summary (Last 24 hours) at 1/17/2020 1105  Last data filed at 1/17/2020 0800  Gross per 24 hour   Intake 3160 ml   Output 375 ml   Net 2785 ml       Invasive Devices     Central Venous Catheter Line            Port A Cath 08/31/16 Left Chest 1234 days          Drain            Urethral Catheter less than 1 day          Airway            ETT  Cuffed; Hi-Lo 7 5 mm less than 1 day                Physical Exam   Constitutional:   Thin elderly female intubated on ventilator   HENT:   Head: Normocephalic and atraumatic  ET tube in place   Eyes: Right eye exhibits no discharge  No scleral icterus  Neck: Neck supple  No JVD present  Cardiovascular: Regular rhythm  Pulmonary/Chest:   Clear breath sounds anteriorly   Abdominal:   Distended, tense, no bowel sounds   Musculoskeletal: She exhibits no edema or deformity  Neurological:   Sedated   Skin: Skin is warm and dry  Lab Results:   I have personally reviewed pertinent lab results    , CBC:   Lab Results   Component Value Date    WBC 16 24 (H) 01/17/2020    HGB 12 4 01/17/2020    HCT 40 9 01/17/2020     (H) 01/17/2020     01/17/2020    MCH 31 1 01/17/2020    MCHC 30 3 (L) 01/17/2020    RDW 12 7 01/17/2020    MPV 10 7 01/17/2020    NRBC 0 01/17/2020   , CMP:   Lab Results   Component Value Date    SODIUM 146 (H) 01/17/2020    K 6 7 (HH) 01/17/2020     01/17/2020    CO2 21 01/17/2020    BUN 50 (H) 01/17/2020    CREATININE 1 80 (H) 01/17/2020    CALCIUM 8 9 01/17/2020     (H) 01/17/2020     (H) 01/17/2020    ALKPHOS 108 01/17/2020    EGFR 26 01/17/2020   , Coagulation:   Lab Results   Component Value Date    INR 0 97 01/16/2020   , Urinalysis:   Lab Results   Component Value Date    COLORU Yellow 01/17/2020    CLARITYU Clear 01/17/2020    SPECGRAV 1 010 01/17/2020    PHUR 5 5 01/17/2020    LEUKOCYTESUR Negative 01/17/2020    NITRITE Negative 01/17/2020    GLUCOSEU Negative 01/17/2020    KETONESU Negative 01/17/2020    BILIRUBINUR Negative 01/17/2020    BLOODU Negative 01/17/2020   , Amylase: No results found for: AMYLASE, Lipase:   Lab Results   Component Value Date    LIPASE 24 (L) 01/16/2020     Imaging: I have personally reviewed pertinent reports  EKG, Pathology, and Other Studies: I have personally reviewed pertinent reports        Ravinder Welch PA-C

## 2020-01-17 NOTE — PROCEDURES
Intubation  Date/Time: 1/17/2020 4:55 AM  Performed by: SAGAR Blue  Authorized by: Jonas Costello [address] [place] [address]     Patient location:  Bedside  Other Assisting Provider: No    Consent:     Consent obtained:  Emergent situation    Consent given by:  Spouse    Risks discussed:  Aspiration, dental trauma, death, hypoxia and pneumothorax    Alternatives discussed:  No treatment and delayed treatment  Universal protocol:     Procedure explained and questions answered to patient or proxy's satisfaction: yes      Immediately prior to procedure, a time out was called: yes      Patient identity confirmed:  Arm band and hospital-assigned identification number  Pre-procedure details:     Patient status:  Altered mental status    Mallampati score:  2    Pretreatment medications:  Etomidate    Paralytics:  None  Indications:     Indications for intubation: respiratory distress and respiratory failure    Procedure details:     Preoxygenation:  BiPAP    CPR in progress: no      Intubation method:  Oral    Oral intubation technique:  Direct    Laryngoscope blade: Mac 3    Tube size (mm):  7 5    Tube type:  Cuffed and hi-lo    Number of attempts:  1    Ventilation between attempts: no      Cricoid pressure: yes      Tube visualized through cords: yes    Placement assessment:     ETT to lip:  21    Tube secured with:  ETT george    Breath sounds:  Equal    Placement verification: CXR verification      Chest x-ray findings: ETT 8 cm above the sofia   Post-procedure details:     Patient tolerance of procedure: Tolerated well, no immediate complications  Comments:      Tube appeared about 8 cm above the sofia via cxr, advanced by 4 cm

## 2020-01-17 NOTE — RESPIRATORY THERAPY NOTE
01/17/20 0836   Respiratory Protocol   Protocol Initiated? Yes   Protocol Selection Respiratory   Language Barrier? Yes  (pt on vent)   Medical & Social History Reviewed? Yes   Diagnostic Studies Reviewed? Yes   Physical Assessment Performed? Yes   Respiratory Plan Vent/NIV/HFNC   Respiratory Assessment   Assessment Type During-treatment   General Appearance Sedated   Respiratory Pattern Normal   Chest Assessment Chest expansion symmetrical   Bilateral Breath Sounds Diminished; Expiratory wheezes   Cough None   Resp Comments pt current vent settings AC22/300/5 peep/50%/transported to [facility] without incident   O2 Device vent   ETT  Cuffed; Hi-Lo 7 5 mm   Placement Date/Time: 01/17/20 (c 0459   Type: Cuffed; Hi-Lo  Tube Size: 7 5 mm  Location: Oral  Insertion attempts: 1  Placement Verification: Chest x-ray  Secured at (cm): 21   Secured at (cm) 25   Measured from Lips   Secured Location Right   Secured by Commercial tube george   Site Condition Dry   HI-LO Suction  Intermittent suction   HI-LO Secretions Other (Comment)  (brown)   HI-LO Intervention Patent   Additional Assessments   SpO2 100 %   RT Ventilator Management Note  Karol Forte [de-identified] y o  female MRN: [id number]  Unit/Bed#: ICU [facility] Encounter: [id number]      Daily Screen       1/17/2020  0853             Patient safety screen outcome[de-identified]  Failed    Not Ready for Weaning due to[de-identified]  Underline problem not resolved            Physical Exam:   Assessment Type: During-treatment  General Appearance: Sedated  Respiratory Pattern: Normal  Chest Assessment: Chest expansion symmetrical  Bilateral Breath Sounds: Diminished, Expiratory wheezes  Cough: None  O2 Device: vent      Resp Comments: pt current vent settings AC22/300/5 peep/50%/transported to [facility] without incident

## 2020-01-17 NOTE — RAPID RESPONSE
Progress Note - Rapid Response   Israel Escobar [de-identified] y o  female MRN: [id number]    Time Called ( Time): 6536  Date Called: 1/17/20  Level of Care: MS  Room#: 416  GQRDUSD Time ( Time): 9437  Event End Time ( Time): 9660  Primary reason for call: Acute change in SPO2  Interventions:  Airway/Breathing:  NPPV failed Bipap, intubated in the ICU   Circulation: ECG  Other Treatments: cxr, repeat labs, abg       Assessment:   1  Acute on chronic hypoxic & hypercarbic respiratory failure  · Initially placed on BiPAP while transferring to the ICU, TV were 300-350 on 10/5, after 30 mins patient failed BiPAP, tidal volumes 130-150, attempted to adjust settings without improvement  · Received xopenex/atrovent/albuterol with mild improvement   · Abx were switched to cefepime/vancomycin and continued on flagyl  · Patient had a decline in mentation and required intubation  7 5 ET tube was placed without difficulty  · Continue scheduled inhalers   · Add IV solumedrol, Pulmicort & performyst   · Currently on ACVC, continue mechanical ventilation    2  Respiratory acidosis, chronic, with secondary metabolic acidosis and lactic acidosis   · Initial ABG 7 166/47 5/322/17 2 on Bipap, after intubation 6 974/87 2/58 0/20 3 the patient was on ACVC -4-100%, increased RR 22 and Peep to 8  · Repeat ABG within 30 mins   · Patient received 2 L IVF bolus   · Initial LA was 4 4, repeat is pending, trend q2 hrs until normalized      3  Shock, multifactorial   · Post intubation, patient was severely hypotensive, levophed was initiated   · Sepsis work up pending   · Previous ECHO in 2018 showed EF of 35%  · Continue vasopressors to maintain MAP > 65   · Repeat ECHO pending     4  Severe COPD with exacerbation   · Interventions as above     5  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  · Follows with heme/onc, most recently seen 12/30/2019 - "She is on Cyramza  She has been on this for about a year  Recent imaging shows essentially stable disease compared to her prior study of 02/03 months earlier "  · Grossly stable appearance of right perihilar lung mass    6  CHF  · 08/2018 ECHO - 35%, moderate diffuse hypokinesis with regional variations  Aortic valve - moderate stenosis, mitral valve mild - moderate clarification, mild stenosis and mid - moderate regurgitation  · Repeat ECHO is pending     7  Acute encephalopathy   · Neuro checks q1 hr  · Wean propofol as able to maintain RASS 0   · Would consider head CT if mentation does not improve     8  PAD   · As per  who is the POA, patient had b/l femoral bypasses previously which both have failed   · She does not have pedal pulses at baseline and b/l LE discoloration     9  Elevated troponin   · Troponin was 0 07 during RRT, will trend until peaked   · ECG pending     10  Abdominal pain   · Initial CT abdomen revealed - Large volume of colonic stool  Large volume of material in stomach which does not appear to be secondary to obstruction  · Would consider repeat scan Head/Chest/abdomen/pelvis given acute decompensation & surgical consultation, concern for perforated bowel ? · OGT with brown bile like fluid     11  Pneumonia   · Scattered tree-in-bud opacities most consistent with an infectious process, likely an atypical organism  · Interventions as above     12  Hyperkalemia   · Received dextrose & insulin, IVF & 10 mg of albuterol  · Repeat BMP is pending   · ECG pending        HPI/Chief Complaint (Background/Situation):   Mary Camargo is a [de-identified]y o  year old female who presents with PMH of Lung CA on chemo q3 weeks, severe copd on chronic o2, CHF (ef 35%), CAD, HLD  Presents to the ED with abdominal pain and diarrhea  CT abdomen/pelvis showed large volume of colonic stool  Large volume of material in stomach which does not appear to be secondary to obstruction & scattered tree-in-bud opacities most consistent with an infectious process     The patient was admitted to [facility] unit with GI consultation  Around 0200, a RRT was called for decline in respiratory status and patient requiring NRBR for o2 of 70%  Upon evaluation, patient appeared to be in respiratory distress, placed on bipap, repeat cxr obtained & given breathing treatments  Abx were switched to cefepime/vanc & continued on flagyl  She initially responded well to Bipap and was able to follows simple commands and talk to family  Discussed with family ( and daughter) code status, it was decided that if the patients heart were to stop she would not want CPR but if her problem is respiratory they would like her to be intubated  Despite all  interventions the patient was no longer responding to bipap, TV were 120-130 and mentation declined  She was intubated around 4 am    SBP were 40/20 shortly after intubation, she was started on levophed and given an IVF bolus with initial good response  Lactic acid was elevated 4 4, then trended up to 9 despite IVF administration  Plan for repeat CT scan head/chest/abd/pelvis once patient is stable, surgery consulted       Historical Information   Past Medical History:   Diagnosis Date    Abnormal CT of the chest     last assessed: Aug 8, 2016    Acute left-sided low back pain without sciatica 1/31/2019    Arthritis     Asthma     Asymptomatic carotid artery stenosis     last assessed: March 2, 2017    Benign essential hypertension     last assessed: March 2, 2017    Cataract of right eye 3/19/2015    Cataract of right eye     last assessed: March 19, 2015    CHF (congestive heart failure) (HCC)     Common cold     Coronary artery disease     Depression     [facility]     Fracture of multiple pubic rami ([facility] ) 1/6/2015    History of radiation therapy     Hyperlipidemia     Hypertension     Lung cancer ([address]     Multiple thyroid nodules 7/26/2016    Myocardial infarction ([address]     Pulmonary emphysema ([facility]     Pulmonary nodule     Shortness of breath      Past Surgical History:   Procedure Laterality Date    BRONCHOSCOPY N/A 8/10/2016    Procedure: BRONCHOSCOPY FLEXIBLE;  Surgeon: Zac Jay MD;  [facility];  Service:     BYPASS GRAFT      using vein - aortic-bifemoral - last assessed: Aug 22, 2016     SECTION      CHOLECYSTECTOMY      EYE SURGERY      HYSTERECTOMY      [place] BRONCHOSCOPY NEEDLE BX TRACHEA MAIN STEM&/BRON N/A 8/10/2016    Procedure: ENDOBRONCHIAL ULTRASOUND (FROZEN SECTION) ; Surgeon: Zac Jay MD;  Location: [facility];  Service: Thoracic    [place] INSJ TUNNELED CTR VAD W/SUBQ PORT AGE 5 YR/> Left 2016    Procedure: INSERTION VENOUS PORT (PORT-A-CATH);   Surgeon: Zac Jay MD;  Location: [facility];  Service: Thoracic    TONSILLECTOMY       Social History   Social History     Substance and Sexual Activity   Alcohol Use No     Social History     Substance and Sexual Activity   Drug Use No     Social History     Tobacco Use   Smoking Status Current Some Day Smoker    Packs/day: 0 25    Types: Cigarettes    Start date:    Smokeless Tobacco Never Used   Tobacco Comment    smokes 3-4 cigs /day - current every day smoker noted in "allscripts" smoke for less than 1/2 pack per day for 50 years       Family History:   Family History   Problem Relation Age of Onset    Brain cancer Sister     Cancer Sister     Thyroid disease Mother     Rheum arthritis Daughter        Meds/Allergies     Current Facility-Administered Medications:  albuterol 2 5 mg Nebulization Q4H PRN SAGAR Sanders    azithromycin 250 mg Oral Once per day on  Conchita Fly, DO    dicyclomine 20 mg Oral TID Conchita Fly, DO    enoxaparin 30 mg Subcutaneous Daily Conchita Fly, DO    fluticasone 1 spray Nasal Daily Conchita Fly, DO    fluticasone-vilanterol 1 puff Inhalation Daily Conchita Fly, DO    furosemide 40 mg Oral Daily Conchita Fly, DO    insulin lispro 1-5 Units Subcutaneous TID AC Conchita Fly, DO    insulin lispro 1-5 Units Subcutaneous HS Conchita Fly, DO    iohexol 100 mL Intravenous Once in imaging [employer], DO    ipratropium 0 5 mg Nebulization 4x Daily Parul Petarevelyne, CRNP    levalbuterol 1 25 mg Nebulization 4x Daily Varsha Seamus, CRNP    metoprolol succinate 100 mg Oral Daily Conchita Fly, DO    metroNIDAZOLE 500 mg Intravenous Q8H Conchita Fly, DO Last Rate: 500 mg (01/16/20 2049)   mirtazapine 15 mg Oral HS Conchita Fly, DO    morphine injection 4 mg Intravenous Q4H PRN Conchita Fly, DO    nicotine 1 patch Transdermal Daily Conchita Fly, DO    nortriptyline 10 mg Oral HS Conchita Fly, DO    ondansetron 4 mg Intravenous Q6H PRN Conchita Fly, DO    oxyCODONE-acetaminophen 1 tablet Oral Q4H PRN Conchita Fly, DO    pentoxifylline 400 mg Oral TID With Meals Conchita Fly, DO    potassium chloride 20 mEq Oral BID Conchita Fly, DO    sodium chloride 125 mL/hr Intravenous Continuous Conchita Fly, DO Last Rate: 125 mL/hr (01/16/20 2035)         sodium chloride 125 mL/hr Last Rate: 125 mL/hr (01/16/20 2035)       Allergies   Allergen Reactions    Penicillins Swelling     Legs swell up       ROS: Review of Systems   Unable to perform ROS: Mental status change   & respiratory distress  Vitals:   120/60  HR 70-90  RR 40  T- 97 6  o2 -76%      Physical Exam   Constitutional: She has a sickly appearance  She appears distressed  She is intubated  Face mask in place  HENT:   Head: Normocephalic  Eyes: Pupils are equal, round, and reactive to light  Neck: Normal range of motion  No tracheal deviation present  Cardiovascular: Normal rate and regular rhythm  Pulses:       Radial pulses are 1+ on the right side, and 0 on the left side  Dorsalis pedis pulses are 0 on the right side, and 0 on the left side  Pulmonary/Chest: She is intubated  She is in respiratory distress  She has decreased breath sounds in the right lower field and the left lower field  She has wheezes in the right upper field and the left upper field  Abdominal: Soft   She exhibits distension  Bowel sounds are decreased  There is tenderness  Musculoskeletal:   B/l LE weakness    Neurological: GCS eye subscore is 4  GCS verbal subscore is 3  GCS motor subscore is 5  GCS prior to intubation was 11-12   Skin: Skin is dry  Nails show no clubbing  B/l LE discoloration, cool to the touch in b/l LE         Intake/Output Summary (Last 24 hours) at 1/17/2020 0319  Last data filed at 1/16/2020 2101  Gross per 24 hour   Intake 1160 ml   Output    Net 1160 ml       Respiratory    Lab Data (Last 4 hours)    None         O2/Vent Data (Last 4 hours)      01/17 0216          Non-Invasive Ventilation Mode BiPAP                 Invasive Devices     Central Venous Catheter Line            Port A Cath 08/31/16 Left Chest 1233 days          Peripheral Intravenous Line            Peripheral IV 01/16/20 Right Antecubital less than 1 day                DIAGNOSTIC DATA:    Lab: I have personally reviewed pertinent lab results     CBC:   Results from last 7 days   Lab Units 01/17/20  0240   WBC Thousand/uL 16 24*   HEMOGLOBIN g/dL 12 4   HEMATOCRIT % 40 9   PLATELETS Thousands/uL 188     CMP:   Results from last 7 days   Lab Units 01/17/20  0247 01/16/20  1734 01/16/20  1602   POTASSIUM mmol/L  --  4 3 6 6*   CHLORIDE mmol/L  --  107 102   CO2 mmol/L  --  27 21   CO2, I-STAT mmol/L 19*  --   --    BUN mg/dL  --  52* 54*   CREATININE mg/dL  --  1 19 1 27   CALCIUM mg/dL  --  8 6 9 3   ALK PHOS U/L  --   --  127*   ALT U/L  --   --  17   AST U/L  --   --  31     PT/INR:   Lab Results   Component Value Date    INR 0 97 01/16/2020   ,   Magnesium: No components found for: MAG,   Phosphorous: No results found for: PHOS    Microbiology:  No results found for: ALFRED See      OUTCOME:   Transferred to Critical Care Unit  Family notified of transfer: yes  Family member contacted:  and daughter   Code Status: Level 1 - Full Code  Critical Care Time: Total Critical Care time spent 52 minutes excluding procedures, teaching and family updates

## 2020-01-17 NOTE — PROGRESS NOTES
Pt received, shift report at bedside  Pt intubated and needing additional sedation as evidenced by peak pressures on vent and witnessed restless movements by pt  Unable to administer Fentanyl gtt d/t need for additional IV access  PRN doses of Fentanyl given IV push per SAGAR Richey for sedation reasons

## 2020-01-17 NOTE — PROGRESS NOTES
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)    Benign essential hypertension    Cardiomyopathy ([facility] )    Chronic respiratory failure with hypoxia and hypercapnia (HCC)    COPD, very severe (HCC)    DMII (diabetes mellitus, type 2) (HCC)    Hyperlipidemia    Other chronic pain    PVD (peripheral vascular disease) ([facility])    Diarrhea    Shock ([facility] )    Ischemic bowel disease (HCC)    Pneumatosis intestinalis    Metabolic acidosis, increased anion gap    Lactic acidosis    Acute respiratory failure with hypoxia and hypercapnia (HCC)    Hyperkalemia  Resolved Problems:    * No resolved hospital problems  *    Plan:  Shock secondary to bowel ischemia/anion gap metabolic acidosis/lactic acidosis/pneumatosis/acute renal failure/hyperkalemia/encephalopathy/elevated troponin/hypernatremia/acute hypoxic and hypercapnic respiratory failure    · Arterial line/dialysis catheter replaced emergently  · Vasopressors upgraded treated Levophed for goal map greater than 65  · Volume resuscitation/IV bicarb push in bicarb drip administered  · Surgery was consulted and called emergently  · CT of the abdomen and pelvis without contrast done emergently  · EKG without acute peaked P waves hyperkalemia treatment given  · CT of the head with chronic changes encephalopathy likely metabolic  · Changed to fentanyl drip from propofol drip given hypotension for pain management also      Counseling / Coordination of Care: Total Critical Care time spent 75 minutes excluding procedures, teaching and family updates  ______________________________________________________________________    Chief Complaint:  Intubated/sedated unable to assess    Recent Events / Nursing Concern:  Rapid response this a m   For hypoxia/unresponsiveness required emergent intubation post sedative meds became hypotensive requiring vasopressors  Lactic acid was elevated in abdomen was noted to be rigid  Surgery was called  Requested CT of the abdomen and pelvis  Patient was taken to CAT scan results as above  Vitals:   Vitals:    20 0900 20 0930 20 0945 20 1000   BP: (!) 63/44      Pulse: 77 83 (!) 51 (!) 0   Resp: 22 13 (!) 4 (!) 0   Temp:       TempSrc:       SpO2: 90% 98% (!) 52%    Weight:       Height:         Arterial Line BP:   Arterial Line MAP (mmHg): 10 mmHg    Temperature: Temp (24hrs), Av 6 °F (35 9 °C), Min:94 °F (34 4 °C), Max:97 8 °F (36 6 °C)  Current: Temperature: (!) 95 9 °F (35 5 °C)    Hemodynamic Monitoring:  N/A       Respiratory:  SpO2: SpO2: (!) 52 %    O2 Flow Rate (L/min): 2 L/min    Physical Exam:  Physical Exam   Constitutional: She is oriented to person, place, and time  She appears toxic  She is sedated, intubated and restrained  HENT:   Head: Normocephalic and atraumatic  Mouth/Throat: Oropharynx is clear and moist    Eyes: Pupils are equal, round, and reactive to light  Conjunctivae are normal  No scleral icterus  Neck: Neck supple  No tracheal deviation present  Cardiovascular: Normal rate, regular rhythm, normal heart sounds and intact distal pulses  Exam reveals no gallop and no friction rub  No murmur heard  Pulmonary/Chest: Effort normal  She is intubated  No respiratory distress  She has wheezes  She has rhonchi  She has no rales  Abdominal: She exhibits distension  Bowel sounds are absent  Firm rigid no bowel sounds   Musculoskeletal: She exhibits no edema, tenderness or deformity  Cachectic   Neurological: She is oriented to person, place, and time  No cranial nerve deficit  GCS eye subscore is 1  GCS verbal subscore is 1  GCS motor subscore is 4  Skin: Skin is dry  Capillary refill takes more than 3 seconds  No rash noted  No erythema   No pallor  Call with delayed capillary refill dusky distal extremity         Allergies:    Allergies   Allergen Reactions    Penicillins Swelling     Legs swell up       Medications:   Scheduled Meds:    Current Facility-Administered Medications:  fentaNYL 50 mcg/hr Intravenous Continuous Kalpana SYDNEE [place] Last Rate: 50 mcg/hr (01/17/20 0902)   fentanyl citrate (PF) 50 mcg Intravenous Q10 Min PRN Shannan Miller, CRNP    LORazepam 0 5 mg Intravenous Q1H PRN SAGAR Sanabria      Continuous Infusions:    fentaNYL 50 mcg/hr Last Rate: 50 mcg/hr (01/17/20 0902)     PRN Meds:    fentanyl citrate (PF) 50 mcg Q10 Min PRN   LORazepam 0 5 mg Q1H PRN       Labs:   Results from last 7 days   Lab Units 01/17/20  0240 01/16/20  1602   WBC Thousand/uL 16 24* 16 46*   HEMOGLOBIN g/dL 12 4 14 6   HEMATOCRIT % 40 9 46 5*   PLATELETS Thousands/uL 188 269   BANDS PCT % 15*  --    MONO PCT % 6 3*     Results from last 7 days   Lab Units 01/17/20  0711 01/17/20  0619 01/17/20  0524 01/17/20  0247 01/17/20  0240 01/16/20  1734 01/16/20  1602   SODIUM mmol/L  --  146*  --   --  141 145 137   POTASSIUM mmol/L  --  6 7*  --   --  6 5* 4 3 6 6*   CHLORIDE mmol/L  --  107  --   --  109* 107 102   CO2 mmol/L  --  20*  --   --  22 27 21   CO2, I-STAT mmol/L 21  --  23 19*  --   --   --    ANION GAP mmol/L  --  19*  --   --  10 11 14*   BUN mg/dL  --  50*  --   --  54* 52* 54*   CREATININE mg/dL  --  1 80*  --   --  1 72* 1 19 1 27   CALCIUM mg/dL  --  8 9  --   --  8 9 8 6 9 3   GLUCOSE RANDOM mg/dL  --  136  --   --  290* 105 312*   ALT U/L  --   --   --   --  211*  --  17   AST U/L  --   --   --   --  297*  --  31   ALK PHOS U/L  --   --   --   --  108  --  127*   ALBUMIN g/dL  --   --   --   --  2 2*  --  2 8*   TOTAL BILIRUBIN mg/dL  --   --   --   --  0 60  --  0 50          Results from last 7 days   Lab Units 01/16/20  1610   INR  0 97   PTT seconds 30      Results from last 7 days   Lab Units 01/17/20  0619 01/17/20  0240 TROPONIN I ng/mL 0 14* 0 07*     Results from last 7 days   Lab Units 01/17/20  0619 01/17/20  0240   LACTIC ACID mmol/L 9 2* 4 4*     ABG:    VBG:          Diagnostic Imaging / Data: I have personally reviewed pertinent reports  and I have personally reviewed pertinent films in PACS  EKG:  No acute ST changes  Code Status: Level 4 - Comfort Care    Portions of the record may have been created with voice recognition software  Occasional wrong word or "sound a like" substitutions may have occurred due to the inherent limitations of voice recognition software  Read the chart carefully and recognize, using context, where substitutions have occurred      SIGNATURE: SAGAR Moulton  DATE: January 17, 2020  TIME: 11:35 AM

## 2020-01-17 NOTE — PROGRESS NOTES
Vancomycin Assessment    Kamryn Guerra is a [de-identified] y o  female who is currently receiving vancomycin 750mg (20mg/kg) Q 48hr for Pneumonia     Relevant clinical data and objective history reviewed:  Creatinine   Date Value Ref Range Status   01/17/2020 1 80 (H) 0 60 - 1 30 mg/dL Final     Comment:     Standardized to IDMS reference method   01/17/2020 1 72 (H) 0 60 - 1 30 mg/dL Final     Comment:     Standardized to IDMS reference method   01/16/2020 1 19 0 60 - 1 30 mg/dL Final     Comment:     Standardized to IDMS reference method   11/20/2015 0 77 0 60 - 1 30 mg/dL Final     Comment:     Standardized to IDMS reference method     /51   Pulse 99   Temp 97 8 °F (36 6 °C) (Oral)   Resp 22   Wt 38 7 kg (85 lb 5 1 oz)   SpO2 92%   Breastfeeding? No   BMI 16 66 kg/m²   I/O last 3 completed shifts: In: 1100 [IV Piggyback:1100]  Out: -   Lab Results   Component Value Date/Time    BUN 50 (H) 01/17/2020 06:19 AM    BUN 13 11/20/2015 04:48 PM    WBC 16 24 (H) 01/17/2020 02:40 AM    WBC 7 06 11/20/2015 04:48 PM    HGB 12 4 01/17/2020 02:40 AM    HGB 14 5 11/20/2015 04:48 PM    HCT 40 9 01/17/2020 02:40 AM    HCT 45 4 11/20/2015 04:48 PM     (H) 01/17/2020 02:40 AM    MCV 97 11/20/2015 04:48 PM     01/17/2020 02:40 AM     11/20/2015 04:48 PM     Temp Readings from Last 3 Encounters:   01/16/20 97 8 °F (36 6 °C) (Oral)   12/31/19 (!) 96 3 °F (35 7 °C) (Tympanic)   12/30/19 97 9 °F (36 6 °C) (Tympanic)     Vancomycin Days of Therapy: 1    Assessment/Plan  The patient is currently on vancomycin utilizing scheduled dosing based on actual body weight  Baseline risks associated with therapy include: pre-existing renal impairment, concomitant nephrotoxic medications and advanced age  The patient is currently receiving 750mg (20mg/kg) Q 48hr and is clinically appropriate and dose will be continued    Pharmacy will also follow closely for s/sx of nephrotoxicity, infusion reactions and appropriateness of therapy  BMP and CBC will be ordered per protocol  Plan for trough as patient approaches steady state, prior to the 3rd  dose at approximately 0500 on 1/21/20  Due to infection severity, will target a trough of 15-20 (appropriate for most indications)   Pharmacy will continue to follow the patients culture results and clinical progress daily      Chloe Boast, Itz

## 2020-01-17 NOTE — PROCEDURES
Arterial Line Insertion  Date/Time: 1/17/2020 9:08 AM  Performed by: Sumeet Erickson by: SAGAR Pink     Patient location:  Bedside  Other Assisting Provider: No    Consent:     Consent obtained:  Emergent situation  Universal protocol:     Immediately prior to procedure a time out was called: yes      Patient identity confirmed:  Arm band  Indications:     Indications: hemodynamic monitoring, multiple ABGs, continuous blood pressure monitoring and frequent labs / infusion    Pre-procedure details:     Skin preparation:  Chlorhexidine    Preparation: Patient was prepped and draped in sterile fashion    Anesthesia (see MAR for exact dosages): Anesthesia method:  None  Procedure details:     Location / Tip of Catheter:  Radial    Laterality:  Right    Kendrick's test performed: no      Needle gauge:  20 G    Placement technique:  Seldinger    Number of attempts:  2    Successful placement: yes      Transducer: waveform confirmed    Post-procedure details:     Post-procedure:  Sterile dressing applied and sutured    CMS:  Unable to assess    Patient tolerance of procedure:   Tolerated well, no immediate complications

## 2020-01-17 NOTE — H&P
H&P Exam - Janette Simmons [de-identified] y o  female MRN: [id number]    Unit/Bed#: ED 03 Encounter: [id number]      Assessment/Plan     1  Abdominal pain-differential includes narcotic induced constipation/irritable bowel issues, high suspicion for component of vascular/ischemic changes given her diffuse vascular disease-will consult GI-will give Dulcolax suppository tonight and consideration for fleets enema if that is not effective  Will have p r n  Morphine as well as Bentyl for her abdominal cramping  2  Uncontrolled hypertension-may be related to her pain but will follow they need increase med doses  3  Lung cancer-followed by Dr Eyal Herrera  4  Diarrhea-patient with C diff and stool cultures pending  5  COPD/asthma severe-patient to continue on her usual meds  6  Cardiomyopathy CHF issues on chronic O2-will hold Lasix today and recheck electrolytes in the a m  Before deciding restarted  7  Peripheral vascular disease with previous surgery  History of Present Illness     HPI:  Janette Simmons is a [de-identified] y o  female who presents with   Complaints of explosive diarrhea  yesterday  and abdominal pain  Patient has chronic constipation issues with her chronic narcotic use and over the past 5-7 days has had very poor appetite and family has tried giving her Ensure or and toast but has had limited intake  Patient denies lightheadedness or dizziness but feels generally weak  Family reports that she has complained of increased pain today prompting visit to the ER    PCP: Mayra Argueta MD    Review of Systems   Constitutional: Positive for fatigue  Negative for fever  HENT: Positive for congestion  Negative for postnasal drip  Respiratory: Positive for cough and shortness of breath  Negative for chest tightness  Cardiovascular: Negative for chest pain  Gastrointestinal: Positive for abdominal distention and abdominal pain  Musculoskeletal: Negative for arthralgias     All other systems reviewed and are negative  Historical Information   Past Medical History:   Diagnosis Date    Abnormal CT of the chest     last assessed: Aug 8, 2016    Acute left-sided low back pain without sciatica 2019    Arthritis     Asthma     Asymptomatic carotid artery stenosis     last assessed: 2017    Benign essential hypertension     last assessed: 2017    Cataract of right eye 3/19/2015    Cataract of right eye     last assessed: 2015    CHF (congestive heart failure) (HCC)     Common cold     Coronary artery disease     Depression     Diabetes mellitus [facility] )     [facility] pub[facility] rami [facility] 2015    History of radiation therapy     Hyperlipidemia     Hypertension     Lung cancer [facility] )     Multiple thyroid nodules 2016    [facility]     Pulmonary emphysema [facility]     Pulmonary nodule     Shortness of breath      Past Surgical History:   Procedure Laterality Date    BRONCHOSCOPY N/A 8/10/2016    Procedure: BRONCHOSCOPY FLEXIBLE;  Surgeon: Radha oStelo MD;  Location: [facility];  Service:    Ramu Joaquin BYPASS GRAFT      using vein - aortic-bifemoral - last assessed: Aug 22, 2016     SECTION      CHOLECYSTECTOMY      EYE SURGERY      HYSTERECTOMY      MA BRONCHOSCOPY NEEDLE BX TRACHEA MAIN STEM&/BRON N/A 8/10/2016    Procedure: ENDOBRONCHIAL ULTRASOUND (FROZEN SECTION) ; Surgeon: Radha Sotelo MD;  Location: [facility];  Service: Thoracic    MA INSJ TUNNELED CTR VAD W/SUBQ PORT AGE 5 YR/> Left 2016    Procedure: INSERTION VENOUS PORT (PORT-A-CATH);   Surgeon: Radha Sotelo MD;  [facility]: [facility];  Service: Thoracic    TONSILLECTOMY       Social History   Social History     Substance and Sexual Activity   Alcohol Use No     Social History     Substance and Sexual Activity   Drug Use No     Social History     Tobacco Use   Smoking Status Current Some Day Smoker    Packs/day: 0 25    Types: Cigarettes    Start date:    Smokeless Tobacco Never Used   Tobacco Comment    smokes 3-4 cigs /day - current every day smoker noted in "allscripts" smoke for less than 1/2 pack per day for 48 years       Family History:   Family History   Problem Relation Age of Onset    Brain cancer Sister     Cancer Sister     Thyroid disease Mother     Rheum arthritis Daughter        Meds/Allergies   PTA meds:   Prior to Admission Medications   Prescriptions Last Dose Informant Patient Reported? Taking?    Melatonin 1 MG/4ML LIQD  Spouse/Significant Other Yes No   Sig: Take by mouth   VENTOLIN  (90 Base) MCG/ACT inhaler  Spouse/Significant Other No No   Sig: TAKE 2 PUFFS BY MOUTH EVERY 6 HOURS AS NEEDED FOR WHEEZE   albuterol (2 5 mg/3 mL) 0 083 % nebulizer solution  Spouse/Significant Other No No   Sig: Take 1 vial (2 5 mg total) by nebulization 4 (four) times a day   azithromycin (ZITHROMAX) 250 mg tablet  Spouse/Significant Other No No   Sig: Take 1 tablet (250 mg total) by mouth 3 (three) times a week   betamethasone, augmented, (DIPROLENE-AF) 0 05 % cream  Spouse/Significant Other No No   Sig: APPLY TO AFFECTED AREA TWICE A DAY   fluticasone (FLONASE) 50 mcg/act nasal spray  Spouse/Significant Other No No   Si spray into each nostril daily   fluticasone-salmeterol (ADVAIR DISKUS) 500-50 mcg/dose inhaler  Spouse/Significant Other No No   Sig: Inhale 1 puff 2 (two) times a day BRAND NECESSARY   furosemide (LASIX) 40 mg tablet  Spouse/Significant Other No No   Sig: TAKE 1 TABLET BY MOUTH EVERY DAY   glucose blood (FREESTYLE LITE) test strip  Spouse/Significant Other No No   Sig: Test blood sugar 3 times daily or as needed, DX E11 9   metoprolol succinate (TOPROL-XL) 100 mg 24 hr tablet  Spouse/Significant Other No No   Sig: Take 1 tablet (100 mg total) by mouth daily   mirtazapine (REMERON) 15 mg tablet  Spouse/Significant Other No No   Sig: Take 1 tablet (15 mg total) by mouth daily at bedtime   mometasone (ELOCON) 0 1 % lotion  Spouse/Significant Other Yes No   Sig: Apply 1 application topically daily   nortriptyline (PAMELOR) 10 mg capsule   No No   Sig: Take 1 capsule (10 mg total) by mouth daily at bedtime   olmesartan (BENICAR) 20 mg tablet  Spouse/Significant Other No No   Sig: Take 0 5 tablets (10 mg total) by mouth daily   oxyCODONE-acetaminophen (PERCOCET) 5-325 mg per tablet  Spouse/Significant Other No No   Sig: Take 1 tablet by mouth every 4 (four) hours as needed for moderate painMax Daily Amount: 6 tablets   pentoxifylline (TRENtal) 400 mg ER tablet  Spouse/Significant Other No No   Sig: Take 1 tablet (400 mg total) by mouth 3 (three) times a day with meals   potassium chloride (K-DUR,KLOR-CON) 20 mEq tablet   No No   Sig: Take 1 tablet (20 mEq total) by mouth 2 (two) times a day   simvastatin (ZOCOR) 80 mg tablet  Spouse/Significant Other No No   Sig: TAKE 1 TABLET BY MOUTH EVERY DAY   tiotropium (SPIRIVA RESPIMAT) 2 5 MCG/ACT AERS inhaler  Spouse/Significant Other No No   Sig: Inhale 2 puffs daily      Facility-Administered Medications: None     Allergies   Allergen Reactions    Penicillins Swelling     Legs swell up       Objective   Vitals: Blood pressure (!) 171/111, pulse (!) 106, temperature 97 8 °F (36 6 °C), temperature source Tympanic, resp  rate (!) 28, weight 38 6 kg (85 lb), SpO2 100 %, not currently breastfeeding  Intake/Output Summary (Last 24 hours) at 1/16/2020 1903  Last data filed at 1/16/2020 1838  Gross per 24 hour   Intake 1100 ml   Output    Net 1100 ml       Invasive Devices     Central Venous Catheter Line            Port A Cath 08/31/16 Left Chest 1233 days          Peripheral Intravenous Line            Peripheral IV 01/16/20 Right Antecubital less than 1 day                Physical Exam   Constitutional: She is oriented to person, place, and time  She appears distressed  Thin frail-appearing elderly female   HENT:   Head: Normocephalic     Right Ear: External ear normal    Left Ear: External ear normal  Eyes: Right eye exhibits no discharge  Left eye exhibits no discharge  Neck: No thyromegaly present  Cardiovascular: Normal rate and regular rhythm  No murmur heard  Pulmonary/Chest: She has wheezes  Scattered rhonchi   Abdominal: She exhibits distension  She exhibits no mass  There is tenderness  Generalized fullness with tenderness in the midepigastric and periumbilical area   Lymphadenopathy:     She has no cervical adenopathy  Neurological: She is alert and oriented to person, place, and time  Skin: No erythema  Nursing note and vitals reviewed  Lab Results: I have personally reviewed pertinent reports  Imaging: I have personally reviewed pertinent reports  EKG, Pathology, and Other Studies: I have personally reviewed pertinent reports  Code Status: No Order  Advance Directive and Living Will:      Power of :    POLST:      Counseling / Coordination of Care  Total floor / unit time spent today 45 minutes  Greater than 50% of total time was spent with the patient and / or family counseling and / or coordination of care    Review of past records and study

## 2020-01-21 ENCOUNTER — HOSPITAL ENCOUNTER (OUTPATIENT)
Dept: INFUSION CENTER | Facility: HOSPITAL | Age: 80
End: 2020-01-21
Attending: INTERNAL MEDICINE

## 2020-01-22 LAB
BACTERIA BLD CULT: NORMAL
BACTERIA BLD CULT: NORMAL

## 2020-02-20 NOTE — CLINICAL RISK MANAGEMENT
Progress Note - Death in [address] [de-identified] y o  female MRN: [id number]  Unit/Bed#: -01 Encounter: [id number]      Patient  within 24 hours of restraint  with Soft restraint right wrist and Soft restraint left wrist  Death unrelated to use of restraints  This situation was tracked internally

## 2022-03-08 NOTE — PROGRESS NOTES
Pt tolerated port labs today with no adverse reactions  Labs drawn and sent as ordered  Pt left unit ambulatory with a steady gait 
no